# Patient Record
Sex: MALE | Race: WHITE | NOT HISPANIC OR LATINO | Employment: OTHER | URBAN - METROPOLITAN AREA
[De-identification: names, ages, dates, MRNs, and addresses within clinical notes are randomized per-mention and may not be internally consistent; named-entity substitution may affect disease eponyms.]

---

## 2018-02-26 ENCOUNTER — HOSPITAL ENCOUNTER (OUTPATIENT)
Dept: LAB | Facility: HOSPITAL | Age: 58
Discharge: HOME | End: 2018-02-26
Attending: INTERNAL MEDICINE | Admitting: INTERNAL MEDICINE

## 2018-02-26 LAB
ALBUMIN SERPL-MCNC: 4.3 G/DL (ref 3.5–4.8)
ALBUMIN/GLOB SERPL: 1.6 {RATIO} (ref 1–1.7)
ALP SERPL-CCNC: 76 IU/L (ref 32–91)
ALT SERPL-CCNC: 22 IU/L (ref 17–63)
ANION GAP SERPL CALC-SCNC: 10.7 MMOL/L (ref 10–20)
AST SERPL-CCNC: 26 IU/L (ref 15–41)
BASOPHILS # BLD AUTO: 0.1 10*3/UL (ref 0–0.2)
BASOPHILS NFR BLD AUTO: 1 % (ref 0–2)
BILIRUB SERPL-MCNC: 0.4 MG/DL (ref 0.3–1.2)
BUN SERPL-MCNC: 15 MG/DL (ref 8–20)
BUN/CREAT SERPL: 15 (ref 6.2–20.3)
CALCIUM SERPL-MCNC: 9 MG/DL (ref 8.9–10.3)
CHLORIDE SERPL-SCNC: 104 MMOL/L (ref 101–111)
CONV CO2: 27 MMOL/L (ref 22–32)
CONV TOTAL PROTEIN: 7 G/DL (ref 6.1–7.9)
CREAT UR-MCNC: 1 MG/DL (ref 0.7–1.2)
DIFFERENTIAL METHOD BLD: (no result)
DIGOXIN SERPL-MCNC: 0.2 NG/ML (ref 0.8–2)
EOSINOPHIL # BLD AUTO: 0.2 10*3/UL (ref 0–0.3)
EOSINOPHIL # BLD AUTO: 3 % (ref 0–3)
ERYTHROCYTE [DISTWIDTH] IN BLOOD BY AUTOMATED COUNT: 15.3 % (ref 11.5–14.5)
GLOBULIN UR ELPH-MCNC: 2.7 G/DL (ref 2.5–3.8)
GLUCOSE SERPL-MCNC: 92 MG/DL (ref 65–99)
HCT VFR BLD AUTO: 39 % (ref 40–54)
HGB BLD-MCNC: 13.4 G/DL (ref 14–18)
LYMPHOCYTES # BLD AUTO: 2.3 10*3/UL (ref 0.8–4.8)
LYMPHOCYTES NFR BLD AUTO: 33 % (ref 18–42)
MAGNESIUM SERPL-MCNC: 2 MG/DL (ref 1.8–2.5)
MCH RBC QN AUTO: 29 PG (ref 26–32)
MCHC RBC AUTO-ENTMCNC: 34.3 G/DL (ref 32–36)
MCV RBC AUTO: 84.7 FL (ref 80–94)
MONOCYTES # BLD AUTO: 0.6 10*3/UL (ref 0.1–1.3)
MONOCYTES NFR BLD AUTO: 9 % (ref 2–11)
NEUTROPHILS # BLD AUTO: 3.8 10*3/UL (ref 2.3–8.6)
NEUTROPHILS NFR BLD AUTO: 54 % (ref 50–75)
NRBC BLD AUTO-RTO: 0 /100{WBCS}
NRBC/RBC NFR BLD MANUAL: 0 10*3/UL
PLATELET # BLD AUTO: 315 10*3/UL (ref 150–450)
PMV BLD AUTO: 7.7 FL (ref 7.4–10.4)
POTASSIUM SERPL-SCNC: 3.7 MMOL/L (ref 3.6–5.1)
RBC # BLD AUTO: 4.6 10*6/UL (ref 4.6–6)
SODIUM SERPL-SCNC: 138 MMOL/L (ref 136–144)
WBC # BLD AUTO: 7 10*3/UL (ref 4.5–11.5)

## 2018-04-11 ENCOUNTER — HOSPITAL ENCOUNTER (OUTPATIENT)
Dept: PREADMISSION TESTING | Facility: HOSPITAL | Age: 58
Discharge: HOME | End: 2018-04-11
Attending: INTERNAL MEDICINE | Admitting: INTERNAL MEDICINE

## 2018-04-11 LAB
ANION GAP SERPL CALC-SCNC: 11 MMOL/L (ref 10–20)
APTT BLD: 23.1 SEC (ref 24–31)
BACTERIA SPEC AEROBE CULT: NORMAL
BASOPHILS # BLD AUTO: 0.1 10*3/UL (ref 0–0.2)
BASOPHILS NFR BLD AUTO: 2 % (ref 0–2)
BUN SERPL-MCNC: 11 MG/DL (ref 8–20)
BUN/CREAT SERPL: 12.2 (ref 6.2–20.3)
CALCIUM SERPL-MCNC: 8.7 MG/DL (ref 8.9–10.3)
CHLORIDE SERPL-SCNC: 104 MMOL/L (ref 101–111)
CONV CO2: 27 MMOL/L (ref 22–32)
CREAT UR-MCNC: 0.9 MG/DL (ref 0.7–1.2)
DIFFERENTIAL METHOD BLD: (no result)
EOSINOPHIL # BLD AUTO: 0.2 10*3/UL (ref 0–0.3)
EOSINOPHIL # BLD AUTO: 3 % (ref 0–3)
ERYTHROCYTE [DISTWIDTH] IN BLOOD BY AUTOMATED COUNT: 14.6 % (ref 11.5–14.5)
GLUCOSE SERPL-MCNC: 87 MG/DL (ref 65–99)
HCT VFR BLD AUTO: 39 % (ref 40–54)
HGB BLD-MCNC: 13 G/DL (ref 14–18)
INR PPP: 0.9
LYMPHOCYTES # BLD AUTO: 1.8 10*3/UL (ref 0.8–4.8)
LYMPHOCYTES NFR BLD AUTO: 35 % (ref 18–42)
Lab: NORMAL
MCH RBC QN AUTO: 28.5 PG (ref 26–32)
MCHC RBC AUTO-ENTMCNC: 33.3 G/DL (ref 32–36)
MCV RBC AUTO: 85.7 FL (ref 80–94)
MICRO REPORT STATUS: NORMAL
MONOCYTES # BLD AUTO: 0.6 10*3/UL (ref 0.1–1.3)
MONOCYTES NFR BLD AUTO: 11 % (ref 2–11)
NEUTROPHILS # BLD AUTO: 2.5 10*3/UL (ref 2.3–8.6)
NEUTROPHILS NFR BLD AUTO: 49 % (ref 50–75)
NRBC BLD AUTO-RTO: 0 /100{WBCS}
NRBC/RBC NFR BLD MANUAL: 0 10*3/UL
PLATELET # BLD AUTO: 254 10*3/UL (ref 150–450)
PMV BLD AUTO: 7.4 FL (ref 7.4–10.4)
POTASSIUM SERPL-SCNC: 4 MMOL/L (ref 3.6–5.1)
PROTHROMBIN TIME: 9.8 SEC (ref 9.6–11.7)
RBC # BLD AUTO: 4.55 10*6/UL (ref 4.6–6)
SODIUM SERPL-SCNC: 138 MMOL/L (ref 136–144)
SPECIMEN SOURCE: NORMAL
WBC # BLD AUTO: 5.1 10*3/UL (ref 4.5–11.5)

## 2018-12-18 ENCOUNTER — HOSPITAL ENCOUNTER (OUTPATIENT)
Dept: OTHER | Facility: HOSPITAL | Age: 58
Discharge: HOME | End: 2018-12-18
Attending: FAMILY MEDICINE | Admitting: FAMILY MEDICINE

## 2018-12-28 ENCOUNTER — HOSPITAL ENCOUNTER (OUTPATIENT)
Dept: GENERAL RADIOLOGY | Facility: HOSPITAL | Age: 58
Discharge: HOME | End: 2018-12-28
Attending: NURSE PRACTITIONER | Admitting: NURSE PRACTITIONER

## 2019-03-19 ENCOUNTER — HOSPITAL ENCOUNTER (OUTPATIENT)
Dept: OTHER | Facility: HOSPITAL | Age: 59
Discharge: HOME | End: 2019-03-19
Attending: FAMILY MEDICINE | Admitting: FAMILY MEDICINE

## 2019-07-01 PROBLEM — K42.9 UMBILICAL HERNIA: Status: ACTIVE | Noted: 2019-07-01

## 2019-07-01 PROBLEM — Z95.0 PRESENCE OF CARDIAC PACEMAKER: Status: ACTIVE | Noted: 2018-04-23

## 2019-07-01 PROBLEM — IMO0002 THORACIC OR LUMBOSACRAL NEURITIS OR RADICULITIS: Status: ACTIVE | Noted: 2019-07-01

## 2019-07-01 PROBLEM — I48.0 PAROXYSMAL ATRIAL FIBRILLATION (HCC): Status: ACTIVE | Noted: 2017-04-06

## 2019-07-01 PROBLEM — I45.5 SINUS PAUSE: Status: ACTIVE | Noted: 2017-06-08

## 2019-07-01 PROBLEM — J30.9 ALLERGIC RHINITIS: Status: ACTIVE | Noted: 2019-07-01

## 2019-07-01 PROBLEM — E78.5 HYPERLIPIDEMIA: Status: ACTIVE | Noted: 2019-07-01

## 2019-07-01 PROBLEM — I49.5 TACHYCARDIA-BRADYCARDIA (HCC): Status: ACTIVE | Noted: 2017-04-06

## 2019-07-01 PROBLEM — M99.79 DISC NARROWING: Status: ACTIVE | Noted: 2019-07-01

## 2019-07-01 PROBLEM — G47.00 INSOMNIA: Status: ACTIVE | Noted: 2019-07-01

## 2019-07-01 PROBLEM — K21.9 GASTROESOPHAGEAL REFLUX DISEASE: Status: ACTIVE | Noted: 2019-07-01

## 2019-07-01 PROBLEM — I10 HYPERTENSION: Status: ACTIVE | Noted: 2019-07-01

## 2019-07-01 RX ORDER — DIGOXIN 125 MCG
1 TABLET ORAL EVERY 24 HOURS
COMMUNITY
Start: 2018-06-11 | End: 2019-11-21 | Stop reason: SDUPTHER

## 2019-07-01 RX ORDER — COLCHICINE 0.6 MG/1
1 TABLET ORAL EVERY 24 HOURS
COMMUNITY
Start: 2018-01-29 | End: 2020-09-24

## 2019-07-01 RX ORDER — TAMSULOSIN HYDROCHLORIDE 0.4 MG/1
1 CAPSULE ORAL DAILY
COMMUNITY
Start: 2017-04-06 | End: 2022-11-04

## 2019-07-01 RX ORDER — IBUPROFEN 800 MG/1
1 TABLET ORAL 3 TIMES DAILY
COMMUNITY
Start: 2015-12-17 | End: 2019-10-28

## 2019-07-01 RX ORDER — AMITRIPTYLINE HYDROCHLORIDE 50 MG/1
2 TABLET, FILM COATED ORAL NIGHTLY
COMMUNITY
Start: 2015-03-13 | End: 2019-09-03 | Stop reason: SDUPTHER

## 2019-07-01 RX ORDER — ESOMEPRAZOLE MAGNESIUM 40 MG/1
1 CAPSULE, DELAYED RELEASE ORAL DAILY
COMMUNITY
Start: 2015-05-12

## 2019-07-01 RX ORDER — FLUTICASONE PROPIONATE 50 MCG
2 SPRAY, SUSPENSION (ML) NASAL DAILY
COMMUNITY
Start: 2015-08-06 | End: 2020-07-16

## 2019-07-01 RX ORDER — LISINOPRIL 20 MG/1
0.5 TABLET ORAL DAILY
COMMUNITY
Start: 2018-07-30 | End: 2020-03-09 | Stop reason: SDUPTHER

## 2019-07-01 RX ORDER — METOPROLOL SUCCINATE 25 MG/1
0.5 TABLET, EXTENDED RELEASE ORAL EVERY 24 HOURS
COMMUNITY
Start: 2019-01-11 | End: 2020-01-07 | Stop reason: SDUPTHER

## 2019-07-01 RX ORDER — LORATADINE 10 MG/1
1 TABLET ORAL DAILY
COMMUNITY
Start: 2015-08-06 | End: 2021-02-02

## 2019-07-01 RX ORDER — GABAPENTIN 600 MG/1
1 TABLET ORAL 4 TIMES DAILY
COMMUNITY
Start: 2013-09-10 | End: 2020-03-05

## 2019-07-01 RX ORDER — LANOLIN ALCOHOL/MO/W.PET/CERES
50 CREAM (GRAM) TOPICAL DAILY
COMMUNITY
End: 2021-02-02

## 2019-07-02 ENCOUNTER — OFFICE VISIT (OUTPATIENT)
Dept: FAMILY MEDICINE CLINIC | Facility: CLINIC | Age: 59
End: 2019-07-02

## 2019-07-02 VITALS
RESPIRATION RATE: 18 BRPM | WEIGHT: 187 LBS | HEART RATE: 74 BPM | HEIGHT: 71 IN | OXYGEN SATURATION: 98 % | BODY MASS INDEX: 26.18 KG/M2 | TEMPERATURE: 98.5 F

## 2019-07-02 DIAGNOSIS — L01.00 IMPETIGO: ICD-10-CM

## 2019-07-02 DIAGNOSIS — R09.89 LABILE BLOOD PRESSURE: ICD-10-CM

## 2019-07-02 DIAGNOSIS — B35.9 TINEA: Primary | ICD-10-CM

## 2019-07-02 PROCEDURE — 99213 OFFICE O/P EST LOW 20 MIN: CPT | Performed by: FAMILY MEDICINE

## 2019-07-02 RX ORDER — CARISOPRODOL 350 MG/1
TABLET ORAL
Refills: 0 | COMMUNITY
Start: 2019-05-20 | End: 2019-10-28

## 2019-07-02 RX ORDER — CEPHALEXIN 500 MG/1
500 CAPSULE ORAL 3 TIMES DAILY
Qty: 30 CAPSULE | Refills: 0 | Status: SHIPPED | OUTPATIENT
Start: 2019-07-02 | End: 2019-10-28

## 2019-07-02 NOTE — PROGRESS NOTES
Subjective   Steven Hammonds is a 58 y.o. male.     Open wound between left big and second toe. Using OTC athletes cream.       Foot Pain   The current episode started 1 to 4 weeks ago (1 week). The problem occurs constantly. The problem has been gradually worsening. Associated symptoms include a rash. Pertinent negatives include no abdominal pain, chest pain, fatigue, fever, joint swelling, nausea, neck pain, numbness, swollen glands, vomiting or weakness. Nothing aggravates the symptoms. The treatment provided no relief.   Hypertension   This is a chronic problem. The problem has been waxing and waning since onset. The problem is controlled. Pertinent negatives include no chest pain, neck pain or shortness of breath. There are no compliance problems.      Has appt with cardiology in a month    The following portions of the patient's history were reviewed and updated as appropriate: allergies, current medications, past family history, past medical history, past social history, past surgical history and problem list.    Patient Active Problem List   Diagnosis   • Degeneration of lumbar or lumbosacral intervertebral disc   • Disc narrowing   • Encounter for other specified aftercare   • Gastroesophageal reflux disease   • Hypertension   • Paroxysmal atrial fibrillation (CMS/HCC)   • Spinal stenosis   • Spondylolysis   • Status post arthroscopy of knee   • Sinus pause   • Tachycardia-bradycardia (CMS/HCC)   • Thoracic or lumbosacral neuritis or radiculitis   • Tremor   • Presence of cardiac pacemaker   • Allergic rhinitis   • Hyperlipidemia   • Insomnia   • Umbilical hernia   • Tinea   • Impetigo   • Labile blood pressure       Current Outpatient Medications on File Prior to Visit   Medication Sig Dispense Refill   • amitriptyline (ELAVIL) 50 MG tablet Take 2 tablets by mouth Every Night.     • carisoprodol (SOMA) 350 MG tablet TK 1 T PO TID  0   • digoxin (DIGOX) 125 MCG tablet Take 1 tablet by mouth Daily.     •  esomeprazole (NEXIUM) 40 MG capsule Take 1 capsule by mouth Daily.     • fluticasone (FLONASE) 50 MCG/ACT nasal spray 2 sprays into the nostril(s) as directed by provider Daily.     • gabapentin (NEURONTIN) 600 MG tablet Take 1 tablet by mouth 4 (Four) Times a Day.     • ibuprofen (ADVIL,MOTRIN) 800 MG tablet Take 1 tablet by mouth 3 (Three) Times a Day.     • lisinopril (PRINIVIL,ZESTRIL) 20 MG tablet Take 1 tablet by mouth 2 (Two) Times a Day.     • loratadine (CLARITIN) 10 MG tablet Take 1 tablet by mouth Daily.     • metoprolol succinate XL (TOPROL XL) 25 MG 24 hr tablet Take 0.5 tablets by mouth Daily.     • tamsulosin (FLOMAX) 0.4 MG capsule 24 hr capsule Take 1 capsule by mouth Daily.     • colchicine (COLCRYS) 0.6 MG tablet Take 1 tablet by mouth Daily.     • etanercept (ENBREL) 50 MG/ML solution prefilled syringe injection Inject  under the skin into the appropriate area as directed.     • vitamin B-6 (PYRIDOXINE) 50 MG tablet Take 50 mg by mouth Daily.       No current facility-administered medications on file prior to visit.        Allergies   Allergen Reactions   • Oxycodone-Acetaminophen GI Intolerance       Review of Systems   Constitutional: Negative for activity change, appetite change, fatigue and fever.   HENT: Negative for ear pain and voice change.    Eyes: Negative for visual disturbance.   Respiratory: Negative for shortness of breath and wheezing.    Cardiovascular: Negative for chest pain and leg swelling.   Gastrointestinal: Negative for abdominal pain, blood in stool, constipation, diarrhea, nausea and vomiting.   Endocrine: Negative for polydipsia and polyuria.   Genitourinary: Negative for dysuria, frequency and hematuria.   Musculoskeletal: Negative for joint swelling, neck pain and neck stiffness.   Skin: Positive for rash. Negative for bruise.   Neurological: Negative for weakness, numbness and headache.   Psychiatric/Behavioral: Negative for suicidal ideas and depressed mood.        Objective   Physical Exam   Constitutional: He is oriented to person, place, and time. He appears well-developed and well-nourished.   Eyes: Conjunctivae and EOM are normal. Pupils are equal, round, and reactive to light.   Neck: Normal range of motion. Neck supple.   Cardiovascular: Normal rate, regular rhythm and normal heart sounds.   Pulmonary/Chest: Effort normal and breath sounds normal.   Abdominal: Soft. Bowel sounds are normal.   Musculoskeletal: Normal range of motion.   Neurological: He is alert and oriented to person, place, and time.   Skin: Skin is warm and dry.   Psychiatric: He has a normal mood and affect. His behavior is normal. Judgment and thought content normal.         Assessment/Plan   Problem List Items Addressed This Visit        Cardiovascular and Mediastinum    Labile blood pressure    Overview     Recc follow up with cardiology            Musculoskeletal and Integument    Impetigo    Relevant Medications    cephalexin (KEFLEX) 500 MG capsule       Other    Tinea - Primary        A high fiber diet with plenty of fluids (up to 8 glasses of water daily) is suggested to relieve these symptoms.  Metamucil, 1 tablespoon once or twice daily can be used to keep bowels regular if needed.  Medication and medication adverse effects discussed.  Drug education given and explained to patient. Patient verbalized understanding.  OTC meds discussed.  Follow-up in 2 weeks if not better.  Follow-up sooner for worsening symptoms or for any concerns.

## 2019-07-09 ENCOUNTER — TELEPHONE (OUTPATIENT)
Dept: FAMILY MEDICINE CLINIC | Facility: CLINIC | Age: 59
End: 2019-07-09

## 2019-07-09 NOTE — TELEPHONE ENCOUNTER
Spoke with wife.  She will let him know.  She says not to do anything until pt calls back and requests change.

## 2019-07-09 NOTE — TELEPHONE ENCOUNTER
Pt's wife called.  Pt needs PA on Soma.  PA submitted online thru [employer].  Waiting on authorization.

## 2019-07-09 NOTE — TELEPHONE ENCOUNTER
[employer] WILL NOT COVER CARISOPRODOL 350MG  -    HOWEVER IT WILL COVER  TIZANIDINE HCL TAB OR CAP -      [phone]    PHONE

## 2019-07-10 ENCOUNTER — TELEPHONE (OUTPATIENT)
Dept: FAMILY MEDICINE CLINIC | Facility: CLINIC | Age: 59
End: 2019-07-10

## 2019-07-10 NOTE — TELEPHONE ENCOUNTER
His insurance is not covering his carisoprodol tabs the alternative is tizanidine hcl tabs or caps. Ref #[id number]

## 2019-07-11 RX ORDER — TIZANIDINE 4 MG/1
4 TABLET ORAL 3 TIMES DAILY
Qty: 180 TABLET | Refills: 3 | Status: SHIPPED | OUTPATIENT
Start: 2019-07-11 | End: 2020-03-31

## 2019-07-12 ENCOUNTER — TELEPHONE (OUTPATIENT)
Dept: FAMILY MEDICINE CLINIC | Facility: CLINIC | Age: 59
End: 2019-07-12

## 2019-07-12 NOTE — TELEPHONE ENCOUNTER
Notified pt we did PA and they denied it. They said they would cover Tizanidine so that was sent in.

## 2019-07-23 ENCOUNTER — TRANSCRIBE ORDERS (OUTPATIENT)
Dept: ADMINISTRATIVE | Facility: HOSPITAL | Age: 59
End: 2019-07-23

## 2019-07-23 ENCOUNTER — HOSPITAL ENCOUNTER (OUTPATIENT)
Dept: GENERAL RADIOLOGY | Facility: HOSPITAL | Age: 59
Discharge: HOME | End: 2019-07-23
Admitting: NURSE PRACTITIONER

## 2019-07-23 DIAGNOSIS — L40.53 PSORIATIC SPONDYLITIS (HCC): Primary | ICD-10-CM

## 2019-07-23 DIAGNOSIS — Z79.899 DRUG THERAPY: ICD-10-CM

## 2019-07-23 DIAGNOSIS — L40.53 PSORIATIC SPONDYLITIS (HCC): ICD-10-CM

## 2019-07-23 PROCEDURE — 71046 X-RAY EXAM CHEST 2 VIEWS: CPT

## 2019-08-21 ENCOUNTER — CLINICAL SUPPORT NO REQUIREMENTS (OUTPATIENT)
Dept: CARDIOLOGY | Facility: CLINIC | Age: 59
End: 2019-08-21

## 2019-08-21 DIAGNOSIS — I49.5 TACHY-BRADY SYNDROME (HCC): Primary | ICD-10-CM

## 2019-08-21 DIAGNOSIS — Z95.0 PACEMAKER: ICD-10-CM

## 2019-08-26 ENCOUNTER — OFFICE VISIT (OUTPATIENT)
Dept: CARDIOLOGY | Facility: CLINIC | Age: 59
End: 2019-08-26

## 2019-08-26 ENCOUNTER — CLINICAL SUPPORT NO REQUIREMENTS (OUTPATIENT)
Dept: CARDIOLOGY | Facility: CLINIC | Age: 59
End: 2019-08-26

## 2019-08-26 VITALS
DIASTOLIC BLOOD PRESSURE: 93 MMHG | HEART RATE: 61 BPM | SYSTOLIC BLOOD PRESSURE: 155 MMHG | BODY MASS INDEX: 25.43 KG/M2 | OXYGEN SATURATION: 99 % | WEIGHT: 179.75 LBS

## 2019-08-26 DIAGNOSIS — I48.0 PAROXYSMAL ATRIAL FIBRILLATION (HCC): ICD-10-CM

## 2019-08-26 DIAGNOSIS — I10 ESSENTIAL HYPERTENSION: Primary | ICD-10-CM

## 2019-08-26 DIAGNOSIS — E78.2 MIXED HYPERLIPIDEMIA: ICD-10-CM

## 2019-08-26 DIAGNOSIS — I49.5 TACHYCARDIA-BRADYCARDIA (HCC): ICD-10-CM

## 2019-08-26 DIAGNOSIS — I45.5 SINUS PAUSE: ICD-10-CM

## 2019-08-26 DIAGNOSIS — I48.0 PAROXYSMAL ATRIAL FIBRILLATION (HCC): Primary | ICD-10-CM

## 2019-08-26 DIAGNOSIS — Z95.0 PRESENCE OF CARDIAC PACEMAKER: ICD-10-CM

## 2019-08-26 PROCEDURE — 93000 ELECTROCARDIOGRAM COMPLETE: CPT | Performed by: INTERNAL MEDICINE

## 2019-08-26 PROCEDURE — 99214 OFFICE O/P EST MOD 30 MIN: CPT | Performed by: INTERNAL MEDICINE

## 2019-08-26 PROCEDURE — 93280 PM DEVICE PROGR EVAL DUAL: CPT | Performed by: INTERNAL MEDICINE

## 2019-08-26 RX ORDER — DICLOFENAC SODIUM AND MISOPROSTOL 75; 200 MG/1; UG/1
1 TABLET, DELAYED RELEASE ORAL 2 TIMES DAILY
COMMUNITY
End: 2021-03-09

## 2019-08-26 NOTE — PROGRESS NOTES
Encounter Date:08/26/2019  Last seen 2/11/2019      Patient ID: Steven Hammonds is a 59 y.o. male.    Chief Complaint:  Status post pacemaker  Hypertension  CKD 3  History of palpitations and near syncope  Acute visit-patient is having blood pressure issues.    History of Present Illness  Patient has been having some blood pressure issues.  Patient has been having dizziness with low blood pressure.    Since I have last seen, the patient has been without any chest discomfort ,shortness of breath, palpitations, or syncope.  Denies having any headache ,abdominal pain ,nausea, vomiting , diarrhea constipation, loss of weight or loss of appetite.  Denies having any excessive bruising ,hematuria or blood in the stool.    Review of all systems negative except as indicated      Assessment and Plan       [[[[[[[[[[[[[[[[[[[[[  Impression  ============  -status post permanent dual-chamber pacemaker implantation ([employer] MRI Compatible ) 04/13/2018  - sick sinus syndrome-tachy-irvin syndrome.  Patient has history of atrial fibrillation with rapid ventricular response.  Patient had significant pauses of 5-6 seconds.  Symptomatic with dizziness and near-syncope    - history of Near-syncope associated with palpitations and dizziness .  -improved    -cardiac catheterization 03/18/2017 revealed normal left ventricular function and normal coronary arteries.  History of normal echocardiogram    -status post loop recorder placement 03/24/2017. -removal of loop recorder 04/13/2018.    -hypertension  Renal dysfunction BUN 17 creatinine 1.4 GERD depression Psoriatic arthritis Hypokalemia    -history of Anemia and neutropenia    -Status post hip replacement bilateral arthroscopic knee surgery right shoulder surgery  CBP- with chronic myelopathy, s/p multiple lumbar surgeries; continue soma, gabapentin    -Allergic to Percocet  ================  Plan  ==============  EKG showed atrial paced ventricular sensed rhythm.  Patient is  having dizzy spells with low blood pressure.  Interrogation of the pacemaker revealed excellent pacing parameters.  Intermittent atrial high rates were noted.  Medications were reviewed and updated.  Change lisinopril to 10 mg twice a day (20 mg tablet)  Patient is only taking 12.5 mg of metoprolol due to problems with constipation.  Hopefully blood pressure will be more steady.   followup in the office in 6 months with pacemaker interrogation  [[[[[[[[[[[[[[[[[[[[[[[[[[[[[[[[           Diagnosis Plan   1. Essential hypertension     2. Paroxysmal atrial fibrillation (CMS/HCC)     3. Sinus pause     4. Tachycardia-bradycardia (CMS/HCC)     5. Presence of cardiac pacemaker     6. Mixed hyperlipidemia     LAB RESULTS (LAST 7 DAYS)    CBC        BMP        CMP         BNP        TROPONIN        CoAg        Creatinine Clearance  CrCl cannot be calculated (Patient's most recent lab result is older than the maximum 30 days allowed.).    ABG        Radiology  No radiology results for the last day                The following portions of the patient's history were reviewed and updated as appropriate: allergies, current medications, past family history, past medical history, past social history, past surgical history and problem list.    Review of Systems   Constitution: Positive for malaise/fatigue.   Cardiovascular: Positive for chest pain, leg swelling (hands and feet) and palpitations. Negative for syncope.   Respiratory: Negative for shortness of breath.    Skin: Negative for rash.   Gastrointestinal: Positive for nausea. Negative for vomiting.   Neurological: Positive for dizziness, light-headedness and numbness.         Current Outpatient Medications:   •  amitriptyline (ELAVIL) 50 MG tablet, Take 2 tablets by mouth Every Night., Disp: , Rfl:   •  colchicine (COLCRYS) 0.6 MG tablet, Take 1 tablet by mouth Daily., Disp: , Rfl:   •  diclofenac-misoprostol (ARTHROTEC 75) 75-0.2 MG EC tablet, Take 1 tablet by mouth 2 (Two)  Times a Day., Disp: , Rfl:   •  digoxin (DIGOX) 125 MCG tablet, Take 1 tablet by mouth Daily., Disp: , Rfl:   •  etanercept (ENBREL) 50 MG/ML solution prefilled syringe injection, Inject  under the skin into the appropriate area as directed., Disp: , Rfl:   •  fluticasone (FLONASE) 50 MCG/ACT nasal spray, 2 sprays into the nostril(s) as directed by provider Daily., Disp: , Rfl:   •  gabapentin (NEURONTIN) 600 MG tablet, Take 1 tablet by mouth 4 (Four) Times a Day., Disp: , Rfl:   •  lisinopril (PRINIVIL,ZESTRIL) 20 MG tablet, Take 1 tablet by mouth 2 (Two) Times a Day., Disp: , Rfl:   •  loratadine (CLARITIN) 10 MG tablet, Take 1 tablet by mouth Daily., Disp: , Rfl:   •  metoprolol succinate XL (TOPROL XL) 25 MG 24 hr tablet, Take 0.5 tablets by mouth Daily., Disp: , Rfl:   •  tamsulosin (FLOMAX) 0.4 MG capsule 24 hr capsule, Take 1 capsule by mouth Daily., Disp: , Rfl:   •  tiZANidine (ZANAFLEX) 4 MG tablet, Take 1 tablet by mouth 3 (Three) Times a Day., Disp: 180 tablet, Rfl: 3  •  vitamin B-6 (PYRIDOXINE) 50 MG tablet, Take 50 mg by mouth Daily., Disp: , Rfl:   •  carisoprodol (SOMA) 350 MG tablet, TK 1 T PO TID, Disp: , Rfl: 0  •  cephalexin (KEFLEX) 500 MG capsule, Take 1 capsule by mouth 3 (Three) Times a Day., Disp: 30 capsule, Rfl: 0  •  esomeprazole (NEXIUM) 40 MG capsule, Take 1 capsule by mouth Daily., Disp: , Rfl:   •  ibuprofen (ADVIL,MOTRIN) 800 MG tablet, Take 1 tablet by mouth 3 (Three) Times a Day., Disp: , Rfl:     Allergies   Allergen Reactions   • Oxycodone-Acetaminophen GI Intolerance   • Adhesive Tape Unknown (See Comments)   • Oxycodone Unknown (See Comments)       Family History   Problem Relation Age of Onset   • Other Father         Substance Abuse   • Cancer Father         Lung       Past Surgical History:   Procedure Laterality Date   • CARDIAC PACEMAKER PLACEMENT     • KNEE SURGERY Right 2016   • LUMBAR DISCECTOMY  10/2015    Comments: lumbar disk decompression   • TOTAL HIP ARTHROPLASTY  Right 12/09/2016       Past Medical History:   Diagnosis Date   • Acute left ankle pain     Impression: xray neg on prelim read. Findings discussed. All questions answered. Awaiting final radiology evaluation. Natural course and self-limited nature of this condition discussed. Follow-up in 4-6 weeks if not better. Follow-up sooner for worsening symptoms or for any concerns.   • Allergic rhinitis     Impression: persistent   • Annual physical exam     Impression: Discussed anticipatory guidance, diet, exercise, and weight loss. Discussed safety, seatbelts, and routine screening examinations. Discussed self-examinations.   • Arthralgia of right hip     Impression: needs right hip relacement   • Bobby's esophagus with dysplasia     Impression: scope 11/2013 improved from previous. Recheck due in 2015 Packet given   • Bronchitis     Impression: resolved   • Bruising     Impression: appears benign, no other areas noted. Natural course and self-limited nature of this condition discussed. Will follow.   • Cervical disc disease with myelopathy     Impression: seen on MRi 5/2012. Workup in progress.   • Chronic low back pain with sciatica     Unspecified. Impression: scheduled for surgery   • Chronic pain     Impression: severe constipation with opana. Try MS contin   • Chronic pain of right knee     Impression: needs knee replacement   • Colon cancer screening     Impression: packet given   • Conductive hearing loss     Impression: hfhl right. Refer to audiology. Question AR. Start flonase   • Constipation    • Degeneration of lumbar intervertebral disc     Story: history of lumbar surgery. s/p epidural x3, last 6/18/15 Dr Ge. Impression: on ibuprofen   • ED (erectile dysfunction)    • Esophageal reflux    • Esophagitis     Impression: Past due for repeat EGD. Previous physician no longer covered under insurance. Recommend refer to gastroentrreology. Pt has packet to fill out for GSI.   • Foot pain, bilateral    •  Gastroenteritis, acute     Impression: try phenergan, lomotil.   • Headache     Impression: Musculoskeletal. Baclofen tid prn.   • History of tobacco use    • Hydrocele    • Hyperglycemia     Impression: incidential finding   • Hypertension, benign     Impression: stable. Low after surgery. Will follow.   • Insomnia    • Intervertebral disc disorder with myelopathy, lumbar region     Impression: Changed neurontin to hs, increased to 1800 mg at hs.   • Left leg weakness     Impression: due to recent surgery. May benefit for home health. Pt homebound due severe pain postop back surgery. Also needs 3-in-1 Commode   • Left medial knee pain     Impression: Likely OA. Findings discussed. All questions answered. Keep follow-up with his ortho. Xrays today   • Localized primary osteoarthritis of right lower leg     Impression: scope planned   • Medicare annual wellness visit, subsequent     With abnormal findings.   • Mixed hyperlipidemia     Story: Stable, Improved, Compliant with meds Is getting adequate diet and exercise Goals developed at last visit were met Follow up in 6 months Care management needs are self addressed. Would not benefit from care management Self-Management abilities addressed and patient is capable of managing his/her own disease   • Need for prophylactic vaccination and inoculation against influenza    • Nocturia    • Non-seasonal allergic rhinitis due to pollen    • Other specified persistent mood disorders (CMS/[facility])     Story: stress   • Overweight (BMI 25.0-29.9)    • Pain of left clavicle     Impression: likely arthritic   • Palpitations     Impression: labs, holter normal. Elevated HR likely just related to pain   • Paronychia of finger of left hand     Impression: Continued pain and discomfort in the finger. There is little to no inflammation. He was started on Bactrim DS and advised to soak his finger in warm water and epson salt.   • Proctitis     Impression: likely based on colonoscopy  findings. Wife had VGE week prior. Will get path results of biopsies taken at time of endoscopy to eval for crohns   • Psoriatic arthritis (CMS/HCC)     Impression: refer to Dr Rodriguez   • Rash     Impression: exam more consistent with psitiasis rosea. Findings discussed. All questions answered. Reassurance, education. Natural course and self-limited nature of this condition discussed. Follow up if worsens or persists.   • Screening for alcoholism    • Screening for depression    • Scrotal mass    • Tendonitis     Impression: right biceps. Findings discussed. All questions answered. Medication and medication adverse effects discussed. Drug education given and explained to patient. Patient verbalized understanding. Follow-up in 2 weeks if not better. Follow-up sooner for worsening symptoms or for any concerns.   • Tick bite     Initial encounter. Impression: with buuseye rash. Findings discussed. All questions answered. Medication and medication adverse effects discussed. Drug education given and explained to patient. Patient verbalized understanding.   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 Natural course and self-limited nature of this condition discussed. Follow-up in 4-6 weeks if not better. Follow-up sooner for worsening symptoms or for any concerns. EMG if not improved   • Umbilical hernia    • Unspecified contact dermatitis, unspecified cause     Impression: He was started on Elocon cream to help with his symptoms. He was encouraged to RTC if it worsens.       Family History   Problem Relation Age of Onset   • Other Father         Substance Abuse   • Cancer Father         Lung       Social History     Socioeconomic History   • Marital status:      Spouse name: Not on file   • Number of children: Not on file   • Years of education: Not  on file   • Highest education level: Not on file   Tobacco Use   • Smoking status: Former Smoker   Substance and Sexual Activity   • Alcohol use: No     Frequency: Never   • Drug use: No           ECG 12 Lead  Date/Time: 8/26/2019 12:11 PM  Performed by: Claudia Benson MD  Authorized by: Claudia Benson MD   Comparison: compared with previous ECG   Comments: Atrial paced ventricular sensed rhythm 60/min nonspecific ST-T wave changes no ectopy normal axis normal intervals.  No change from 2/11/2019              Objective:       Physical Exam    /93 (BP Location: Left arm, Patient Position: Sitting, Cuff Size: Adult)   Pulse 61   Wt 81.5 kg (179 lb 12 oz)   SpO2 99%   BMI 25.43 kg/m²   The patient is alert, oriented and in no distress.    Vital signs as noted above.    Head and neck revealed no carotid bruits or jugular venous distension.  No thyromegaly or lymphadenopathy is present.    Lungs clear.  No wheezing.  Breath sounds are normal bilaterally.    Heart normal first and second heart sounds.  No murmur..  No pericardial rub is present.  No gallop is present.    Abdomen soft and nontender.  No organomegaly is present.    Extremities revealed good peripheral pulses without any pedal edema.    Skin warm and dry.  Pacemaker site looks normal.    Musculoskeletal system is grossly normal.    CNS grossly normal.

## 2019-09-03 DIAGNOSIS — M51.37 DEGENERATION OF LUMBAR OR LUMBOSACRAL INTERVERTEBRAL DISC: Primary | ICD-10-CM

## 2019-09-03 RX ORDER — AMITRIPTYLINE HYDROCHLORIDE 50 MG/1
TABLET, FILM COATED ORAL
Qty: 180 TABLET | Refills: 4 | Status: SHIPPED | OUTPATIENT
Start: 2019-09-03 | End: 2020-12-01

## 2019-10-28 ENCOUNTER — OFFICE VISIT (OUTPATIENT)
Dept: FAMILY MEDICINE CLINIC | Facility: CLINIC | Age: 59
End: 2019-10-28

## 2019-10-28 VITALS
RESPIRATION RATE: 18 BRPM | HEIGHT: 71 IN | HEART RATE: 90 BPM | WEIGHT: 177.4 LBS | OXYGEN SATURATION: 96 % | BODY MASS INDEX: 24.84 KG/M2 | TEMPERATURE: 97.6 F

## 2019-10-28 DIAGNOSIS — E78.2 MIXED HYPERLIPIDEMIA: ICD-10-CM

## 2019-10-28 DIAGNOSIS — R21 RASH: ICD-10-CM

## 2019-10-28 DIAGNOSIS — R09.89 LABILE BLOOD PRESSURE: ICD-10-CM

## 2019-10-28 DIAGNOSIS — Z12.5 PROSTATE CANCER SCREENING: ICD-10-CM

## 2019-10-28 DIAGNOSIS — K21.9 GASTROESOPHAGEAL REFLUX DISEASE, ESOPHAGITIS PRESENCE NOT SPECIFIED: ICD-10-CM

## 2019-10-28 DIAGNOSIS — I10 ESSENTIAL HYPERTENSION: ICD-10-CM

## 2019-10-28 DIAGNOSIS — I48.0 PAROXYSMAL ATRIAL FIBRILLATION (HCC): ICD-10-CM

## 2019-10-28 DIAGNOSIS — Z00.00 MEDICARE ANNUAL WELLNESS VISIT, SUBSEQUENT: Primary | ICD-10-CM

## 2019-10-28 PROBLEM — K22.719 BARRETT'S ESOPHAGUS WITH DYSPLASIA: Status: ACTIVE | Noted: 2019-10-28

## 2019-10-28 PROBLEM — M54.40 CHRONIC LOW BACK PAIN WITH SCIATICA: Status: ACTIVE | Noted: 2019-10-28

## 2019-10-28 PROBLEM — G89.29 CHRONIC LOW BACK PAIN WITH SCIATICA: Status: ACTIVE | Noted: 2019-10-28

## 2019-10-28 PROBLEM — J30.1 NON-SEASONAL ALLERGIC RHINITIS DUE TO POLLEN: Status: ACTIVE | Noted: 2019-10-28

## 2019-10-28 LAB
BILIRUB BLD-MCNC: NEGATIVE MG/DL
CLARITY, POC: CLEAR
COLOR UR: YELLOW
GLUCOSE UR STRIP-MCNC: NEGATIVE MG/DL
KETONES UR QL: NEGATIVE
LEUKOCYTE EST, POC: NEGATIVE
NITRITE UR-MCNC: NEGATIVE MG/ML
PH UR: 7 [PH] (ref 5–8)
PROT UR STRIP-MCNC: NEGATIVE MG/DL
RBC # UR STRIP: NEGATIVE /UL
SP GR UR: 1 (ref 1–1.03)
UROBILINOGEN UR QL: NORMAL

## 2019-10-28 PROCEDURE — 99213 OFFICE O/P EST LOW 20 MIN: CPT | Performed by: FAMILY MEDICINE

## 2019-10-28 PROCEDURE — 96160 PT-FOCUSED HLTH RISK ASSMT: CPT | Performed by: FAMILY MEDICINE

## 2019-10-28 PROCEDURE — 81003 URINALYSIS AUTO W/O SCOPE: CPT | Performed by: FAMILY MEDICINE

## 2019-10-28 PROCEDURE — G0439 PPPS, SUBSEQ VISIT: HCPCS | Performed by: FAMILY MEDICINE

## 2019-10-28 NOTE — PROGRESS NOTES
The ABCs of the Annual Wellness Visit  Subsequent Medicare Wellness Visit    Chief Complaint   Patient presents with   • Medicare Wellness-subsequent   • Hyperlipidemia   • Hypertension       Subjective   History of Present Illness:  Steven Hammonds is a 59 y.o. male who presents for a Subsequent Medicare Wellness Visit.    HEALTH RISK ASSESSMENT    Recent Hospitalizations:  No hospitalization(s) within the last year.    Current Medical Providers:  Patient Care Team:  Junaid Marquez MD as PCP - General  Junaid Marquez MD as PCP - Family Medicine  Junaid Marquez MD    Smoking Status:  Social History     Tobacco Use   Smoking Status Former Smoker   Smokeless Tobacco Never Used       Alcohol Consumption:  Social History     Substance and Sexual Activity   Alcohol Use No   • Frequency: Never       Depression Screen:   PHQ-2/PHQ-9 Depression Screening 10/28/2019   Little interest or pleasure in doing things 0   Feeling down, depressed, or hopeless 1   Trouble falling or staying asleep, or sleeping too much 1   Feeling tired or having little energy 0   Poor appetite or overeating 0   Feeling bad about yourself - or that you are a failure or have let yourself or your family down 0   Trouble concentrating on things, such as reading the newspaper or watching television 0   Moving or speaking so slowly that other people could have noticed. Or the opposite - being so fidgety or restless that you have been moving around a lot more than usual 0   Thoughts that you would be better off dead, or of hurting yourself in some way 0   Total Score 2   If you checked off any problems, how difficult have these problems made it for you to do your work, take care of things at home, or get along with other people? Not difficult at all       Fall Risk Screen:  JENNIFERADI Fall Risk Assessment has not been completed.    Health Habits and Functional and Cognitive Screening:  Functional & Cognitive Status 10/28/2019   Do  you have difficulty preparing food and eating? No   Do you have difficulty bathing yourself, getting dressed or grooming yourself? No   Do you have difficulty using the toilet? No   Do you have difficulty moving around from place to place? Yes   Do you have trouble with steps or getting out of a bed or a chair? Yes   Current Diet Unhealthy Diet   Dental Exam Up to date   Eye Exam Up to date   Exercise (times per week) 7 times per week   Current Exercise Activities Include Stationary Bicycling/Spin Class   Do you need help using the phone?  No   Are you deaf or do you have serious difficulty hearing?  Yes   Do you need help with transportation? No   Do you need help shopping? No   Do you need help preparing meals?  No   Do you need help with housework?  No   Do you need help with laundry? No   Do you need help taking your medications? No   Do you need help managing money? No   Do you ever drive or ride in a car without wearing a seat belt? No   Have you felt unusual stress, anger or loneliness in the last month? Yes   Who do you live with? Spouse   If you need help, do you have trouble finding someone available to you? No   Have you been bothered in the last four weeks by sexual problems? No   Do you have difficulty concentrating, remembering or making decisions? No         Does the patient have evidence of cognitive impairment? No    Asprin use counseling:Taking ASA appropriately as indicated    Age-appropriate Screening Schedule:  Refer to the list below for future screening recommendations based on patient's age, sex and/or medical conditions. Orders for these recommended tests are listed in the plan section. The patient has been provided with a written plan.    Health Maintenance   Topic Date Due   • TDAP/TD VACCINES (1 - Tdap) 07/10/1978   • ZOSTER VACCINE (1 of 2) 07/10/2009   • INFLUENZA VACCINE  08/01/2019   • LIPID PANEL  10/04/2019   • COLONOSCOPY  12/05/2027          The following portions of the patient's  history were reviewed and updated as appropriate: allergies, current medications, past family history, past medical history, past social history, past surgical history and problem list.    Outpatient Medications Prior to Visit   Medication Sig Dispense Refill   • amitriptyline (ELAVIL) 50 MG tablet TAKE 2 TABLETS AT BEDTIME 180 tablet 4   • colchicine (COLCRYS) 0.6 MG tablet Take 1 tablet by mouth Daily.     • diclofenac-misoprostol (ARTHROTEC 75) 75-0.2 MG EC tablet Take 1 tablet by mouth 2 (Two) Times a Day.     • digoxin (DIGOX) 125 MCG tablet Take 1 tablet by mouth Daily.     • esomeprazole (NEXIUM) 40 MG capsule Take 1 capsule by mouth Daily.     • etanercept (ENBREL) 50 MG/ML solution prefilled syringe injection Inject  under the skin into the appropriate area as directed.     • fluticasone (FLONASE) 50 MCG/ACT nasal spray 2 sprays into the nostril(s) as directed by provider Daily.     • gabapentin (NEURONTIN) 600 MG tablet Take 1 tablet by mouth 4 (Four) Times a Day.     • lisinopril (PRINIVIL,ZESTRIL) 20 MG tablet Take 0.5 tablets by mouth Daily.     • loratadine (CLARITIN) 10 MG tablet Take 1 tablet by mouth Daily.     • metoprolol succinate XL (TOPROL XL) 25 MG 24 hr tablet Take 0.5 tablets by mouth Daily.     • tamsulosin (FLOMAX) 0.4 MG capsule 24 hr capsule Take 1 capsule by mouth Daily.     • tiZANidine (ZANAFLEX) 4 MG tablet Take 1 tablet by mouth 3 (Three) Times a Day. 180 tablet 3   • vitamin B-6 (PYRIDOXINE) 50 MG tablet Take 50 mg by mouth Daily.     • carisoprodol (SOMA) 350 MG tablet TK 1 T PO TID  0   • cephalexin (KEFLEX) 500 MG capsule Take 1 capsule by mouth 3 (Three) Times a Day. 30 capsule 0   • ibuprofen (ADVIL,MOTRIN) 800 MG tablet Take 1 tablet by mouth 3 (Three) Times a Day.       No facility-administered medications prior to visit.        Patient Active Problem List   Diagnosis   • Degeneration of lumbar or lumbosacral intervertebral disc   • Disc narrowing   • Encounter for other  "specified aftercare   • Gastroesophageal reflux disease   • Hypertension   • Paroxysmal atrial fibrillation (CMS/HCC)   • Spinal stenosis   • Spondylolysis   • Status post arthroscopy of knee   • Sinus pause   • Tachycardia-bradycardia (CMS/HCC)   • Thoracic or lumbosacral neuritis or radiculitis   • Tremor   • Presence of cardiac pacemaker   • Allergic rhinitis   • Hyperlipidemia   • Insomnia   • Umbilical hernia   • Tinea   • Impetigo   • Labile blood pressure   • Other specified persistent mood disorders (CMS/HCC)   • Non-seasonal allergic rhinitis due to pollen   • Chronic low back pain with sciatica   • Bobby's esophagus with dysplasia       Advanced Care Planning:  Patient has an advance directive - a copy has been provided and is visible in patient header    Review of Systems    Compared to one year ago, the patient feels his physical health is the same.  Compared to one year ago, the patient feels his mental health is the same.    Reviewed chart for potential of high risk medication in the elderly: yes  Reviewed chart for potential of harmful drug interactions in the elderly:yes    Objective         Vitals:    10/28/19 1416   Pulse: 90   Resp: 18   Temp: 97.6 °F (36.4 °C)   SpO2: 96%   Weight: 80.5 kg (177 lb 6.4 oz)   Height: 179.1 cm (70.5\")       Body mass index is 25.09 kg/m².  Discussed the patient's BMI with him. The BMI is in the acceptable range.    Physical Exam          Assessment/Plan   Medicare Risks and Personalized Health Plan  CMS Preventative Services Quick Reference  Chronic Pain   Colon Cancer Screening  Prostate Cancer Screening     The above risks/problems have been discussed with the patient.  Pertinent information has been shared with the patient in the After Visit Summary.  Follow up plans and orders are seen below in the Assessment/Plan Section.      Follow Up:  Return in about 6 months (around 4/28/2020).     An After Visit Summary and PPPS were given to the patient.             "

## 2019-10-28 NOTE — PATIENT INSTRUCTIONS
Medicare Wellness  Personal Prevention Plan of Service     Date of Office Visit:  10/28/2019  Encounter Provider:  Junaid Marquez MD  Place of Service:  [facility]  Patient Name: Steven Hammonds  :  1960    As part of the Medicare Wellness portion of your visit today, we are providing you with this personalized preventive plan of services (PPPS). This plan is based upon recommendations of the [place] Preventive Services Task Force (USPSTF) and the Advisory Committee on Immunization Practices (ACIP).    This lists the preventive care services that should be considered, and provides dates of when you are due. Items listed as completed are up-to-date and do not require any further intervention.    Health Maintenance   Topic Date Due   • TDAP/TD VACCINES (1 - Tdap) 07/10/1978   • ZOSTER VACCINE (1 of 2) 07/10/2009   • HEPATITIS C SCREENING  2019   • MEDICARE ANNUAL WELLNESS  2019   • INFLUENZA VACCINE  2019   • LIPID PANEL  10/04/2019   • COLONOSCOPY  2027       Orders Placed This Encounter   Procedures   • CBC Auto Differential   • Comprehensive Metabolic Panel   • DENNIS   • Rheumatoid Factor   • TSH   • Sedimentation Rate   • Rickettsial Fever Group IgG / M   • B. Burgdorferi Antibodies, WB Reflex     Standing Status:   Future     Standing Expiration Date:   10/28/2020   • Ehrlichia Profile DNA PCR   • PSA Screen   • Lipid Panel With / Chol / HDL Ratio   • POC Urinalysis Dipstick, Multipro       Return in about 6 months (around 2020).          Fall Prevention in the Home, Adult  Falls can cause injuries. They can happen to people of all ages. There are many things you can do to make your home safe and to help prevent falls. Ask for help when making these changes, if needed.  What actions can I take to prevent falls?  General Instructions  · Use good lighting in all rooms. Replace any light bulbs that burn out.  · Turn on the lights  when you go into a dark area. Use night-lights.  · Keep items that you use often in easy-to-reach places. Lower the shelves around your home if necessary.  · Set up your furniture so you have a clear path. Avoid moving your furniture around.  · Do not have throw rugs and other things on the floor that can make you trip.  · Avoid walking on wet floors.  · If any of your floors are uneven, fix them.  · Add color or contrast paint or tape to clearly jarad and help you see:  ? Any grab bars or handrails.  ? First and last steps of stairways.  ? Where the edge of each step is.  · If you use a stepladder:  ? Make sure that it is fully opened. Do not climb a closed stepladder.  ? Make sure that both sides of the stepladder are locked into place.  ? Ask someone to hold the stepladder for you while you use it.  · If there are any pets around you, be aware of where they are.  What can I do in the bathroom?    · Keep the floor dry. Clean up any water that spills onto the floor as soon as it happens.  · Remove soap buildup in the tub or shower regularly.  · Use non-skid mats or decals on the floor of the tub or shower.  · Attach bath mats securely with double-sided, non-slip rug tape.  · If you need to sit down in the shower, use a plastic, non-slip stool.  · Install grab bars by the toilet and in the tub and shower. Do not use towel bars as grab bars.  What can I do in the bedroom?  · Make sure that you have a light by your bed that is easy to reach.  · Do not use any sheets or blankets that are too big for your bed. They should not hang down onto the floor.  · Have a firm chair that has side arms. You can use this for support while you get dressed.  What can I do in the kitchen?  · Clean up any spills right away.  · If you need to reach something above you, use a strong step stool that has a grab bar.  · Keep electrical cords out of the way.  · Do not use floor polish or wax that makes floors slippery. If you must use wax, use  non-skid floor wax.  What can I do with my stairs?  · Do not leave any items on the stairs.  · Make sure that you have a light switch at the top of the stairs and the bottom of the stairs. If you do not have them, ask someone to add them for you.  · Make sure that there are handrails on both sides of the stairs, and use them. Fix handrails that are broken or loose. Make sure that handrails are as long as the stairways.  · Install non-slip stair treads on all stairs in your home.  · Avoid having throw rugs at the top or bottom of the stairs. If you do have throw rugs, attach them to the floor with carpet tape.  · Choose a carpet that does not hide the edge of the steps on the stairway.  · Check any carpeting to make sure that it is firmly attached to the stairs. Fix any carpet that is loose or worn.  What can I do on the outside of my home?  · Use bright outdoor lighting.  · Regularly fix the edges of walkways and driveways and fix any cracks.  · Remove anything that might make you trip as you walk through a door, such as a raised step or threshold.  · Trim any bushes or trees on the path to your home.  · Regularly check to see if handrails are loose or broken. Make sure that both sides of any steps have handrails.  · Install guardrails along the edges of any raised decks and porches.  · Clear walking paths of anything that might make someone trip, such as tools or rocks.  · Have any leaves, snow, or ice cleared regularly.  · Use sand or salt on walking paths during winter.  · Clean up any spills in your garage right away. This includes grease or oil spills.  What other actions can I take?  · Wear shoes that:  ? Have a low heel. Do not wear high heels.  ? Have rubber bottoms.  ? Are comfortable and fit you well.  ? Are closed at the toe. Do not wear open-toe sandals.  · Use tools that help you move around (mobility aids) if they are needed. These include:  ? Canes.  ? Walkers.  ? Scooters.  ? Crutches.  · Review your  medicines with your doctor. Some medicines can make you feel dizzy. This can increase your chance of falling.  Ask your doctor what other things you can do to help prevent falls.  Where to find more information  · Centers for Disease Control and Prevention, STEADI: https://cdc.gov  · National [place] on Aging: https://ds2uidp.eliu.nih.gov  Contact a doctor if:  · You are afraid of falling at home.  · You feel weak, drowsy, or dizzy at home.  · You fall at home.  Summary  · There are many simple things that you can do to make your home safe and to help prevent falls.  · Ways to make your home safe include removing tripping hazards and installing grab bars in the bathroom.  · Ask for help when making these changes in your home.  This information is not intended to replace advice given to you by your health care provider. Make sure you discuss any questions you have with your health care provider.  Document Released: 10/14/2010 Document Revised: 08/02/2018 Document Reviewed: 08/02/2018  [employer] Interactive Patient Education © 2019 [employer]      Sit-to-Stand Exercise    The sit-to-stand exercise (also known as the chair stand or chair rise exercise) strengthens your lower body and helps you maintain or improve your mobility and independence. The goal is to do the sit-to-stand exercise without using your hands. This will be easier as you become stronger. You should always talk with your health care provider before starting any exercise program, especially if you have had recent surgery.  Do the exercise exactly as told by your health care provider and adjust it as directed. It is normal to feel mild stretching, pulling, tightness, or discomfort as you do this exercise, but you should stop right away if you feel sudden pain or your pain gets worse. Do not begin doing this exercise until told by your health care provider.  What the sit-to-stand exercise does  The sit-to-stand exercise helps to strengthen the muscles  in your thighs and the muscles in the center of your body that give you stability (core muscles). This exercise is especially helpful if:  · You have had knee or hip surgery.  · You have trouble getting up from a chair, out of a car, or off the toilet.  How to do the sit-to-stand exercise  1. Sit toward the front edge of a sturdy chair without armrests. Your knees should be bent and your feet should be flat on the floor and shoulder-width apart.  2. Place your hands lightly on each side of the seat. Keep your back and neck as straight as possible, with your chest slightly forward.  3. Breathe in slowly. Lean forward and slightly shift your weight to the front of your feet.  4. Breathe out as you slowly stand up. Use your hands as little as possible.  5. Stand and pause for a full breath in and out.  6. Breathe in as you sit down slowly. Tighten your core and abdominal muscles to control your lowering as much as possible.  7. Breathe out slowly.  8. Do this exercise 10-15 times. If needed, do it fewer times until you build up strength.  9. Rest for 1 minute, then do another set of 10-15 repetitions.  To change the difficulty of the sit-to-stand exercise  · If the exercise is too difficult, use a chair with sturdy armrests, and push off the armrests to help you come to the standing position. You can also use the armrests to help slowly lower yourself back to sitting. As this gets easier, try to use your arms less. You can also place a firm cushion or pillow on the chair to make the surface higher.  · If this exercise is too easy, do not use your arms to help raise or lower yourself. You can also wear a weighted vest, use hand weights, increase your repetitions, or try a lower chair.  General tips  · You may feel tired when starting an exercise routine. This is normal.  · You may have muscle soreness that lasts a few days. This is normal. As you get stronger, you may not feel muscle soreness.  · Use smooth, steady  movements.  · Do not  hold your breath during strength exercises. This can cause unsafe changes in your blood pressure.  · Breathe in slowly through your nose, and breathe out slowly through your mouth.  Summary  · Strengthening your lower body is an important step to help you move safely and independently.  · The sit-to-stand exercise helps strengthen the muscles in your thighs and core.  · You should always talk with your health care provider before starting any exercise program, especially if you have had recent surgery.  This information is not intended to replace advice given to you by your health care provider. Make sure you discuss any questions you have with your health care provider.  Document Released: 02/08/2018 Document Revised: 02/08/2018 Document Reviewed: 02/08/2018  [employer] Interactive Patient Education © 2019 [employer]      Advance Directive    Advance directives are legal documents that let you make choices ahead of time about your health care and medical treatment in case you become unable to communicate for yourself. Advance directives are a way for you to communicate your wishes to family, friends, and health care providers. This can help convey your decisions about end-of-life care if you become unable to communicate.  Discussing and writing advance directives should happen over time rather than all at once. Advance directives can be changed depending on your situation and what you want, even after you have signed the advance directives.  If you do not have an advance directive, some states assign family decision makers to act on your behalf based on how closely you are related to them. Each state has its own laws regarding advance directives. You may want to check with your health care provider, , or state representative about the laws in your state. There are different types of advance directives, such as:  · Medical power of .  · Living will.  · Do not resuscitate (DNR)  or do not attempt resuscitation (DNAR) order.  Health care proxy and medical power of   A health care proxy, also called a health care agent, is a person who is appointed to make medical decisions for you in cases in which you are unable to make the decisions yourself. Generally, people choose someone they know well and trust to represent their preferences. Make sure to ask this person for an agreement to act as your proxy. A proxy may have to exercise judgment in the event of a medical decision for which your wishes are not known.  A medical power of  is a legal document that names your health care proxy. Depending on the laws in your state, after the document is written, it may also need to be:  · Signed.  · Notarized.  · Dated.  · Copied.  · Witnessed.  · Incorporated into your medical record.  You may also want to appoint someone to manage your financial affairs in a situation in which you are unable to do so. This is called a durable power of  for finances. It is a separate legal document from the durable power of  for health care. You may choose the same person or someone different from your health care proxy to act as your agent in financial matters.  If you do not appoint a proxy, or if there is a concern that the proxy is not acting in your best interests, a court-appointed guardian may be designated to act on your behalf.  Living will  A living will is a set of instructions documenting your wishes about medical care when you cannot express them yourself. Health care providers should keep a copy of your living will in your medical record. You may want to give a copy to family members or friends. To alert caregivers in case of an emergency, you can place a card in your wallet to let them know that you have a living will and where they can find it. A living will is used if you become:  · Terminally ill.  · Incapacitated.  · Unable to communicate or make decisions.  Items to  consider in your living will include:  · The use or non-use of life-sustaining equipment, such as dialysis machines and breathing machines (ventilators).  · A DNR or DNAR order, which is the instruction not to use cardiopulmonary resuscitation (CPR) if breathing or heartbeat stops.  · The use or non-use of tube feeding.  · Withholding of food and fluids.  · Comfort (palliative) care when the goal becomes comfort rather than a cure.  · Organ and tissue donation.  A living will does not give instructions for distributing your money and property if you should pass away. It is recommended that you seek the advice of a  when writing a will. Decisions about taxes, beneficiaries, and asset distribution will be legally binding. This process can relieve your family and friends of any concerns surrounding disputes or questions that may come up about the distribution of your assets.  DNR or DNAR  A DNR or DNAR order is a request not to have CPR in the event that your heart stops beating or you stop breathing. If a DNR or DNAR order has not been made and shared, a health care provider will try to help any patient whose heart has stopped or who has stopped breathing. If you plan to have surgery, talk with your health care provider about how your DNR or DNAR order will be followed if problems occur.  Summary  · Advance directives are the legal documents that allow you to make choices ahead of time about your health care and medical treatment in case you become unable to communicate for yourself.  · The process of discussing and writing advance directives should happen over time. You can change the advance directives, even after you have signed them.  · Advance directives include DNR or DNAR orders, living ingram, and designating an agent as your medical power of .  This information is not intended to replace advice given to you by your health care provider. Make sure you discuss any questions you have with your  health care provider.  Document Released: 03/26/2009 Document Revised: 11/06/2017 Document Reviewed: 11/06/2017  [employer] Interactive Patient Education © 2019 [employer]

## 2019-10-28 NOTE — PROGRESS NOTES
Subjective   Steven Hammonds is a 59 y.o. male.     Hyperlipidemia   The current episode started more than 1 year ago. Pertinent negatives include no chest pain or shortness of breath.   Hypertension   The current episode started more than 1 year ago. Pertinent negatives include no chest pain, neck pain or shortness of breath. Current antihypertension treatment includes ACE inhibitors and beta blockers.   Rash   The current episode started more than 1 month ago (Since December). The problem has been waxing and waning since onset. The affected locations include the right arm, left arm, torso, back, right lower leg, right upper leg, left lower leg and left upper leg. The rash is characterized by redness and scaling (redness worsens after taking shower). He was exposed to an insect bite/sting (tick bite last year). Pertinent negatives include no diarrhea, fatigue, fever, shortness of breath or vomiting.        The following portions of the patient's history were reviewed and updated as appropriate: allergies, current medications, past family history, past medical history, past social history, past surgical history and problem list.    Patient Active Problem List   Diagnosis   • Degeneration of lumbar or lumbosacral intervertebral disc   • Disc narrowing   • Encounter for other specified aftercare   • Gastroesophageal reflux disease   • Hypertension   • Paroxysmal atrial fibrillation (CMS/HCC)   • Spinal stenosis   • Spondylolysis   • Status post arthroscopy of knee   • Sinus pause   • Tachycardia-bradycardia (CMS/HCC)   • Thoracic or lumbosacral neuritis or radiculitis   • Tremor   • Presence of cardiac pacemaker   • Allergic rhinitis   • Hyperlipidemia   • Insomnia   • Umbilical hernia   • Tinea   • Impetigo   • Labile blood pressure   • Other specified persistent mood disorders (CMS/HCC)   • Non-seasonal allergic rhinitis due to pollen   • Chronic low back pain with sciatica   • Bobby's esophagus with dysplasia        Current Outpatient Medications on File Prior to Visit   Medication Sig Dispense Refill   • amitriptyline (ELAVIL) 50 MG tablet TAKE 2 TABLETS AT BEDTIME 180 tablet 4   • colchicine (COLCRYS) 0.6 MG tablet Take 1 tablet by mouth Daily.     • diclofenac-misoprostol (ARTHROTEC 75) 75-0.2 MG EC tablet Take 1 tablet by mouth 2 (Two) Times a Day.     • digoxin (DIGOX) 125 MCG tablet Take 1 tablet by mouth Daily.     • esomeprazole (NEXIUM) 40 MG capsule Take 1 capsule by mouth Daily.     • etanercept (ENBREL) 50 MG/ML solution prefilled syringe injection Inject  under the skin into the appropriate area as directed.     • fluticasone (FLONASE) 50 MCG/ACT nasal spray 2 sprays into the nostril(s) as directed by provider Daily.     • gabapentin (NEURONTIN) 600 MG tablet Take 1 tablet by mouth 4 (Four) Times a Day.     • lisinopril (PRINIVIL,ZESTRIL) 20 MG tablet Take 0.5 tablets by mouth Daily.     • loratadine (CLARITIN) 10 MG tablet Take 1 tablet by mouth Daily.     • metoprolol succinate XL (TOPROL XL) 25 MG 24 hr tablet Take 0.5 tablets by mouth Daily.     • tamsulosin (FLOMAX) 0.4 MG capsule 24 hr capsule Take 1 capsule by mouth Daily.     • tiZANidine (ZANAFLEX) 4 MG tablet Take 1 tablet by mouth 3 (Three) Times a Day. 180 tablet 3   • vitamin B-6 (PYRIDOXINE) 50 MG tablet Take 50 mg by mouth Daily.     • [DISCONTINUED] carisoprodol (SOMA) 350 MG tablet TK 1 T PO TID  0   • [DISCONTINUED] cephalexin (KEFLEX) 500 MG capsule Take 1 capsule by mouth 3 (Three) Times a Day. 30 capsule 0   • [DISCONTINUED] ibuprofen (ADVIL,MOTRIN) 800 MG tablet Take 1 tablet by mouth 3 (Three) Times a Day.       No current facility-administered medications on file prior to visit.        Allergies   Allergen Reactions   • Oxycodone-Acetaminophen GI Intolerance   • Adhesive Tape Unknown (See Comments)   • Oxycodone Unknown (See Comments)       Review of Systems   Constitutional: Negative for activity change, appetite change, fatigue and  fever.   HENT: Negative for ear pain, swollen glands and voice change.    Eyes: Negative for visual disturbance.   Respiratory: Negative for shortness of breath and wheezing.    Cardiovascular: Negative for chest pain and leg swelling.   Gastrointestinal: Negative for abdominal pain, blood in stool, constipation, diarrhea, nausea and vomiting.   Endocrine: Negative for polydipsia and polyuria.   Genitourinary: Negative for dysuria, frequency and hematuria.   Musculoskeletal: Negative for joint swelling, neck pain and neck stiffness.   Skin: Positive for rash. Negative for bruise.   Neurological: Negative for weakness, numbness and headache.   Psychiatric/Behavioral: Negative for suicidal ideas and depressed mood.       Objective   Physical Exam   Constitutional: He is oriented to person, place, and time. He appears well-developed and well-nourished.   Eyes: Conjunctivae and EOM are normal. Pupils are equal, round, and reactive to light.   Neck: Normal range of motion. Neck supple.   Cardiovascular: Normal rate, regular rhythm and normal heart sounds.   Pulmonary/Chest: Effort normal and breath sounds normal.   Abdominal: Soft. Bowel sounds are normal.   Musculoskeletal: Normal range of motion.   Neurological: He is alert and oriented to person, place, and time.   Skin: Skin is warm and dry.   Nummular areas of fine scale and slight erythema. Does not itch    Psychiatric: He has a normal mood and affect. His behavior is normal. Judgment and thought content normal.         Assessment/Plan .  Problem List Items Addressed This Visit     Gastroesophageal reflux disease    Overview     Barretts         Hypertension    Current Assessment & Plan     Take lisinopril 10 mg qd and metoprolol 25 mg qd          Relevant Orders    POC Urinalysis Dipstick, Multipro (Completed)    CBC Auto Differential    Comprehensive Metabolic Panel    Paroxysmal atrial fibrillation (CMS/HCC)    Relevant Medications    aspirin 81 MG tablet     Other Relevant Orders    TSH    Hyperlipidemia    Current Assessment & Plan     Recheck          Relevant Orders    Lipid Panel With / Chol / HDL Ratio    Labile blood pressure    Overview     Recc follow up with cardiology           Other Visit Diagnoses     Medicare annual wellness visit, subsequent    -  Primary    Relevant Orders    POC Urinalysis Dipstick, Multipro (Completed)    Rash        Relevant Orders    DENNIS    Rheumatoid Factor    Sedimentation Rate    Rickettsial Fever Group IgG / M    B. Burgdorferi Antibodies, WB Reflex    Ehrlichia Profile DNA PCR    Prostate cancer screening        Relevant Orders    PSA Screen        Differential diagnosis discussed.   Follow-up for routine health maintenance as directed  Anticipatory guidance discussed.

## 2019-10-31 ENCOUNTER — TELEPHONE (OUTPATIENT)
Dept: FAMILY MEDICINE CLINIC | Facility: CLINIC | Age: 59
End: 2019-10-31

## 2019-10-31 LAB
A PHAGOCYTOPH DNA BLD QL NAA+PROBE: NEGATIVE
ALBUMIN SERPL-MCNC: 4.6 G/DL (ref 3.6–4.8)
ALBUMIN/GLOB SERPL: 1.8 {RATIO} (ref 1.2–2.2)
ALP SERPL-CCNC: 127 IU/L (ref 39–117)
ALT SERPL-CCNC: 30 IU/L (ref 0–44)
ANA SER QL: NEGATIVE
AST SERPL-CCNC: 28 IU/L (ref 0–40)
BASOPHILS # BLD AUTO: 0.1 X10E3/UL (ref 0–0.2)
BASOPHILS NFR BLD AUTO: 1 %
BILIRUB SERPL-MCNC: 0.3 MG/DL (ref 0–1.2)
BUN SERPL-MCNC: 12 MG/DL (ref 8–27)
BUN/CREAT SERPL: 13 (ref 10–24)
CALCIUM SERPL-MCNC: 9.3 MG/DL (ref 8.6–10.2)
CHLORIDE SERPL-SCNC: 104 MMOL/L (ref 96–106)
CHOLEST SERPL-MCNC: 232 MG/DL (ref 100–199)
CHOLEST/HDLC SERPL: 5.3 RATIO (ref 0–5)
CO2 SERPL-SCNC: 25 MMOL/L (ref 20–29)
CREAT SERPL-MCNC: 0.9 MG/DL (ref 0.76–1.27)
E CHAFFEENSIS DNA BLD QL NAA+PROBE: NEGATIVE
EOSINOPHIL # BLD AUTO: 0.2 X10E3/UL (ref 0–0.4)
EOSINOPHIL NFR BLD AUTO: 2 %
ERYTHROCYTE [DISTWIDTH] IN BLOOD BY AUTOMATED COUNT: 14 % (ref 12.3–15.4)
ERYTHROCYTE [SEDIMENTATION RATE] IN BLOOD BY WESTERGREN METHOD: 7 MM/HR (ref 0–30)
GLOBULIN SER CALC-MCNC: 2.6 G/DL (ref 1.5–4.5)
GLUCOSE SERPL-MCNC: 98 MG/DL (ref 65–99)
HCT VFR BLD AUTO: 43.4 % (ref 37.5–51)
HDLC SERPL-MCNC: 44 MG/DL
HGB BLD-MCNC: 14.4 G/DL (ref 13–17.7)
IMM GRANULOCYTES # BLD AUTO: 0 X10E3/UL (ref 0–0.1)
IMM GRANULOCYTES NFR BLD AUTO: 0 %
LDLC SERPL CALC-MCNC: 164 MG/DL (ref 0–99)
LYMPHOCYTES # BLD AUTO: 1.9 X10E3/UL (ref 0.7–3.1)
LYMPHOCYTES NFR BLD AUTO: 25 %
MCH RBC QN AUTO: 29.1 PG (ref 26.6–33)
MCHC RBC AUTO-ENTMCNC: 33.2 G/DL (ref 31.5–35.7)
MCV RBC AUTO: 88 FL (ref 79–97)
MONOCYTES # BLD AUTO: 0.9 X10E3/UL (ref 0.1–0.9)
MONOCYTES NFR BLD AUTO: 13 %
NEUTROPHILS # BLD AUTO: 4.4 X10E3/UL (ref 1.4–7)
NEUTROPHILS NFR BLD AUTO: 59 %
PLATELET # BLD AUTO: 261 X10E3/UL (ref 150–450)
POTASSIUM SERPL-SCNC: 4.3 MMOL/L (ref 3.5–5.2)
PROT SERPL-MCNC: 7.2 G/DL (ref 6–8.5)
PSA SERPL-MCNC: 0.4 NG/ML (ref 0–4)
RBC # BLD AUTO: 4.95 X10E6/UL (ref 4.14–5.8)
RHEUMATOID FACT SERPL-ACNC: <10 IU/ML (ref 0–13.9)
RICK SF IGG TITR SER IF: NORMAL {TITER}
RICK SF IGM TITR SER IF: NORMAL {TITER}
RICK TYPHUS IGG TITR SER IF: NORMAL {TITER}
RICK TYPHUS IGM TITR SER IF: NORMAL {TITER}
SODIUM SERPL-SCNC: 141 MMOL/L (ref 134–144)
TRIGL SERPL-MCNC: 122 MG/DL (ref 0–149)
TSH SERPL DL<=0.005 MIU/L-ACNC: 3.15 UIU/ML (ref 0.45–4.5)
VLDLC SERPL CALC-MCNC: 24 MG/DL (ref 5–40)
WBC # BLD AUTO: 7.5 X10E3/UL (ref 3.4–10.8)

## 2019-10-31 NOTE — TELEPHONE ENCOUNTER
----- Message from Junaid Marquez MD sent at 10/31/2019 10:36 AM EDT -----  Please notify patient that labs are stable/looked ok. Chol creeping up, may need to start meds if persistent

## 2019-11-02 ENCOUNTER — RESULTS ENCOUNTER (OUTPATIENT)
Dept: FAMILY MEDICINE CLINIC | Facility: CLINIC | Age: 59
End: 2019-11-02

## 2019-11-02 DIAGNOSIS — R21 RASH: ICD-10-CM

## 2019-11-21 RX ORDER — DIGOXIN 125 MCG
TABLET ORAL
Qty: 90 TABLET | Refills: 3 | Status: SHIPPED | OUTPATIENT
Start: 2019-11-21 | End: 2020-03-09 | Stop reason: SDUPTHER

## 2019-11-26 ENCOUNTER — CLINICAL SUPPORT NO REQUIREMENTS (OUTPATIENT)
Dept: CARDIOLOGY | Facility: CLINIC | Age: 59
End: 2019-11-26

## 2019-11-26 DIAGNOSIS — Z95.0 PACEMAKER: ICD-10-CM

## 2019-11-26 DIAGNOSIS — I48.0 PAROXYSMAL ATRIAL FIBRILLATION (HCC): ICD-10-CM

## 2019-11-26 DIAGNOSIS — I49.5 TACHYCARDIA-BRADYCARDIA (HCC): Primary | ICD-10-CM

## 2019-11-26 DIAGNOSIS — I49.5 SICK SINUS SYNDROME (HCC): ICD-10-CM

## 2019-11-26 PROCEDURE — 93296 REM INTERROG EVL PM/IDS: CPT | Performed by: INTERNAL MEDICINE

## 2019-11-26 PROCEDURE — 93294 REM INTERROG EVL PM/LDLS PM: CPT | Performed by: INTERNAL MEDICINE

## 2019-12-30 ENCOUNTER — CLINICAL SUPPORT NO REQUIREMENTS (OUTPATIENT)
Dept: CARDIOLOGY | Facility: CLINIC | Age: 59
End: 2019-12-30

## 2019-12-30 DIAGNOSIS — Z95.0 PRESENCE OF CARDIAC PACEMAKER: ICD-10-CM

## 2019-12-30 DIAGNOSIS — I48.0 PAROXYSMAL ATRIAL FIBRILLATION (HCC): Primary | ICD-10-CM

## 2019-12-30 DIAGNOSIS — I49.5 TACHYCARDIA-BRADYCARDIA (HCC): ICD-10-CM

## 2020-01-07 ENCOUNTER — TELEPHONE (OUTPATIENT)
Dept: CARDIOLOGY | Facility: CLINIC | Age: 60
End: 2020-01-07

## 2020-01-07 RX ORDER — METOPROLOL SUCCINATE 25 MG/1
12.5 TABLET, EXTENDED RELEASE ORAL EVERY 24 HOURS
Qty: 90 TABLET | Refills: 3 | Status: SHIPPED | OUTPATIENT
Start: 2020-01-07 | End: 2020-03-09 | Stop reason: SDUPTHER

## 2020-02-03 ENCOUNTER — TELEPHONE (OUTPATIENT)
Dept: FAMILY MEDICINE CLINIC | Facility: CLINIC | Age: 60
End: 2020-02-03

## 2020-02-03 NOTE — TELEPHONE ENCOUNTER
Pt's wife called.  Pt got called for jury duty and needs a letter stating he cannot do it because of his disability.  Please fax letter to clerks office at [phone].

## 2020-03-05 DIAGNOSIS — M51.37 DEGENERATION OF LUMBAR OR LUMBOSACRAL INTERVERTEBRAL DISC: Primary | ICD-10-CM

## 2020-03-05 RX ORDER — GABAPENTIN 600 MG/1
TABLET ORAL
Qty: 360 TABLET | Refills: 3 | Status: SHIPPED | OUTPATIENT
Start: 2020-03-05 | End: 2021-06-07

## 2020-03-09 ENCOUNTER — CLINICAL SUPPORT NO REQUIREMENTS (OUTPATIENT)
Dept: CARDIOLOGY | Facility: CLINIC | Age: 60
End: 2020-03-09

## 2020-03-09 ENCOUNTER — OFFICE VISIT (OUTPATIENT)
Dept: CARDIOLOGY | Facility: CLINIC | Age: 60
End: 2020-03-09

## 2020-03-09 VITALS
HEART RATE: 60 BPM | BODY MASS INDEX: 26.32 KG/M2 | DIASTOLIC BLOOD PRESSURE: 109 MMHG | HEIGHT: 71 IN | WEIGHT: 188 LBS | SYSTOLIC BLOOD PRESSURE: 163 MMHG | OXYGEN SATURATION: 96 %

## 2020-03-09 DIAGNOSIS — Z95.0 PRESENCE OF CARDIAC PACEMAKER: ICD-10-CM

## 2020-03-09 DIAGNOSIS — I45.5 SINUS PAUSE: ICD-10-CM

## 2020-03-09 DIAGNOSIS — I49.5 TACHYCARDIA-BRADYCARDIA (HCC): ICD-10-CM

## 2020-03-09 DIAGNOSIS — I48.0 PAROXYSMAL ATRIAL FIBRILLATION (HCC): ICD-10-CM

## 2020-03-09 DIAGNOSIS — I10 ESSENTIAL HYPERTENSION: ICD-10-CM

## 2020-03-09 DIAGNOSIS — E78.2 MIXED HYPERLIPIDEMIA: Primary | ICD-10-CM

## 2020-03-09 DIAGNOSIS — I48.0 PAROXYSMAL ATRIAL FIBRILLATION (HCC): Primary | ICD-10-CM

## 2020-03-09 PROCEDURE — 93280 PM DEVICE PROGR EVAL DUAL: CPT | Performed by: INTERNAL MEDICINE

## 2020-03-09 PROCEDURE — 99214 OFFICE O/P EST MOD 30 MIN: CPT | Performed by: INTERNAL MEDICINE

## 2020-03-09 PROCEDURE — 93000 ELECTROCARDIOGRAM COMPLETE: CPT | Performed by: INTERNAL MEDICINE

## 2020-03-09 RX ORDER — DIGOXIN 125 MCG
125 TABLET ORAL DAILY
Qty: 90 TABLET | Refills: 3 | Status: SHIPPED | OUTPATIENT
Start: 2020-03-09 | End: 2020-09-24 | Stop reason: SDUPTHER

## 2020-03-09 RX ORDER — METOPROLOL SUCCINATE 25 MG/1
12.5 TABLET, EXTENDED RELEASE ORAL EVERY 24 HOURS
Qty: 180 TABLET | Refills: 3 | Status: SHIPPED | OUTPATIENT
Start: 2020-03-09 | End: 2020-09-24 | Stop reason: SDUPTHER

## 2020-03-09 RX ORDER — LISINOPRIL 20 MG/1
10 TABLET ORAL DAILY
Qty: 90 TABLET | Refills: 3 | Status: SHIPPED | OUTPATIENT
Start: 2020-03-09 | End: 2021-03-11 | Stop reason: SDUPTHER

## 2020-03-09 RX ORDER — DULOXETIN HYDROCHLORIDE 20 MG/1
20 CAPSULE, DELAYED RELEASE ORAL
COMMUNITY
Start: 2020-03-02 | End: 2021-02-02

## 2020-03-09 NOTE — PROGRESS NOTES
Encounter Date:03/09/2020  Last seen 8/6/2019      Patient ID: Steven Hammonds is a 59 y.o. male.    Chief Complaint:  Status post pacemaker  Hypertension  Renal dysfunction  History of near syncope    History of Present Illness  Patient has labile blood pressure.    Since I have last seen, the patient has been without any chest discomfort ,shortness of breath, palpitations, dizziness or syncope.  Denies having any headache ,abdominal pain ,nausea, vomiting , diarrhea constipation, loss of weight or loss of appetite.  Denies having any excessive bruising ,hematuria or blood in the stool.    Review of all systems negative except as indicated    Assessment and Plan       [[[[[[[[[[[[[[[[[[[[[  Impression  ============  -status post permanent dual-chamber pacemaker implantation ([employer] Compatible ) 04/13/2018  - sick sinus syndrome-tachy-irvin syndrome.  Patient has history of atrial fibrillation with rapid ventricular response.  Patient had significant pauses of 5-6 seconds.  Symptomatic with dizziness and near-syncope     - history of Near-syncope associated with palpitations and dizziness .  -improved     -cardiac catheterization 03/18/2017 revealed normal left ventricular function and normal coronary arteries.  History of normal echocardiogram     -status post loop recorder placement 03/24/2017. -removal of loop recorder 04/13/2018.     -hypertension  Renal dysfunction BUN 17 creatinine 1.4 GERD depression Psoriatic arthritis Hypokalemia     -history of Anemia and neutropenia     -Status post hip replacement bilateral arthroscopic knee surgery right shoulder surgery  CBP- with chronic myelopathy, s/p multiple lumbar surgeries; continue soma, gabapentin     -Allergic to Percocet  ================  Plan  ==============  Patient has a labile blood pressure.  Patient is taking metoprolol 25 mg half a tablet a day and lisinopril half a tablet in the p.m. as needed.  EKG showed atrial paced ventricular  sensed rhythm.  Interrogation of the pacemaker revealed excellent pacing parameters.  Intermittent atrial high rates were noted.  Medications were reviewed and updated.  Further plan will depend on patient's progress. followup in the office in 6 months with pacemaker interrogation  [[[[[[[[[[[[[[[[[[[[[[[[[[[[[[[[            Diagnosis Plan   1. Mixed hyperlipidemia     2. Essential hypertension     3. Paroxysmal atrial fibrillation (CMS/HCC)     4. Presence of cardiac pacemaker     5. Sinus pause     6. Tachycardia-bradycardia (CMS/HCC)     LAB RESULTS (LAST 7 DAYS)    CBC        BMP        CMP         BNP        TROPONIN        CoAg        Creatinine Clearance  CrCl cannot be calculated (Patient's most recent lab result is older than the maximum 30 days allowed.).    ABG        Radiology  No radiology results for the last day                The following portions of the patient's history were reviewed and updated as appropriate: allergies, current medications, past family history, past medical history, past social history, past surgical history and problem list.    Review of Systems   Constitution: Negative for malaise/fatigue.   Cardiovascular: Negative for chest pain, leg swelling, palpitations and syncope.   Respiratory: Negative for shortness of breath.    Skin: Negative for rash.   Musculoskeletal: Positive for joint swelling (arthritis).   Gastrointestinal: Negative for nausea and vomiting.   Neurological: Positive for dizziness and light-headedness. Negative for numbness.         Current Outpatient Medications:   •  amitriptyline (ELAVIL) 50 MG tablet, TAKE 2 TABLETS AT BEDTIME, Disp: 180 tablet, Rfl: 4  •  aspirin 81 MG tablet, Take 1 tablet by mouth Daily., Disp: 30 tablet, Rfl: 12  •  colchicine (COLCRYS) 0.6 MG tablet, Take 1 tablet by mouth Daily., Disp: , Rfl:   •  diclofenac-misoprostol (ARTHROTEC 75) 75-0.2 MG EC tablet, Take 1 tablet by mouth 2 (Two) Times a Day., Disp: , Rfl:   •  DULoxetine  (CYMBALTA) 20 MG capsule, , Disp: , Rfl:   •  esomeprazole (NEXIUM) 40 MG capsule, Take 1 capsule by mouth Daily., Disp: , Rfl:   •  etanercept (ENBREL) 50 MG/ML solution prefilled syringe injection, Inject  under the skin into the appropriate area as directed., Disp: , Rfl:   •  fluticasone (FLONASE) 50 MCG/ACT nasal spray, 2 sprays into the nostril(s) as directed by provider Daily., Disp: , Rfl:   •  gabapentin (NEURONTIN) 600 MG tablet, TAKE 1 TABLET FOUR TIMES A DAY, Disp: 360 tablet, Rfl: 3  •  loratadine (CLARITIN) 10 MG tablet, Take 1 tablet by mouth Daily., Disp: , Rfl:   •  tamsulosin (FLOMAX) 0.4 MG capsule 24 hr capsule, Take 1 capsule by mouth Daily., Disp: , Rfl:   •  tiZANidine (ZANAFLEX) 4 MG tablet, Take 1 tablet by mouth 3 (Three) Times a Day., Disp: 180 tablet, Rfl: 3  •  triamcinolone (KENALOG) 0.1 % ointment, RAJENDRA EXT AA OF TORSO AND EXTREMITIES BID FOR FOUR TO FIVE TIMES A WEEK PRN, Disp: , Rfl:   •  vitamin B-6 (PYRIDOXINE) 50 MG tablet, Take 50 mg by mouth Daily., Disp: , Rfl:   •  digoxin (LANOXIN) 125 MCG tablet, Take 1 tablet by mouth Daily., Disp: 90 tablet, Rfl: 3  •  lisinopril (PRINIVIL,ZESTRIL) 20 MG tablet, Take 0.5 tablets by mouth Daily., Disp: 90 tablet, Rfl: 3  •  metoprolol succinate XL (TOPROL XL) 25 MG 24 hr tablet, Take 0.5 tablets by mouth Daily., Disp: 180 tablet, Rfl: 3    Allergies   Allergen Reactions   • Oxycodone-Acetaminophen GI Intolerance   • Adhesive Tape Unknown (See Comments)   • Oxycodone Unknown (See Comments)       Family History   Problem Relation Age of Onset   • Other Father         Substance Abuse   • Cancer Father         Lung       Past Surgical History:   Procedure Laterality Date   • CARDIAC PACEMAKER PLACEMENT     • KNEE SURGERY Right 2016   • LUMBAR DISCECTOMY  10/2015    Comments: lumbar disk decompression   • TOTAL HIP ARTHROPLASTY Right 12/09/2016       Past Medical History:   Diagnosis Date   • Acute left ankle pain     Impression: xray neg on  prelim read. Findings discussed. All questions answered. Awaiting final radiology evaluation. Natural course and self-limited nature of this condition discussed. Follow-up in 4-6 weeks if not better. Follow-up sooner for worsening symptoms or for any concerns.   • Allergic rhinitis     Impression: persistent   • Annual physical exam     Impression: Discussed anticipatory guidance, diet, exercise, and weight loss. Discussed safety, seatbelts, and routine screening examinations. Discussed self-examinations.   • Arthralgia of right hip     Impression: needs right hip relacement   • Bobby's esophagus with dysplasia     Impression: scope 11/2013 improved from previous. Recheck due in 2015 Packet given   • Bronchitis     Impression: resolved   • Bruising     Impression: appears benign, no other areas noted. Natural course and self-limited nature of this condition discussed. Will follow.   • Cervical disc disease with myelopathy     Impression: seen on MRi 5/2012. Workup in progress.   • Chronic low back pain with sciatica     Unspecified. Impression: scheduled for surgery   • Chronic pain     Impression: severe constipation with opana. Try MS contin   • Chronic pain of right knee     Impression: needs knee replacement   • Colon cancer screening     Impression: packet given   • Conductive hearing loss     Impression: hfhl right. Refer to audiology. Question AR. Start flonase   • Constipation    • Degeneration of lumbar intervertebral disc     Story: history of lumbar surgery. s/p epidural x3, last 6/18/15 Dr Ge. Impression: on ibuprofen   • ED (erectile dysfunction)    • Esophageal reflux    • Esophagitis     Impression: Past due for repeat EGD. Previous physician no longer covered under insurance. Recommend refer to gastroentrreology. Pt has packet to fill out for GSI.   • Foot pain, bilateral    • Gastroenteritis, acute     Impression: try phenergan, lomotil.   • Headache     Impression: Musculoskeletal.  Baclofen tid prn.   • History of tobacco use    • Hydrocele    • Hyperglycemia     Impression: incidential finding   • Hypertension, benign     Impression: stable. Low after surgery. Will follow.   • Insomnia    • Intervertebral disc disorder with myelopathy, lumbar region     Impression: Changed neurontin to hs, increased to 1800 mg at hs.   • Left leg weakness     Impression: due to recent surgery. May benefit for home health. Pt homebound due severe pain postop back surgery. Also needs 3-in-1 Commode   • Left medial knee pain     Impression: Likely OA. Findings discussed. All questions answered. Keep follow-up with his ortho. Xrays today   • Localized primary osteoarthritis of right lower leg     Impression: scope planned   • Medicare annual wellness visit, subsequent     With abnormal findings.   • Mixed hyperlipidemia     Story: Stable, Improved, Compliant with meds Is getting adequate diet and exercise Goals developed at last visit were met Follow up in 6 months Care management needs are self addressed. Would not benefit from care management Self-Management abilities addressed and patient is capable of managing his/her own disease   • Need for prophylactic vaccination and inoculation against influenza    • Nocturia    • Non-seasonal allergic rhinitis due to pollen    • Other specified persistent mood disorders (CMS/HCC)     Story: stress   • Overweight (BMI 25.0-29.9)    • Pain of left clavicle     Impression: likely arthritic   • Palpitations     Impression: labs, holter normal. Elevated HR likely just related to pain   • Paronychia of finger of left hand     Impression: Continued pain and discomfort in the finger. There is little to no inflammation. He was started on Bactrim DS and advised to soak his finger in warm water and epson salt.   • Proctitis     Impression: likely based on colonoscopy findings. Wife had VGE week prior. Will get path results of biopsies taken at time of endoscopy to eval for crohns    • Psoriatic arthritis (CMS/[facility])     Impression: refer to Dr Rodriguez   • Rash     Impression: exam more consistent with psitiasis rosea. Findings discussed. All questions answered. Reassurance, education. Natural course and self-limited nature of this condition discussed. Follow up if worsens or persists.   • Screening for alcoholism    • Screening for depression    • Scrotal mass    • Tendonitis     Impression: right biceps. Findings discussed. All questions answered. Medication and medication adverse effects discussed. Drug education given and explained to patient. Patient verbalized understanding. Follow-up in 2 weeks if not better. Follow-up sooner for worsening symptoms or for any concerns.   • Tick bite     Initial encounter. Impression: with buuseye rash. Findings discussed. All questions answered. Medication and medication adverse effects discussed. Drug education given and explained to patient. Patient verbalized understanding.   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 Natural course and self-limited nature of this condition discussed. Follow-up in 4-6 weeks if not better. Follow-up sooner for worsening symptoms or for any concerns. EMG if not improved   • Umbilical hernia    • Unspecified contact dermatitis, unspecified cause     Impression: He was started on Elocon cream to help with his symptoms. He was encouraged to RTC if it worsens.       Family History   Problem Relation Age of Onset   • Other Father         Substance Abuse   • Cancer Father         Lung       Social History     Socioeconomic History   • Marital status:      Spouse name: Not on file   • Number of children: Not on file   • Years of education: Not on file   • Highest education level: Not on file   Tobacco Use   • Smoking status: Former Smoker   • Smokeless  "tobacco: Never Used   Substance and Sexual Activity   • Alcohol use: No     Frequency: Never   • Drug use: No           ECG 12 Lead  Date/Time: 3/9/2020 4:01 PM  Performed by: Claudia Benson MD  Authorized by: Claudia Benson MD   Comparison: compared with previous ECG   Similar to previous ECG  Comments: Atrial paced ventricular sensed rhythm 60/min nonspecific ST-T wave changes normal axis normal intervals no ectopy.  No change from 8/26/2019              Objective:       Physical Exam    BP (!) 163/109   Pulse 60   Ht 179.1 cm (70.5\")   Wt 85.3 kg (188 lb)   SpO2 96%   BMI 26.59 kg/m²   The patient is alert, oriented and in no distress.    Vital signs as noted above.    Head and neck revealed no carotid bruits or jugular venous distension.  No thyromegaly or lymphadenopathy is present.    Lungs clear.  No wheezing.  Breath sounds are normal bilaterally.    Heart normal first and second heart sounds.  No murmur..  No pericardial rub is present.  No gallop is present.    Abdomen soft and nontender.  No organomegaly is present.    Extremities revealed good peripheral pulses without any pedal edema.    Skin warm and dry.  Pacemaker site looks normal.    Musculoskeletal system is grossly normal.    CNS grossly normal.        "

## 2020-03-15 ENCOUNTER — HOSPITAL ENCOUNTER (EMERGENCY)
Facility: HOSPITAL | Age: 60
Discharge: HOME | End: 2020-03-15
Admitting: EMERGENCY MEDICINE

## 2020-03-15 ENCOUNTER — APPOINTMENT (OUTPATIENT)
Dept: CT IMAGING | Facility: HOSPITAL | Age: 60
End: 2020-03-15

## 2020-03-15 VITALS
DIASTOLIC BLOOD PRESSURE: 99 MMHG | HEIGHT: 71 IN | SYSTOLIC BLOOD PRESSURE: 178 MMHG | TEMPERATURE: 98.4 F | RESPIRATION RATE: 16 BRPM | HEART RATE: 78 BPM | BODY MASS INDEX: 26.23 KG/M2 | OXYGEN SATURATION: 99 % | WEIGHT: 187.39 LBS

## 2020-03-15 DIAGNOSIS — K52.9 COLITIS: Primary | ICD-10-CM

## 2020-03-15 LAB
ADV 40+41 DNA STL QL NAA+NON-PROBE: NOT DETECTED
ALBUMIN SERPL-MCNC: 4.2 G/DL (ref 3.5–5.2)
ALBUMIN/GLOB SERPL: 1.5 G/DL
ALP SERPL-CCNC: 105 U/L (ref 39–117)
ALT SERPL W P-5'-P-CCNC: 23 U/L (ref 1–41)
ANION GAP SERPL CALCULATED.3IONS-SCNC: 12 MMOL/L (ref 5–15)
APTT PPP: 24.3 SECONDS (ref 24–31)
AST SERPL-CCNC: 19 U/L (ref 1–40)
ASTRO TYP 1-8 RNA STL QL NAA+NON-PROBE: NOT DETECTED
BASOPHILS # BLD AUTO: 0.1 10*3/MM3 (ref 0–0.2)
BASOPHILS NFR BLD AUTO: 0.6 % (ref 0–1.5)
BILIRUB SERPL-MCNC: 0.3 MG/DL (ref 0.2–1.2)
BUN BLD-MCNC: 21 MG/DL (ref 8–23)
BUN/CREAT SERPL: 24.1 (ref 7–25)
C CAYETANENSIS DNA STL QL NAA+NON-PROBE: NOT DETECTED
CALCIUM SPEC-SCNC: 8.8 MG/DL (ref 8.6–10.5)
CAMPY SP DNA.DIARRHEA STL QL NAA+PROBE: NOT DETECTED
CHLORIDE SERPL-SCNC: 103 MMOL/L (ref 98–107)
CO2 SERPL-SCNC: 25 MMOL/L (ref 22–29)
CREAT BLD-MCNC: 0.87 MG/DL (ref 0.76–1.27)
CRYPTOSP STL CULT: NOT DETECTED
D-LACTATE SERPL-SCNC: 1.1 MMOL/L (ref 0.5–2)
DEPRECATED RDW RBC AUTO: 44.2 FL (ref 37–54)
E COLI DNA SPEC QL NAA+PROBE: NOT DETECTED
E HISTOLYT AG STL-ACNC: NOT DETECTED
EAEC PAA PLAS AGGR+AATA ST NAA+NON-PRB: NOT DETECTED
EC STX1 + STX2 GENES STL NAA+PROBE: NOT DETECTED
EOSINOPHIL # BLD AUTO: 0 10*3/MM3 (ref 0–0.4)
EOSINOPHIL NFR BLD AUTO: 0.1 % (ref 0.3–6.2)
EPEC EAE GENE STL QL NAA+NON-PROBE: NOT DETECTED
ERYTHROCYTE [DISTWIDTH] IN BLOOD BY AUTOMATED COUNT: 15 % (ref 12.3–15.4)
ETEC LTA+ST1A+ST1B TOX ST NAA+NON-PROBE: NOT DETECTED
G LAMBLIA DNA SPEC QL NAA+PROBE: NOT DETECTED
GFR SERPL CREATININE-BSD FRML MDRD: 90 ML/MIN/1.73
GLOBULIN UR ELPH-MCNC: 2.8 GM/DL
GLUCOSE BLD-MCNC: 152 MG/DL (ref 65–99)
HCT VFR BLD AUTO: 41.9 % (ref 37.5–51)
HGB BLD-MCNC: 14.5 G/DL (ref 13–17.7)
INR PPP: 0.95 (ref 0.9–1.1)
LYMPHOCYTES # BLD AUTO: 0.6 10*3/MM3 (ref 0.7–3.1)
LYMPHOCYTES NFR BLD AUTO: 4.7 % (ref 19.6–45.3)
MCH RBC QN AUTO: 29.3 PG (ref 26.6–33)
MCHC RBC AUTO-ENTMCNC: 34.5 G/DL (ref 31.5–35.7)
MCV RBC AUTO: 85 FL (ref 79–97)
MONOCYTES # BLD AUTO: 0.6 10*3/MM3 (ref 0.1–0.9)
MONOCYTES NFR BLD AUTO: 4.7 % (ref 5–12)
NEUTROPHILS # BLD AUTO: 12.1 10*3/MM3 (ref 1.7–7)
NEUTROPHILS NFR BLD AUTO: 89.9 % (ref 42.7–76)
NOROVIRUS GI+II RNA STL QL NAA+NON-PROBE: NOT DETECTED
NRBC BLD AUTO-RTO: 0 /100 WBC (ref 0–0.2)
P SHIGELLOIDES DNA STL QL NAA+PROBE: NOT DETECTED
PLATELET # BLD AUTO: 284 10*3/MM3 (ref 140–450)
PMV BLD AUTO: 7 FL (ref 6–12)
POTASSIUM BLD-SCNC: 4.1 MMOL/L (ref 3.5–5.2)
PROT SERPL-MCNC: 7 G/DL (ref 6–8.5)
PROTHROMBIN TIME: 10.1 SECONDS (ref 9.6–11.7)
RBC # BLD AUTO: 4.93 10*6/MM3 (ref 4.14–5.8)
RV RNA STL NAA+PROBE: NOT DETECTED
SALMONELLA DNA SPEC QL NAA+PROBE: NOT DETECTED
SAPO I+II+IV+V RNA STL QL NAA+NON-PROBE: NOT DETECTED
SHIGELLA SP+EIEC IPAH STL QL NAA+PROBE: NOT DETECTED
SODIUM BLD-SCNC: 140 MMOL/L (ref 136–145)
V CHOLERAE DNA SPEC QL NAA+PROBE: NOT DETECTED
VIBRIO DNA SPEC NAA+PROBE: NOT DETECTED
WBC NRBC COR # BLD: 13.4 10*3/MM3 (ref 3.4–10.8)
YERSINIA STL CULT: NOT DETECTED

## 2020-03-15 PROCEDURE — 83605 ASSAY OF LACTIC ACID: CPT

## 2020-03-15 PROCEDURE — 0097U HC BIOFIRE FILMARRAY GI PANEL: CPT | Performed by: NURSE PRACTITIONER

## 2020-03-15 PROCEDURE — 96374 THER/PROPH/DIAG INJ IV PUSH: CPT

## 2020-03-15 PROCEDURE — 25010000002 MORPHINE PER 10 MG: Performed by: NURSE PRACTITIONER

## 2020-03-15 PROCEDURE — 80053 COMPREHEN METABOLIC PANEL: CPT | Performed by: NURSE PRACTITIONER

## 2020-03-15 PROCEDURE — 96375 TX/PRO/DX INJ NEW DRUG ADDON: CPT

## 2020-03-15 PROCEDURE — 96372 THER/PROPH/DIAG INJ SC/IM: CPT

## 2020-03-15 PROCEDURE — 85730 THROMBOPLASTIN TIME PARTIAL: CPT | Performed by: NURSE PRACTITIONER

## 2020-03-15 PROCEDURE — 74177 CT ABD & PELVIS W/CONTRAST: CPT

## 2020-03-15 PROCEDURE — 0 IOPAMIDOL PER 1 ML: Performed by: NURSE PRACTITIONER

## 2020-03-15 PROCEDURE — 85610 PROTHROMBIN TIME: CPT | Performed by: NURSE PRACTITIONER

## 2020-03-15 PROCEDURE — 99284 EMERGENCY DEPT VISIT MOD MDM: CPT

## 2020-03-15 PROCEDURE — 85025 COMPLETE CBC W/AUTO DIFF WBC: CPT | Performed by: NURSE PRACTITIONER

## 2020-03-15 PROCEDURE — 25010000002 DICYCLOMINE PER 20 MG: Performed by: NURSE PRACTITIONER

## 2020-03-15 PROCEDURE — 25010000002 ONDANSETRON PER 1 MG: Performed by: NURSE PRACTITIONER

## 2020-03-15 PROCEDURE — 96376 TX/PRO/DX INJ SAME DRUG ADON: CPT

## 2020-03-15 RX ORDER — MORPHINE SULFATE 4 MG/ML
2 INJECTION, SOLUTION INTRAMUSCULAR; INTRAVENOUS ONCE
Status: COMPLETED | OUTPATIENT
Start: 2020-03-15 | End: 2020-03-15

## 2020-03-15 RX ORDER — SODIUM CHLORIDE 0.9 % (FLUSH) 0.9 %
10 SYRINGE (ML) INJECTION AS NEEDED
Status: DISCONTINUED | OUTPATIENT
Start: 2020-03-15 | End: 2020-03-15 | Stop reason: HOSPADM

## 2020-03-15 RX ORDER — ONDANSETRON 2 MG/ML
4 INJECTION INTRAMUSCULAR; INTRAVENOUS ONCE
Status: COMPLETED | OUTPATIENT
Start: 2020-03-15 | End: 2020-03-15

## 2020-03-15 RX ORDER — DICYCLOMINE HYDROCHLORIDE 10 MG/1
10 CAPSULE ORAL 3 TIMES DAILY PRN
Qty: 9 CAPSULE | Refills: 0 | Status: SHIPPED | OUTPATIENT
Start: 2020-03-15 | End: 2020-03-18

## 2020-03-15 RX ORDER — DICYCLOMINE HYDROCHLORIDE 10 MG/ML
20 INJECTION INTRAMUSCULAR ONCE
Status: COMPLETED | OUTPATIENT
Start: 2020-03-15 | End: 2020-03-15

## 2020-03-15 RX ADMIN — DICYCLOMINE HYDROCHLORIDE 20 MG: 20 INJECTION INTRAMUSCULAR at 12:35

## 2020-03-15 RX ADMIN — ONDANSETRON 4 MG: 2 INJECTION INTRAMUSCULAR; INTRAVENOUS at 12:32

## 2020-03-15 RX ADMIN — IOPAMIDOL 100 ML: 755 INJECTION, SOLUTION INTRAVENOUS at 13:56

## 2020-03-15 RX ADMIN — SODIUM CHLORIDE 1000 ML: 900 INJECTION, SOLUTION INTRAVENOUS at 12:28

## 2020-03-15 RX ADMIN — Medication 10 ML: at 12:26

## 2020-03-15 RX ADMIN — MORPHINE SULFATE 2 MG: 4 INJECTION INTRAVENOUS at 12:34

## 2020-03-15 RX ADMIN — MORPHINE SULFATE 2 MG: 4 INJECTION INTRAVENOUS at 15:19

## 2020-03-15 NOTE — DISCHARGE INSTRUCTIONS
Call tomorrow to make an appointment to follow-up with your primary care provider this week for further evaluation.  Use the Bentyl as needed for abdominal pain and cramping.  If you have multiple episodes of bleeding from the rectum call your PCPs office or if they are not open return to the emergency department.  Return to the ED for any new or worsening symptoms.

## 2020-03-15 NOTE — ED PROVIDER NOTES
Subjective   History:    59-year-old male presents emergency department today for evaluation of rectal bleeding.  Patient states that he woke up at 2:00 this morning with cramping in his abdomen and went to the bathroom and had diarrhea and he noted blood in the toilet and on the toilet paper.  Patient is complaining of low back pain, nausea but no vomiting.  Patient does have a history of hemorrhoids.  Patient states he had a colonoscopy just a couple of years ago at [facility] and it was normal except for a couple of polyps.  She has never had rectal bleeding before.            Review of Systems   Constitutional: Negative for appetite change, chills, fatigue and fever.   HENT: Negative for congestion, facial swelling, sinus pain and sore throat.    Eyes: Negative for pain and visual disturbance.   Respiratory: Negative for cough, chest tightness and shortness of breath.    Cardiovascular: Negative for chest pain and palpitations.   Gastrointestinal: Positive for blood in stool, diarrhea and nausea. Negative for constipation and vomiting.   Genitourinary: Negative for dysuria, flank pain, frequency and urgency.   Musculoskeletal: Negative for arthralgias, joint swelling and neck pain.   Skin: Negative for color change and rash.   Neurological: Negative for dizziness, seizures, syncope, weakness, light-headedness and headaches.       Past Medical History:   Diagnosis Date   • Acute left ankle pain     Impression: xray neg on prelim read. Findings discussed. All questions answered. Awaiting final radiology evaluation. Natural course and self-limited nature of this condition discussed. Follow-up in 4-6 weeks if not better. Follow-up sooner for worsening symptoms or for any concerns.   • Allergic rhinitis     Impression: persistent   • Annual physical exam     Impression: Discussed anticipatory guidance, diet, exercise, and weight loss. Discussed safety, seatbelts, and routine screening examinations. Discussed  self-examinations.   • Arthralgia of right hip     Impression: needs right hip relacement   • Bobby's esophagus with dysplasia     Impression: scope 11/2013 improved from previous. Recheck due in 2015 Packet given   • Bronchitis     Impression: resolved   • Bruising     Impression: appears benign, no other areas noted. Natural course and self-limited nature of this condition discussed. Will follow.   • Cervical disc disease with myelopathy     Impression: seen on MRi 5/2012. Workup in progress.   • Chronic low back pain with sciatica     Unspecified. Impression: scheduled for surgery   • Chronic pain     Impression: severe constipation with opana. Try MS contin   • Chronic pain of right knee     Impression: needs knee replacement   • Colon cancer screening     Impression: packet given   • Conductive hearing loss     Impression: hfhl right. Refer to audiology. Question AR. Start flonase   • Constipation    • Degeneration of lumbar intervertebral disc     Story: history of lumbar surgery. s/p epidural x3, last 6/18/15 Dr Ge. Impression: on ibuprofen   • ED (erectile dysfunction)    • Esophageal reflux    • Esophagitis     Impression: Past due for repeat EGD. Previous physician no longer covered under insurance. Recommend refer to gastroentrreology. Pt has packet to fill out for GSI.   • Foot pain, bilateral    • Gastroenteritis, acute     Impression: try phenergan, lomotil.   • Headache     Impression: Musculoskeletal. Baclofen tid prn.   • History of tobacco use    • Hydrocele    • Hyperglycemia     Impression: incidential finding   • Hypertension, benign     Impression: stable. Low after surgery. Will follow.   • Insomnia    • Intervertebral disc disorder with myelopathy, lumbar region     Impression: Changed neurontin to hs, increased to 1800 mg at hs.   • Left leg weakness     Impression: due to recent surgery. May benefit for home health. Pt homebound due severe pain postop back surgery. Also needs  3-in-1 Commode   • Left medial knee pain     Impression: Likely OA. Findings discussed. All questions answered. Keep follow-up with his ortho. Xrays today   • Localized primary osteoarthritis of right lower leg     Impression: scope planned   • Medicare annual wellness visit, subsequent     With abnormal findings.   • Mixed hyperlipidemia     Story: Stable, Improved, Compliant with meds Is getting adequate diet and exercise Goals developed at last visit were met Follow up in 6 months Care management needs are self addressed. Would not benefit from care management Self-Management abilities addressed and patient is capable of managing his/her own disease   • Need for prophylactic vaccination and inoculation against influenza    • Nocturia    • Non-seasonal allergic rhinitis due to pollen    • Other specified persistent mood disorders (CMS/HCC)     Story: stress   • Overweight (BMI 25.0-29.9)    • Pain of left clavicle     Impression: likely arthritic   • Palpitations     Impression: labs, holter normal. Elevated HR likely just related to pain   • Paronychia of finger of left hand     Impression: Continued pain and discomfort in the finger. There is little to no inflammation. He was started on Bactrim DS and advised to soak his finger in warm water and epson salt.   • Proctitis     Impression: likely based on colonoscopy findings. Wife had VGE week prior. Will get path results of biopsies taken at time of endoscopy to eval for crohns   • Psoriatic arthritis (CMS/HCC)     Impression: refer to Dr Rodriguez   • Rash     Impression: exam more consistent with psitiasis rosea. Findings discussed. All questions answered. Reassurance, education. Natural course and self-limited nature of this condition discussed. Follow up if worsens or persists.   • Screening for alcoholism    • Screening for depression    • Scrotal mass    • Tendonitis     Impression: right biceps. Findings discussed. All questions answered. Medication and  medication adverse effects discussed. Drug education given and explained to patient. Patient verbalized understanding. Follow-up in 2 weeks if not better. Follow-up sooner for worsening symptoms or for any concerns.   • Tick bite     Initial encounter. Impression: with buuseye rash. Findings discussed. All questions answered. Medication and medication adverse effects discussed. Drug education given and explained to patient. Patient verbalized understanding.   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 Natural course and self-limited nature of this condition discussed. Follow-up in 4-6 weeks if not better. Follow-up sooner for worsening symptoms or for any concerns. EMG if not improved   • Umbilical hernia    • Unspecified contact dermatitis, unspecified cause     Impression: He was started on Elocon cream to help with his symptoms. He was encouraged to RTC if it worsens.       Allergies   Allergen Reactions   • Oxycodone-Acetaminophen GI Intolerance   • Adhesive Tape Unknown (See Comments)   • Oxycodone Unknown (See Comments)       Past Surgical History:   Procedure Laterality Date   • CARDIAC PACEMAKER PLACEMENT     • KNEE SURGERY Right 2016   • LUMBAR DISCECTOMY  10/2015    Comments: lumbar disk decompression   • TOTAL HIP ARTHROPLASTY Right 12/09/2016       Family History   Problem Relation Age of Onset   • Other Father         Substance Abuse   • Cancer Father         Lung       Social History     Socioeconomic History   • Marital status:      Spouse name: Not on file   • Number of children: Not on file   • Years of education: Not on file   • Highest education level: Not on file   Tobacco Use   • Smoking status: Former Smoker   • Smokeless tobacco: Never Used   Substance and Sexual Activity   • Alcohol use: No     Frequency: Never   • Drug  use: No           Objective   Physical Exam   Constitutional: He is oriented to person, place, and time. He appears well-developed and well-nourished.   HENT:   Head: Normocephalic and atraumatic.   Eyes: Pupils are equal, round, and reactive to light. Conjunctivae and EOM are normal.   Neck: Normal range of motion. Neck supple.   Cardiovascular: Normal rate, regular rhythm, normal heart sounds and intact distal pulses.   Pulmonary/Chest: Effort normal and breath sounds normal.   Abdominal: Soft. He exhibits no distension. There is generalized tenderness.   Mildly tender to palpation   Genitourinary: Rectal exam shows guaiac positive stool.   Musculoskeletal: Normal range of motion.   Neurological: He is alert and oriented to person, place, and time.   Skin: Skin is warm and dry. Capillary refill takes less than 2 seconds.   Psychiatric: He has a normal mood and affect. His behavior is normal. Judgment and thought content normal.       Procedures           ED Course      Medications   sodium chloride 0.9 % flush 10 mL (10 mL Intravenous Given 3/15/20 1226)   sodium chloride 0.9 % bolus 1,000 mL (0 mL Intravenous Stopped 3/15/20 1318)   ondansetron (ZOFRAN) injection 4 mg (4 mg Intravenous Given 3/15/20 1232)   Morphine sulfate (PF) injection 2 mg (2 mg Intravenous Given 3/15/20 1234)   dicyclomine (BENTYL) injection 20 mg (20 mg Intramuscular Given 3/15/20 1235)   iopamidol (ISOVUE-370) 76 % injection 100 mL (100 mL Intravenous Given 3/15/20 1356)   Morphine sulfate (PF) injection 2 mg (2 mg Intravenous Given 3/15/20 1519)     Labs Reviewed   COMPREHENSIVE METABOLIC PANEL - Abnormal; Notable for the following components:       Result Value    Glucose 152 (*)     All other components within normal limits    Narrative:     GFR Normal >60  Chronic Kidney Disease <60  Kidney Failure <15     CBC WITH AUTO DIFFERENTIAL - Abnormal; Notable for the following components:    WBC 13.40 (*)     Neutrophil % 89.9 (*)      Lymphocyte % 4.7 (*)     Monocyte % 4.7 (*)     Eosinophil % 0.1 (*)     Neutrophils, Absolute 12.10 (*)     Lymphocytes, Absolute 0.60 (*)     All other components within normal limits   GASTROINTESTINAL PANEL, PCR - Normal    Narrative:     If Aeromonas, Staphylococcus aureus or Bacillus cereus are suspected, please order [id number]: Stool Culture, Aeromonas, S aureus, B Cereus.   APTT - Normal   PROTIME-INR - Normal   POC LACTATE - Normal   POC LACTATE   CBC AND DIFFERENTIAL    Narrative:     The following orders were created for panel order CBC & Differential.  Procedure                               Abnormality         Status                     ---------                               -----------         ------                     CBC Auto Differential[[id number]]        Abnormal            Final result                 Please view results for these tests on the individual orders.     CT Abdomen Pelvis With Contrast   Final Result       1. Nonspecific colitis involving the sigmoid colon, descending colon,   and splenic flexure.   2. No free air, pneumatosis, or abscess formation.   3. Bilateral renal cysts.   4. Dependent atelectasis in lung bases. There is chronic scarring versus   subsegmental atelectasis in the lingula.   5. Postsurgical changes at the L4-S1 levels. The patient has an old   nonunited fracture of the right L3 pedicle.       Electronically Signed By-Jacky Aldana On:3/15/2020 2:06 PM   This report was finalized on [id number] by  Jacky Aldana, .                                               MDM  Number of Diagnoses or Management Options  Colitis:   Diagnosis management comments: DISPOSITION:   Chart Review:  Comorbidity:  has a past medical history of Acute left ankle pain, Allergic rhinitis, Annual physical exam, Arthralgia of right hip, Bobby's esophagus with dysplasia, Bronchitis, Bruising, Cervical disc disease with myelopathy, Chronic low back pain with sciatica, Chronic pain, Chronic pain of  right knee, Colon cancer screening, Conductive hearing loss, Constipation, Degeneration of lumbar intervertebral disc, ED (erectile dysfunction), Esophageal reflux, Esophagitis, Foot pain, bilateral, Gastroenteritis, acute, Headache, History of tobacco use, Hydrocele, Hyperglycemia, Hypertension, benign, Insomnia, Intervertebral disc disorder with myelopathy, lumbar region, Left leg weakness, Left medial knee pain, Localized primary osteoarthritis of right lower leg, Medicare annual wellness visit, subsequent, Mixed hyperlipidemia, Need for prophylactic vaccination and inoculation against influenza, Nocturia, Non-seasonal allergic rhinitis due to pollen, Other specified persistent mood disorders (CMS/HCC), Overweight (BMI 25.0-29.9), Pain of left clavicle, Palpitations, Paronychia of finger of left hand, Proctitis, Psoriatic arthritis (CMS/HCC), Rash, Screening for alcoholism, Screening for depression, Scrotal mass, Tendonitis, Tick bite, Tobacco use disorder, Trigger ring finger of right hand, Ulnar neuropathy at elbow of right upper extremity, Umbilical hernia, and Unspecified contact dermatitis, unspecified cause.     Labs: Hemoglobin 14.5, hematocrit 41.9, WBC 13.4, CMP and clotting times reviewed and unremarkable    Imaging: Was interpreted by physician and reviewed by myself:  Ct Abdomen Pelvis With Contrast    Result Date: 3/15/2020   1. Nonspecific colitis involving the sigmoid colon, descending colon, and splenic flexure. 2. No free air, pneumatosis, or abscess formation. 3. Bilateral renal cysts. 4. Dependent atelectasis in lung bases. There is chronic scarring versus subsegmental atelectasis in the lingula. 5. Postsurgical changes at the L4-S1 levels. The patient has an old nonunited fracture of the right L3 pedicle.  Electronically Signed By-Jacky Aldana On:3/15/2020 2:06 PM This report was finalized on [id number] by  Jacky Aldaan, .      Disposition/Treatment:    59-year-old male presented to the  emergency department today with complaints of rectal bleeding, cramping, nausea that began this morning at 2 AM.  Patient is never had rectal bleeding before.  Labs were obtained and hemoglobin was found to be normal.  Patient did have colitis on CT.  Patient main stable in the emergency department other than hypertension but he states that his blood pressure is always high.  He is in no acute distress.  Patient be discharged home in stable condition to follow-up with his primary care provider for further evaluation of colitis.  Prior to discharge we obtained a stool sample to send for culture.  Patient formed that if these came back negative that he will be contacted by Sherif Centeno.  Patient return to the emergency department for any new or worsening symptoms.       Amount and/or Complexity of Data Reviewed  Clinical lab tests: reviewed  Tests in the radiology section of CPT®: reviewed        Final diagnoses:   Colitis            Vidhya Collado, MINERVA  03/15/20 1724

## 2020-03-16 ENCOUNTER — OFFICE VISIT (OUTPATIENT)
Dept: FAMILY MEDICINE CLINIC | Facility: CLINIC | Age: 60
End: 2020-03-16

## 2020-03-16 VITALS
WEIGHT: 183 LBS | BODY MASS INDEX: 25.62 KG/M2 | SYSTOLIC BLOOD PRESSURE: 112 MMHG | RESPIRATION RATE: 18 BRPM | HEART RATE: 72 BPM | OXYGEN SATURATION: 94 % | TEMPERATURE: 98.8 F | HEIGHT: 71 IN | DIASTOLIC BLOOD PRESSURE: 78 MMHG

## 2020-03-16 DIAGNOSIS — K52.9 COLITIS, ACUTE: Primary | ICD-10-CM

## 2020-03-16 PROCEDURE — 99214 OFFICE O/P EST MOD 30 MIN: CPT | Performed by: FAMILY MEDICINE

## 2020-03-16 RX ORDER — PREDNISONE 20 MG/1
20 TABLET ORAL DAILY
Qty: 20 TABLET | Refills: 0 | Status: SHIPPED | OUTPATIENT
Start: 2020-03-16 | End: 2020-03-29

## 2020-03-16 NOTE — PROGRESS NOTES
Subjective   Steven Hammonds is a 59 y.o. male.     Following up from [facility] ER visit on 3/15/20 due to rectal bleeding, dx with colitis. Would like to go over CT results from yesterday.     Rectal Bleeding   The current episode started yesterday. The problem occurs constantly. The problem has been unchanged. Associated symptoms include abdominal pain (cramping), anorexia (since yesterday), headaches and nausea. Pertinent negatives include no chest pain, fatigue, fever, joint swelling, neck pain, numbness, rash, swollen glands, vomiting or weakness. Associated symptoms comments: Diarrhea, low back pain, dizziness.        The following portions of the patient's history were reviewed and updated as appropriate: allergies, current medications, past family history, past medical history, past social history, past surgical history and problem list.    Patient Active Problem List   Diagnosis   • Degeneration of lumbar or lumbosacral intervertebral disc   • Disc narrowing   • Encounter for other specified aftercare   • Gastroesophageal reflux disease   • Hypertension   • Paroxysmal atrial fibrillation (CMS/HCC)   • Spinal stenosis   • Spondylolysis   • Status post arthroscopy of knee   • Sinus pause   • Tachycardia-bradycardia (CMS/HCC)   • Thoracic or lumbosacral neuritis or radiculitis   • Tremor   • Presence of cardiac pacemaker   • Allergic rhinitis   • Hyperlipidemia   • Insomnia   • Umbilical hernia   • Tinea   • Impetigo   • Labile blood pressure   • Other specified persistent mood disorders (CMS/HCC)   • Non-seasonal allergic rhinitis due to pollen   • Chronic low back pain with sciatica   • Bobby's esophagus with dysplasia       Current Outpatient Medications on File Prior to Visit   Medication Sig Dispense Refill   • amitriptyline (ELAVIL) 50 MG tablet TAKE 2 TABLETS AT BEDTIME 180 tablet 4   • aspirin 81 MG tablet Take 1 tablet by mouth Daily. 30 tablet 12   • colchicine (COLCRYS) 0.6 MG tablet Take 1 tablet by  mouth Daily.     • dicyclomine (BENTYL) 10 MG capsule Take 1 capsule by mouth 3 (Three) Times a Day As Needed (abdominal pain) for up to 3 days. 9 capsule 0   • digoxin (LANOXIN) 125 MCG tablet Take 1 tablet by mouth Daily. 90 tablet 3   • DULoxetine (CYMBALTA) 20 MG capsule      • esomeprazole (NEXIUM) 40 MG capsule Take 1 capsule by mouth Daily.     • etanercept (ENBREL) 50 MG/ML solution prefilled syringe injection Inject  under the skin into the appropriate area as directed.     • fluticasone (FLONASE) 50 MCG/ACT nasal spray 2 sprays into the nostril(s) as directed by provider Daily.     • gabapentin (NEURONTIN) 600 MG tablet TAKE 1 TABLET FOUR TIMES A  tablet 3   • lisinopril (PRINIVIL,ZESTRIL) 20 MG tablet Take 0.5 tablets by mouth Daily. 90 tablet 3   • loratadine (CLARITIN) 10 MG tablet Take 1 tablet by mouth Daily.     • metoprolol succinate XL (TOPROL XL) 25 MG 24 hr tablet Take 0.5 tablets by mouth Daily. 180 tablet 3   • tamsulosin (FLOMAX) 0.4 MG capsule 24 hr capsule Take 1 capsule by mouth Daily.     • tiZANidine (ZANAFLEX) 4 MG tablet Take 1 tablet by mouth 3 (Three) Times a Day. 180 tablet 3   • triamcinolone (KENALOG) 0.1 % ointment RAJENDRA EXT AA OF TORSO AND EXTREMITIES BID FOR FOUR TO FIVE TIMES A WEEK PRN     • vitamin B-6 (PYRIDOXINE) 50 MG tablet Take 50 mg by mouth Daily.     • diclofenac-misoprostol (ARTHROTEC 75) 75-0.2 MG EC tablet Take 1 tablet by mouth 2 (Two) Times a Day.       Current Facility-Administered Medications on File Prior to Visit   Medication Dose Route Frequency Provider Last Rate Last Dose   • [DISCONTINUED] sodium chloride 0.9 % flush 10 mL  10 mL Intravenous PRN Vidhya Collado APRN   10 mL at 03/15/20 1226     Current outpatient and discharge medications have been reconciled for the patient.  Reviewed by: Junaid Marquez MD      Allergies   Allergen Reactions   • Oxycodone-Acetaminophen GI Intolerance   • Adhesive Tape Unknown (See Comments)   •  Oxycodone Unknown (See Comments)       Review of Systems   Constitutional: Negative for activity change, appetite change, fatigue and fever.   HENT: Negative for ear pain, swollen glands and voice change.    Eyes: Negative for visual disturbance.   Respiratory: Negative for shortness of breath and wheezing.    Cardiovascular: Negative for chest pain and leg swelling.   Gastrointestinal: Positive for abdominal pain (cramping), anorexia (since yesterday), hematochezia and nausea. Negative for blood in stool, constipation, diarrhea and vomiting.   Endocrine: Negative for polydipsia and polyuria.   Genitourinary: Negative for dysuria, frequency and hematuria.   Musculoskeletal: Negative for joint swelling, neck pain and neck stiffness.   Skin: Negative for rash and bruise.   Neurological: Negative for weakness, numbness and headache.   Psychiatric/Behavioral: Negative for suicidal ideas and depressed mood.     I have reviewed and confirmed the accuracy of the ROS as documented by the MA/LPN/RN Junaid Marquez MD      Objective   Vitals:    03/16/20 1529   BP: 112/78   Pulse: 72   Resp: 18   Temp: 98.8 °F (37.1 °C)   SpO2: 94%     Physical Exam   Constitutional: He is oriented to person, place, and time. He appears well-developed and well-nourished.   Eyes: Pupils are equal, round, and reactive to light. Conjunctivae and EOM are normal.   Neck: Normal range of motion. Neck supple.   Cardiovascular: Normal rate, regular rhythm and normal heart sounds.   Pulmonary/Chest: Effort normal and breath sounds normal.   Abdominal: Soft. Bowel sounds are normal.   Musculoskeletal: Normal range of motion.   Neurological: He is alert and oriented to person, place, and time.   Skin: Skin is warm and dry.   Psychiatric: He has a normal mood and affect. His behavior is normal. Judgment and thought content normal.       Assessment/Plan .  Problem List Items Addressed This Visit     None      Visit Diagnoses     Colitis, acute    -   Primary    Relevant Medications    predniSONE (DELTASONE) 20 MG tablet      CT and labs reviewed. Try Xifaxin, samples given. If not improved, recc try prednisone taper. Return to ER for worsening sx, weakness/lightheadedness or other s/sx of hypovolemia. Pt referred to Dr Rodríguez for continued management  Medication and medication adverse effects discussed.  Drug education given and explained to patient. Patient verbalized understanding.

## 2020-03-31 RX ORDER — TIZANIDINE 4 MG/1
TABLET ORAL
Qty: 180 TABLET | Refills: 5 | Status: SHIPPED | OUTPATIENT
Start: 2020-03-31 | End: 2020-09-28

## 2020-05-04 ENCOUNTER — CLINICAL SUPPORT NO REQUIREMENTS (OUTPATIENT)
Dept: CARDIOLOGY | Facility: CLINIC | Age: 60
End: 2020-05-04

## 2020-05-04 DIAGNOSIS — I48.0 PAROXYSMAL ATRIAL FIBRILLATION (HCC): Primary | ICD-10-CM

## 2020-05-04 DIAGNOSIS — I49.5 TACHYCARDIA-BRADYCARDIA (HCC): ICD-10-CM

## 2020-05-04 DIAGNOSIS — I45.5 SINUS PAUSE: ICD-10-CM

## 2020-05-04 DIAGNOSIS — Z95.0 PRESENCE OF CARDIAC PACEMAKER: ICD-10-CM

## 2020-06-10 ENCOUNTER — CLINICAL SUPPORT NO REQUIREMENTS (OUTPATIENT)
Dept: CARDIOLOGY | Facility: CLINIC | Age: 60
End: 2020-06-10

## 2020-06-10 DIAGNOSIS — I49.5 TACHYCARDIA-BRADYCARDIA (HCC): ICD-10-CM

## 2020-06-10 DIAGNOSIS — Z95.0 PRESENCE OF CARDIAC PACEMAKER: ICD-10-CM

## 2020-06-10 DIAGNOSIS — I48.0 PAROXYSMAL ATRIAL FIBRILLATION (HCC): Primary | ICD-10-CM

## 2020-06-10 PROCEDURE — 93296 REM INTERROG EVL PM/IDS: CPT | Performed by: INTERNAL MEDICINE

## 2020-06-10 PROCEDURE — 93294 REM INTERROG EVL PM/LDLS PM: CPT | Performed by: INTERNAL MEDICINE

## 2020-06-18 DIAGNOSIS — K52.9 COLITIS, ACUTE: Primary | ICD-10-CM

## 2020-06-29 ENCOUNTER — TELEPHONE (OUTPATIENT)
Dept: FAMILY MEDICINE CLINIC | Facility: CLINIC | Age: 60
End: 2020-06-29

## 2020-07-16 DIAGNOSIS — J30.9 ALLERGIC RHINITIS, UNSPECIFIED SEASONALITY, UNSPECIFIED TRIGGER: Primary | ICD-10-CM

## 2020-07-17 RX ORDER — FLUTICASONE PROPIONATE 50 MCG
SPRAY, SUSPENSION (ML) NASAL
Qty: 3 BOTTLE | Refills: 3 | Status: SHIPPED | OUTPATIENT
Start: 2020-07-17 | End: 2021-08-16

## 2020-09-24 ENCOUNTER — OFFICE VISIT (OUTPATIENT)
Dept: CARDIOLOGY | Facility: CLINIC | Age: 60
End: 2020-09-24

## 2020-09-24 ENCOUNTER — TELEPHONE (OUTPATIENT)
Dept: FAMILY MEDICINE CLINIC | Facility: CLINIC | Age: 60
End: 2020-09-24

## 2020-09-24 ENCOUNTER — CLINICAL SUPPORT NO REQUIREMENTS (OUTPATIENT)
Dept: CARDIOLOGY | Facility: CLINIC | Age: 60
End: 2020-09-24

## 2020-09-24 VITALS
SYSTOLIC BLOOD PRESSURE: 180 MMHG | BODY MASS INDEX: 26.46 KG/M2 | DIASTOLIC BLOOD PRESSURE: 98 MMHG | HEIGHT: 71 IN | WEIGHT: 189 LBS | OXYGEN SATURATION: 97 % | HEART RATE: 57 BPM

## 2020-09-24 DIAGNOSIS — I48.0 PAROXYSMAL ATRIAL FIBRILLATION (HCC): ICD-10-CM

## 2020-09-24 DIAGNOSIS — E78.2 MIXED HYPERLIPIDEMIA: ICD-10-CM

## 2020-09-24 DIAGNOSIS — Z95.0 PRESENCE OF CARDIAC PACEMAKER: ICD-10-CM

## 2020-09-24 DIAGNOSIS — I45.5 SINUS PAUSE: ICD-10-CM

## 2020-09-24 DIAGNOSIS — Z95.0 PRESENCE OF CARDIAC PACEMAKER: Primary | ICD-10-CM

## 2020-09-24 DIAGNOSIS — I10 ESSENTIAL HYPERTENSION: ICD-10-CM

## 2020-09-24 DIAGNOSIS — I49.5 TACHYCARDIA-BRADYCARDIA (HCC): Primary | ICD-10-CM

## 2020-09-24 DIAGNOSIS — I49.5 TACHYCARDIA-BRADYCARDIA (HCC): ICD-10-CM

## 2020-09-24 DIAGNOSIS — M51.37 DEGENERATION OF LUMBAR OR LUMBOSACRAL INTERVERTEBRAL DISC: Primary | ICD-10-CM

## 2020-09-24 PROCEDURE — 93280 PM DEVICE PROGR EVAL DUAL: CPT | Performed by: INTERNAL MEDICINE

## 2020-09-24 PROCEDURE — 99214 OFFICE O/P EST MOD 30 MIN: CPT | Performed by: INTERNAL MEDICINE

## 2020-09-24 PROCEDURE — 93000 ELECTROCARDIOGRAM COMPLETE: CPT | Performed by: INTERNAL MEDICINE

## 2020-09-24 RX ORDER — METOPROLOL SUCCINATE 25 MG/1
12.5 TABLET, EXTENDED RELEASE ORAL EVERY 24 HOURS
Qty: 45 TABLET | Refills: 3 | Status: SHIPPED | OUTPATIENT
Start: 2020-09-24 | End: 2020-09-24 | Stop reason: SDUPTHER

## 2020-09-24 RX ORDER — TRAMADOL HYDROCHLORIDE 50 MG/1
50 TABLET ORAL EVERY 6 HOURS PRN
Qty: 28 TABLET | Refills: 0 | Status: SHIPPED | OUTPATIENT
Start: 2020-09-24 | End: 2020-09-28

## 2020-09-24 RX ORDER — DIGOXIN 125 MCG
125 TABLET ORAL DAILY
Qty: 90 TABLET | Refills: 3 | Status: SHIPPED | OUTPATIENT
Start: 2020-09-24 | End: 2021-10-11

## 2020-09-24 RX ORDER — METOPROLOL SUCCINATE 25 MG/1
12.5 TABLET, EXTENDED RELEASE ORAL EVERY 24 HOURS
Qty: 90 TABLET | Refills: 5 | Status: ON HOLD | OUTPATIENT
Start: 2020-09-24 | End: 2021-02-04 | Stop reason: SDUPTHER

## 2020-09-24 NOTE — PROGRESS NOTES
Encounter Date:09/24/2020  Last seen 3/9/2020      Patient ID: Steven Hammonds is a 60 y.o. male.    Chief Complaint:    Status post pacemaker  Hypertension  Renal dysfunction  History of near syncope     History of Present Illness  Patient has labile blood pressure.  Patient recently has been having back discomfort and his blood pressure has been elevated.     Since I have last seen, the patient has been without any chest discomfort ,shortness of breath, palpitations, dizziness or syncope.  Denies having any headache ,abdominal pain ,nausea, vomiting , diarrhea constipation, loss of weight or loss of appetite.  Denies having any excessive bruising ,hematuria or blood in the stool.     Review of all systems negative except as indicated     Assessment and Plan         [[[[[[[[[[[[[[[[[[[[[  Impression  ============  -status post permanent dual-chamber pacemaker implantation (Scaled Inference MRI Compatible ) 04/13/2018  - sick sinus syndrome-tachy-irvin syndrome.  Patient has history of atrial fibrillation with rapid ventricular response.  Patient had significant pauses of 5-6 seconds.  Symptomatic with dizziness and near-syncope     - history of Near-syncope associated with palpitations and dizziness .  -improved     -cardiac catheterization 03/18/2017 revealed normal left ventricular function and normal coronary arteries.  History of normal echocardiogram     -status post loop recorder placement 03/24/2017. -removal of loop recorder 04/13/2018.     -hypertension  Renal dysfunction BUN 17 creatinine 1.4 GERD depression Psoriatic arthritis Hypokalemia     -history of Anemia and neutropenia     -Status post hip replacement bilateral arthroscopic knee surgery right shoulder surgery  CBP- with chronic myelopathy, s/p multiple lumbar surgeries; continue soma, gabapentin     -Allergic to Percocet  ================  Plan  ==============  Patient has a labile blood pressure.  Patient has been having back discomfort causing  blood pressure to be higher.  Interrogation of the pacemaker revealed excellent pacing parameters.  AF burden is less than 1%.  Increase metoprolol XL to 25 mg a day and take lisinopril half a tablet as needed if the blood pressure is elevated.  EKG showed sinus rhythm without ischemic changes  Have discussed with patient regarding possible need for anticoagulation.  However hopefully increasing the dose of metoprolol will decrease AF burden.  Medications were reviewed and updated.  Further plan will depend on patient's progress.  Followup in the office in 6 months with pacemaker interrogation  [[[[[[[[[[[[[[[[[[[[[[[[[[[[[[[[                    Diagnosis Plan   1. Presence of cardiac pacemaker     2. Essential hypertension     3. Mixed hyperlipidemia     4. Paroxysmal atrial fibrillation (CMS/HCC)     5. Tachycardia-bradycardia (CMS/HCC)     LAB RESULTS (LAST 7 DAYS)    CBC        BMP        CMP         BNP        TROPONIN        CoAg        Creatinine Clearance  CrCl cannot be calculated (Patient's most recent lab result is older than the maximum 30 days allowed.).    ABG        Radiology  No radiology results for the last day                The following portions of the patient's history were reviewed and updated as appropriate: allergies, current medications, past family history, past medical history, past social history, past surgical history and problem list.    Review of Systems   Constitution: Negative for malaise/fatigue.   Cardiovascular: Positive for chest pain and palpitations. Negative for leg swelling and syncope.   Respiratory: Negative for shortness of breath.    Skin: Negative for rash.   Gastrointestinal: Negative for nausea and vomiting.   Neurological: Negative for dizziness, light-headedness and numbness.         Current Outpatient Medications:   •  amitriptyline (ELAVIL) 50 MG tablet, TAKE 2 TABLETS AT BEDTIME, Disp: 180 tablet, Rfl: 4  •  aspirin 81 MG tablet, Take 1 tablet by mouth Daily.,  Disp: 30 tablet, Rfl: 12  •  diclofenac-misoprostol (ARTHROTEC 75) 75-0.2 MG EC tablet, Take 1 tablet by mouth 2 (Two) Times a Day., Disp: , Rfl:   •  digoxin (LANOXIN) 125 MCG tablet, Take 1 tablet by mouth Daily., Disp: 90 tablet, Rfl: 3  •  DULoxetine (CYMBALTA) 20 MG capsule, Take 20 mg by mouth., Disp: , Rfl:   •  esomeprazole (NEXIUM) 40 MG capsule, Take 1 capsule by mouth Daily., Disp: , Rfl:   •  etanercept (ENBREL) 50 MG/ML solution prefilled syringe injection, Inject  under the skin into the appropriate area as directed., Disp: , Rfl:   •  fluticasone (FLONASE) 50 MCG/ACT nasal spray, USE 2 SPRAYS IN EACH NOSTRIL ONCE DAILY, Disp: 3 bottle, Rfl: 3  •  gabapentin (NEURONTIN) 600 MG tablet, TAKE 1 TABLET FOUR TIMES A DAY, Disp: 360 tablet, Rfl: 3  •  lisinopril (PRINIVIL,ZESTRIL) 20 MG tablet, Take 0.5 tablets by mouth Daily., Disp: 90 tablet, Rfl: 3  •  loratadine (CLARITIN) 10 MG tablet, Take 1 tablet by mouth Daily., Disp: , Rfl:   •  tamsulosin (FLOMAX) 0.4 MG capsule 24 hr capsule, Take 1 capsule by mouth Daily., Disp: , Rfl:   •  tiZANidine (ZANAFLEX) 4 MG tablet, TAKE 1 TABLET THREE TIMES A DAY, Disp: 180 tablet, Rfl: 5  •  triamcinolone (KENALOG) 0.1 % ointment, RAJENDRA EXT AA OF TORSO AND EXTREMITIES BID FOR FOUR TO FIVE TIMES A WEEK PRN, Disp: , Rfl:   •  vitamin B-6 (PYRIDOXINE) 50 MG tablet, Take 50 mg by mouth Daily., Disp: , Rfl:   •  metoprolol succinate XL (Toprol XL) 25 MG 24 hr tablet, Take 0.5 tablets by mouth Daily., Disp: 90 tablet, Rfl: 5  •  traMADol (ULTRAM) 50 MG tablet, Take 1 tablet by mouth Every 6 (Six) Hours As Needed for Moderate Pain ., Disp: 28 tablet, Rfl: 0    Allergies   Allergen Reactions   • Oxycodone-Acetaminophen GI Intolerance   • Adhesive Tape Unknown (See Comments)   • Oxycodone Unknown (See Comments)       Family History   Problem Relation Age of Onset   • Other Father         Substance Abuse   • Cancer Father         Lung       Past Surgical History:   Procedure  Laterality Date   • CARDIAC PACEMAKER PLACEMENT     • KNEE SURGERY Right 2016   • LUMBAR DISCECTOMY  10/2015    Comments: lumbar disk decompression   • TOTAL HIP ARTHROPLASTY Right 12/09/2016       Past Medical History:   Diagnosis Date   • Acute left ankle pain     Impression: xray neg on prelim read. Findings discussed. All questions answered. Awaiting final radiology evaluation. Natural course and self-limited nature of this condition discussed. Follow-up in 4-6 weeks if not better. Follow-up sooner for worsening symptoms or for any concerns.   • Allergic rhinitis     Impression: persistent   • Annual physical exam     Impression: Discussed anticipatory guidance, diet, exercise, and weight loss. Discussed safety, seatbelts, and routine screening examinations. Discussed self-examinations.   • Arthralgia of right hip     Impression: needs right hip relacement   • Bobby's esophagus with dysplasia     Impression: scope 11/2013 improved from previous. Recheck due in 2015 Packet given   • Bronchitis     Impression: resolved   • Bruising     Impression: appears benign, no other areas noted. Natural course and self-limited nature of this condition discussed. Will follow.   • Cervical disc disease with myelopathy     Impression: seen on MRi 5/2012. Workup in progress.   • Chronic low back pain with sciatica     Unspecified. Impression: scheduled for surgery   • Chronic pain     Impression: severe constipation with opana. Try MS contin   • Chronic pain of right knee     Impression: needs knee replacement   • Colon cancer screening     Impression: packet given   • Conductive hearing loss     Impression: hfhl right. Refer to audiology. Question AR. Start flonase   • Constipation    • Degeneration of lumbar intervertebral disc     Story: history of lumbar surgery. s/p epidural x3, last 6/18/15 Dr Ge. Impression: on ibuprofen   • ED (erectile dysfunction)    • Esophageal reflux    • Esophagitis     Impression: Past  due for repeat EGD. Previous physician no longer covered under insurance. Recommend refer to gastroentrreology. Pt has packet to fill out for GSI.   • Foot pain, bilateral    • Gastroenteritis, acute     Impression: try phenergan, lomotil.   • Headache     Impression: Musculoskeletal. Baclofen tid prn.   • History of tobacco use    • Hydrocele    • Hyperglycemia     Impression: incidential finding   • Hypertension, benign     Impression: stable. Low after surgery. Will follow.   • Insomnia    • Intervertebral disc disorder with myelopathy, lumbar region     Impression: Changed neurontin to hs, increased to 1800 mg at hs.   • Left leg weakness     Impression: due to recent surgery. May benefit for home health. Pt homebound due severe pain postop back surgery. Also needs 3-in-1 Commode   • Left medial knee pain     Impression: Likely OA. Findings discussed. All questions answered. Keep follow-up with his ortho. Xrays today   • Localized primary osteoarthritis of right lower leg     Impression: scope planned   • Medicare annual wellness visit, subsequent     With abnormal findings.   • Mixed hyperlipidemia     Story: Stable, Improved, Compliant with meds Is getting adequate diet and exercise Goals developed at last visit were met Follow up in 6 months Care management needs are self addressed. Would not benefit from care management Self-Management abilities addressed and patient is capable of managing his/her own disease   • Need for prophylactic vaccination and inoculation against influenza    • Nocturia    • Non-seasonal allergic rhinitis due to pollen    • Other specified persistent mood disorders (CMS/HCC)     Story: stress   • Overweight (BMI 25.0-29.9)    • Pain of left clavicle     Impression: likely arthritic   • Palpitations     Impression: labs, holter normal. Elevated HR likely just related to pain   • Paronychia of finger of left hand     Impression: Continued pain and discomfort in the finger. There is  little to no inflammation. He was started on Bactrim DS and advised to soak his finger in warm water and epson salt.   • Proctitis     Impression: likely based on colonoscopy findings. Wife had VGE week prior. Will get path results of biopsies taken at time of endoscopy to eval for crohns   • Psoriatic arthritis (CMS/HCC)     Impression: refer to Dr Rodriguez   • Rash     Impression: exam more consistent with psitiasis rosea. Findings discussed. All questions answered. Reassurance, education. Natural course and self-limited nature of this condition discussed. Follow up if worsens or persists.   • Screening for alcoholism    • Screening for depression    • Scrotal mass    • Tendonitis     Impression: right biceps. Findings discussed. All questions answered. Medication and medication adverse effects discussed. Drug education given and explained to patient. Patient verbalized understanding. Follow-up in 2 weeks if not better. Follow-up sooner for worsening symptoms or for any concerns.   • Tick bite     Initial encounter. Impression: with buuseye rash. Findings discussed. All questions answered. Medication and medication adverse effects discussed. Drug education given and explained to patient. Patient verbalized understanding.   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 Natural course and self-limited nature of this condition discussed. Follow-up in 4-6 weeks if not better. Follow-up sooner for worsening symptoms or for any concerns. EMG if not improved   • Umbilical hernia    • Unspecified contact dermatitis, unspecified cause     Impression: He was started on Elocon cream to help with his symptoms. He was encouraged to RTC if it worsens.       Family History   Problem Relation Age of Onset   • Other Father         Substance Abuse   • Cancer Father          "Lung       Social History     Socioeconomic History   • Marital status:      Spouse name: Not on file   • Number of children: Not on file   • Years of education: Not on file   • Highest education level: Not on file   Tobacco Use   • Smoking status: Former Smoker   • Smokeless tobacco: Never Used   Substance and Sexual Activity   • Alcohol use: No     Frequency: Never   • Drug use: No           ECG 12 Lead    Date/Time: 9/24/2020 11:38 AM  Performed by: Claudia Benson MD  Authorized by: Claudia Benson MD   Comparison: compared with previous ECG   Similar to previous ECG  Comparison to previous ECG: Normal sinus rhythm nonspecific ST-T wave changes premature atrial contractions 72/min leftward axis no ectopy no other ectopy.  No significant change from 3/9/2020                Objective:       Physical Exam    /98 (BP Location: Left arm, Patient Position: Sitting, Cuff Size: Large Adult)   Pulse 57   Ht 180.3 cm (71\")   Wt 85.7 kg (189 lb)   SpO2 97%   BMI 26.36 kg/m²   The patient is alert, oriented and in no distress.    Vital signs as noted above.    Head and neck revealed no carotid bruits or jugular venous distension.  No thyromegaly or lymphadenopathy is present.    Lungs clear.  No wheezing.  Breath sounds are normal bilaterally.    Heart normal first and second heart sounds.  No murmur..  No pericardial rub is present.  No gallop is present.    Abdomen soft and nontender.  No organomegaly is present.    Extremities revealed good peripheral pulses without any pedal edema.    Skin warm and dry.  Pacemaker site looks normal.    Musculoskeletal system is grossly normal.    CNS grossly normal.        "

## 2020-09-24 NOTE — TELEPHONE ENCOUNTER
Pt's wife called.  States he's having a lot of back pain and can't get in with his neurosurgeon until after he gets an mri done.  Requesting something for pain.  Notified wife per Dr. Marquez can only give him Tramadol, otherwise has to be seen.  Ok with her-he has appt with Dr. Marquez on Monday-he cannot come in any sooner.  Rx for Tramadol sent to [facility].    no

## 2020-09-28 ENCOUNTER — OFFICE VISIT (OUTPATIENT)
Dept: FAMILY MEDICINE CLINIC | Facility: CLINIC | Age: 60
End: 2020-09-28

## 2020-09-28 VITALS
RESPIRATION RATE: 18 BRPM | SYSTOLIC BLOOD PRESSURE: 144 MMHG | OXYGEN SATURATION: 97 % | WEIGHT: 192 LBS | HEART RATE: 64 BPM | TEMPERATURE: 98.2 F | BODY MASS INDEX: 26.88 KG/M2 | DIASTOLIC BLOOD PRESSURE: 96 MMHG | HEIGHT: 71 IN

## 2020-09-28 DIAGNOSIS — K62.89 PROCTITIS: ICD-10-CM

## 2020-09-28 DIAGNOSIS — M51.37 DEGENERATION OF LUMBAR OR LUMBOSACRAL INTERVERTEBRAL DISC: Primary | ICD-10-CM

## 2020-09-28 PROCEDURE — 96372 THER/PROPH/DIAG INJ SC/IM: CPT | Performed by: FAMILY MEDICINE

## 2020-09-28 PROCEDURE — 99214 OFFICE O/P EST MOD 30 MIN: CPT | Performed by: FAMILY MEDICINE

## 2020-09-28 RX ORDER — HYDROCODONE BITARTRATE AND ACETAMINOPHEN 5; 325 MG/1; MG/1
1 TABLET ORAL EVERY 6 HOURS PRN
Qty: 28 TABLET | Refills: 0 | Status: SHIPPED | OUTPATIENT
Start: 2020-09-28 | End: 2020-09-28

## 2020-09-28 RX ORDER — HYDROCORTISONE 25 MG/G
CREAM TOPICAL DAILY
Qty: 7 EACH | Refills: 0 | Status: SHIPPED | OUTPATIENT
Start: 2020-09-28 | End: 2020-09-28 | Stop reason: SDUPTHER

## 2020-09-28 RX ORDER — HYDROCORTISONE 25 MG/G
CREAM TOPICAL DAILY
Qty: 7 EACH | Refills: 0 | Status: SHIPPED | OUTPATIENT
Start: 2020-09-28 | End: 2020-10-05

## 2020-09-28 RX ORDER — BACLOFEN 10 MG/1
10 TABLET ORAL 3 TIMES DAILY PRN
Qty: 60 TABLET | Refills: 1 | Status: SHIPPED | OUTPATIENT
Start: 2020-09-28 | End: 2020-09-28

## 2020-09-28 RX ORDER — KETOROLAC TROMETHAMINE 30 MG/ML
60 INJECTION, SOLUTION INTRAMUSCULAR; INTRAVENOUS ONCE
Status: COMPLETED | OUTPATIENT
Start: 2020-09-28 | End: 2020-09-28

## 2020-09-28 RX ORDER — HYDROCODONE BITARTRATE AND ACETAMINOPHEN 5; 325 MG/1; MG/1
1 TABLET ORAL EVERY 6 HOURS PRN
Qty: 28 TABLET | Refills: 0 | Status: SHIPPED | OUTPATIENT
Start: 2020-09-28 | End: 2020-10-09 | Stop reason: SDUPTHER

## 2020-09-28 RX ORDER — BACLOFEN 10 MG/1
10 TABLET ORAL 3 TIMES DAILY PRN
Qty: 60 TABLET | Refills: 1 | Status: SHIPPED | OUTPATIENT
Start: 2020-09-28 | End: 2020-12-03 | Stop reason: SDUPTHER

## 2020-09-28 RX ADMIN — KETOROLAC TROMETHAMINE 60 MG: 30 INJECTION, SOLUTION INTRAMUSCULAR; INTRAVENOUS at 09:57

## 2020-09-28 NOTE — PROGRESS NOTES
Subjective   Steven Hammonds is a 60 y.o. male.     Patient here today to discuss getting an MRI of back and requesting something stronger than Tramadol for pain.  hx of lumbar surgery. s/p epidural x3, last 6/18/15 Dr Ge.    Would also like to go over colonoscopy from 9/24/20, was dx with proctitis.    Back Pain  The current episode started more than 1 year ago. The problem has been rapidly worsening (x 2 weeks) since onset. The pain is present in the lumbar spine. Pertinent negatives include no abdominal pain, chest pain, dysuria, fever, numbness or weakness.      The following portions of the patient's history were reviewed and updated as appropriate: allergies, current medications, past family history, past medical history, past social history, past surgical history and problem list.    Patient Active Problem List   Diagnosis   • Degeneration of lumbar or lumbosacral intervertebral disc   • Disc narrowing   • Encounter for other specified aftercare   • Gastroesophageal reflux disease   • Hypertension   • Paroxysmal atrial fibrillation (CMS/HCC)   • Spinal stenosis   • Spondylolysis   • Status post arthroscopy of knee   • Sinus pause   • Tachycardia-bradycardia (CMS/HCC)   • Thoracic or lumbosacral neuritis or radiculitis   • Tremor   • Presence of cardiac pacemaker   • Allergic rhinitis   • Hyperlipidemia   • Insomnia   • Umbilical hernia   • Tinea   • Impetigo   • Labile blood pressure   • Other specified persistent mood disorders (CMS/HCC)   • Non-seasonal allergic rhinitis due to pollen   • Chronic low back pain with sciatica   • Bobby's esophagus with dysplasia       Current Outpatient Medications on File Prior to Visit   Medication Sig Dispense Refill   • amitriptyline (ELAVIL) 50 MG tablet TAKE 2 TABLETS AT BEDTIME 180 tablet 4   • aspirin 81 MG tablet Take 1 tablet by mouth Daily. 30 tablet 12   • diclofenac-misoprostol (ARTHROTEC 75) 75-0.2 MG EC tablet Take 1 tablet by mouth 2 (Two) Times a  Day.     • digoxin (LANOXIN) 125 MCG tablet Take 1 tablet by mouth Daily. 90 tablet 3   • DULoxetine (CYMBALTA) 20 MG capsule Take 20 mg by mouth.     • esomeprazole (NEXIUM) 40 MG capsule Take 1 capsule by mouth Daily.     • etanercept (ENBREL) 50 MG/ML solution prefilled syringe injection Inject  under the skin into the appropriate area as directed.     • fluticasone (FLONASE) 50 MCG/ACT nasal spray USE 2 SPRAYS IN EACH NOSTRIL ONCE DAILY 3 bottle 3   • gabapentin (NEURONTIN) 600 MG tablet TAKE 1 TABLET FOUR TIMES A  tablet 3   • lisinopril (PRINIVIL,ZESTRIL) 20 MG tablet Take 0.5 tablets by mouth Daily. 90 tablet 3   • loratadine (CLARITIN) 10 MG tablet Take 1 tablet by mouth Daily.     • metoprolol succinate XL (Toprol XL) 25 MG 24 hr tablet Take 0.5 tablets by mouth Daily. 90 tablet 5   • tamsulosin (FLOMAX) 0.4 MG capsule 24 hr capsule Take 1 capsule by mouth Daily.     • triamcinolone (KENALOG) 0.1 % ointment RAJENDRA EXT AA OF TORSO AND EXTREMITIES BID FOR FOUR TO FIVE TIMES A WEEK PRN     • vitamin B-6 (PYRIDOXINE) 50 MG tablet Take 50 mg by mouth Daily.     • [DISCONTINUED] tiZANidine (ZANAFLEX) 4 MG tablet TAKE 1 TABLET THREE TIMES A  tablet 5   • [DISCONTINUED] traMADol (ULTRAM) 50 MG tablet Take 1 tablet by mouth Every 6 (Six) Hours As Needed for Moderate Pain . 28 tablet 0     No current facility-administered medications on file prior to visit.      Current outpatient and discharge medications have been reconciled for the patient.  Reviewed by: Junaid Marquez MD      Allergies   Allergen Reactions   • Oxycodone-Acetaminophen GI Intolerance   • Adhesive Tape Unknown (See Comments)   • Oxycodone Unknown (See Comments)       Review of Systems   Constitutional: Negative for activity change, appetite change, fatigue and fever.   HENT: Negative for ear pain, swollen glands and voice change.    Eyes: Negative for visual disturbance.   Respiratory: Negative for shortness of breath and  wheezing.    Cardiovascular: Negative for chest pain and leg swelling.   Gastrointestinal: Negative for abdominal pain, blood in stool, constipation, diarrhea, nausea and vomiting.   Endocrine: Negative for polydipsia and polyuria.   Genitourinary: Negative for dysuria, frequency and hematuria.   Musculoskeletal: Positive for back pain. Negative for joint swelling, neck pain and neck stiffness.   Skin: Negative for rash and bruise.   Neurological: Negative for weakness, numbness and headache.   Psychiatric/Behavioral: Negative for suicidal ideas and depressed mood.     I have reviewed and confirmed the accuracy of the ROS as documented by the MA/LPN/RN Junaid Marquez MD      Objective   Vitals:    09/28/20 0902   BP: 144/96   Pulse: 64   Resp: 18   Temp: 98.2 °F (36.8 °C)   SpO2: 97%     Physical Exam  Constitutional:       Appearance: He is well-developed.   HENT:      Head: Normocephalic and atraumatic.      Right Ear: External ear normal.      Left Ear: External ear normal.      Nose: Nose normal.   Eyes:      Pupils: Pupils are equal, round, and reactive to light.   Neck:      Musculoskeletal: Normal range of motion and neck supple.   Cardiovascular:      Rate and Rhythm: Normal rate and regular rhythm.      Heart sounds: Normal heart sounds.   Pulmonary:      Effort: Pulmonary effort is normal.      Breath sounds: Normal breath sounds.   Abdominal:      General: Bowel sounds are normal.      Palpations: Abdomen is soft.   Musculoskeletal: Normal range of motion.   Skin:     General: Skin is warm and dry.   Neurological:      Mental Status: He is alert and oriented to person, place, and time.   Psychiatric:         Behavior: Behavior normal.         Thought Content: Thought content normal.         Judgment: Judgment normal.       I wore protective equipment throughout this patient encounter to include mask and eye protection. Hand hygiene was performed before donning protective equipment and after  removal when leaving the room.    Assessment/Plan .  Steven was seen today for back pain.    Diagnoses and all orders for this visit:    Degeneration of lumbar or lumbosacral intervertebral disc  -     ketorolac (TORADOL) injection 60 mg  -     baclofen (LIORESAL) 10 MG tablet; Take 1 tablet by mouth 3 (Three) Times a Day As Needed for Muscle Spasms.  -     Discontinue: HYDROcodone-acetaminophen (NORCO) 5-325 MG per tablet; Take 1 tablet by mouth Every 6 (Six) Hours As Needed for Severe Pain  for up to 7 days.  -     Cancel: MRI Lumbar Spine Without Contrast  -     MRI Lumbar Spine Without Contrast  -     HYDROcodone-acetaminophen (NORCO) 5-325 MG per tablet; Take 1 tablet by mouth Every 6 (Six) Hours As Needed for Severe Pain  for up to 7 days.    Proctitis  -     Hydrocortisone, Perianal, (Anusol-HC) 2.5 % rectal cream; Insert  into the rectum Daily for 7 days.    Findings discussed. All questions answered.  Medication and medication adverse effects discussed.  Drug education given and explained to patient. Patient verbalized understanding.  Follow-up for routine health maintenance as directed

## 2020-09-30 ENCOUNTER — TRANSCRIBE ORDERS (OUTPATIENT)
Dept: ADMINISTRATIVE | Facility: HOSPITAL | Age: 60
End: 2020-09-30

## 2020-09-30 DIAGNOSIS — C41.4 MALIGNANT NEOPLASM OF PELVIC BONES, SACRUM, AND COCCYX (HCC): Primary | ICD-10-CM

## 2020-10-01 ENCOUNTER — TELEPHONE (OUTPATIENT)
Dept: FAMILY MEDICINE CLINIC | Facility: CLINIC | Age: 60
End: 2020-10-01

## 2020-10-01 NOTE — TELEPHONE ENCOUNTER
[place] from [facility] called and asked if the cream was ment to be suppository instead of a cream. They just wanted clarification.

## 2020-10-05 ENCOUNTER — TELEPHONE (OUTPATIENT)
Dept: FAMILY MEDICINE CLINIC | Facility: CLINIC | Age: 60
End: 2020-10-05

## 2020-10-05 DIAGNOSIS — K62.89 PROCTITIS: Primary | ICD-10-CM

## 2020-10-05 RX ORDER — METHYLPREDNISOLONE 4 MG/1
TABLET ORAL
Qty: 21 TABLET | Refills: 0 | Status: SHIPPED | OUTPATIENT
Start: 2020-10-05 | End: 2021-01-21

## 2020-10-05 NOTE — TELEPHONE ENCOUNTER
Pt was seen on 9-28 and given supp cream for proctitis but it's over 100.00.  Is there something else, like a pill, he can take?

## 2020-10-05 NOTE — TELEPHONE ENCOUNTER
Wife notified.  Rx sent.  He will f/u with Dr. Rodríguez-his GI specialist who just did his scope.

## 2020-10-06 ENCOUNTER — APPOINTMENT (OUTPATIENT)
Dept: MRI IMAGING | Facility: HOSPITAL | Age: 60
End: 2020-10-06

## 2020-10-08 ENCOUNTER — HOSPITAL ENCOUNTER (OUTPATIENT)
Dept: MRI IMAGING | Facility: HOSPITAL | Age: 60
Discharge: HOME | End: 2020-10-08
Admitting: FAMILY MEDICINE

## 2020-10-08 PROCEDURE — 72148 MRI LUMBAR SPINE W/O DYE: CPT

## 2020-10-09 ENCOUNTER — TELEPHONE (OUTPATIENT)
Dept: FAMILY MEDICINE CLINIC | Facility: CLINIC | Age: 60
End: 2020-10-09

## 2020-10-09 DIAGNOSIS — M51.37 DEGENERATION OF LUMBAR OR LUMBOSACRAL INTERVERTEBRAL DISC: ICD-10-CM

## 2020-10-09 RX ORDER — HYDROCODONE BITARTRATE AND ACETAMINOPHEN 5; 325 MG/1; MG/1
1 TABLET ORAL 3 TIMES DAILY PRN
Qty: 90 TABLET | Refills: 0 | Status: SHIPPED | OUTPATIENT
Start: 2020-10-09 | End: 2020-12-03 | Stop reason: SDUPTHER

## 2020-10-09 NOTE — TELEPHONE ENCOUNTER
----- Message from Junaid Marquez MD sent at 10/8/2020  6:40 PM EDT -----  Please notify patient that MRI showed new bulging disk. May need to see spine surgery vs trial of shots by pain management

## 2020-10-09 NOTE — TELEPHONE ENCOUNTER
Pt's wife called.  Asking if pt can get a refills on pain med until his appt with Dr. Ge on Feb 8.  Ok per Dr. Marquez.  Will go ahead and send over a month now but he will need appt for any refills after that.  Called-lmom. Rx sent.

## 2020-10-09 NOTE — TELEPHONE ENCOUNTER
Pt notified.  Pt is getting set up with Dr. Ge (neurosurgeon).  They were just waiting on mri report.

## 2020-11-26 DIAGNOSIS — M51.37 DEGENERATION OF LUMBAR OR LUMBOSACRAL INTERVERTEBRAL DISC: ICD-10-CM

## 2020-12-01 RX ORDER — AMITRIPTYLINE HYDROCHLORIDE 50 MG/1
TABLET, FILM COATED ORAL
Qty: 180 TABLET | Refills: 3 | Status: SHIPPED | OUTPATIENT
Start: 2020-12-01 | End: 2021-11-04

## 2020-12-03 ENCOUNTER — OFFICE VISIT (OUTPATIENT)
Dept: FAMILY MEDICINE CLINIC | Facility: CLINIC | Age: 60
End: 2020-12-03

## 2020-12-03 VITALS
RESPIRATION RATE: 18 BRPM | HEIGHT: 71 IN | BODY MASS INDEX: 26.32 KG/M2 | TEMPERATURE: 98 F | WEIGHT: 188 LBS | OXYGEN SATURATION: 97 % | HEART RATE: 50 BPM

## 2020-12-03 DIAGNOSIS — E78.2 MIXED HYPERLIPIDEMIA: ICD-10-CM

## 2020-12-03 DIAGNOSIS — Z12.5 PROSTATE CANCER SCREENING: ICD-10-CM

## 2020-12-03 DIAGNOSIS — M51.37 DEGENERATION OF LUMBAR OR LUMBOSACRAL INTERVERTEBRAL DISC: ICD-10-CM

## 2020-12-03 DIAGNOSIS — I10 ESSENTIAL HYPERTENSION: Primary | ICD-10-CM

## 2020-12-03 PROCEDURE — 1125F AMNT PAIN NOTED PAIN PRSNT: CPT | Performed by: FAMILY MEDICINE

## 2020-12-03 PROCEDURE — G0439 PPPS, SUBSEQ VISIT: HCPCS | Performed by: FAMILY MEDICINE

## 2020-12-03 PROCEDURE — 99213 OFFICE O/P EST LOW 20 MIN: CPT | Performed by: FAMILY MEDICINE

## 2020-12-03 PROCEDURE — 96160 PT-FOCUSED HLTH RISK ASSMT: CPT | Performed by: FAMILY MEDICINE

## 2020-12-03 PROCEDURE — 1170F FXNL STATUS ASSESSED: CPT | Performed by: FAMILY MEDICINE

## 2020-12-03 RX ORDER — HYDROCODONE BITARTRATE AND ACETAMINOPHEN 5; 325 MG/1; MG/1
1 TABLET ORAL 3 TIMES DAILY PRN
Qty: 90 TABLET | Refills: 0 | Status: SHIPPED | OUTPATIENT
Start: 2020-12-03 | End: 2021-01-21 | Stop reason: SDUPTHER

## 2020-12-03 RX ORDER — BACLOFEN 10 MG/1
10 TABLET ORAL 3 TIMES DAILY PRN
Qty: 60 TABLET | Refills: 1 | Status: SHIPPED | OUTPATIENT
Start: 2020-12-03 | End: 2021-01-21 | Stop reason: SDUPTHER

## 2020-12-03 NOTE — PATIENT INSTRUCTIONS
Medicare Wellness  Personal Prevention Plan of Service     Date of Office Visit:  2020  Encounter Provider:  Junaid Marquez MD  Place of Service:  [facility]  Patient Name: Steven Hammonds  :  1960    As part of the Medicare Wellness portion of your visit today, we are providing you with this personalized preventive plan of services (PPPS). This plan is based upon recommendations of the [place] Preventive Services Task Force (USPSTF) and the Advisory Committee on Immunization Practices (ACIP).    This lists the preventive care services that should be considered, and provides dates of when you are due. Items listed as completed are up-to-date and do not require any further intervention.    Health Maintenance   Topic Date Due   • TDAP/TD VACCINES (1 - Tdap) 07/10/1978   • ZOSTER VACCINE (1 of 2) 07/10/2009   • HEPATITIS C SCREENING  2019   • ANNUAL WELLNESS VISIT  2019   • LIPID PANEL  10/28/2020   • COLONOSCOPY  2030   • INFLUENZA VACCINE  Completed   • Pneumococcal Vaccine 0-64  Aged Out       No orders of the defined types were placed in this encounter.      Return in about 6 months (around 6/3/2021).          Fall Prevention in the Home, Adult  Falls can cause injuries. They can happen to people of all ages. There are many things you can do to make your home safe and to help prevent falls. Ask for help when making these changes, if needed.  What actions can I take to prevent falls?  General Instructions  · Use good lighting in all rooms. Replace any light bulbs that burn out.  · Turn on the lights when you go into a dark area. Use night-lights.  · Keep items that you use often in easy-to-reach places. Lower the shelves around your home if necessary.  · Set up your furniture so you have a clear path. Avoid moving your furniture around.  · Do not have throw rugs and other things on the floor that can make you trip.  · Avoid walking on  wet floors.  · If any of your floors are uneven, fix them.  · Add color or contrast paint or tape to clearly jarad and help you see:  ? Any grab bars or handrails.  ? First and last steps of stairways.  ? Where the edge of each step is.  · If you use a stepladder:  ? Make sure that it is fully opened. Do not climb a closed stepladder.  ? Make sure that both sides of the stepladder are locked into place.  ? Ask someone to hold the stepladder for you while you use it.  · If there are any pets around you, be aware of where they are.  What can I do in the bathroom?         · Keep the floor dry. Clean up any water that spills onto the floor as soon as it happens.  · Remove soap buildup in the tub or shower regularly.  · Use non-skid mats or decals on the floor of the tub or shower.  · Attach bath mats securely with double-sided, non-slip rug tape.  · If you need to sit down in the shower, use a plastic, non-slip stool.  · Install grab bars by the toilet and in the tub and shower. Do not use towel bars as grab bars.  What can I do in the bedroom?  · Make sure that you have a light by your bed that is easy to reach.  · Do not use any sheets or blankets that are too big for your bed. They should not hang down onto the floor.  · Have a firm chair that has side arms. You can use this for support while you get dressed.  What can I do in the kitchen?  · Clean up any spills right away.  · If you need to reach something above you, use a strong step stool that has a grab bar.  · Keep electrical cords out of the way.  · Do not use floor polish or wax that makes floors slippery. If you must use wax, use non-skid floor wax.  What can I do with my stairs?  · Do not leave any items on the stairs.  · Make sure that you have a light switch at the top of the stairs and the bottom of the stairs. If you do not have them, ask someone to add them for you.  · Make sure that there are handrails on both sides of the stairs, and use them. Fix  handrails that are broken or loose. Make sure that handrails are as long as the stairways.  · Install non-slip stair treads on all stairs in your home.  · Avoid having throw rugs at the top or bottom of the stairs. If you do have throw rugs, attach them to the floor with carpet tape.  · Choose a carpet that does not hide the edge of the steps on the stairway.  · Check any carpeting to make sure that it is firmly attached to the stairs. Fix any carpet that is loose or worn.  What can I do on the outside of my home?  · Use bright outdoor lighting.  · Regularly fix the edges of walkways and driveways and fix any cracks.  · Remove anything that might make you trip as you walk through a door, such as a raised step or threshold.  · Trim any bushes or trees on the path to your home.  · Regularly check to see if handrails are loose or broken. Make sure that both sides of any steps have handrails.  · Install guardrails along the edges of any raised decks and porches.  · Clear walking paths of anything that might make someone trip, such as tools or rocks.  · Have any leaves, snow, or ice cleared regularly.  · Use sand or salt on walking paths during winter.  · Clean up any spills in your garage right away. This includes grease or oil spills.  What other actions can I take?  · Wear shoes that:  ? Have a low heel. Do not wear high heels.  ? Have rubber bottoms.  ? Are comfortable and fit you well.  ? Are closed at the toe. Do not wear open-toe sandals.  · Use tools that help you move around (mobility aids) if they are needed. These include:  ? Canes.  ? Walkers.  ? Scooters.  ? Crutches.  · Review your medicines with your doctor. Some medicines can make you feel dizzy. This can increase your chance of falling.  Ask your doctor what other things you can do to help prevent falls.  Where to find more information  · Centers for Disease Control and PreventionSHARON: https://cdc.gov  · [employer] [place] [employer]:  https://st7thdo.eliu.nih.gov  Contact a doctor if:  · You are afraid of falling at home.  · You feel weak, drowsy, or dizzy at home.  · You fall at home.  Summary  · There are many simple things that you can do to make your home safe and to help prevent falls.  · Ways to make your home safe include removing tripping hazards and installing grab bars in the bathroom.  · Ask for help when making these changes in your home.  This information is not intended to replace advice given to you by your health care provider. Make sure you discuss any questions you have with your health care provider.  Document Revised: 04/09/2020 Document Reviewed: 08/02/2018  Elsevier Patient Education © 2020 [employer]      Sit-to-Stand Exercise    The sit-to-stand exercise (also known as the chair stand or chair rise exercise) strengthens your lower body and helps you maintain or improve your mobility and independence. The goal is to do the sit-to-stand exercise without using your hands. This will be easier as you become stronger. You should always talk with your health care provider before starting any exercise program, especially if you have had recent surgery.  Do the exercise exactly as told by your health care provider and adjust it as directed. It is normal to feel mild stretching, pulling, tightness, or discomfort as you do this exercise, but you should stop right away if you feel sudden pain or your pain gets worse. Do not begin doing this exercise until told by your health care provider.  What the sit-to-stand exercise does  The sit-to-stand exercise helps to strengthen the muscles in your thighs and the muscles in the center of your body that give you stability (core muscles). This exercise is especially helpful if:  · You have had knee or hip surgery.  · You have trouble getting up from a chair, out of a car, or off the toilet.  How to do the sit-to-stand exercise  1. Sit toward the front edge of a sturdy chair without armrests.  Your knees should be bent and your feet should be flat on the floor and shoulder-width apart.  2. Place your hands lightly on each side of the seat. Keep your back and neck as straight as possible, with your chest slightly forward.  3. Breathe in slowly. Lean forward and slightly shift your weight to the front of your feet.  4. Breathe out as you slowly stand up. Use your hands as little as possible.  5. Stand and pause for a full breath in and out.  6. Breathe in as you sit down slowly. Tighten your core and abdominal muscles to control your lowering as much as possible.  7. Breathe out slowly.  8. Do this exercise 10-15 times. If needed, do it fewer times until you build up strength.  9. Rest for 1 minute, then do another set of 10-15 repetitions.  To change the difficulty of the sit-to-stand exercise  · If the exercise is too difficult, use a chair with sturdy armrests, and push off the armrests to help you come to the standing position. You can also use the armrests to help slowly lower yourself back to sitting. As this gets easier, try to use your arms less. You can also place a firm cushion or pillow on the chair to make the surface higher.  · If this exercise is too easy, do not use your arms to help raise or lower yourself. You can also wear a weighted vest, use hand weights, increase your repetitions, or try a lower chair.  General tips  · You may feel tired when starting an exercise routine. This is normal.  · You may have muscle soreness that lasts a few days. This is normal. As you get stronger, you may not feel muscle soreness.  · Use smooth, steady movements.  · Do not  hold your breath during strength exercises. This can cause unsafe changes in your blood pressure.  · Breathe in slowly through your nose, and breathe out slowly through your mouth.  Summary  · Strengthening your lower body is an important step to help you move safely and independently.  · The sit-to-stand exercise helps strengthen the  muscles in your thighs and core.  · You should always talk with your health care provider before starting any exercise program, especially if you have had recent surgery.  This information is not intended to replace advice given to you by your health care provider. Make sure you discuss any questions you have with your health care provider.  Document Revised: 10/16/2019 Document Reviewed: 02/08/2018  [employer] Patient Education © 2020 [employer]      Advance Directive    Advance directives are legal documents that let you make choices ahead of time about your health care and medical treatment in case you become unable to communicate for yourself. Advance directives are a way for you to make known your wishes to family, friends, and health care providers. This can let others know about your end-of-life care if you become unable to communicate.  Discussing and writing advance directives should happen over time rather than all at once. Advance directives can be changed depending on your situation and what you want, even after you have signed the advance directives.  There are different types of advance directives, such as:  · Medical power of .  · Living will.  · Do not resuscitate (DNR) or do not attempt resuscitation (DNAR) order.  Health care proxy and medical power of   A health care proxy is also called a health care agent. This is a person who is appointed to make medical decisions for you in cases where you are unable to make the decisions yourself. Generally, people choose someone they know well and trust to represent their preferences. Make sure to ask this person for an agreement to act as your proxy. A proxy may have to exercise judgment in the event of a medical decision for which your wishes are not known.  A medical power of  is a legal document that names your health care proxy. Depending on the laws in your state, after the document is written, it may also need to  be:  · Signed.  · Notarized.  · Dated.  · Copied.  · Witnessed.  · Incorporated into your medical record.  You may also want to appoint someone to manage your money in a situation in which you are unable to do so. This is called a durable power of  for finances. It is a separate legal document from the durable power of  for health care. You may choose the same person or someone different from your health care proxy to act as your agent in money matters.  If you do not appoint a proxy, or if there is a concern that the proxy is not acting in your best interests, a court may appoint a guardian to act on your behalf.  Living will  A living will is a set of instructions that state your wishes about medical care when you cannot express them yourself. Health care providers should keep a copy of your living will in your medical record. You may want to give a copy to family members or friends. To alert caregivers in case of an emergency, you can place a card in your wallet to let them know that you have a living will and where they can find it. A living will is used if you become:  · Terminally ill.  · Disabled.  · Unable to communicate or make decisions.  Items to consider in your living will include:  · To use or not to use life-support equipment, such as dialysis machines and breathing machines (ventilators).  · A DNR or DNAR order. This tells health care providers not to use cardiopulmonary resuscitation (CPR) if breathing or heartbeat stops.  · To use or not to use tube feeding.  · To be given or not to be given food and fluids.  · Comfort (palliative) care when the goal becomes comfort rather than a cure.  · Donation of organs and tissues.  A living will does not give instructions for distributing your money and property if you should pass away.  DNR or DNAR  A DNR or DNAR order is a request not to have CPR in the event that your heart stops beating or you stop breathing. If a DNR or DNAR order has not  been made and shared, a health care provider will try to help any patient whose heart has stopped or who has stopped breathing. If you plan to have surgery, talk with your health care provider about how your DNR or DNAR order will be followed if problems occur.  What if I do not have an advance directive?  If you do not have an advance directive, some states assign family decision makers to act on your behalf based on how closely you are related to them. Each state has its own laws about advance directives. You may want to check with your health care provider, , or state representative about the laws in your state.  Summary  · Advance directives are the legal documents that allow you to make choices ahead of time about your health care and medical treatment in case you become unable to tell others about your care.  · The process of discussing and writing advance directives should happen over time. You can change the advance directives, even after you have signed them.  · Advance directives include DNR or DNAR orders, living ingram, and designating an agent as your medical power of .  This information is not intended to replace advice given to you by your health care provider. Make sure you discuss any questions you have with your health care provider.  Document Revised: 07/16/2020 Document Reviewed: 07/16/2020  Elsevier Patient Education © 2020 Elsevier Inc.

## 2020-12-03 NOTE — PROGRESS NOTES
Subjective   Steven Hammonds is a 60 y.o. male.     Pain med refill.    Back Pain  This is a chronic problem. The current episode started more than 1 year ago. The problem has been rapidly worsening (x 2 weeks) since onset. The pain is present in the lumbar spine. Pertinent negatives include no abdominal pain, chest pain, dysuria, fever, numbness or weakness.        The following portions of the patient's history were reviewed and updated as appropriate: allergies, current medications, past family history, past medical history, past social history, past surgical history and problem list.    Patient Active Problem List   Diagnosis   • Degeneration of lumbar or lumbosacral intervertebral disc   • Disc narrowing   • Encounter for other specified aftercare   • Gastroesophageal reflux disease   • Hypertension   • Paroxysmal atrial fibrillation (CMS/HCC)   • Spinal stenosis   • Spondylolysis   • Status post arthroscopy of knee   • Sinus pause   • Tachycardia-bradycardia (CMS/HCC)   • Thoracic or lumbosacral neuritis or radiculitis   • Tremor   • Presence of cardiac pacemaker   • Allergic rhinitis   • Hyperlipidemia   • Insomnia   • Umbilical hernia   • Tinea   • Impetigo   • Labile blood pressure   • Other specified persistent mood disorders (CMS/HCC)   • Non-seasonal allergic rhinitis due to pollen   • Chronic low back pain with sciatica   • Bobby's esophagus with dysplasia       Current Outpatient Medications on File Prior to Visit   Medication Sig Dispense Refill   • amitriptyline (ELAVIL) 50 MG tablet TAKE 2 TABLETS AT BEDTIME 180 tablet 3   • aspirin 81 MG tablet Take 1 tablet by mouth Daily. 30 tablet 12   • diclofenac-misoprostol (ARTHROTEC 75) 75-0.2 MG EC tablet Take 1 tablet by mouth 2 (Two) Times a Day.     • digoxin (LANOXIN) 125 MCG tablet Take 1 tablet by mouth Daily. 90 tablet 3   • DULoxetine (CYMBALTA) 20 MG capsule Take 20 mg by mouth.     • esomeprazole (NEXIUM) 40 MG capsule Take 1 capsule by mouth  Daily.     • etanercept (ENBREL) 50 MG/ML solution prefilled syringe injection Inject  under the skin into the appropriate area as directed.     • fluticasone (FLONASE) 50 MCG/ACT nasal spray USE 2 SPRAYS IN EACH NOSTRIL ONCE DAILY 3 bottle 3   • gabapentin (NEURONTIN) 600 MG tablet TAKE 1 TABLET FOUR TIMES A  tablet 3   • lisinopril (PRINIVIL,ZESTRIL) 20 MG tablet Take 0.5 tablets by mouth Daily. 90 tablet 3   • loratadine (CLARITIN) 10 MG tablet Take 1 tablet by mouth Daily.     • metoprolol succinate XL (Toprol XL) 25 MG 24 hr tablet Take 0.5 tablets by mouth Daily. 90 tablet 5   • tamsulosin (FLOMAX) 0.4 MG capsule 24 hr capsule Take 1 capsule by mouth Daily.     • triamcinolone (KENALOG) 0.1 % ointment RAJENDRA EXT AA OF TORSO AND EXTREMITIES BID FOR FOUR TO FIVE TIMES A WEEK PRN     • vitamin B-6 (PYRIDOXINE) 50 MG tablet Take 50 mg by mouth Daily.     • [DISCONTINUED] baclofen (LIORESAL) 10 MG tablet Take 1 tablet by mouth 3 (Three) Times a Day As Needed for Muscle Spasms. 60 tablet 1   • [DISCONTINUED] HYDROcodone-acetaminophen (NORCO) 5-325 MG per tablet Take 1 tablet by mouth 3 (Three) Times a Day As Needed for Severe Pain . 90 tablet 0   • methylPREDNISolone (MEDROL) 4 MG dose pack 6 tablets on day one, 5 tablets on day two, 4 tablets on day three, 3 tablets on day four, 2 tablets on day five, 1 tablet on day 6. 21 tablet 0     No current facility-administered medications on file prior to visit.      Current outpatient and discharge medications have been reconciled for the patient.  Reviewed by: Junaid Marquez MD      Allergies   Allergen Reactions   • Oxycodone-Acetaminophen GI Intolerance   • Adhesive Tape Unknown (See Comments)   • Oxycodone Unknown (See Comments)       Review of Systems   Constitutional: Negative for fever.   Cardiovascular: Negative for chest pain.   Gastrointestinal: Negative for abdominal pain.   Genitourinary: Negative for dysuria.   Musculoskeletal: Positive for back  pain.   Neurological: Negative for weakness and numbness.     I have reviewed and confirmed the accuracy of the ROS as documented by the MA/LPN/RN Junaid Marquez MD      Objective   Vitals:    12/03/20 1440   Pulse: 50   Resp: 18   Temp: 98 °F (36.7 °C)   SpO2: 97%     Physical Exam  Constitutional:       Appearance: He is well-developed.   HENT:      Head: Normocephalic and atraumatic.      Right Ear: External ear normal.      Left Ear: External ear normal.      Nose: Nose normal.   Eyes:      Pupils: Pupils are equal, round, and reactive to light.   Neck:      Musculoskeletal: Normal range of motion and neck supple.   Cardiovascular:      Rate and Rhythm: Normal rate and regular rhythm.      Heart sounds: Normal heart sounds.   Pulmonary:      Effort: Pulmonary effort is normal.      Breath sounds: Normal breath sounds.   Abdominal:      General: Bowel sounds are normal.      Palpations: Abdomen is soft.   Musculoskeletal: Normal range of motion.   Skin:     General: Skin is warm and dry.   Neurological:      Mental Status: He is alert and oriented to person, place, and time.   Psychiatric:         Behavior: Behavior normal.         Thought Content: Thought content normal.         Judgment: Judgment normal.         I wore protective equipment throughout this patient encounter to include mask and eye protection. Hand hygiene was performed before donning protective equipment and after removal when leaving the room.      Assessment/Plan .  Problem List Items Addressed This Visit     Degeneration of lumbar or lumbosacral intervertebral disc    Relevant Medications    baclofen (LIORESAL) 10 MG tablet    HYDROcodone-acetaminophen (NORCO) 5-325 MG per tablet    Hypertension - Primary    Relevant Orders    CBC Auto Differential    Hyperlipidemia    Relevant Orders    Comprehensive Metabolic Panel    Lipid Panel With / Chol / HDL Ratio    TSH      Other Visit Diagnoses     Prostate cancer screening        Relevant  Orders    PSA Screen       Findings discussed. All questions answered.  Medication and medication adverse effects discussed.  Drug education given and explained to patient. Patient verbalized understanding.  Follow-up for routine health maintenance as directed  Follow up in 1 month for reevaluation, sooner for concerns.

## 2020-12-03 NOTE — PROGRESS NOTES
QUICK REFERENCE INFORMATION:  The ABCs of the Annual Wellness Visit    Subsequent Medicare Wellness Visit     HEALTH RISK ASSESSMENT    : 1960    Recent Hospitalizations:  No hospitalization(s) within the last year..  ccc    Current Medical Providers:  Patient Care Team:  Junaid Marquez MD as PCP - General  Junaid Marquez MD as PCP - Family Medicine  Junaid Marquez MD Gondi, [place], MD as Consulting Physician (Cardiology)    Smoking Status:  Social History     Tobacco Use   Smoking Status Former Smoker   Smokeless Tobacco Never Used       Alcohol Consumption:  Social History     Substance and Sexual Activity   Alcohol Use No   • Frequency: Never       Depression Screen:   PHQ-2/PHQ-9 Depression Screening 12/3/2020   Little interest or pleasure in doing things 0   Feeling down, depressed, or hopeless 0   Trouble falling or staying asleep, or sleeping too much 0   Feeling tired or having little energy 1   Poor appetite or overeating 0   Feeling bad about yourself - or that you are a failure or have let yourself or your family down 0   Trouble concentrating on things, such as reading the newspaper or watching television 0   Moving or speaking so slowly that other people could have noticed. Or the opposite - being so fidgety or restless that you have been moving around a lot more than usual 0   Thoughts that you would be better off dead, or of hurting yourself in some way 0   Total Score 1   If you checked off any problems, how difficult have these problems made it for you to do your work, take care of things at home, or get along with other people? Not difficult at all     We spent less than 16 minutes asking patient questions, counseling and documenting in the chart.    Health Habits and Functional and Cognitive Screening:  Functional & Cognitive Status 12/3/2020   Do you have difficulty preparing food and eating? No   Do you have difficulty bathing yourself, getting dressed or  grooming yourself? No   Do you have difficulty using the toilet? Yes   Do you have difficulty moving around from place to place? Yes   Do you have trouble with steps or getting out of a bed or a chair? Yes   Current Diet Low Fat Diet   Dental Exam Up to date   Eye Exam Up to date   Exercise (times per week) 7 times per week   Current Exercise Activities Include Walking   Do you need help using the phone?  No   Are you deaf or do you have serious difficulty hearing?  Yes   Do you need help with transportation? No   Do you need help shopping? No   Do you need help preparing meals?  No   Do you need help with housework?  No   Do you need help with laundry? No   Do you need help taking your medications? No   Do you need help managing money? No   Do you ever drive or ride in a car without wearing a seat belt? No   Have you felt unusual stress, anger or loneliness in the last month? Yes   Who do you live with? Spouse   If you need help, do you have trouble finding someone available to you? No   Have you been bothered in the last four weeks by sexual problems? No   Do you have difficulty concentrating, remembering or making decisions? No       Does the patient have evidence of cognitive impairment? No    Aspirin use counseling: Taking ASA appropriately as indicated    Recent Lab Results:    Lab Results   Component Value Date    GLU 98 10/28/2019        Lab Results   Component Value Date    CHOL 206 (H) 10/04/2018    TRIG 122 10/28/2019    HDL 44 10/28/2019    VLDL 24 10/28/2019       Age-appropriate Screening Schedule:  Refer to the list below for future screening recommendations based on patient's age, sex and/or medical conditions. Orders for these recommended tests are listed in the plan section. The patient has been provided with a written plan.    Health Maintenance   Topic Date Due   • TDAP/TD VACCINES (1 - Tdap) 07/10/1978   • ZOSTER VACCINE (1 of 2) 07/10/2009   • LIPID PANEL  10/28/2020   • COLONOSCOPY  09/25/2030    • INFLUENZA VACCINE  Completed        Subjective   History of Present Illness    Steven Hammonds is a 60 y.o. male who presents for an Annual Wellness Visit.  HPI    The following portions of the patient's history were reviewed and updated as appropriate: allergies, current medications, past family history, past medical history, past social history, past surgical history and problem list.    Outpatient Medications Prior to Visit   Medication Sig Dispense Refill   • amitriptyline (ELAVIL) 50 MG tablet TAKE 2 TABLETS AT BEDTIME 180 tablet 3   • aspirin 81 MG tablet Take 1 tablet by mouth Daily. 30 tablet 12   • baclofen (LIORESAL) 10 MG tablet Take 1 tablet by mouth 3 (Three) Times a Day As Needed for Muscle Spasms. 60 tablet 1   • diclofenac-misoprostol (ARTHROTEC 75) 75-0.2 MG EC tablet Take 1 tablet by mouth 2 (Two) Times a Day.     • digoxin (LANOXIN) 125 MCG tablet Take 1 tablet by mouth Daily. 90 tablet 3   • DULoxetine (CYMBALTA) 20 MG capsule Take 20 mg by mouth.     • esomeprazole (NEXIUM) 40 MG capsule Take 1 capsule by mouth Daily.     • etanercept (ENBREL) 50 MG/ML solution prefilled syringe injection Inject  under the skin into the appropriate area as directed.     • fluticasone (FLONASE) 50 MCG/ACT nasal spray USE 2 SPRAYS IN EACH NOSTRIL ONCE DAILY 3 bottle 3   • gabapentin (NEURONTIN) 600 MG tablet TAKE 1 TABLET FOUR TIMES A  tablet 3   • HYDROcodone-acetaminophen (NORCO) 5-325 MG per tablet Take 1 tablet by mouth 3 (Three) Times a Day As Needed for Severe Pain . 90 tablet 0   • lisinopril (PRINIVIL,ZESTRIL) 20 MG tablet Take 0.5 tablets by mouth Daily. 90 tablet 3   • loratadine (CLARITIN) 10 MG tablet Take 1 tablet by mouth Daily.     • metoprolol succinate XL (Toprol XL) 25 MG 24 hr tablet Take 0.5 tablets by mouth Daily. 90 tablet 5   • tamsulosin (FLOMAX) 0.4 MG capsule 24 hr capsule Take 1 capsule by mouth Daily.     • triamcinolone (KENALOG) 0.1 % ointment RAJENDRA EXT AA OF TORSO AND  EXTREMITIES BID FOR FOUR TO FIVE TIMES A WEEK PRN     • vitamin B-6 (PYRIDOXINE) 50 MG tablet Take 50 mg by mouth Daily.     • methylPREDNISolone (MEDROL) 4 MG dose pack 6 tablets on day one, 5 tablets on day two, 4 tablets on day three, 3 tablets on day four, 2 tablets on day five, 1 tablet on day 6. 21 tablet 0     No facility-administered medications prior to visit.        Patient Active Problem List   Diagnosis   • Degeneration of lumbar or lumbosacral intervertebral disc   • Disc narrowing   • Encounter for other specified aftercare   • Gastroesophageal reflux disease   • Hypertension   • Paroxysmal atrial fibrillation (CMS/HCC)   • Spinal stenosis   • Spondylolysis   • Status post arthroscopy of knee   • Sinus pause   • Tachycardia-bradycardia (CMS/HCC)   • Thoracic or lumbosacral neuritis or radiculitis   • Tremor   • Presence of cardiac pacemaker   • Allergic rhinitis   • Hyperlipidemia   • Insomnia   • Umbilical hernia   • Tinea   • Impetigo   • Labile blood pressure   • Other specified persistent mood disorders (CMS/HCC)   • Non-seasonal allergic rhinitis due to pollen   • Chronic low back pain with sciatica   • Bobby's esophagus with dysplasia       Advance Care Planning:  ACP discussion was declined by the patient. Patient does not have an advance directive, information provided.     A face-to-face visit was completed today with patient.  Counseling explanation, and discussion of advanced directives was performed.   The last advanced care visit was performed in 2019.  In a near life ending situation, from which he is not expected to recover functionally, and he is not able to speak for him, he does not want life sustaining measures. We discussed feeding tubes, ventilators and cardiac support as well as dialysis.    We spent less than 16 minutes discussing Advanced Care Planning information and documenting in the chart.      Identification of Risk Factors:  Risk factors include: Cardiovascular  "risk  Polypharmacy  Prostate Cancer Screening .    Review of Systems      Compared to one year ago, the patient feels his physical health is the same.  Compared to one year ago, the patient feels his mental health is the same.    Objective       Vitals:    12/03/20 1440   Pulse: 50   Resp: 18   Temp: 98 °F (36.7 °C)   TempSrc: Temporal   SpO2: 97%   Weight: 85.3 kg (188 lb)   Height: 180.3 cm (70.98\")   PainSc:   6   PainLoc: Back       Patient's Body mass index is 26.23 kg/m². BMI is within normal parameters. No follow-up required..      Assessment/Plan   Patient Self-Management and Personalized Health Advice  The patient has been provided with information about: fall prevention and preventive services including:   · Annual Wellness Visit (AWV).      Reviewed use of high risk medication in the elderly: yes  Reviewed for potential of harmful drug interactions in the elderly: yes    Follow Up:  Return in about 6 months (around 6/3/2021).     An After Visit Summary and PPPS with all of these plans were given to the patient.           Discussed wearing sunscreen, seatbelts. Avoidance or caution with alcohol. Discussed screening as appropriate for age.     I wore protective equipment throughout this patient encounter to include mask and eye protection. Hand hygiene was performed before donning protective equipment and after removal when leaving the room.        "

## 2020-12-04 ENCOUNTER — TELEPHONE (OUTPATIENT)
Dept: FAMILY MEDICINE CLINIC | Facility: CLINIC | Age: 60
End: 2020-12-04

## 2020-12-04 LAB
ALBUMIN SERPL-MCNC: 4.4 G/DL (ref 3.8–4.8)
ALBUMIN/GLOB SERPL: 1.8 {RATIO} (ref 1.2–2.2)
ALP SERPL-CCNC: 123 IU/L (ref 39–117)
ALT SERPL-CCNC: 19 IU/L (ref 0–44)
AST SERPL-CCNC: 23 IU/L (ref 0–40)
BASOPHILS # BLD AUTO: 0.1 X10E3/UL (ref 0–0.2)
BASOPHILS NFR BLD AUTO: 2 %
BILIRUB SERPL-MCNC: 0.4 MG/DL (ref 0–1.2)
BUN SERPL-MCNC: 8 MG/DL (ref 8–27)
BUN/CREAT SERPL: 8 (ref 10–24)
CALCIUM SERPL-MCNC: 9.5 MG/DL (ref 8.6–10.2)
CHLORIDE SERPL-SCNC: 103 MMOL/L (ref 96–106)
CHOLEST SERPL-MCNC: 224 MG/DL (ref 100–199)
CHOLEST/HDLC SERPL: 6.2 RATIO (ref 0–5)
CO2 SERPL-SCNC: 25 MMOL/L (ref 20–29)
CREAT SERPL-MCNC: 0.99 MG/DL (ref 0.76–1.27)
EOSINOPHIL # BLD AUTO: 0.1 X10E3/UL (ref 0–0.4)
EOSINOPHIL NFR BLD AUTO: 2 %
ERYTHROCYTE [DISTWIDTH] IN BLOOD BY AUTOMATED COUNT: 13.5 % (ref 11.6–15.4)
GLOBULIN SER CALC-MCNC: 2.5 G/DL (ref 1.5–4.5)
GLUCOSE SERPL-MCNC: 104 MG/DL (ref 65–99)
HCT VFR BLD AUTO: 41.5 % (ref 37.5–51)
HDLC SERPL-MCNC: 36 MG/DL
HGB BLD-MCNC: 13.7 G/DL (ref 13–17.7)
IMM GRANULOCYTES # BLD AUTO: 0 X10E3/UL (ref 0–0.1)
IMM GRANULOCYTES NFR BLD AUTO: 0 %
LDLC SERPL CALC-MCNC: 155 MG/DL (ref 0–99)
LYMPHOCYTES # BLD AUTO: 1.5 X10E3/UL (ref 0.7–3.1)
LYMPHOCYTES NFR BLD AUTO: 22 %
MCH RBC QN AUTO: 28.5 PG (ref 26.6–33)
MCHC RBC AUTO-ENTMCNC: 33 G/DL (ref 31.5–35.7)
MCV RBC AUTO: 86 FL (ref 79–97)
MONOCYTES # BLD AUTO: 0.7 X10E3/UL (ref 0.1–0.9)
MONOCYTES NFR BLD AUTO: 10 %
NEUTROPHILS # BLD AUTO: 4.3 X10E3/UL (ref 1.4–7)
NEUTROPHILS NFR BLD AUTO: 64 %
PLATELET # BLD AUTO: 273 X10E3/UL (ref 150–450)
POTASSIUM SERPL-SCNC: 4.7 MMOL/L (ref 3.5–5.2)
PROT SERPL-MCNC: 6.9 G/DL (ref 6–8.5)
PSA SERPL-MCNC: 0.3 NG/ML (ref 0–4)
RBC # BLD AUTO: 4.81 X10E6/UL (ref 4.14–5.8)
SODIUM SERPL-SCNC: 142 MMOL/L (ref 134–144)
TRIGL SERPL-MCNC: 179 MG/DL (ref 0–149)
TSH SERPL DL<=0.005 MIU/L-ACNC: 2.58 UIU/ML (ref 0.45–4.5)
VLDLC SERPL CALC-MCNC: 33 MG/DL (ref 5–40)
WBC # BLD AUTO: 6.7 X10E3/UL (ref 3.4–10.8)

## 2020-12-08 RX ORDER — ROSUVASTATIN CALCIUM 10 MG/1
5 TABLET, COATED ORAL DAILY
Qty: 30 TABLET | Refills: 5 | Status: SHIPPED | OUTPATIENT
Start: 2020-12-08 | End: 2020-12-09

## 2020-12-09 RX ORDER — ROSUVASTATIN CALCIUM 10 MG/1
TABLET, COATED ORAL
Qty: 45 TABLET | Refills: 5 | Status: SHIPPED | OUTPATIENT
Start: 2020-12-09 | End: 2021-06-07 | Stop reason: SDUPTHER

## 2020-12-11 ENCOUNTER — TELEPHONE (OUTPATIENT)
Dept: FAMILY MEDICINE CLINIC | Facility: CLINIC | Age: 60
End: 2020-12-11

## 2020-12-11 NOTE — TELEPHONE ENCOUNTER
Received fax from [employer] requesting PA on Rosuvastatin (Crestor).  PA submitted online thru Covermymeds  CaseId:[id number];Status:Approved;Review Type:Prior Auth;Coverage Start Date:11/11/2020;Coverage End Date:12/11/2021.  [facility].

## 2021-01-21 ENCOUNTER — OFFICE VISIT (OUTPATIENT)
Dept: FAMILY MEDICINE CLINIC | Facility: CLINIC | Age: 61
End: 2021-01-21

## 2021-01-21 VITALS
HEIGHT: 71 IN | BODY MASS INDEX: 26.74 KG/M2 | WEIGHT: 191 LBS | RESPIRATION RATE: 20 BRPM | OXYGEN SATURATION: 96 % | TEMPERATURE: 98.4 F

## 2021-01-21 DIAGNOSIS — I10 ESSENTIAL HYPERTENSION: Primary | ICD-10-CM

## 2021-01-21 DIAGNOSIS — E78.2 MIXED HYPERLIPIDEMIA: ICD-10-CM

## 2021-01-21 DIAGNOSIS — M51.37 DEGENERATION OF LUMBAR OR LUMBOSACRAL INTERVERTEBRAL DISC: ICD-10-CM

## 2021-01-21 DIAGNOSIS — H65.01 NON-RECURRENT ACUTE SEROUS OTITIS MEDIA OF RIGHT EAR: ICD-10-CM

## 2021-01-21 PROBLEM — Z87.891 FORMER TOBACCO USE: Status: ACTIVE | Noted: 2021-01-21

## 2021-01-21 PROBLEM — H92.01 RIGHT EAR PAIN: Status: ACTIVE | Noted: 2021-01-21

## 2021-01-21 PROCEDURE — 99214 OFFICE O/P EST MOD 30 MIN: CPT | Performed by: FAMILY MEDICINE

## 2021-01-21 RX ORDER — HYDROCODONE BITARTRATE AND ACETAMINOPHEN 5; 325 MG/1; MG/1
1 TABLET ORAL 3 TIMES DAILY PRN
Qty: 90 TABLET | Refills: 0 | Status: SHIPPED | OUTPATIENT
Start: 2021-01-21 | End: 2021-01-21

## 2021-01-21 RX ORDER — BACLOFEN 10 MG/1
10 TABLET ORAL 3 TIMES DAILY PRN
Qty: 60 TABLET | Refills: 1 | Status: SHIPPED | OUTPATIENT
Start: 2021-01-21 | End: 2021-03-11 | Stop reason: SDUPTHER

## 2021-01-21 RX ORDER — AMOXICILLIN 500 MG/1
500 TABLET, FILM COATED ORAL 3 TIMES DAILY
Qty: 30 TABLET | Refills: 0 | Status: SHIPPED | OUTPATIENT
Start: 2021-01-21 | End: 2021-01-31

## 2021-01-21 RX ORDER — HYDROCODONE BITARTRATE AND ACETAMINOPHEN 5; 325 MG/1; MG/1
1 TABLET ORAL 3 TIMES DAILY PRN
Qty: 90 TABLET | Refills: 0 | Status: SHIPPED | OUTPATIENT
Start: 2021-01-21 | End: 2021-03-11 | Stop reason: DRUGHIGH

## 2021-01-21 RX ORDER — BACLOFEN 10 MG/1
10 TABLET ORAL 3 TIMES DAILY PRN
Qty: 60 TABLET | Refills: 1 | Status: SHIPPED | OUTPATIENT
Start: 2021-01-21 | End: 2021-01-21

## 2021-01-21 NOTE — PROGRESS NOTES
Subjective   Steven Hammonds is a 60 y.o. male.     Pain med refill. Stable on current treatment.  Controlled substance agreement signed and on file.  Right ear pain x 1 week  Recently started on crestor for hyperlipidemia last month. Needs recheck labs    Back Pain  This is a chronic problem. The current episode started more than 1 year ago. The problem has been rapidly worsening (x 2 weeks) since onset. The pain is present in the lumbar spine. Associated symptoms include headaches. Pertinent negatives include no abdominal pain, chest pain, dysuria, fever, numbness or weakness.   Earache   There is pain in the right ear. This is a new problem. The current episode started 1 to 4 weeks ago. The problem occurs constantly. There has been no fever. The pain is at a severity of 7/10. The pain is moderate. Associated symptoms include ear discharge, headaches, hearing loss and neck pain. Pertinent negatives include no abdominal pain, sore throat or vomiting. He has tried NSAIDs and ear drops for the symptoms. The treatment provided mild relief.   Hypertension  This is a chronic problem. The current episode started more than 1 year ago. The problem is uncontrolled. Associated symptoms include headaches and neck pain. Pertinent negatives include no chest pain, palpitations or shortness of breath. Risk factors for coronary artery disease include obesity, male gender and smoking/tobacco exposure. Current antihypertension treatment includes beta blockers and ACE inhibitors.   Hyperlipidemia  Pertinent negatives include no chest pain or shortness of breath.      The following portions of the patient's history were reviewed and updated as appropriate: allergies, current medications, past family history, past medical history, past social history, past surgical history and problem list.    Patient Active Problem List   Diagnosis   • Degeneration of lumbar or lumbosacral intervertebral disc   • Disc narrowing   • Encounter for other  specified aftercare   • Gastroesophageal reflux disease   • Hypertension   • Paroxysmal atrial fibrillation (CMS/HCC)   • Spinal stenosis   • Spondylolysis   • Status post arthroscopy of knee   • Sinus pause   • Tachycardia-bradycardia (CMS/HCC)   • Thoracic or lumbosacral neuritis or radiculitis   • Tremor   • Presence of cardiac pacemaker   • Allergic rhinitis   • Hyperlipidemia   • Insomnia   • Umbilical hernia   • Tinea   • Impetigo   • Labile blood pressure   • Other specified persistent mood disorders (CMS/HCC)   • Non-seasonal allergic rhinitis due to pollen   • Chronic low back pain with sciatica   • Bobby's esophagus with dysplasia   • Former tobacco use   • Cervical disc disease with myelopathy   • Psoriatic arthritis (CMS/HCC)       Current Outpatient Medications on File Prior to Visit   Medication Sig Dispense Refill   • amitriptyline (ELAVIL) 50 MG tablet TAKE 2 TABLETS AT BEDTIME 180 tablet 3   • aspirin 81 MG tablet Take 1 tablet by mouth Daily. 30 tablet 12   • diclofenac-misoprostol (ARTHROTEC 75) 75-0.2 MG EC tablet Take 1 tablet by mouth 2 (Two) Times a Day.     • digoxin (LANOXIN) 125 MCG tablet Take 1 tablet by mouth Daily. 90 tablet 3   • DULoxetine (CYMBALTA) 20 MG capsule Take 20 mg by mouth.     • esomeprazole (NEXIUM) 40 MG capsule Take 1 capsule by mouth Daily.     • etanercept (ENBREL) 50 MG/ML solution prefilled syringe injection Inject  under the skin into the appropriate area as directed.     • fluticasone (FLONASE) 50 MCG/ACT nasal spray USE 2 SPRAYS IN EACH NOSTRIL ONCE DAILY 3 bottle 3   • gabapentin (NEURONTIN) 600 MG tablet TAKE 1 TABLET FOUR TIMES A  tablet 3   • lisinopril (PRINIVIL,ZESTRIL) 20 MG tablet Take 0.5 tablets by mouth Daily. 90 tablet 3   • loratadine (CLARITIN) 10 MG tablet Take 1 tablet by mouth Daily.     • metoprolol succinate XL (Toprol XL) 25 MG 24 hr tablet Take 0.5 tablets by mouth Daily. 90 tablet 5   • rosuvastatin (CRESTOR) 10 MG tablet TAKE 1/2  TABLET BY MOUTH DAILY 45 tablet 5   • tamsulosin (FLOMAX) 0.4 MG capsule 24 hr capsule Take 1 capsule by mouth Daily.     • triamcinolone (KENALOG) 0.1 % ointment RAJENDRA EXT AA OF TORSO AND EXTREMITIES BID FOR FOUR TO FIVE TIMES A WEEK PRN     • vitamin B-6 (PYRIDOXINE) 50 MG tablet Take 50 mg by mouth Daily.     • [DISCONTINUED] baclofen (LIORESAL) 10 MG tablet Take 1 tablet by mouth 3 (Three) Times a Day As Needed for Muscle Spasms. 60 tablet 1   • [DISCONTINUED] HYDROcodone-acetaminophen (NORCO) 5-325 MG per tablet Take 1 tablet by mouth 3 (Three) Times a Day As Needed for Severe Pain . 90 tablet 0   • [DISCONTINUED] methylPREDNISolone (MEDROL) 4 MG dose pack 6 tablets on day one, 5 tablets on day two, 4 tablets on day three, 3 tablets on day four, 2 tablets on day five, 1 tablet on day 6. 21 tablet 0     No current facility-administered medications on file prior to visit.      Current outpatient and discharge medications have been reconciled for the patient.  Reviewed by: Junaid Marquez MD      Allergies   Allergen Reactions   • Oxycodone-Acetaminophen GI Intolerance   • Adhesive Tape Unknown (See Comments)   • Oxycodone Unknown (See Comments)       Review of Systems   Constitutional: Negative for chills, diaphoresis and fever.   HENT: Positive for ear discharge, ear pain and hearing loss. Negative for sore throat, trouble swallowing and voice change.    Eyes: Negative for visual disturbance.   Respiratory: Negative for shortness of breath.    Cardiovascular: Negative for chest pain and palpitations.   Gastrointestinal: Negative for abdominal pain, nausea and vomiting.   Endocrine: Negative for polydipsia and polyphagia.   Genitourinary: Negative for dysuria and hematuria.   Musculoskeletal: Positive for back pain and neck pain. Negative for neck stiffness.   Skin: Negative for color change and pallor.   Allergic/Immunologic: Negative for immunocompromised state.   Neurological: Negative for seizures,  syncope, weakness and numbness.   Hematological: Negative for adenopathy.   Psychiatric/Behavioral: Negative for hallucinations, sleep disturbance and suicidal ideas.     I have reviewed and confirmed the accuracy of the ROS as documented by the MA/LPN/RN Junaid Marquez MD    Objective   Vitals:    01/21/21 0939   Resp: 20   Temp: 98.4 °F (36.9 °C)   SpO2: 96%   B/P 189/90    Physical Exam  Constitutional:       Appearance: He is well-developed.   HENT:      Head: Normocephalic and atraumatic.      Right Ear: External ear normal. Tympanic membrane is erythematous and bulging.      Left Ear: External ear normal.      Nose: Nose normal.   Eyes:      Pupils: Pupils are equal, round, and reactive to light.   Neck:      Musculoskeletal: Normal range of motion and neck supple.   Cardiovascular:      Rate and Rhythm: Normal rate and regular rhythm.      Heart sounds: Normal heart sounds.   Pulmonary:      Effort: Pulmonary effort is normal.      Breath sounds: Normal breath sounds.   Abdominal:      General: Bowel sounds are normal.      Palpations: Abdomen is soft.   Skin:     General: Skin is warm and dry.   Neurological:      Mental Status: He is alert and oriented to person, place, and time.   Psychiatric:         Behavior: Behavior normal.         Thought Content: Thought content normal.         Judgment: Judgment normal.         Common labs    Common Labsle 3/15/20 3/15/20 12/3/20 12/3/20 12/3/20 12/3/20    1224 1224 1614 1614 1614 1614   Glucose   104 (A)      BUN  21 8      Creatinine  0.87 0.99      eGFR Non  Am  90 82      eGFR African Am   95      Sodium  140 142      Potassium  4.1 4.7      Chloride  103 103      Calcium  8.8 9.5      Total Protein   6.9      Albumin  4.20 4.4      Total Bilirubin  0.3 0.4      Alkaline Phosphatase  105 123 (A)      AST (SGOT)  19 23      ALT (SGPT)  23 19      WBC 13.40 (A)     6.7   Hemoglobin 14.5     13.7   Hematocrit 41.9     41.5   Platelets 284     273    Total Cholesterol    224 (A)     Triglycerides    179 (A)     HDL Cholesterol    36 (A)     LDL Cholesterol     155 (A)     PSA     0.3    (A) Abnormal value       Comments are available for some flowsheets but are not being displayed.           Assessment/Plan .  Problem List Items Addressed This Visit     Degeneration of lumbar or lumbosacral intervertebral disc    Relevant Medications    HYDROcodone-acetaminophen (NORCO) 5-325 MG per tablet    baclofen (LIORESAL) 10 MG tablet    Hypertension - Primary    Hyperlipidemia    Relevant Orders    Lipid Panel With / Chol / HDL Ratio    Comprehensive Metabolic Panel      Other Visit Diagnoses     Non-recurrent acute serous otitis media of right ear        Relevant Medications    amoxicillin (AMOXIL) 500 MG tablet       Findings discussed. All questions answered.  Current Outpatient Medications on File Prior to Visit   Medication Sig Dispense Refill   • amitriptyline (ELAVIL) 50 MG tablet TAKE 2 TABLETS AT BEDTIME 180 tablet 3   • aspirin 81 MG tablet Take 1 tablet by mouth Daily. 30 tablet 12   • diclofenac-misoprostol (ARTHROTEC 75) 75-0.2 MG EC tablet Take 1 tablet by mouth 2 (Two) Times a Day.     • digoxin (LANOXIN) 125 MCG tablet Take 1 tablet by mouth Daily. 90 tablet 3   • DULoxetine (CYMBALTA) 20 MG capsule Take 20 mg by mouth.     • esomeprazole (NEXIUM) 40 MG capsule Take 1 capsule by mouth Daily.     • etanercept (ENBREL) 50 MG/ML solution prefilled syringe injection Inject  under the skin into the appropriate area as directed.     • fluticasone (FLONASE) 50 MCG/ACT nasal spray USE 2 SPRAYS IN EACH NOSTRIL ONCE DAILY 3 bottle 3   • gabapentin (NEURONTIN) 600 MG tablet TAKE 1 TABLET FOUR TIMES A  tablet 3   • lisinopril (PRINIVIL,ZESTRIL) 20 MG tablet Take 0.5 tablets by mouth Daily. 90 tablet 3   • loratadine (CLARITIN) 10 MG tablet Take 1 tablet by mouth Daily.     • metoprolol succinate XL (Toprol XL) 25 MG 24 hr tablet Take 0.5 tablets by mouth  Daily. 90 tablet 5   • rosuvastatin (CRESTOR) 10 MG tablet TAKE 1/2 TABLET BY MOUTH DAILY 45 tablet 5   • tamsulosin (FLOMAX) 0.4 MG capsule 24 hr capsule Take 1 capsule by mouth Daily.     • triamcinolone (KENALOG) 0.1 % ointment RAJENDRA EXT AA OF TORSO AND EXTREMITIES BID FOR FOUR TO FIVE TIMES A WEEK PRN     • vitamin B-6 (PYRIDOXINE) 50 MG tablet Take 50 mg by mouth Daily.     • [DISCONTINUED] baclofen (LIORESAL) 10 MG tablet Take 1 tablet by mouth 3 (Three) Times a Day As Needed for Muscle Spasms. 60 tablet 1   • [DISCONTINUED] HYDROcodone-acetaminophen (NORCO) 5-325 MG per tablet Take 1 tablet by mouth 3 (Three) Times a Day As Needed for Severe Pain . 90 tablet 0   • [DISCONTINUED] methylPREDNISolone (MEDROL) 4 MG dose pack 6 tablets on day one, 5 tablets on day two, 4 tablets on day three, 3 tablets on day four, 2 tablets on day five, 1 tablet on day 6. 21 tablet 0     No current facility-administered medications on file prior to visit.      Follow-up in 2 weeks if not better.  Follow-up sooner for worsening symptoms or for any concerns.     I wore protective equipment throughout this patient encounter to include mask and eye protection. Hand hygiene was performed before donning protective equipment and after removal when leaving the room

## 2021-01-22 ENCOUNTER — TELEPHONE (OUTPATIENT)
Dept: FAMILY MEDICINE CLINIC | Facility: CLINIC | Age: 61
End: 2021-01-22

## 2021-01-22 LAB
ALBUMIN SERPL-MCNC: 4.1 G/DL (ref 3.8–4.8)
ALBUMIN/GLOB SERPL: 1.8 {RATIO} (ref 1.2–2.2)
ALP SERPL-CCNC: 99 IU/L (ref 39–117)
ALT SERPL-CCNC: 21 IU/L (ref 0–44)
AST SERPL-CCNC: 24 IU/L (ref 0–40)
BILIRUB SERPL-MCNC: 0.3 MG/DL (ref 0–1.2)
BUN SERPL-MCNC: 11 MG/DL (ref 8–27)
BUN/CREAT SERPL: 12 (ref 10–24)
CALCIUM SERPL-MCNC: 8.7 MG/DL (ref 8.6–10.2)
CHLORIDE SERPL-SCNC: 103 MMOL/L (ref 96–106)
CHOLEST SERPL-MCNC: 126 MG/DL (ref 100–199)
CHOLEST/HDLC SERPL: 2.7 RATIO (ref 0–5)
CO2 SERPL-SCNC: 24 MMOL/L (ref 20–29)
CREAT SERPL-MCNC: 0.95 MG/DL (ref 0.76–1.27)
GLOBULIN SER CALC-MCNC: 2.3 G/DL (ref 1.5–4.5)
GLUCOSE SERPL-MCNC: 98 MG/DL (ref 65–99)
HDLC SERPL-MCNC: 46 MG/DL
LDLC SERPL CALC-MCNC: 64 MG/DL (ref 0–99)
POTASSIUM SERPL-SCNC: 4.6 MMOL/L (ref 3.5–5.2)
PROT SERPL-MCNC: 6.4 G/DL (ref 6–8.5)
SODIUM SERPL-SCNC: 142 MMOL/L (ref 134–144)
TRIGL SERPL-MCNC: 83 MG/DL (ref 0–149)
VLDLC SERPL CALC-MCNC: 16 MG/DL (ref 5–40)

## 2021-01-22 NOTE — TELEPHONE ENCOUNTER
----- Message from Junaid Marquez MD sent at 1/22/2021  8:55 AM EST -----  Please notify patient that labs are stable/looked ok.  Chol looks good

## 2021-02-02 ENCOUNTER — APPOINTMENT (OUTPATIENT)
Dept: CT IMAGING | Facility: HOSPITAL | Age: 61
End: 2021-02-02

## 2021-02-02 ENCOUNTER — HOSPITAL ENCOUNTER (OUTPATIENT)
Facility: HOSPITAL | Age: 61
Setting detail: OBSERVATION
Discharge: HOME | End: 2021-02-04
Attending: INTERNAL MEDICINE | Admitting: INTERNAL MEDICINE

## 2021-02-02 ENCOUNTER — APPOINTMENT (OUTPATIENT)
Dept: GENERAL RADIOLOGY | Facility: HOSPITAL | Age: 61
End: 2021-02-02

## 2021-02-02 DIAGNOSIS — I10 HYPERTENSION, UNSPECIFIED TYPE: ICD-10-CM

## 2021-02-02 DIAGNOSIS — R07.9 CHEST PAIN, UNSPECIFIED TYPE: ICD-10-CM

## 2021-02-02 DIAGNOSIS — Z20.822 2019-NCOV NOT DETECTED: ICD-10-CM

## 2021-02-02 DIAGNOSIS — M79.10 MYALGIA: ICD-10-CM

## 2021-02-02 DIAGNOSIS — Z95.0 PRESENCE OF CARDIAC PACEMAKER: ICD-10-CM

## 2021-02-02 DIAGNOSIS — R94.39 ABNORMAL NUCLEAR STRESS TEST: Primary | ICD-10-CM

## 2021-02-02 PROBLEM — I16.0 HYPERTENSIVE URGENCY: Status: ACTIVE | Noted: 2021-02-02

## 2021-02-02 PROBLEM — R51.9 HEADACHE: Status: ACTIVE | Noted: 2021-02-02

## 2021-02-02 LAB
ALBUMIN SERPL-MCNC: 4.5 G/DL (ref 3.5–5.2)
ALBUMIN/GLOB SERPL: 1.7 G/DL
ALP SERPL-CCNC: 100 U/L (ref 39–117)
ALT SERPL W P-5'-P-CCNC: 18 U/L (ref 1–41)
ANION GAP SERPL CALCULATED.3IONS-SCNC: 10 MMOL/L (ref 5–15)
APTT PPP: 24.5 SECONDS (ref 24–31)
AST SERPL-CCNC: 22 U/L (ref 1–40)
B PARAPERT DNA SPEC QL NAA+PROBE: NOT DETECTED
B PERT DNA SPEC QL NAA+PROBE: NOT DETECTED
BASOPHILS # BLD AUTO: 0 10*3/MM3 (ref 0–0.2)
BASOPHILS NFR BLD AUTO: 0.8 % (ref 0–1.5)
BILIRUB SERPL-MCNC: 0.3 MG/DL (ref 0–1.2)
BUN SERPL-MCNC: 8 MG/DL (ref 8–23)
BUN/CREAT SERPL: 9.5 (ref 7–25)
C PNEUM DNA NPH QL NAA+NON-PROBE: NOT DETECTED
CALCIUM SPEC-SCNC: 9.1 MG/DL (ref 8.6–10.5)
CHLORIDE SERPL-SCNC: 105 MMOL/L (ref 98–107)
CO2 SERPL-SCNC: 27 MMOL/L (ref 22–29)
CREAT SERPL-MCNC: 0.84 MG/DL (ref 0.76–1.27)
DEPRECATED RDW RBC AUTO: 41.6 FL (ref 37–54)
EOSINOPHIL # BLD AUTO: 0.1 10*3/MM3 (ref 0–0.4)
EOSINOPHIL NFR BLD AUTO: 2.3 % (ref 0.3–6.2)
ERYTHROCYTE [DISTWIDTH] IN BLOOD BY AUTOMATED COUNT: 13.9 % (ref 12.3–15.4)
FLUAV SUBTYP SPEC NAA+PROBE: NOT DETECTED
FLUBV RNA ISLT QL NAA+PROBE: NOT DETECTED
GFR SERPL CREATININE-BSD FRML MDRD: 93 ML/MIN/1.73
GLOBULIN UR ELPH-MCNC: 2.6 GM/DL
GLUCOSE SERPL-MCNC: 117 MG/DL (ref 65–99)
HADV DNA SPEC NAA+PROBE: NOT DETECTED
HCOV 229E RNA SPEC QL NAA+PROBE: NOT DETECTED
HCOV HKU1 RNA SPEC QL NAA+PROBE: NOT DETECTED
HCOV NL63 RNA SPEC QL NAA+PROBE: NOT DETECTED
HCOV OC43 RNA SPEC QL NAA+PROBE: NOT DETECTED
HCT VFR BLD AUTO: 41.1 % (ref 37.5–51)
HGB BLD-MCNC: 14.2 G/DL (ref 13–17.7)
HMPV RNA NPH QL NAA+NON-PROBE: NOT DETECTED
HPIV1 RNA SPEC QL NAA+PROBE: NOT DETECTED
HPIV2 RNA SPEC QL NAA+PROBE: NOT DETECTED
HPIV3 RNA NPH QL NAA+PROBE: NOT DETECTED
HPIV4 P GENE NPH QL NAA+PROBE: NOT DETECTED
INR PPP: 0.97 (ref 0.93–1.1)
LYMPHOCYTES # BLD AUTO: 1.6 10*3/MM3 (ref 0.7–3.1)
LYMPHOCYTES NFR BLD AUTO: 30.4 % (ref 19.6–45.3)
M PNEUMO IGG SER IA-ACNC: NOT DETECTED
MCH RBC QN AUTO: 29.3 PG (ref 26.6–33)
MCHC RBC AUTO-ENTMCNC: 34.6 G/DL (ref 31.5–35.7)
MCV RBC AUTO: 84.8 FL (ref 79–97)
MONOCYTES # BLD AUTO: 0.5 10*3/MM3 (ref 0.1–0.9)
MONOCYTES NFR BLD AUTO: 9.1 % (ref 5–12)
NEUTROPHILS NFR BLD AUTO: 3 10*3/MM3 (ref 1.7–7)
NEUTROPHILS NFR BLD AUTO: 57.4 % (ref 42.7–76)
NRBC BLD AUTO-RTO: 0 /100 WBC (ref 0–0.2)
NT-PROBNP SERPL-MCNC: 27.9 PG/ML (ref 0–900)
PLATELET # BLD AUTO: 255 10*3/MM3 (ref 140–450)
PMV BLD AUTO: 7.2 FL (ref 6–12)
POTASSIUM SERPL-SCNC: 4.6 MMOL/L (ref 3.5–5.2)
PROCALCITONIN SERPL-MCNC: 0.05 NG/ML (ref 0–0.25)
PROT SERPL-MCNC: 7.1 G/DL (ref 6–8.5)
PROTHROMBIN TIME: 10.7 SECONDS (ref 9.6–11.7)
RBC # BLD AUTO: 4.85 10*6/MM3 (ref 4.14–5.8)
RHINOVIRUS RNA SPEC NAA+PROBE: NOT DETECTED
RSV RNA NPH QL NAA+NON-PROBE: NOT DETECTED
SARS-COV-2 RNA NPH QL NAA+NON-PROBE: NOT DETECTED
SODIUM SERPL-SCNC: 142 MMOL/L (ref 136–145)
TROPONIN T SERPL-MCNC: <0.01 NG/ML (ref 0–0.03)
TROPONIN T SERPL-MCNC: <0.01 NG/ML (ref 0–0.03)
WBC # BLD AUTO: 5.2 10*3/MM3 (ref 3.4–10.8)

## 2021-02-02 PROCEDURE — G0378 HOSPITAL OBSERVATION PER HR: HCPCS

## 2021-02-02 PROCEDURE — 84484 ASSAY OF TROPONIN QUANT: CPT | Performed by: NURSE PRACTITIONER

## 2021-02-02 PROCEDURE — 96365 THER/PROPH/DIAG IV INF INIT: CPT

## 2021-02-02 PROCEDURE — 85610 PROTHROMBIN TIME: CPT | Performed by: NURSE PRACTITIONER

## 2021-02-02 PROCEDURE — 84145 PROCALCITONIN (PCT): CPT | Performed by: NURSE PRACTITIONER

## 2021-02-02 PROCEDURE — 83880 ASSAY OF NATRIURETIC PEPTIDE: CPT | Performed by: NURSE PRACTITIONER

## 2021-02-02 PROCEDURE — 99220 PR INITIAL OBSERVATION CARE/DAY 70 MINUTES: CPT | Performed by: INTERNAL MEDICINE

## 2021-02-02 PROCEDURE — 85730 THROMBOPLASTIN TIME PARTIAL: CPT | Performed by: NURSE PRACTITIONER

## 2021-02-02 PROCEDURE — 96366 THER/PROPH/DIAG IV INF ADDON: CPT

## 2021-02-02 PROCEDURE — 99214 OFFICE O/P EST MOD 30 MIN: CPT | Performed by: INTERNAL MEDICINE

## 2021-02-02 PROCEDURE — 70450 CT HEAD/BRAIN W/O DYE: CPT

## 2021-02-02 PROCEDURE — 25010000002 ONDANSETRON PER 1 MG: Performed by: NURSE PRACTITIONER

## 2021-02-02 PROCEDURE — 99284 EMERGENCY DEPT VISIT MOD MDM: CPT

## 2021-02-02 PROCEDURE — 0202U NFCT DS 22 TRGT SARS-COV-2: CPT | Performed by: NURSE PRACTITIONER

## 2021-02-02 PROCEDURE — 80053 COMPREHEN METABOLIC PANEL: CPT | Performed by: NURSE PRACTITIONER

## 2021-02-02 PROCEDURE — 85025 COMPLETE CBC W/AUTO DIFF WBC: CPT | Performed by: NURSE PRACTITIONER

## 2021-02-02 PROCEDURE — 71045 X-RAY EXAM CHEST 1 VIEW: CPT

## 2021-02-02 PROCEDURE — 93005 ELECTROCARDIOGRAM TRACING: CPT | Performed by: NURSE PRACTITIONER

## 2021-02-02 RX ORDER — ACETAMINOPHEN 160 MG/5ML
650 SOLUTION ORAL EVERY 4 HOURS PRN
Status: DISCONTINUED | OUTPATIENT
Start: 2021-02-02 | End: 2021-02-04 | Stop reason: HOSPADM

## 2021-02-02 RX ORDER — CHOLECALCIFEROL (VITAMIN D3) 125 MCG
5 CAPSULE ORAL NIGHTLY PRN
Status: DISCONTINUED | OUTPATIENT
Start: 2021-02-02 | End: 2021-02-04 | Stop reason: HOSPADM

## 2021-02-02 RX ORDER — SODIUM CHLORIDE 0.9 % (FLUSH) 0.9 %
10 SYRINGE (ML) INJECTION AS NEEDED
Status: DISCONTINUED | OUTPATIENT
Start: 2021-02-02 | End: 2021-02-04 | Stop reason: HOSPADM

## 2021-02-02 RX ORDER — MELATONIN
3000
COMMUNITY

## 2021-02-02 RX ORDER — GABAPENTIN 300 MG/1
600 CAPSULE ORAL 4 TIMES DAILY
Status: DISCONTINUED | OUTPATIENT
Start: 2021-02-02 | End: 2021-02-04 | Stop reason: HOSPADM

## 2021-02-02 RX ORDER — SODIUM CHLORIDE 0.9 % (FLUSH) 0.9 %
10 SYRINGE (ML) INJECTION EVERY 12 HOURS SCHEDULED
Status: DISCONTINUED | OUTPATIENT
Start: 2021-02-02 | End: 2021-02-04 | Stop reason: HOSPADM

## 2021-02-02 RX ORDER — PANTOPRAZOLE SODIUM 40 MG/1
40 TABLET, DELAYED RELEASE ORAL EVERY MORNING
Status: DISCONTINUED | OUTPATIENT
Start: 2021-02-03 | End: 2021-02-04 | Stop reason: HOSPADM

## 2021-02-02 RX ORDER — ACETAMINOPHEN 650 MG/1
650 SUPPOSITORY RECTAL EVERY 4 HOURS PRN
Status: DISCONTINUED | OUTPATIENT
Start: 2021-02-02 | End: 2021-02-04 | Stop reason: HOSPADM

## 2021-02-02 RX ORDER — CETIRIZINE HYDROCHLORIDE 10 MG/1
10 TABLET ORAL DAILY
COMMUNITY

## 2021-02-02 RX ORDER — MELATONIN
3000 NIGHTLY
Status: DISCONTINUED | OUTPATIENT
Start: 2021-02-02 | End: 2021-02-04 | Stop reason: HOSPADM

## 2021-02-02 RX ORDER — METOPROLOL SUCCINATE 25 MG/1
12.5 TABLET, EXTENDED RELEASE ORAL EVERY 24 HOURS
Status: DISCONTINUED | OUTPATIENT
Start: 2021-02-03 | End: 2021-02-02

## 2021-02-02 RX ORDER — ASCORBIC ACID 500 MG
500 TABLET ORAL NIGHTLY
Status: DISCONTINUED | OUTPATIENT
Start: 2021-02-02 | End: 2021-02-04 | Stop reason: HOSPADM

## 2021-02-02 RX ORDER — BACLOFEN 10 MG/1
10 TABLET ORAL 3 TIMES DAILY PRN
Status: DISCONTINUED | OUTPATIENT
Start: 2021-02-02 | End: 2021-02-04 | Stop reason: HOSPADM

## 2021-02-02 RX ORDER — LISINOPRIL 5 MG/1
10 TABLET ORAL ONCE
Status: COMPLETED | OUTPATIENT
Start: 2021-02-02 | End: 2021-02-02

## 2021-02-02 RX ORDER — HYDROCODONE BITARTRATE AND ACETAMINOPHEN 5; 325 MG/1; MG/1
1 TABLET ORAL EVERY 6 HOURS PRN
Status: DISCONTINUED | OUTPATIENT
Start: 2021-02-02 | End: 2021-02-04 | Stop reason: HOSPADM

## 2021-02-02 RX ORDER — NITROGLYCERIN 20 MG/100ML
10-50 INJECTION INTRAVENOUS
Status: DISCONTINUED | OUTPATIENT
Start: 2021-02-02 | End: 2021-02-04 | Stop reason: HOSPADM

## 2021-02-02 RX ORDER — LISINOPRIL 5 MG/1
10 TABLET ORAL
Status: DISCONTINUED | OUTPATIENT
Start: 2021-02-02 | End: 2021-02-03

## 2021-02-02 RX ORDER — ACETAMINOPHEN 325 MG/1
650 TABLET ORAL EVERY 6 HOURS PRN
Status: DISCONTINUED | OUTPATIENT
Start: 2021-02-02 | End: 2021-02-02

## 2021-02-02 RX ORDER — ROSUVASTATIN CALCIUM 5 MG/1
5 TABLET, COATED ORAL DAILY
Status: DISCONTINUED | OUTPATIENT
Start: 2021-02-03 | End: 2021-02-04 | Stop reason: HOSPADM

## 2021-02-02 RX ORDER — ALUMINA, MAGNESIA, AND SIMETHICONE 2400; 2400; 240 MG/30ML; MG/30ML; MG/30ML
15 SUSPENSION ORAL EVERY 6 HOURS PRN
Status: DISCONTINUED | OUTPATIENT
Start: 2021-02-02 | End: 2021-02-04 | Stop reason: HOSPADM

## 2021-02-02 RX ORDER — ACETAMINOPHEN 500 MG
1000 TABLET ORAL ONCE
Status: COMPLETED | OUTPATIENT
Start: 2021-02-02 | End: 2021-02-02

## 2021-02-02 RX ORDER — ONDANSETRON 4 MG/1
4 TABLET, FILM COATED ORAL EVERY 6 HOURS PRN
Status: DISCONTINUED | OUTPATIENT
Start: 2021-02-02 | End: 2021-02-04 | Stop reason: HOSPADM

## 2021-02-02 RX ORDER — ACETAMINOPHEN 325 MG/1
650 TABLET ORAL EVERY 4 HOURS PRN
Status: DISCONTINUED | OUTPATIENT
Start: 2021-02-02 | End: 2021-02-04 | Stop reason: HOSPADM

## 2021-02-02 RX ORDER — TAMSULOSIN HYDROCHLORIDE 0.4 MG/1
0.4 CAPSULE ORAL NIGHTLY
Status: DISCONTINUED | OUTPATIENT
Start: 2021-02-02 | End: 2021-02-04 | Stop reason: HOSPADM

## 2021-02-02 RX ORDER — AMITRIPTYLINE HYDROCHLORIDE 50 MG/1
100 TABLET, FILM COATED ORAL NIGHTLY
Status: DISCONTINUED | OUTPATIENT
Start: 2021-02-02 | End: 2021-02-04 | Stop reason: HOSPADM

## 2021-02-02 RX ORDER — ONDANSETRON 2 MG/ML
4 INJECTION INTRAMUSCULAR; INTRAVENOUS EVERY 6 HOURS PRN
Status: DISCONTINUED | OUTPATIENT
Start: 2021-02-02 | End: 2021-02-04 | Stop reason: HOSPADM

## 2021-02-02 RX ORDER — ASPIRIN 81 MG/1
324 TABLET, CHEWABLE ORAL ONCE
Status: COMPLETED | OUTPATIENT
Start: 2021-02-02 | End: 2021-02-02

## 2021-02-02 RX ORDER — ASCORBIC ACID 500 MG
500 TABLET ORAL
COMMUNITY

## 2021-02-02 RX ORDER — DIGOXIN 125 MCG
125 TABLET ORAL
Status: DISCONTINUED | OUTPATIENT
Start: 2021-02-03 | End: 2021-02-04 | Stop reason: HOSPADM

## 2021-02-02 RX ORDER — CETIRIZINE HYDROCHLORIDE 10 MG/1
10 TABLET ORAL DAILY
Status: DISCONTINUED | OUTPATIENT
Start: 2021-02-03 | End: 2021-02-04 | Stop reason: HOSPADM

## 2021-02-02 RX ORDER — METOPROLOL SUCCINATE 25 MG/1
25 TABLET, EXTENDED RELEASE ORAL EVERY 24 HOURS
Status: DISCONTINUED | OUTPATIENT
Start: 2021-02-03 | End: 2021-02-03

## 2021-02-02 RX ADMIN — ONDANSETRON 4 MG: 2 INJECTION, SOLUTION INTRAMUSCULAR; INTRAVENOUS at 19:04

## 2021-02-02 RX ADMIN — OXYCODONE HYDROCHLORIDE AND ACETAMINOPHEN 500 MG: 500 TABLET ORAL at 20:42

## 2021-02-02 RX ADMIN — HYDROCODONE BITARTRATE AND ACETAMINOPHEN 1 TABLET: 5; 325 TABLET ORAL at 16:30

## 2021-02-02 RX ADMIN — NITROGLYCERIN 10 MCG/MIN: 20 INJECTION INTRAVENOUS at 12:47

## 2021-02-02 RX ADMIN — Medication 3000 UNITS: at 20:42

## 2021-02-02 RX ADMIN — TAMSULOSIN HYDROCHLORIDE 0.4 MG: 0.4 CAPSULE ORAL at 20:42

## 2021-02-02 RX ADMIN — ASPIRIN 324 MG: 81 TABLET, CHEWABLE ORAL at 12:41

## 2021-02-02 RX ADMIN — LISINOPRIL 10 MG: 5 TABLET ORAL at 15:58

## 2021-02-02 RX ADMIN — AMITRIPTYLINE HYDROCHLORIDE 100 MG: 50 TABLET, FILM COATED ORAL at 20:42

## 2021-02-02 RX ADMIN — GABAPENTIN 600 MG: 300 CAPSULE ORAL at 20:42

## 2021-02-02 RX ADMIN — ACETAMINOPHEN 1000 MG: 500 TABLET, FILM COATED ORAL at 12:41

## 2021-02-02 RX ADMIN — ACETAMINOPHEN 650 MG: 325 TABLET, FILM COATED ORAL at 19:04

## 2021-02-02 RX ADMIN — LISINOPRIL 10 MG: 5 TABLET ORAL at 17:42

## 2021-02-02 RX ADMIN — GABAPENTIN 600 MG: 300 CAPSULE ORAL at 17:42

## 2021-02-02 RX ADMIN — Medication 10 ML: at 20:42

## 2021-02-02 NOTE — ED PROVIDER NOTES
Subjective   Chief complaint: Chest pain headache myalgias      Context: Patient is a 60-year-old male who comes in with multiple complaints.  He states he has had varying blood pressures but it is unclear for how long and states he has been in the 200s at home.  He states his temperature T-max has been 99, he had a Covid exposure 3 to 4 weeks ago.  Has had a minor sore throat and an intermittent nonproductive cough for the last week.  He denies any abdominal pain vomiting or diarrhea.  Has had some intermittent nausea but none at this time.  He has had a intermittent non-intractable headache, not the worst headache of his life and denies any thunderclap type noise.  He denies any unilateral focal deficits weakness confusion ataxia or lethargy.  He states he has had some chest pressure that is nonradiating for the last 3 to 4 days that is not reproducible.  Has had some intermittent shortness of breath associated with it.  He denies any swelling in his legs or feet.  He states last time he had his pacemaker evaluated was a couple months ago and was reported to have a battery life of approximately 70%.    Duration: As above    Timing: Waxes and wanes    Severity: Moderate    Associated symptoms:  Denies        PCP:  Jimmy jackson          Review of Systems   Constitutional: Negative for fever.   HENT: Positive for congestion.    Eyes: Negative for visual disturbance.   Respiratory: Positive for cough and shortness of breath.    Cardiovascular: Positive for chest pain. Negative for palpitations and leg swelling.   Gastrointestinal: Negative.    Genitourinary: Negative.    Musculoskeletal: Positive for myalgias.   Skin: Negative.    Allergic/Immunologic: Negative for immunocompromised state.   Neurological: Positive for weakness.   Hematological: Does not bruise/bleed easily.   Psychiatric/Behavioral: Negative for confusion.       Past Medical History:   Diagnosis Date   • Allergic rhinitis     Impression: persistent    • Arthralgia of right hip     Impression: needs right hip relacement   • Bobby's esophagus with dysplasia     Impression: scope 11/2013 improved from previous. Recheck due in 2015 Packet given   • Cervical disc disease with myelopathy     Impression: seen on MRi 5/2012. Workup in progress.   • Chronic low back pain with sciatica     Unspecified. Impression: scheduled for surgery   • Chronic pain of right knee     Impression: needs knee replacement   • Colon cancer screening     Impression: packet given   • ED (erectile dysfunction)    • Hydrocele    • Insomnia    • Left leg weakness     Impression: due to recent surgery. May benefit for home health. Pt homebound due severe pain postop back surgery. Also needs 3-in-1 Commode   • Medicare annual wellness visit, subsequent     With abnormal findings.   • Non-seasonal allergic rhinitis due to pollen    • Other specified persistent mood disorders (CMS/HCC)     Story: stress   • Psoriatic arthritis (CMS/HCC)     Impression: refer to Dr Rodriguez   • Screening for alcoholism    • Trigger ring finger of right hand     Impression: refer to hand surgeon   • Ulnar neuropathy at elbow of right upper extremity     Impression: suspect stinger occured during surgery. Natural course and self-limited nature of this condition discussed. Follow-up in 4-6 weeks if not better. Follow-up sooner for worsening symptoms or for any concerns. EMG if not improved   • Umbilical hernia        Allergies   Allergen Reactions   • Oxycodone-Acetaminophen GI Intolerance   • Adhesive Tape Unknown (See Comments)   • Oxycodone Unknown (See Comments)       Past Surgical History:   Procedure Laterality Date   • CARDIAC PACEMAKER PLACEMENT     • KNEE SURGERY Right 2016   • LUMBAR DISCECTOMY  10/2015    Comments: lumbar disk decompression   • TOTAL HIP ARTHROPLASTY Right 12/09/2016       Family History   Problem Relation Age of Onset   • Other Father         Substance Abuse   • Cancer Father         Lung        Social History     Socioeconomic History   • Marital status:      Spouse name: Not on file   • Number of children: Not on file   • Years of education: Not on file   • Highest education level: Not on file   Tobacco Use   • Smoking status: Former Smoker   • Smokeless tobacco: Never Used   Substance and Sexual Activity   • Alcohol use: No     Frequency: Never   • Drug use: No   • Sexual activity: Not Currently           Objective   Physical Exam     Vital signs and triage nurse note reviewed.   Constitutional: Awake, alert; well-developed and well-nourished. No acute distress is noted.   HEENT: Normocephalic, atraumatic; pupils are PERRL with intact EOM; oropharynx is pink and moist without exudate or erythema.   Neck: Supple, full range of motion without pain; no cervical lymphadenopathy.   Cardiovascular: Regular rate and rhythm, normal S1-S2.   Pulmonary: Respiratory effort regular nonlabored, breath sounds clear to auscultation all fields.   Abdomen: Soft, nontender nondistended with normoactive bowel sounds; no rebound or guarding.   Musculoskeletal: Independent range of motion of all extremities with no palpable tenderness or edema.   Neuro: Alert oriented x3, speech is clear and appropriate, GCS 15   Skin:  Fleshtone warm, dry, intact; no erythematous or petechial rash or lesion       ECG 12 Lead      Date/Time: 2/2/2021 2:48 PM  Performed by: Alexa Bone APRN  Authorized by: Alexa Bone APRN   Interpreted by physician (jeremy)  Comparison: compared with previous ECG from 4/14/2018  Comparison to previous ECG: AV paced  Rhythm: sinus rhythm  Ectopy comments: pac  BPM: 74                   ED Course      Labs Reviewed   COMPREHENSIVE METABOLIC PANEL - Abnormal; Notable for the following components:       Result Value    Glucose 117 (*)     All other components within normal limits    Narrative:     GFR Normal >60  Chronic Kidney Disease <60  Kidney Failure <15     RESPIRATORY PANEL PCR  "W/ COVID-19 (SARS-COV-2) SHAYNE/SANAM/RISHI/[facility] IN-HOUSE, NP SWAB IN UTM/VTP, 3-4 HR TAT - Normal    Narrative:     Fact sheet for providers: https://docs.[employer]/wp-content/uploads/NYV3567-2606-OA0.1-EUA-Provider-Fact-Sheet-3.pdf    Fact sheet for patients: https://docs.[employer]/wp-content/uploads/NYT8270-7579-IU7.1-EUA-Patient-Fact-Sheet-1.pdf    Test performed by PCR.   PROCALCITONIN - Normal    Narrative:     As a Marker for Sepsis (Non-Neonates):   1. <0.5 ng/mL represents a low risk of severe sepsis and/or septic shock.  1. >2 ng/mL represents a high risk of severe sepsis and/or septic shock.    As a Marker for Lower Respiratory Tract Infections that require antibiotic therapy:  PCT on Admission     Antibiotic Therapy             6-12 Hrs later  > 0.5                Strongly Recommended            >0.25 - <0.5         Recommended  0.1 - 0.25           Discouraged                   Remeasure/reassess PCT  <0.1                 Strongly Discouraged          Remeasure/reassess PCT      As 28 day mortality risk marker: \"Change in Procalcitonin Result\" (> 80 % or <=80 %) if Day 0 (or Day 1) and Day 4 values are available. Refer to http://www.[facility]pct[place]cal[place]com/   Change in PCT <=80 %   A decrease of PCT levels below or equal to 80 % defines a positive change in PCT test result representing a higher risk for 28-day all-cause mortality of patients diagnosed with severe sepsis or septic shock.  Change in PCT > 80 %   A decrease of PCT levels of more than 80 % defines a negative change in PCT result representing a lower risk for 28-day all-cause mortality of patients diagnosed with severe sepsis or septic shock.                Results may be falsely decreased if patient taking Biotin.    PROTIME-INR - Normal   APTT - Normal   BNP (IN-HOUSE) - Normal    Narrative:     Among patients with dyspnea, NT-proBNP is highly sensitive for the detection of acute congestive heart failure. In addition " NT-proBNP of <300 pg/ml effectively rules out acute congestive heart failure with 99% negative predictive value.    Results may be falsely decreased if patient taking Biotin.     TROPONIN (IN-HOUSE) - Normal    Narrative:     Troponin T Reference Range:  <= 0.03 ng/mL-   Negative for AMI  >0.03 ng/mL-     Abnormal for myocardial necrosis.  Clinicians would have to utilize clinical acumen, EKG, Troponin and serial changes to determine if it is an Acute Myocardial Infarction or myocardial injury due to an underlying chronic condition.       Results may be falsely decreased if patient taking Biotin.     CBC WITH AUTO DIFFERENTIAL - Normal   CBC AND DIFFERENTIAL    Narrative:     The following orders were created for panel order CBC & Differential.  Procedure                               Abnormality         Status                     ---------                               -----------         ------                     CBC Auto Differential[[id number]]        Normal              Final result                 Please view results for these tests on the individual orders.         Medications   sodium chloride 0.9 % flush 10 mL (has no administration in time range)   nitroglycerin (TRIDIL) 200 mcg/ml infusion (20 mcg/min Intravenous Rate/Dose Change 2/2/21 1417)   aspirin chewable tablet 324 mg (324 mg Oral Given 2/2/21 1241)   acetaminophen (TYLENOL) tablet 1,000 mg (1,000 mg Oral Given 2/2/21 1241)     Ct Head Without Contrast    Result Date: 2/2/2021  1.No evidence for acute intracranial abnormality.  Electronically Signed By-Yfn Cook MD On:2/2/2021 1:42 PM This report was finalized on [id number] by  Yfn Cook MD.    Xr Chest Ap    Result Date: 2/2/2021  There is no significant change when compared to the prior study. There is no evidence for acute cardiopulmonary process.  Electronically Signed By-Yfn Cook MD On:2/2/2021 12:51 PM This report was finalized on [id number] by  Yfn Cook  MD.    Prior to Admission medications    Medication Sig Start Date End Date Taking? Authorizing Provider   amitriptyline (ELAVIL) 50 MG tablet TAKE 2 TABLETS AT BEDTIME 12/1/20   Junaid Marquez MD   aspirin 81 MG tablet Take 1 tablet by mouth Daily. 10/28/19   Junaid Marquez MD   baclofen (LIORESAL) 10 MG tablet Take 1 tablet by mouth 3 (Three) Times a Day As Needed for Muscle Spasms. 1/21/21   Junaid Marquez MD   diclofenac-misoprostol (ARTHROTEC 75) 75-0.2 MG EC tablet Take 1 tablet by mouth 2 (Two) Times a Day.    Radha Payne MD   digoxin (LANOXIN) 125 MCG tablet Take 1 tablet by mouth Daily. 9/24/20   Claudia Benson MD   DULoxetine (CYMBALTA) 20 MG capsule Take 20 mg by mouth. 3/2/20   Radha Payne MD   esomeprazole (NEXIUM) 40 MG capsule Take 1 capsule by mouth Daily. 5/12/15   Radha Payne MD   etanercept (ENBREL) 50 MG/ML solution prefilled syringe injection Inject  under the skin into the appropriate area as directed. 8/6/15   Radha Payne MD   fluticasone (FLONASE) 50 MCG/ACT nasal spray USE 2 SPRAYS IN EACH NOSTRIL ONCE DAILY 7/17/20   Junaid Marquez MD   gabapentin (NEURONTIN) 600 MG tablet TAKE 1 TABLET FOUR TIMES A DAY 3/5/20   Junaid Marquez MD   HYDROcodone-acetaminophen (NORCO) 5-325 MG per tablet Take 1 tablet by mouth 3 (Three) Times a Day As Needed for Severe Pain . 1/21/21   Junaid Marquez MD   lisinopril (PRINIVIL,ZESTRIL) 20 MG tablet Take 0.5 tablets by mouth Daily. 3/9/20   Claudia Benson MD   loratadine (CLARITIN) 10 MG tablet Take 1 tablet by mouth Daily. 8/6/15   Radha Payne MD   metoprolol succinate XL (Toprol XL) 25 MG 24 hr tablet Take 0.5 tablets by mouth Daily. 9/24/20   Claudia Benson MD   rosuvastatin (CRESTOR) 10 MG tablet TAKE 1/2 TABLET BY MOUTH DAILY 12/9/20   Junaid Marquez MD   tamsulosin (FLOMAX) 0.4 MG capsule 24 hr capsule Take 1 capsule by mouth Daily.  4/6/17   Radha Payne MD   triamcinolone (KENALOG) 0.1 % ointment RAJENDRA EXT AA OF TORSO AND EXTREMITIES BID FOR FOUR TO FIVE TIMES A WEEK PRN 1/27/20   Radha Payne MD   vitamin B-6 (PYRIDOXINE) 50 MG tablet Take 50 mg by mouth Daily.    Radha Payne MD                                          MDM  Number of Diagnoses or Management Options  2019-nCoV not detected:   Chest pain, unspecified type:   Hypertension, unspecified type:   Myalgia:   Diagnosis management comments:        Comorbidities:  has a past medical history of Allergic rhinitis, Arthralgia of right hip, Bobby's esophagus with dysplasia, Cervical disc disease with myelopathy, Chronic low back pain with sciatica, Chronic pain of right knee, Colon cancer screening, ED (erectile dysfunction), Hydrocele, Insomnia, Left leg weakness, Medicare annual wellness visit, subsequent, Non-seasonal allergic rhinitis due to pollen, Other specified persistent mood disorders (CMS/HCC), Psoriatic arthritis (CMS/HCC), Screening for alcoholism, Trigger ring finger of right hand, Ulnar neuropathy at elbow of right upper extremity, and Umbilical hernia.  Differentials: Myalgias virus STEMI non-STEMI electrode abnormalities not all inclusive of differentials considered  Discussion with provider: Hospitalist  Radiology interpretation:  X-rays reviewed by me and interpreted by radiologist,   Ct Head Without Contrast    Result Date: 2/2/2021  1.No evidence for acute intracranial abnormality.  Electronically Signed By-Yfn Cook MD On:2/2/2021 1:42 PM This report was finalized on [id number] by  Yfn Cook MD.    Xr Chest Ap    Result Date: 2/2/2021  There is no significant change when compared to the prior study. There is no evidence for acute cardiopulmonary process.  Electronically Signed By-Yfn Cook MD On:2/2/2021 12:51 PM This report was finalized on [id number] by  Yfn Cook MD.    Lab interpretation:  Labs viewed by me  significant for, glucose 117    Appropriate PPE worn during exam.  Patient has had some improvement of his pain and blood pressure was noted to improve.  Discussed with the hospitalist agreed to admit for further rule out.    i discussed findings with patient who voices understanding of admission        Final diagnoses:   2019-nCoV not detected   Chest pain, unspecified type   Myalgia   Hypertension, unspecified type            Alexa Bone, MINERVA  02/02/21 1445       Alexa Bone, MINERVA  02/02/21 1445

## 2021-02-02 NOTE — CONSULTS
Referring Provider: Deric Mane MD  Reason for Consultation:  Chest pain  Palpitations  Status post pacemaker    Patient Care Team:  Junaid Marquez MD as PCP - General  Junaid Marquez MD as PCP - Family Medicine  Junaid Marquez MD Gondi, B[place], MD as Consulting Physician (Cardiology)    Chief complaint  Chest pain    Subjective .     History of present illness:  Steven Hammonds is a 60 y.o. male who presents with history of multiple cardiac and noncardiac problems as outlined in the assessment was admitted with history of increasing blood pressure recently and having chest discomfort.  Patient blood pressure was high around 220/130.  Patient also has been having palpitations with sometimes heart rate going to 130/min.  Patient denies having any shortness of breath but having chest discomfort left precordial dull aching sensation nonexertional and exertional.  Patient took extra half a tablet of metoprolol with blood pressure coming down some but still was high.  Patient came to the emergency room.  Patient recently had ear infection and has been on amoxicillin.  EKG showed no acute changes.  Troponin levels are negative..         ROS      Patient is not having any shortness of breath,, dizziness or syncope.  Denies having any headache ,abdominal pain ,nausea, vomiting , diarrhea constipation, loss of weight or loss of appetite.  Denies having any excessive bruising ,hematuria or blood in the stool.    Review of all systems negative except as indicated      History  Past Medical History:   Diagnosis Date   • Allergic rhinitis     Impression: persistent   • Arthralgia of right hip     Impression: needs right hip relacement   • Bobby's esophagus with dysplasia     Impression: scope 11/2013 improved from previous. Recheck due in 2015 Packet given   • Cervical disc disease with myelopathy     Impression: seen on MRi 5/2012. Workup in progress.   • Chronic low back pain with sciatica      Unspecified. Impression: scheduled for surgery   • Chronic pain of right knee     Impression: needs knee replacement   • Colon cancer screening     Impression: packet given   • ED (erectile dysfunction)    • Hydrocele    • Insomnia    • Left leg weakness     Impression: due to recent surgery. May benefit for home health. Pt homebound due severe pain postop back surgery. Also needs 3-in-1 Commode   • Medicare annual wellness visit, subsequent     With abnormal findings.   • Non-seasonal allergic rhinitis due to pollen    • Other specified persistent mood disorders (CMS/HCC)     Story: stress   • Psoriatic arthritis (CMS/HCC)     Impression: refer to Dr Rodriguez   • Screening for alcoholism    • Trigger ring finger of right hand     Impression: refer to hand surgeon   • Ulnar neuropathy at elbow of right upper extremity     Impression: suspect stinger occured during surgery. Natural course and self-limited nature of this condition discussed. Follow-up in 4-6 weeks if not better. Follow-up sooner for worsening symptoms or for any concerns. EMG if not improved   • Umbilical hernia        Past Surgical History:   Procedure Laterality Date   • CARDIAC PACEMAKER PLACEMENT     • KNEE SURGERY Right 2016   • LUMBAR DISCECTOMY  10/2015    Comments: lumbar disk decompression   • TOTAL HIP ARTHROPLASTY Right 12/09/2016       Family History   Problem Relation Age of Onset   • Other Father         Substance Abuse   • Cancer Father         Lung       Social History     Tobacco Use   • Smoking status: Former Smoker   • Smokeless tobacco: Never Used   Substance Use Topics   • Alcohol use: No     Frequency: Never   • Drug use: No        Medications Prior to Admission   Medication Sig Dispense Refill Last Dose   • amitriptyline (ELAVIL) 50 MG tablet TAKE 2 TABLETS AT BEDTIME 180 tablet 3 2/1/2021 at Unknown time   • ascorbic acid (VITAMIN C) 500 MG tablet Take 500 mg by mouth every night at bedtime.   2/1/2021 at Unknown time   •  baclofen (LIORESAL) 10 MG tablet Take 1 tablet by mouth 3 (Three) Times a Day As Needed for Muscle Spasms. 60 tablet 1    • cetirizine (zyrTEC) 10 MG tablet Take 10 mg by mouth Daily.   2/2/2021 at 0800   • cholecalciferol (VITAMIN D3) 25 MCG (1000 UT) tablet Take 3,000 Units by mouth every night at bedtime.   2/1/2021 at Unknown time   • diclofenac-misoprostol (ARTHROTEC 75) 75-0.2 MG EC tablet Take 1 tablet by mouth 2 (Two) Times a Day.      • digoxin (LANOXIN) 125 MCG tablet Take 1 tablet by mouth Daily. 90 tablet 3 2/2/2021 at 0800   • esomeprazole (NEXIUM) 40 MG capsule Take 1 capsule by mouth Daily.   2/2/2021 at 0800   • etanercept (ENBREL) 50 MG/ML solution prefilled syringe injection Inject 50 mg under the skin into the appropriate area as directed 1 (One) Time Per Week. Wed   Past Week at Unknown time   • fluticasone (FLONASE) 50 MCG/ACT nasal spray USE 2 SPRAYS IN EACH NOSTRIL ONCE DAILY 3 bottle 3 2/2/2021 at 0800   • gabapentin (NEURONTIN) 600 MG tablet TAKE 1 TABLET FOUR TIMES A  tablet 3 2/2/2021 at 0800   • HYDROcodone-acetaminophen (NORCO) 5-325 MG per tablet Take 1 tablet by mouth 3 (Three) Times a Day As Needed for Severe Pain . 90 tablet 0    • lisinopril (PRINIVIL,ZESTRIL) 20 MG tablet Take 0.5 tablets by mouth Daily. (Patient taking differently: Take 10 mg by mouth Daily As Needed.) 90 tablet 3    • metoprolol succinate XL (Toprol XL) 25 MG 24 hr tablet Take 0.5 tablets by mouth Daily. 90 tablet 5 2/2/2021 at 0800   • rosuvastatin (CRESTOR) 10 MG tablet TAKE 1/2 TABLET BY MOUTH DAILY (Patient taking differently: Take 5 mg by mouth Daily.) 45 tablet 5 2/2/2021 at 0800   • tamsulosin (FLOMAX) 0.4 MG capsule 24 hr capsule Take 1 capsule by mouth Daily.   2/1/2021 at Unknown time         Oxycodone-acetaminophen, Adhesive tape, and Oxycodone    Scheduled Meds:amitriptyline, 100 mg, Oral, Nightly  ascorbic acid, 500 mg, Oral, Nightly  [START ON 2/3/2021] cetirizine, 10 mg, Oral,  "Daily  cholecalciferol, 3,000 Units, Oral, Nightly  [START ON 2/3/2021] digoxin, 125 mcg, Oral, Daily  gabapentin, 600 mg, Oral, 4x Daily  lisinopril, 10 mg, Oral, Q24H  [START ON 2/3/2021] metoprolol succinate XL, 12.5 mg, Oral, Q24H  [START ON 2/3/2021] pantoprazole, 40 mg, Oral, QAM  [START ON 2/3/2021] rosuvastatin, 5 mg, Oral, Daily  sodium chloride, 10 mL, Intravenous, Q12H  tamsulosin, 0.4 mg, Oral, Nightly      Continuous Infusions:nitroglycerin, 10-50 mcg/min, Last Rate: 20 mcg/min (02/02/21 1635)      PRN Meds:.•  acetaminophen **OR** acetaminophen **OR** acetaminophen  •  aluminum-magnesium hydroxide-simethicone  •  baclofen  •  HYDROcodone-acetaminophen  •  melatonin  •  ondansetron **OR** ondansetron  •  [COMPLETED] Insert peripheral IV **AND** sodium chloride  •  sodium chloride    Objective     VITAL SIGNS  Vitals:    02/02/21 1417 02/02/21 1427 02/02/21 1542 02/02/21 1637   BP: 173/94 161/98 140/95 166/90   BP Location:    Left arm   Patient Position:    Sitting   Pulse:   81 71   Resp:    17   Temp:       TempSrc:       SpO2: 96% 97% 96% 98%   Weight:       Height:           Flowsheet Rows      First Filed Value   Admission Height  180.3 cm (71\") Documented at 02/02/2021 1115   Admission Weight  84.3 kg (185 lb 12.8 oz) Documented at 02/02/2021 1115          No intake or output data in the 24 hours ending 02/02/21 1713     TELEMETRY: Sinus rhythm    Physical Exam:  The patient is alert, oriented and in no distress.  Vital signs as noted above.  Head and neck revealed no carotid bruits or jugular venous distention.  No thyromegaly or lymph adenopathy is present  Lungs clear.  No wheezing.  Breath sounds are normal bilaterally.  Heart normal first and second heart sounds.No murmur.  No precordial rub is present.  No gallop is present.  Abdomen soft and nontender.  No organomegaly is present.  Extremities with good peripheral pulses without any pedal edema.  Skin warm and dry.  Pacemaker site looks " normal.  Musculoskeletal system is grossly normal  CNS grossly normal      Results Review:   I reviewed the patient's new clinical results.  Lab Results (last 24 hours)     Procedure Component Value Units Date/Time    Respiratory Panel PCR w/COVID-19(SARS-CoV-2) SHAYNE/SANAM/RISHI/PAD/COR/MAD/THAI In-House, NP Swab in UTM/VTM, 3-4 HR TAT - Swab, Nasopharynx [[id number]]  (Normal) Collected: 02/02/21 1246    Specimen: Swab from Nasopharynx Updated: 02/02/21 1340     ADENOVIRUS, PCR Not Detected     Coronavirus 229E Not Detected     Coronavirus HKU1 Not Detected     Coronavirus NL63 Not Detected     Coronavirus OC43 Not Detected     COVID19 Not Detected     Human Metapneumovirus Not Detected     Human Rhinovirus/Enterovirus Not Detected     Influenza A PCR Not Detected     Influenza B PCR Not Detected     Parainfluenza Virus 1 Not Detected     Parainfluenza Virus 2 Not Detected     Parainfluenza Virus 3 Not Detected     Parainfluenza Virus 4 Not Detected     RSV, PCR Not Detected     Bordetella pertussis pcr Not Detected     Bordetella parapertussis PCR Not Detected     Chlamydophila pneumoniae PCR Not Detected     Mycoplasma pneumo by PCR Not Detected    Narrative:      Fact sheet for providers: https://docs.[employer]/wp-content/uploads/CWB0668-3289-XW9.1-EUA-Provider-Fact-Sheet-3.pdf    Fact sheet for patients: https://docs.[employer]/wp-content/uploads/ZNE2507-7132-HU3.1-EUA-Patient-Fact-Sheet-1.pdf    Test performed by PCR.    Procalcitonin [[id number]]  (Normal) Collected: 02/02/21 1246    Specimen: Blood Updated: 02/02/21 1332     Procalcitonin 0.05 ng/mL     Narrative:      As a Marker for Sepsis (Non-Neonates):   1. <0.5 ng/mL represents a low risk of severe sepsis and/or septic shock.  1. >2 ng/mL represents a high risk of severe sepsis and/or septic shock.    As a Marker for Lower Respiratory Tract Infections that require antibiotic therapy:  PCT on Admission     Antibiotic Therapy             6-12 Hrs later  >  "0.5                Strongly Recommended            >0.25 - <0.5         Recommended  0.1 - 0.25           Discouraged                   Remeasure/reassess PCT  <0.1                 Strongly Discouraged          Remeasure/reassess PCT      As 28 day mortality risk marker: \"Change in Procalcitonin Result\" (> 80 % or <=80 %) if Day 0 (or Day 1) and Day 4 values are available. Refer to http://www.[facility].ioE[facility]au[facility]res [facility] Ter[facility]irspct-calculator.com/   Change in PCT <=80 %   A decrease of PCT levels below or equal to 80 % defines a positive change in PCT test result representing a higher risk for 28-day all-cause mortality of patients diagnosed with severe sepsis or septic shock.  Change in PCT > 80 %   A decrease of PCT levels of more than 80 % defines a negative change in PCT result representing a lower risk for 28-day all-cause mortality of patients diagnosed with severe sepsis or septic shock.                Results may be falsely decreased if patient taking Biotin.     Comprehensive Metabolic Panel [[id number]]  (Abnormal) Collected: 02/02/21 1246    Specimen: Blood Updated: 02/02/21 1325     Glucose 117 mg/dL      BUN 8 mg/dL      Creatinine 0.84 mg/dL      Sodium 142 mmol/L      Potassium 4.6 mmol/L      Chloride 105 mmol/L      CO2 27.0 mmol/L      Calcium 9.1 mg/dL      Total Protein 7.1 g/dL      Albumin 4.50 g/dL      ALT (SGPT) 18 U/L      AST (SGOT) 22 U/L      Alkaline Phosphatase 100 U/L      Total Bilirubin 0.3 mg/dL      eGFR Non African Amer 93 mL/min/1.73      Globulin 2.6 gm/dL      A/G Ratio 1.7 g/dL      BUN/Creatinine Ratio 9.5     Anion Gap 10.0 mmol/L     Narrative:      GFR Normal >60  Chronic Kidney Disease <60  Kidney Failure <15      Troponin [[id number]]  (Normal) Collected: 02/02/21 1246    Specimen: Blood Updated: 02/02/21 1325     Troponin T <0.010 ng/mL     Narrative:      Troponin T Reference Range:  <= 0.03 ng/mL-   Negative for AMI  >0.03 ng/mL-     Abnormal for myocardial necrosis.  Clinicians would have to " utilize clinical acumen, EKG, Troponin and serial changes to determine if it is an Acute Myocardial Infarction or myocardial injury due to an underlying chronic condition.       Results may be falsely decreased if patient taking Biotin.      BNP [[id number]]  (Normal) Collected: 02/02/21 1246    Specimen: Blood Updated: 02/02/21 1323     proBNP 27.9 pg/mL     Narrative:      Among patients with dyspnea, NT-proBNP is highly sensitive for the detection of acute congestive heart failure. In addition NT-proBNP of <300 pg/ml effectively rules out acute congestive heart failure with 99% negative predictive value.    Results may be falsely decreased if patient taking Biotin.      Protime-INR [[id number]]  (Normal) Collected: 02/02/21 1246    Specimen: Blood Updated: 02/02/21 1310     Protime 10.7 Seconds      INR 0.97    aPTT [[id number]]  (Normal) Collected: 02/02/21 1246    Specimen: Blood Updated: 02/02/21 1310     PTT 24.5 seconds     CBC & Differential [[id number]]  (Normal) Collected: 02/02/21 1246    Specimen: Blood Updated: 02/02/21 1256    Narrative:      The following orders were created for panel order CBC & Differential.  Procedure                               Abnormality         Status                     ---------                               -----------         ------                     CBC Auto Differential[[id number]]        Normal              Final result                 Please view results for these tests on the individual orders.    CBC Auto Differential [[id number]]  (Normal) Collected: 02/02/21 1246    Specimen: Blood Updated: 02/02/21 1256     WBC 5.20 10*3/mm3      RBC 4.85 10*6/mm3      Hemoglobin 14.2 g/dL      Hematocrit 41.1 %      MCV 84.8 fL      MCH 29.3 pg      MCHC 34.6 g/dL      RDW 13.9 %      RDW-SD 41.6 fl      MPV 7.2 fL      Platelets 255 10*3/mm3      Neutrophil % 57.4 %      Lymphocyte % 30.4 %      Monocyte % 9.1 %      Eosinophil % 2.3 %      Basophil % 0.8 %      Neutrophils,  Absolute 3.00 10*3/mm3      Lymphocytes, Absolute 1.60 10*3/mm3      Monocytes, Absolute 0.50 10*3/mm3      Eosinophils, Absolute 0.10 10*3/mm3      Basophils, Absolute 0.00 10*3/mm3      nRBC 0.0 /100 WBC           Imaging Results (Last 24 Hours)     Procedure Component Value Units Date/Time    CT Head Without Contrast [[id number]] Collected: 02/02/21 1341     Updated: 02/02/21 1344    Narrative:         DATE OF EXAM:  2/2/2021 1:37 PM     PROCEDURE:   CT HEAD WO CONTRAST-     INDICATIONS:   headache htn     COMPARISON:  3/14/2017     TECHNIQUE:   Routine transaxial and coronal reconstruction images were obtained  through the head without the administration of contrast. Automated  exposure control and iterative reconstruction methods were used.     FINDINGS:  There is no evidence for acute intracranial hemorrhage. No definitive  acute focal ischemia is observed. There is no abnormal cerebral edema.  There is no mass effect or midline shift. The ventricular system is  nondilated. The basilar cisterns are patent. There is no evidence for  displaced skull fracture. The paranasal sinuses and mastoid air cells  are clear.       Impression:      1.No evidence for acute intracranial abnormality.     Electronically Signed By-Yfn Cook MD On:2/2/2021 1:42 PM  This report was finalized on [id number] by  Yfn Cook MD.    XR Chest AP [[id number]] Collected: 02/02/21 1250     Updated: 02/02/21 1253    Narrative:         DATE OF EXAM:   2/2/2021 12:30 PM     PROCEDURE:   XR CHEST AP-     INDICATIONS:   COVID Evaluation, Cough, Fever     COMPARISON:  7/23/2019, 4/13/2018     TECHNIQUE:   [Portable chest radiograph]     FINDINGS:  Chronic changes are noted which appear stable. No new consolidations or  pleural effusions are observed. The cardiac silhouette and mediastinum  are stable. The cardiac pacemaker/AICD is stable with leads intact. No  acute osseous abnormalities are identified.       Impression:      There is  no significant change when compared to the prior study. There  is no evidence for acute cardiopulmonary process.     Electronically Signed By-Yfn Cook MD On:2/2/2021 12:51 PM  This report was finalized on [id number] by  Yfn Cook MD.      LAB RESULTS (LAST 7 DAYS)    CBC  Results from last 7 days   Lab Units 02/02/21  1246   WBC 10*3/mm3 5.20   RBC 10*6/mm3 4.85   HEMOGLOBIN g/dL 14.2   HEMATOCRIT % 41.1   MCV fL 84.8   PLATELETS 10*3/mm3 255       BMP  Results from last 7 days   Lab Units 02/02/21  1246   SODIUM mmol/L 142   POTASSIUM mmol/L 4.6   CHLORIDE mmol/L 105   CO2 mmol/L 27.0   BUN mg/dL 8   CREATININE mg/dL 0.84   GLUCOSE mg/dL 117*       CMP   Results from last 7 days   Lab Units 02/02/21  1246   SODIUM mmol/L 142   POTASSIUM mmol/L 4.6   CHLORIDE mmol/L 105   CO2 mmol/L 27.0   BUN mg/dL 8   CREATININE mg/dL 0.84   GLUCOSE mg/dL 117*   ALBUMIN g/dL 4.50   BILIRUBIN mg/dL 0.3   ALK PHOS U/L 100   AST (SGOT) U/L 22   ALT (SGPT) U/L 18         BNP        TROPONIN  Results from last 7 days   Lab Units 02/02/21  1246   TROPONIN T ng/mL <0.010       CoAg  Results from last 7 days   Lab Units 02/02/21  1246   INR  0.97   APTT seconds 24.5       Creatinine Clearance  Estimated Creatinine Clearance: 110.1 mL/min (by C-G formula based on SCr of 0.84 mg/dL).    ABG        Radiology  Ct Head Without Contrast    Result Date: 2/2/2021  1.No evidence for acute intracranial abnormality.  Electronically Signed By-Yfn Cook MD On:2/2/2021 1:42 PM This report was finalized on [id number] by  Yfn Cook MD.    Xr Chest Ap    Result Date: 2/2/2021  There is no significant change when compared to the prior study. There is no evidence for acute cardiopulmonary process.  Electronically Signed By-Yfn Cook MD On:2/2/2021 12:51 PM This report was finalized on [id number] by  Yfn Cook MD.              EKG          I personally viewed and interpreted the patient's EKG/Telemetry data: Sinus  rhythm premature atrial contractions    ECHOCARDIOGRAM:                 Cardiolite (Tc-99m Sestamibi) stress test      OTHER:     Assessment/Plan     Principal Problem:    Chest pain  Active Problems:    Degeneration of lumbar or lumbosacral intervertebral disc    Gastroesophageal reflux disease    Hypertension    Paroxysmal atrial fibrillation (CMS/HCC)    Presence of cardiac pacemaker    Hyperlipidemia    Chronic low back pain with sciatica    Bobby's esophagus with dysplasia    Hypertensive urgency    Headache    [[[[[[[[[[[[[[[[[[[[[  Impression  ============  -Chest discomfort-possible angina pectoris.  EKG showed no acute changes.  Troponin levels are negative.    -status post permanent dual-chamber pacemaker implantation ([employer] MRI Compatible ) 04/13/2018  - sick sinus syndrome-tachy-irvin syndrome.  Patient has history of atrial fibrillation with rapid ventricular response.  Patient had significant pauses of 5-6 seconds.  Symptomatic with dizziness and near-syncope     - history of Near-syncope associated with palpitations and dizziness .  -improved     -cardiac catheterization 03/18/2017 revealed normal left ventricular function and normal coronary arteries.  History of normal echocardiogram     -status post loop recorder placement 03/24/2017. -removal of loop recorder 04/13/2018.     -hypertension  Renal dysfunction BUN 17 creatinine 1.4 GERD depression Psoriatic arthritis Hypokalemia     -history of Anemia and neutropenia     -Status post hip replacement bilateral arthroscopic knee surgery right shoulder surgery  CBP- with chronic myelopathy, s/p multiple lumbar surgeries; continue soma, gabapentin     -Allergic to Percocet  ================  Plan  ==============  Chest discomfort suggestive of angina pectoris.  History of labile blood pressure.  Patient has hypertensive crisis which is better now.  Patient has palpitations and appears to have PMT.  Reviewed rhythm strips.  We will have  pacemaker interrogation and adjust intervals.  Echocardiogram.  Stress Cardiolite test.  Medications were reviewed and updated.  Further plan will depend on patient's progress.  [[[[[[[[[[[[[[[[[[[[[[[[[[[[[[[[    Claudia Benson MD  02/02/21  17:13 EST

## 2021-02-02 NOTE — H&P
[facility] Medicine Services      Patient Name: Steven Hammonds  : 1960  MRN: [id number]  Primary Care Physician: Junaid Marquez MD  Date of admission: 2021    Patient Care Team:  Junaid Marquez MD as PCP - General  Junaid Marquez MD as PCP - Family Medicine  Junaid Marquez MD Gondi, Bapineedu, MD as Consulting Physician (Cardiology)          Subjective   History Present Illness     Chief Complaint:   Chief Complaint   Patient presents with   • Exposure To Known Illness       Mr. Hammonds is a 60 y.o. male with PMH of paroxysmal atrial fibrillation, tachy-irvin syndrome status post dual-chamber pacemaker, hypertension with documented labile blood pressure in the past, hyperlipidemia, chronic back pain, and Bobby's esophagus who presented to [facility] ER 2021 with complaints of headache, elevated blood pressure as high as 220/130, palpitations, and chest pressure.  He said he is also had a sore throat and a low-grade fever 99.6.  He denied any shortness of breath. The symptoms started approximately 5 days ago on Thursday.  He had a scheduled Covid test today however decided to come to the hospital due to his blood pressure being elevated again this morning with an intense headache.  He stated he checked his heart rate with his home monitor and his heart rate was 28 this morning when his wife and daughter's heart rate were normal. Yesterday his blood pressure was over 200 upon waking. He took 1/2 tablet of 25mg of metoprolol. His blood pressure was still high at noon and he took 10mg of lisinopril that he takes as needed. He was recently treated for otitis media and finished amoxicillin.     In the ER the patient has systolic blood pressure in the 200s. EKG showed sinus rhythm with PACs rate 74. Troponin was normal. CT head showed no acute findings. CXR showed no acute chest findings. WBC normal, procalcitonin normal.  Respiratory  viral panel negative.  He was given 324mg aspirin and started on nitro drip with improvement in his blood pressure and chest pain. He was admitted for further cardiac evaluation.       Review of Systems   Constitution: Positive for fever.   Eyes: Negative.    Cardiovascular: Positive for chest pain and palpitations. Negative for leg swelling.   Respiratory: Positive for cough. Negative for shortness of breath.    Endocrine: Negative.    Hematologic/Lymphatic: Negative.    Skin: Negative.    Musculoskeletal: Negative.    Gastrointestinal: Negative.    Genitourinary: Negative.    Neurological: Positive for headaches.   Psychiatric/Behavioral: Negative.    Allergic/Immunologic: Negative.    All other systems reviewed and are negative.        Personal History     Past Medical History:   Past Medical History:   Diagnosis Date   • Allergic rhinitis     Impression: persistent   • Arthralgia of right hip     Impression: needs right hip relacement   • Bobby's esophagus with dysplasia     Impression: scope 11/2013 improved from previous. Recheck due in 2015 Packet given   • Cervical disc disease with myelopathy     Impression: seen on MRi 5/2012. Workup in progress.   • Chronic low back pain with sciatica     Unspecified. Impression: scheduled for surgery   • Chronic pain of right knee     Impression: needs knee replacement   • Colon cancer screening     Impression: packet given   • ED (erectile dysfunction)    • Hydrocele    • Insomnia    • Left leg weakness     Impression: due to recent surgery. May benefit for home health. Pt homebound due severe pain postop back surgery. Also needs 3-in-1 Commode   • Medicare annual wellness visit, subsequent     With abnormal findings.   • Non-seasonal allergic rhinitis due to pollen    • Other specified persistent mood disorders (CMS/HCC)     Story: stress   • Psoriatic arthritis (CMS/HCC)     Impression: refer to Dr Rodriguez   • Screening for alcoholism    • Trigger ring finger of right  hand     Impression: refer to hand surgeon   • Ulnar neuropathy at elbow of right upper extremity     Impression: suspect stinger occured during surgery. Natural course and self-limited nature of this condition discussed. Follow-up in 4-6 weeks if not better. Follow-up sooner for worsening symptoms or for any concerns. EMG if not improved   • Umbilical hernia        Surgical History:      Past Surgical History:   Procedure Laterality Date   • CARDIAC PACEMAKER PLACEMENT     • KNEE SURGERY Right 2016   • LUMBAR DISCECTOMY  10/2015    Comments: lumbar disk decompression   • TOTAL HIP ARTHROPLASTY Right 12/09/2016           Family History: family history includes Cancer in his father; Other in his father.     Social History:  reports that he has quit smoking. He has never used smokeless tobacco. He reports that he does not drink alcohol or use drugs.      Medications:  Prior to Admission medications    Medication Sig Start Date End Date Taking? Authorizing Provider   amitriptyline (ELAVIL) 50 MG tablet TAKE 2 TABLETS AT BEDTIME 12/1/20   Junaid Marquez MD   aspirin 81 MG tablet Take 1 tablet by mouth Daily. 10/28/19   Junaid Marquez MD   baclofen (LIORESAL) 10 MG tablet Take 1 tablet by mouth 3 (Three) Times a Day As Needed for Muscle Spasms. 1/21/21   Junaid Marquez MD   diclofenac-misoprostol (ARTHROTEC 75) 75-0.2 MG EC tablet Take 1 tablet by mouth 2 (Two) Times a Day.    Radha Payne MD   digoxin (LANOXIN) 125 MCG tablet Take 1 tablet by mouth Daily. 9/24/20   Claudia Benson MD   DULoxetine (CYMBALTA) 20 MG capsule Take 20 mg by mouth. 3/2/20   Radha Payne MD   esomeprazole (NEXIUM) 40 MG capsule Take 1 capsule by mouth Daily. 5/12/15   Radha Payne MD   etanercept (ENBREL) 50 MG/ML solution prefilled syringe injection Inject  under the skin into the appropriate area as directed. 8/6/15   Radha Payne MD   fluticasone (FLONASE) 50 MCG/ACT nasal  spray USE 2 SPRAYS IN EACH NOSTRIL ONCE DAILY 7/17/20   Junaid Marquez MD   gabapentin (NEURONTIN) 600 MG tablet TAKE 1 TABLET FOUR TIMES A DAY 3/5/20   Junaid Marquez MD   HYDROcodone-acetaminophen (NORCO) 5-325 MG per tablet Take 1 tablet by mouth 3 (Three) Times a Day As Needed for Severe Pain . 1/21/21   Junaid Marquez MD   lisinopril (PRINIVIL,ZESTRIL) 20 MG tablet Take 0.5 tablets by mouth Daily. 3/9/20   Claudia Benson MD   loratadine (CLARITIN) 10 MG tablet Take 1 tablet by mouth Daily. 8/6/15   Radha Payne MD   metoprolol succinate XL (Toprol XL) 25 MG 24 hr tablet Take 0.5 tablets by mouth Daily. 9/24/20   Claudia Benson MD   rosuvastatin (CRESTOR) 10 MG tablet TAKE 1/2 TABLET BY MOUTH DAILY 12/9/20   Junaid Marquez MD   tamsulosin (FLOMAX) 0.4 MG capsule 24 hr capsule Take 1 capsule by mouth Daily. 4/6/17   Radha Pyane MD   triamcinolone (KENALOG) 0.1 % ointment RAJENDRA EXT AA OF TORSO AND EXTREMITIES BID FOR FOUR TO FIVE TIMES A WEEK PRN 1/27/20   Radha Payne MD   vitamin B-6 (PYRIDOXINE) 50 MG tablet Take 50 mg by mouth Daily.    Radha Payne MD       Allergies:    Allergies   Allergen Reactions   • Oxycodone-Acetaminophen GI Intolerance   • Adhesive Tape Unknown (See Comments)   • Oxycodone Unknown (See Comments)       Objective   Objective     Vital Signs  Temp:  [98.3 °F (36.8 °C)] 98.3 °F (36.8 °C)  Heart Rate:  [64-80] 80  Resp:  [18] 18  BP: (161-187)/() 161/98  SpO2:  [95 %-97 %] 97 %  on   ;   Device (Oxygen Therapy): room air  Body mass index is 25.91 kg/m².    Physical Exam  Constitutional:       Appearance: Normal appearance. He is normal weight.   HENT:      Head: Normocephalic.   Eyes:      Pupils: Pupils are equal, round, and reactive to light.   Neck:      Musculoskeletal: Normal range of motion.   Cardiovascular:      Rate and Rhythm: Normal rate and regular rhythm.      Pulses: Normal pulses.      Heart  sounds: Normal heart sounds.   Pulmonary:      Effort: Pulmonary effort is normal.      Breath sounds: Normal breath sounds.   Abdominal:      General: Bowel sounds are normal.      Palpations: Abdomen is soft.   Musculoskeletal: Normal range of motion.   Skin:     General: Skin is warm.   Neurological:      Mental Status: He is alert and oriented to person, place, and time.   Psychiatric:         Mood and Affect: Mood normal.         Behavior: Behavior normal.         Results Review:  I have personally reviewed most recent cardiac tracings, lab results and radiology images and interpretations and agree with findings    Results from last 7 days   Lab Units 02/02/21  1246   WBC 10*3/mm3 5.20   HEMOGLOBIN g/dL 14.2   HEMATOCRIT % 41.1   PLATELETS 10*3/mm3 255   INR  0.97     Results from last 7 days   Lab Units 02/02/21  1246   SODIUM mmol/L 142   POTASSIUM mmol/L 4.6   CHLORIDE mmol/L 105   CO2 mmol/L 27.0   BUN mg/dL 8   CREATININE mg/dL 0.84   GLUCOSE mg/dL 117*   CALCIUM mg/dL 9.1   ALT (SGPT) U/L 18   AST (SGOT) U/L 22   TROPONIN T ng/mL <0.010   PROBNP pg/mL 27.9   PROCALCITONIN ng/mL 0.05     Estimated Creatinine Clearance: 110.1 mL/min (by C-G formula based on SCr of 0.84 mg/dL).  Brief Urine Lab Results     None          Microbiology Results (last 10 days)     Procedure Component Value - Date/Time    Respiratory Panel PCR w/COVID-19(SARS-CoV-2) SHAYNE/SANAM/RISHI/PAD/COR/MAD/THAI In-House, NP Swab in UTM/VTM, 3-4 HR TAT - Swab, Nasopharynx [[id number]]  (Normal) Collected: 02/02/21 1246    Lab Status: Final result Specimen: Swab from Nasopharynx Updated: 02/02/21 1340     ADENOVIRUS, PCR Not Detected     Coronavirus 229E Not Detected     Coronavirus HKU1 Not Detected     Coronavirus NL63 Not Detected     Coronavirus OC43 Not Detected     COVID19 Not Detected     Human Metapneumovirus Not Detected     Human Rhinovirus/Enterovirus Not Detected     Influenza A PCR Not Detected     Influenza B PCR Not Detected      Parainfluenza Virus 1 Not Detected     Parainfluenza Virus 2 Not Detected     Parainfluenza Virus 3 Not Detected     Parainfluenza Virus 4 Not Detected     RSV, PCR Not Detected     Bordetella pertussis pcr Not Detected     Bordetella parapertussis PCR Not Detected     Chlamydophila pneumoniae PCR Not Detected     Mycoplasma pneumo by PCR Not Detected    Narrative:      Fact sheet for providers: https://docs.mobintent/wp-content/uploads/WBH6096-2935-WI4.1-EUA-Provider-Fact-Sheet-3.pdf    Fact sheet for patients: https://docs.mobintent/wp-content/uploads/COV2904-7093-SA6.1-EUA-Patient-Fact-Sheet-1.pdf    Test performed by PCR.          ECG/EMG Results (most recent)     Procedure Component Value Units Date/Time    ECG 12 Lead [[id number]] Collected: 02/02/21 1445     Updated: 02/02/21 1447     QT Interval 370 ms     Narrative:      HEART RATE= 74  bpm  RR Interval= 888  ms  OH Interval= 144  ms  P Horizontal Axis= 34  deg  P Front Axis= 63  deg  QRSD Interval= 101  ms  QT Interval= 370  ms  QRS Axis= -80  deg  T Wave Axis= 74  deg  - ABNORMAL ECG -  Sinus rhythm  Atrial premature complexes  When compared with ECG of 14-Apr-2018 4:06:40,  Significant change in rhythm  Electronically Signed By:   Date and Time of Study: 2021-02-02 14:45:05    ECG 12 Lead [[id number]] Resulted: 02/02/21 1449     Updated: 02/02/21 1449                    Ct Head Without Contrast    Result Date: 2/2/2021  1.No evidence for acute intracranial abnormality.  Electronically Signed By-Yfn Cook MD On:2/2/2021 1:42 PM This report was finalized on [id number] by  Yfn Cook MD.    Xr Chest Ap    Result Date: 2/2/2021  There is no significant change when compared to the prior study. There is no evidence for acute cardiopulmonary process.  Electronically Signed By-Yfn Cook MD On:2/2/2021 12:51 PM This report was finalized on [id number] by  Yfn Cook MD.        Estimated Creatinine Clearance: 110.1 mL/min (by C-G  formula based on SCr of 0.84 mg/dL).    Assessment/Plan   Assessment/Plan       Active Hospital Problems    Diagnosis  POA   • **Chest pain [R07.9]  Yes     Priority: High   • Hypertensive urgency [I16.0]  Yes     Priority: High   • Headache [R51.9]  Yes     Priority: High   • Bobby's esophagus with dysplasia [K22.719]  Yes   • Chronic low back pain with sciatica [M54.40, G89.29]  Yes   • Hypertension [I10]  Yes   • Gastroesophageal reflux disease [K21.9]  Yes   • Hyperlipidemia [E78.5]  Yes   • Presence of cardiac pacemaker [Z95.0]  Yes   • Paroxysmal atrial fibrillation (CMS/HCC) [I48.0]  Yes   • Degeneration of lumbar or lumbosacral intervertebral disc [M51.37]  Yes      Resolved Hospital Problems   No resolved problems to display.     Chest pain  -EKG showed EKG showed sinus rhythm with PACs rate 74. Frequent PACs on bedside monitor  -CXR showed no acute chest findings  -first troponin normal   -given 324mg aspirin and started on nitro drip with improvement   -check serial troponins r/o ACS, pacemaker interrogation   -consult Dr. Benson for further recommendations    Hypertensive urgency  -SBP 200s in ER   -on nitro drip  -took 12.5mg of metoprolol this am, patient takes as needed lisinopril 10mg when is blood pressure is elevated. Will give dose of lisinopril now    Headache  -likely secondary to hypertensive urgency  -CT head without acute findings  -WBC normal, procalcitonin normal, respiratory viral panel negative    Paroxysmal atrial fibrillation, tacky-bradycardia syndrome status post dual-chamber pacemaker   -EKG as above SR with frequent PACs  -cont home beta-blocker, digoxin  -pt is not on blood thinners    Hyperlipidemia  -Continue home statin    Chronic back pain  -Continue home Norco, baclofen    GERD, Bobby's esophagus  -PPI      VTE Prophylaxis - SCDs      CODE STATUS:    Code Status and Medical Interventions:   Ordered at: 02/02/21 1532     Level Of Support Discussed With:    Patient     Code  Status:    CPR     Medical Interventions (Level of Support Prior to Arrest):    Full       This patient has been examined wearing appropriate Personal Protective Equipment 02/02/21      I discussed the patient's findings and my recommendations with patient.      Signature: Electronically signed by MINERVA Serra, 02/02/21, 3:33 PM EST.    [facility] Team    Reviewed and agreed with the documentation of the NP above.    I also personally evaluated this patient.    60-year-old man with multiple comorbidities including sick sinus syndrome status post permanent pacemaker placement, hypertension, hyperlipidemia and chronic back pain.    Presented to the emergency room with complaints of headache, chest pressure, palpitation and elevated blood pressure.  Also reported having sore throat and l subjective fever.    In the emergency room systolic blood pressure was noted to be greater than 200.  Respiratory viral panel including Covid 19 was negative.    Patient was admitted for further care    On general examination, patient is in no obvious distress  Lungs are clear to auscultation bilaterally  Heart-S1-S2 heard regular rate and rhythm    Plan  Check serial troponin  Continue on nitroglycerin drip  Pacemaker interrogation  Consult cardiologist    Further recommendation following clinical course    Electronically signed by Deric Mane MD, 02/02/21, 5:21 PM EST.

## 2021-02-03 ENCOUNTER — APPOINTMENT (OUTPATIENT)
Dept: NUCLEAR MEDICINE | Facility: HOSPITAL | Age: 61
End: 2021-02-03

## 2021-02-03 ENCOUNTER — APPOINTMENT (OUTPATIENT)
Dept: CARDIOLOGY | Facility: HOSPITAL | Age: 61
End: 2021-02-03

## 2021-02-03 PROBLEM — R94.39 ABNORMAL NUCLEAR STRESS TEST: Status: ACTIVE | Noted: 2021-02-02

## 2021-02-03 LAB
ALBUMIN SERPL-MCNC: 4.2 G/DL (ref 3.5–5.2)
ALBUMIN/GLOB SERPL: 1.8 G/DL
ALP SERPL-CCNC: 96 U/L (ref 39–117)
ALT SERPL W P-5'-P-CCNC: 16 U/L (ref 1–41)
ANION GAP SERPL CALCULATED.3IONS-SCNC: 9 MMOL/L (ref 5–15)
AST SERPL-CCNC: 23 U/L (ref 1–40)
BASOPHILS # BLD AUTO: 0.1 10*3/MM3 (ref 0–0.2)
BASOPHILS NFR BLD AUTO: 1 % (ref 0–1.5)
BH CV ECHO MEAS - ACS: 2.2 CM
BH CV ECHO MEAS - AO MAX PG (FULL): 1.2 MMHG
BH CV ECHO MEAS - AO MAX PG: 6.4 MMHG
BH CV ECHO MEAS - AO MEAN PG (FULL): 1.1 MMHG
BH CV ECHO MEAS - AO MEAN PG: 3.7 MMHG
BH CV ECHO MEAS - AO ROOT AREA (BSA CORRECTED): 1.8
BH CV ECHO MEAS - AO ROOT AREA: 10.3 CM^2
BH CV ECHO MEAS - AO ROOT DIAM: 3.6 CM
BH CV ECHO MEAS - AO V2 MAX: 126.6 CM/SEC
BH CV ECHO MEAS - AO V2 MEAN: 92 CM/SEC
BH CV ECHO MEAS - AO V2 VTI: 21.7 CM
BH CV ECHO MEAS - AORTIC HR: 95.5 BPM
BH CV ECHO MEAS - AORTIC R-R: 0.63 SEC
BH CV ECHO MEAS - AVA(I,A): 3.2 CM^2
BH CV ECHO MEAS - AVA(I,D): 3.2 CM^2
BH CV ECHO MEAS - AVA(V,A): 2.9 CM^2
BH CV ECHO MEAS - AVA(V,D): 2.9 CM^2
BH CV ECHO MEAS - BSA(HAYCOCK): 2 M^2
BH CV ECHO MEAS - BSA: 2 M^2
BH CV ECHO MEAS - BZI_BMI: 25 KILOGRAMS/M^2
BH CV ECHO MEAS - BZI_METRIC_HEIGHT: 180.3 CM
BH CV ECHO MEAS - BZI_METRIC_WEIGHT: 81.2 KG
BH CV ECHO MEAS - CI(AO): 10.7 L/MIN/M^2
BH CV ECHO MEAS - CI(LVOT): 3.3 L/MIN/M^2
BH CV ECHO MEAS - CO(AO): 21.4 L/MIN
BH CV ECHO MEAS - CO(LVOT): 6.7 L/MIN
BH CV ECHO MEAS - EDV(CUBED): 127.8 ML
BH CV ECHO MEAS - EDV(MOD-SP4): 116.5 ML
BH CV ECHO MEAS - EDV(TEICH): 120.3 ML
BH CV ECHO MEAS - EF(CUBED): 65.9 %
BH CV ECHO MEAS - EF(MOD-BP): 74 %
BH CV ECHO MEAS - EF(MOD-SP4): 73.5 %
BH CV ECHO MEAS - EF(TEICH): 57.2 %
BH CV ECHO MEAS - ESV(CUBED): 43.6 ML
BH CV ECHO MEAS - ESV(MOD-SP4): 30.9 ML
BH CV ECHO MEAS - ESV(TEICH): 51.5 ML
BH CV ECHO MEAS - FS: 30.1 %
BH CV ECHO MEAS - IVS/LVPW: 0.77
BH CV ECHO MEAS - IVSD: 0.97 CM
BH CV ECHO MEAS - LA DIMENSION(2D): 3.7 CM
BH CV ECHO MEAS - LV DIASTOLIC VOL/BSA (35-75): 57.9 ML/M^2
BH CV ECHO MEAS - LV MASS(C)D: 212.4 GRAMS
BH CV ECHO MEAS - LV MASS(C)DI: 105.6 GRAMS/M^2
BH CV ECHO MEAS - LV MAX PG: 5.2 MMHG
BH CV ECHO MEAS - LV MEAN PG: 2.6 MMHG
BH CV ECHO MEAS - LV SYSTOLIC VOL/BSA (12-30): 15.4 ML/M^2
BH CV ECHO MEAS - LV V1 MAX: 114.2 CM/SEC
BH CV ECHO MEAS - LV V1 MEAN: 74.8 CM/SEC
BH CV ECHO MEAS - LV V1 VTI: 21.9 CM
BH CV ECHO MEAS - LVIDD: 5 CM
BH CV ECHO MEAS - LVIDS: 3.5 CM
BH CV ECHO MEAS - LVOT AREA: 3.2 CM^2
BH CV ECHO MEAS - LVOT DIAM: 2 CM
BH CV ECHO MEAS - LVPWD: 1.3 CM
BH CV ECHO MEAS - MV A MAX VEL: 75.5 CM/SEC
BH CV ECHO MEAS - MV DEC SLOPE: 268.9 CM/SEC^2
BH CV ECHO MEAS - MV DEC TIME: 0.22 SEC
BH CV ECHO MEAS - MV E MAX VEL: 59.3 CM/SEC
BH CV ECHO MEAS - MV E/A: 0.78
BH CV ECHO MEAS - MV MAX PG: 3 MMHG
BH CV ECHO MEAS - MV MEAN PG: 1.1 MMHG
BH CV ECHO MEAS - MV V2 MAX: 87.1 CM/SEC
BH CV ECHO MEAS - MV V2 MEAN: 48.7 CM/SEC
BH CV ECHO MEAS - MV V2 VTI: 20.4 CM
BH CV ECHO MEAS - MVA(VTI): 3.4 CM^2
BH CV ECHO MEAS - PA MAX PG (FULL): 4.5 MMHG
BH CV ECHO MEAS - PA MAX PG: 9.4 MMHG
BH CV ECHO MEAS - PA V2 MAX: 152.6 CM/SEC
BH CV ECHO MEAS - PVA(V,A): 1.8 CM^2
BH CV ECHO MEAS - PVA(V,D): 1.8 CM^2
BH CV ECHO MEAS - QP/QS: 0.62
BH CV ECHO MEAS - RV MAX PG: 4.9 MMHG
BH CV ECHO MEAS - RV MEAN PG: 2.6 MMHG
BH CV ECHO MEAS - RV V1 MAX: 110.6 CM/SEC
BH CV ECHO MEAS - RV V1 MEAN: 75.4 CM/SEC
BH CV ECHO MEAS - RV V1 VTI: 18.1 CM
BH CV ECHO MEAS - RVDD: 3 CM
BH CV ECHO MEAS - RVOT AREA: 2.4 CM^2
BH CV ECHO MEAS - RVOT DIAM: 1.8 CM
BH CV ECHO MEAS - SI(AO): 111.5 ML/M^2
BH CV ECHO MEAS - SI(CUBED): 41.9 ML/M^2
BH CV ECHO MEAS - SI(LVOT): 34.9 ML/M^2
BH CV ECHO MEAS - SI(MOD-SP4): 42.6 ML/M^2
BH CV ECHO MEAS - SI(TEICH): 34.2 ML/M^2
BH CV ECHO MEAS - SV(AO): 224.4 ML
BH CV ECHO MEAS - SV(CUBED): 84.3 ML
BH CV ECHO MEAS - SV(LVOT): 70.2 ML
BH CV ECHO MEAS - SV(MOD-SP4): 85.6 ML
BH CV ECHO MEAS - SV(RVOT): 43.7 ML
BH CV ECHO MEAS - SV(TEICH): 68.8 ML
BH CV ECHO MEAS - TR MAX VEL: 226.2 CM/SEC
BH CV NUCLEAR PRIOR STUDY: 3
BH CV STRESS BP STAGE 1: NORMAL
BH CV STRESS COMMENTS STAGE 1: NORMAL
BH CV STRESS DOSE REGADENOSON STAGE 1: 0.4
BH CV STRESS DURATION MIN STAGE 1: 4
BH CV STRESS DURATION SEC STAGE 1: 10
BH CV STRESS HR STAGE 1: 122
BH CV STRESS PROTOCOL 1: NORMAL
BH CV STRESS RECOVERY BP: NORMAL MMHG
BH CV STRESS RECOVERY HR: 108 BPM
BH CV STRESS STAGE 1: 1
BILIRUB SERPL-MCNC: 0.5 MG/DL (ref 0–1.2)
BUN SERPL-MCNC: 10 MG/DL (ref 8–23)
BUN/CREAT SERPL: 12.5 (ref 7–25)
CALCIUM SPEC-SCNC: 9.4 MG/DL (ref 8.6–10.5)
CHLORIDE SERPL-SCNC: 105 MMOL/L (ref 98–107)
CO2 SERPL-SCNC: 26 MMOL/L (ref 22–29)
CREAT SERPL-MCNC: 0.8 MG/DL (ref 0.76–1.27)
DEPRECATED RDW RBC AUTO: 41.1 FL (ref 37–54)
EOSINOPHIL # BLD AUTO: 0.1 10*3/MM3 (ref 0–0.4)
EOSINOPHIL NFR BLD AUTO: 0.9 % (ref 0.3–6.2)
ERYTHROCYTE [DISTWIDTH] IN BLOOD BY AUTOMATED COUNT: 13.8 % (ref 12.3–15.4)
GFR SERPL CREATININE-BSD FRML MDRD: 98 ML/MIN/1.73
GLOBULIN UR ELPH-MCNC: 2.4 GM/DL
GLUCOSE SERPL-MCNC: 124 MG/DL (ref 65–99)
HCT VFR BLD AUTO: 40 % (ref 37.5–51)
HGB BLD-MCNC: 13.7 G/DL (ref 13–17.7)
LV EF 2D ECHO EST: 60 %
LYMPHOCYTES # BLD AUTO: 1.4 10*3/MM3 (ref 0.7–3.1)
LYMPHOCYTES NFR BLD AUTO: 18.3 % (ref 19.6–45.3)
MAGNESIUM SERPL-MCNC: 2 MG/DL (ref 1.6–2.4)
MAGNESIUM SERPL-MCNC: 2 MG/DL (ref 1.6–2.4)
MAXIMAL PREDICTED HEART RATE: 159 BPM
MCH RBC QN AUTO: 29.5 PG (ref 26.6–33)
MCHC RBC AUTO-ENTMCNC: 34.4 G/DL (ref 31.5–35.7)
MCV RBC AUTO: 85.7 FL (ref 79–97)
MONOCYTES # BLD AUTO: 0.6 10*3/MM3 (ref 0.1–0.9)
MONOCYTES NFR BLD AUTO: 7.9 % (ref 5–12)
NEUTROPHILS NFR BLD AUTO: 5.6 10*3/MM3 (ref 1.7–7)
NEUTROPHILS NFR BLD AUTO: 71.9 % (ref 42.7–76)
NRBC BLD AUTO-RTO: 0 /100 WBC (ref 0–0.2)
PERCENT MAX PREDICTED HR: 76.73 %
PLATELET # BLD AUTO: 257 10*3/MM3 (ref 140–450)
PMV BLD AUTO: 7.4 FL (ref 6–12)
POTASSIUM SERPL-SCNC: 4.1 MMOL/L (ref 3.5–5.2)
PROT SERPL-MCNC: 6.6 G/DL (ref 6–8.5)
RBC # BLD AUTO: 4.66 10*6/MM3 (ref 4.14–5.8)
SODIUM SERPL-SCNC: 140 MMOL/L (ref 136–145)
STRESS BASELINE BP: NORMAL MMHG
STRESS BASELINE HR: 98 BPM
STRESS PERCENT HR: 90 %
STRESS POST PEAK BP: NORMAL MMHG
STRESS POST PEAK HR: 122 BPM
STRESS TARGET HR: 135 BPM
TROPONIN T SERPL-MCNC: <0.01 NG/ML (ref 0–0.03)
TSH SERPL DL<=0.05 MIU/L-ACNC: 0.74 UIU/ML (ref 0.27–4.2)
WBC # BLD AUTO: 7.8 10*3/MM3 (ref 3.4–10.8)

## 2021-02-03 PROCEDURE — 93017 CV STRESS TEST TRACING ONLY: CPT

## 2021-02-03 PROCEDURE — 80053 COMPREHEN METABOLIC PANEL: CPT | Performed by: INTERNAL MEDICINE

## 2021-02-03 PROCEDURE — 99214 OFFICE O/P EST MOD 30 MIN: CPT | Performed by: INTERNAL MEDICINE

## 2021-02-03 PROCEDURE — A9500 TC99M SESTAMIBI: HCPCS | Performed by: INTERNAL MEDICINE

## 2021-02-03 PROCEDURE — 84443 ASSAY THYROID STIM HORMONE: CPT | Performed by: INTERNAL MEDICINE

## 2021-02-03 PROCEDURE — 93018 CV STRESS TEST I&R ONLY: CPT | Performed by: INTERNAL MEDICINE

## 2021-02-03 PROCEDURE — G0378 HOSPITAL OBSERVATION PER HR: HCPCS

## 2021-02-03 PROCEDURE — 93306 TTE W/DOPPLER COMPLETE: CPT | Performed by: INTERNAL MEDICINE

## 2021-02-03 PROCEDURE — 0 TECHNETIUM SESTAMIBI: Performed by: INTERNAL MEDICINE

## 2021-02-03 PROCEDURE — 93306 TTE W/DOPPLER COMPLETE: CPT

## 2021-02-03 PROCEDURE — 83735 ASSAY OF MAGNESIUM: CPT | Performed by: INTERNAL MEDICINE

## 2021-02-03 PROCEDURE — 25010000002 ENOXAPARIN PER 10 MG: Performed by: INTERNAL MEDICINE

## 2021-02-03 PROCEDURE — 78452 HT MUSCLE IMAGE SPECT MULT: CPT | Performed by: INTERNAL MEDICINE

## 2021-02-03 PROCEDURE — 85025 COMPLETE CBC W/AUTO DIFF WBC: CPT | Performed by: NURSE PRACTITIONER

## 2021-02-03 PROCEDURE — 84484 ASSAY OF TROPONIN QUANT: CPT | Performed by: INTERNAL MEDICINE

## 2021-02-03 PROCEDURE — 78452 HT MUSCLE IMAGE SPECT MULT: CPT

## 2021-02-03 PROCEDURE — 63710000001 ONDANSETRON PER 8 MG: Performed by: NURSE PRACTITIONER

## 2021-02-03 PROCEDURE — 25010000002 REGADENOSON 0.4 MG/5ML SOLUTION: Performed by: INTERNAL MEDICINE

## 2021-02-03 PROCEDURE — 99226 PR SBSQ OBSERVATION CARE/DAY 35 MINUTES: CPT | Performed by: INTERNAL MEDICINE

## 2021-02-03 RX ORDER — LISINOPRIL 20 MG/1
20 TABLET ORAL
Status: DISCONTINUED | OUTPATIENT
Start: 2021-02-03 | End: 2021-02-04 | Stop reason: HOSPADM

## 2021-02-03 RX ORDER — SODIUM CHLORIDE 0.9 % (FLUSH) 0.9 %
3-10 SYRINGE (ML) INJECTION AS NEEDED
Status: DISCONTINUED | OUTPATIENT
Start: 2021-02-03 | End: 2021-02-04

## 2021-02-03 RX ORDER — SODIUM CHLORIDE 0.9 % (FLUSH) 0.9 %
3 SYRINGE (ML) INJECTION EVERY 12 HOURS SCHEDULED
Status: DISCONTINUED | OUTPATIENT
Start: 2021-02-03 | End: 2021-02-04

## 2021-02-03 RX ORDER — METOPROLOL SUCCINATE 25 MG/1
25 TABLET, EXTENDED RELEASE ORAL EVERY 12 HOURS SCHEDULED
Status: DISCONTINUED | OUTPATIENT
Start: 2021-02-03 | End: 2021-02-04 | Stop reason: HOSPADM

## 2021-02-03 RX ADMIN — TECHNETIUM TC 99M SESTAMIBI 1 DOSE: 1 INJECTION INTRAVENOUS at 08:55

## 2021-02-03 RX ADMIN — ENOXAPARIN SODIUM 40 MG: 40 INJECTION SUBCUTANEOUS at 18:19

## 2021-02-03 RX ADMIN — Medication 10 ML: at 10:35

## 2021-02-03 RX ADMIN — HYDROCODONE BITARTRATE AND ACETAMINOPHEN 1 TABLET: 5; 325 TABLET ORAL at 20:43

## 2021-02-03 RX ADMIN — GABAPENTIN 600 MG: 300 CAPSULE ORAL at 20:43

## 2021-02-03 RX ADMIN — HYDROCODONE BITARTRATE AND ACETAMINOPHEN 1 TABLET: 5; 325 TABLET ORAL at 02:25

## 2021-02-03 RX ADMIN — HYDROCODONE BITARTRATE AND ACETAMINOPHEN 1 TABLET: 5; 325 TABLET ORAL at 10:35

## 2021-02-03 RX ADMIN — AMITRIPTYLINE HYDROCHLORIDE 100 MG: 50 TABLET, FILM COATED ORAL at 20:44

## 2021-02-03 RX ADMIN — REGADENOSON 0.4 MG: 0.08 INJECTION, SOLUTION INTRAVENOUS at 08:55

## 2021-02-03 RX ADMIN — METOPROLOL SUCCINATE 25 MG: 25 TABLET, EXTENDED RELEASE ORAL at 10:30

## 2021-02-03 RX ADMIN — ONDANSETRON HYDROCHLORIDE 4 MG: 4 TABLET, FILM COATED ORAL at 05:21

## 2021-02-03 RX ADMIN — PANTOPRAZOLE SODIUM 40 MG: 40 TABLET, DELAYED RELEASE ORAL at 10:31

## 2021-02-03 RX ADMIN — METOPROLOL SUCCINATE 25 MG: 25 TABLET, EXTENDED RELEASE ORAL at 20:44

## 2021-02-03 RX ADMIN — TAMSULOSIN HYDROCHLORIDE 0.4 MG: 0.4 CAPSULE ORAL at 20:44

## 2021-02-03 RX ADMIN — TECHNETIUM TC 99M SESTAMIBI 1 DOSE: 1 INJECTION INTRAVENOUS at 06:45

## 2021-02-03 RX ADMIN — DIGOXIN 125 MCG: 0.12 TABLET ORAL at 11:28

## 2021-02-03 RX ADMIN — ROSUVASTATIN CALCIUM 5 MG: 5 TABLET, FILM COATED ORAL at 10:31

## 2021-02-03 RX ADMIN — OXYCODONE HYDROCHLORIDE AND ACETAMINOPHEN 500 MG: 500 TABLET ORAL at 20:43

## 2021-02-03 RX ADMIN — GABAPENTIN 600 MG: 300 CAPSULE ORAL at 10:31

## 2021-02-03 RX ADMIN — LISINOPRIL 20 MG: 20 TABLET ORAL at 10:31

## 2021-02-03 RX ADMIN — CETIRIZINE HYDROCHLORIDE 10 MG: 10 TABLET, FILM COATED ORAL at 10:31

## 2021-02-03 RX ADMIN — Medication 3000 UNITS: at 20:44

## 2021-02-03 RX ADMIN — GABAPENTIN 600 MG: 300 CAPSULE ORAL at 18:19

## 2021-02-03 NOTE — PROGRESS NOTES
Discharge Planning Assessment  [facility]     Patient Name: Steven Hammonds  MRN: [id number]  Today's Date: 2/3/2021    Admit Date: 2/2/2021    Discharge Needs Assessment     Row Name 02/03/21 1056       Living Environment    Lives With  spouse;child(wicho), adult    Current Living Arrangements  home/apartment/condo    Primary Care Provided by  self    Provides Primary Care For  child(wicho)    Caregiving Concerns  Provides care for special needs daughter.    Family Caregiver if Needed  spouse    Quality of Family Relationships  involved;supportive    Able to Return to Prior Arrangements  yes       Resource/Environmental Concerns    Resource/Environmental Concerns  none       Transition Planning    Patient/Family Anticipates Transition to  home    Patient/Family Anticipated Services at Transition  none    Transportation Anticipated  family or friend will provide Spouse to transport at dc       Discharge Needs Assessment    Readmission Within the Last 30 Days  no previous admission in last 30 days    Equipment Currently Used at Home  cane, straight PRN    Concerns to be Addressed  no discharge needs identified;denies needs/concerns at this time    Equipment Needed After Discharge  none        Discharge Plan     Row Name 02/03/21 1058       Plan    Plan  DC Plan: Anticipate Routine Home    Patient/Family in Agreement with Plan  yes    Plan Comments  S/w pt at bedside. States he lives home with spouse and dtr. Independent with daily care. Caregiver for special needs child. Confirmed PCP: Jimmy. Affords meds/food. Drives. Denies need for dc at this time. Barriers: Pending myoview testing today.        Continued Care and Services - Admitted Since 2/2/2021    Coordination has not been started for this encounter.         Demographic Summary    No documentation.       Functional Status     Row Name 02/03/21 1056       Functional Status, IADL    Medications  independent    Meal Preparation  independent    Housekeeping   independent    Laundry  independent    Shopping  independent       Employment/    Employment Status  retired            Patient Forms     Row Name 02/03/21 1059       Patient Forms    Patient Observation Letter  Delivered    Delivered to  Patient    Method of delivery  In person declined copy of letter        Met with patient in room wearing PPE: mask, face shield/goggles.      Maintained distance greater than six feet and spent less than 15 minutes in the room.        Beatriz Jensen RN

## 2021-02-03 NOTE — PLAN OF CARE
Goal Outcome Evaluation:        Outcome Summary: patient had stress test today. was abnormal orders for patient to have Heart cath 2/4. consent signed and patient informed

## 2021-02-03 NOTE — PROGRESS NOTES
Referring Provider: Deric Mane MD    Reason for follow-up:  Chest pain  Status post pacemaker  PMT  History of atrial fibrillation  Labile blood pressure     Patient Care Team:  Junaid Marquez MD as PCP - General  Junaid Marquez MD as PCP - Family Medicine  Junaid Marquez MD Gondi, Bapineedu, MD as Consulting Physician (Cardiology)    Subjective .  Feeling okay     ROS    Since I have last seen him yesterday, the patient has been without any chest discomfort ,shortness of breath, palpitations, dizziness or syncope.  Denies having any,abdominal pain ,nausea, vomiting , diarrhea constipation, loss of weight or loss of appetite.  Denies having any excessive bruising ,hematuria or blood in the stool.    Review of all systems negative except as indicated    History  Past Medical History:   Diagnosis Date   • Allergic rhinitis     Impression: persistent   • Arthralgia of right hip     Impression: needs right hip relacement   • Bobby's esophagus with dysplasia     Impression: scope 11/2013 improved from previous. Recheck due in 2015 Packet given   • Cervical disc disease with myelopathy     Impression: seen on MRi 5/2012. Workup in progress.   • Chronic low back pain with sciatica     Unspecified. Impression: scheduled for surgery   • Chronic pain of right knee     Impression: needs knee replacement   • Colon cancer screening     Impression: packet given   • ED (erectile dysfunction)    • Hydrocele    • Insomnia    • Left leg weakness     Impression: due to recent surgery. May benefit for home health. Pt homebound due severe pain postop back surgery. Also needs 3-in-1 Commode   • Medicare annual wellness visit, subsequent     With abnormal findings.   • Non-seasonal allergic rhinitis due to pollen    • Other specified persistent mood disorders (CMS/HCC)     Story: stress   • Psoriatic arthritis (CMS/HCC)     Impression: refer to Dr Rodriguez   • Screening for alcoholism    • Trigger ring  finger of right hand     Impression: refer to hand surgeon   • Ulnar neuropathy at elbow of right upper extremity     Impression: suspect stinger occured during surgery. Natural course and self-limited nature of this condition discussed. Follow-up in 4-6 weeks if not better. Follow-up sooner for worsening symptoms or for any concerns. EMG if not improved   • Umbilical hernia        Past Surgical History:   Procedure Laterality Date   • CARDIAC PACEMAKER PLACEMENT     • KNEE SURGERY Right 2016   • LUMBAR DISCECTOMY  10/2015    Comments: lumbar disk decompression   • TOTAL HIP ARTHROPLASTY Right 12/09/2016       Family History   Problem Relation Age of Onset   • Other Father         Substance Abuse   • Cancer Father         Lung       Social History     Tobacco Use   • Smoking status: Former Smoker   • Smokeless tobacco: Never Used   Substance Use Topics   • Alcohol use: No     Frequency: Never   • Drug use: No        Medications Prior to Admission   Medication Sig Dispense Refill Last Dose   • amitriptyline (ELAVIL) 50 MG tablet TAKE 2 TABLETS AT BEDTIME 180 tablet 3 2/1/2021 at Unknown time   • ascorbic acid (VITAMIN C) 500 MG tablet Take 500 mg by mouth every night at bedtime.   2/1/2021 at Unknown time   • baclofen (LIORESAL) 10 MG tablet Take 1 tablet by mouth 3 (Three) Times a Day As Needed for Muscle Spasms. 60 tablet 1    • cetirizine (zyrTEC) 10 MG tablet Take 10 mg by mouth Daily.   2/2/2021 at 0800   • cholecalciferol (VITAMIN D3) 25 MCG (1000 UT) tablet Take 3,000 Units by mouth every night at bedtime.   2/1/2021 at Unknown time   • diclofenac-misoprostol (ARTHROTEC 75) 75-0.2 MG EC tablet Take 1 tablet by mouth 2 (Two) Times a Day.      • digoxin (LANOXIN) 125 MCG tablet Take 1 tablet by mouth Daily. 90 tablet 3 2/2/2021 at 0800   • esomeprazole (NEXIUM) 40 MG capsule Take 1 capsule by mouth Daily.   2/2/2021 at 0800   • etanercept (ENBREL) 50 MG/ML solution prefilled syringe injection Inject 50 mg under  the skin into the appropriate area as directed 1 (One) Time Per Week. Wed   Past Week at Unknown time   • fluticasone (FLONASE) 50 MCG/ACT nasal spray USE 2 SPRAYS IN EACH NOSTRIL ONCE DAILY 3 bottle 3 2/2/2021 at 0800   • gabapentin (NEURONTIN) 600 MG tablet TAKE 1 TABLET FOUR TIMES A  tablet 3 2/2/2021 at 0800   • HYDROcodone-acetaminophen (NORCO) 5-325 MG per tablet Take 1 tablet by mouth 3 (Three) Times a Day As Needed for Severe Pain . 90 tablet 0    • lisinopril (PRINIVIL,ZESTRIL) 20 MG tablet Take 0.5 tablets by mouth Daily. (Patient taking differently: Take 10 mg by mouth Daily As Needed.) 90 tablet 3    • metoprolol succinate XL (Toprol XL) 25 MG 24 hr tablet Take 0.5 tablets by mouth Daily. 90 tablet 5 2/2/2021 at 0800   • rosuvastatin (CRESTOR) 10 MG tablet TAKE 1/2 TABLET BY MOUTH DAILY (Patient taking differently: Take 5 mg by mouth Daily.) 45 tablet 5 2/2/2021 at 0800   • tamsulosin (FLOMAX) 0.4 MG capsule 24 hr capsule Take 1 capsule by mouth Daily.   2/1/2021 at Unknown time       Allergies  Oxycodone-acetaminophen, Adhesive tape, and Oxycodone    Scheduled Meds:amitriptyline, 100 mg, Oral, Nightly  ascorbic acid, 500 mg, Oral, Nightly  cetirizine, 10 mg, Oral, Daily  cholecalciferol, 3,000 Units, Oral, Nightly  digoxin, 125 mcg, Oral, Daily  enoxaparin, 40 mg, Subcutaneous, Q24H  gabapentin, 600 mg, Oral, 4x Daily  lisinopril, 10 mg, Oral, Q24H  metoprolol succinate XL, 25 mg, Oral, Q24H  pantoprazole, 40 mg, Oral, QAM  rosuvastatin, 5 mg, Oral, Daily  sodium chloride, 10 mL, Intravenous, Q12H  tamsulosin, 0.4 mg, Oral, Nightly      Continuous Infusions:nitroglycerin, 10-50 mcg/min, Last Rate: 20 mcg/min (02/03/21 7523)      PRN Meds:.•  acetaminophen **OR** acetaminophen **OR** acetaminophen  •  aluminum-magnesium hydroxide-simethicone  •  baclofen  •  HYDROcodone-acetaminophen  •  melatonin  •  ondansetron **OR** ondansetron  •  [COMPLETED] Insert peripheral IV **AND** sodium chloride  •   "sodium chloride    Objective     VITAL SIGNS  Vitals:    02/03/21 0500 02/03/21 0501 02/03/21 0533 02/03/21 0602   BP:  157/93 (!) 147/110 (!) 159/105   BP Location:  Left arm     Patient Position:  Lying     Pulse:  77 88 90   Resp:  20     Temp:  98.1 °F (36.7 °C)     TempSrc:  Oral     SpO2:  93%     Weight: 81.2 kg (179 lb 0.2 oz)      Height:           Flowsheet Rows      First Filed Value   Admission Height  180.3 cm (71\") Documented at 02/02/2021 1115   Admission Weight  84.3 kg (185 lb 12.8 oz) Documented at 02/02/2021 1115            Intake/Output Summary (Last 24 hours) at 2/3/2021 0642  Last data filed at 2/3/2021 0220  Gross per 24 hour   Intake 240 ml   Output 850 ml   Net -610 ml        TELEMETRY: Sinus rhythm    Physical Exam:  The patient is alert, oriented and in no distress.  Vital signs as noted above.  Head and neck revealed no carotid bruits or jugular venous distention.  No thyromegaly or lymphadenopathy is present  Lungs clear.  No wheezing.  Breath sounds are normal bilaterally.  Heart normal first and second heart sounds.  No murmur. No precordial rub is present.  No gallop is present.  Abdomen soft and nontender.  No organomegaly is present.  Extremities with good peripheral pulses without any pedal edema.  Skin warm and dry.  Pacemaker site looks normal.  Musculoskeletal system is grossly normal  CNS grossly normal      Results Review:   I reviewed the patient's new clinical results.  Lab Results (last 24 hours)     Procedure Component Value Units Date/Time    TSH [[id number]]  (Normal) Collected: 02/03/21 0443    Specimen: Blood Updated: 02/03/21 0550     TSH 0.740 uIU/mL     Troponin [[id number]]  (Normal) Collected: 02/03/21 0443    Specimen: Blood Updated: 02/03/21 0550     Troponin T <0.010 ng/mL     Narrative:      Troponin T Reference Range:  <= 0.03 ng/mL-   Negative for AMI  >0.03 ng/mL-     Abnormal for myocardial necrosis.  Clinicians would have to utilize clinical acumen, EKG, " Troponin and serial changes to determine if it is an Acute Myocardial Infarction or myocardial injury due to an underlying chronic condition.       Results may be falsely decreased if patient taking Biotin.      Comprehensive Metabolic Panel [[id number]]  (Abnormal) Collected: 02/03/21 0443    Specimen: Blood Updated: 02/03/21 0544     Glucose 124 mg/dL      BUN 10 mg/dL      Creatinine 0.80 mg/dL      Sodium 140 mmol/L      Potassium 4.1 mmol/L      Chloride 105 mmol/L      CO2 26.0 mmol/L      Calcium 9.4 mg/dL      Total Protein 6.6 g/dL      Albumin 4.20 g/dL      ALT (SGPT) 16 U/L      AST (SGOT) 23 U/L      Alkaline Phosphatase 96 U/L      Total Bilirubin 0.5 mg/dL      eGFR Non African Amer 98 mL/min/1.73      Globulin 2.4 gm/dL      A/G Ratio 1.8 g/dL      BUN/Creatinine Ratio 12.5     Anion Gap 9.0 mmol/L     Narrative:      GFR Normal >60  Chronic Kidney Disease <60  Kidney Failure <15      Magnesium [[id number]]  (Normal) Collected: 02/03/21 0443    Specimen: Blood Updated: 02/03/21 0544     Magnesium 2.0 mg/dL     CBC Auto Differential [[id number]]  (Abnormal) Collected: 02/03/21 0443    Specimen: Blood Updated: 02/03/21 0526     WBC 7.80 10*3/mm3      RBC 4.66 10*6/mm3      Hemoglobin 13.7 g/dL      Hematocrit 40.0 %      MCV 85.7 fL      MCH 29.5 pg      MCHC 34.4 g/dL      RDW 13.8 %      RDW-SD 41.1 fl      MPV 7.4 fL      Platelets 257 10*3/mm3      Neutrophil % 71.9 %      Lymphocyte % 18.3 %      Monocyte % 7.9 %      Eosinophil % 0.9 %      Basophil % 1.0 %      Neutrophils, Absolute 5.60 10*3/mm3      Lymphocytes, Absolute 1.40 10*3/mm3      Monocytes, Absolute 0.60 10*3/mm3      Eosinophils, Absolute 0.10 10*3/mm3      Basophils, Absolute 0.10 10*3/mm3      nRBC 0.0 /100 WBC     Troponin [[id number]]  (Normal) Collected: 02/02/21 1824    Specimen: Blood Updated: 02/02/21 1915     Troponin T <0.010 ng/mL     Narrative:      Troponin T Reference Range:  <= 0.03 ng/mL-   Negative for AMI  >0.03 ng/mL-      Abnormal for myocardial necrosis.  Clinicians would have to utilize clinical acumen, EKG, Troponin and serial changes to determine if it is an Acute Myocardial Infarction or myocardial injury due to an underlying chronic condition.       Results may be falsely decreased if patient taking Biotin.      Respiratory Panel PCR w/COVID-19(SARS-CoV-2) SHAYNE/SANAM/RISHI/PAD/COR/MAD/THAI In-House, NP Swab in UTM/VTM, 3-4 HR TAT - Swab, Nasopharynx [[id number]]  (Normal) Collected: 02/02/21 1246    Specimen: Swab from Nasopharynx Updated: 02/02/21 1340     ADENOVIRUS, PCR Not Detected     Coronavirus 229E Not Detected     Coronavirus HKU1 Not Detected     Coronavirus NL63 Not Detected     Coronavirus OC43 Not Detected     COVID19 Not Detected     Human Metapneumovirus Not Detected     Human Rhinovirus/Enterovirus Not Detected     Influenza A PCR Not Detected     Influenza B PCR Not Detected     Parainfluenza Virus 1 Not Detected     Parainfluenza Virus 2 Not Detected     Parainfluenza Virus 3 Not Detected     Parainfluenza Virus 4 Not Detected     RSV, PCR Not Detected     Bordetella pertussis pcr Not Detected     Bordetella parapertussis PCR Not Detected     Chlamydophila pneumoniae PCR Not Detected     Mycoplasma pneumo by PCR Not Detected    Narrative:      Fact sheet for providers: https://docs.[employer]/wp-content/uploads/TTW9637-2878-KW8.1-EUA-Provider-Fact-Sheet-3.pdf    Fact sheet for patients: https://docs.[employer]/wp-content/uploads/YXK4925-2713-ER3.1-EUA-Patient-Fact-Sheet-1.pdf    Test performed by PCR.    Procalcitonin [[id number]]  (Normal) Collected: 02/02/21 1246    Specimen: Blood Updated: 02/02/21 1332     Procalcitonin 0.05 ng/mL     Narrative:      As a Marker for Sepsis (Non-Neonates):   1. <0.5 ng/mL represents a low risk of severe sepsis and/or septic shock.  1. >2 ng/mL represents a high risk of severe sepsis and/or septic shock.    As a Marker for Lower Respiratory Tract Infections that require  "antibiotic therapy:  PCT on Admission     Antibiotic Therapy             6-12 Hrs later  > 0.5                Strongly Recommended            >0.25 - <0.5         Recommended  0.1 - 0.25           Discouraged                   Remeasure/reassess PCT  <0.1                 Strongly Discouraged          Remeasure/reassess PCT      As 28 day mortality risk marker: \"Change in Procalcitonin Result\" (> 80 % or <=80 %) if Day 0 (or Day 1) and Day 4 values are available. Refer to http://www.[facility]saThe Start Projectpct-calculator.com/   Change in PCT <=80 %   A decrease of PCT levels below or equal to 80 % defines a positive change in PCT test result representing a higher risk for 28-day all-cause mortality of patients diagnosed with severe sepsis or septic shock.  Change in PCT > 80 %   A decrease of PCT levels of more than 80 % defines a negative change in PCT result representing a lower risk for 28-day all-cause mortality of patients diagnosed with severe sepsis or septic shock.                Results may be falsely decreased if patient taking Biotin.     Comprehensive Metabolic Panel [[id number]]  (Abnormal) Collected: 02/02/21 1246    Specimen: Blood Updated: 02/02/21 1325     Glucose 117 mg/dL      BUN 8 mg/dL      Creatinine 0.84 mg/dL      Sodium 142 mmol/L      Potassium 4.6 mmol/L      Chloride 105 mmol/L      CO2 27.0 mmol/L      Calcium 9.1 mg/dL      Total Protein 7.1 g/dL      Albumin 4.50 g/dL      ALT (SGPT) 18 U/L      AST (SGOT) 22 U/L      Alkaline Phosphatase 100 U/L      Total Bilirubin 0.3 mg/dL      eGFR Non African Amer 93 mL/min/1.73      Globulin 2.6 gm/dL      A/G Ratio 1.7 g/dL      BUN/Creatinine Ratio 9.5     Anion Gap 10.0 mmol/L     Narrative:      GFR Normal >60  Chronic Kidney Disease <60  Kidney Failure <15      Troponin [[id number]]  (Normal) Collected: 02/02/21 1246    Specimen: Blood Updated: 02/02/21 1325     Troponin T <0.010 ng/mL     Narrative:      Troponin T Reference Range:  <= 0.03 ng/mL-   Negative " for AMI  >0.03 ng/mL-     Abnormal for myocardial necrosis.  Clinicians would have to utilize clinical acumen, EKG, Troponin and serial changes to determine if it is an Acute Myocardial Infarction or myocardial injury due to an underlying chronic condition.       Results may be falsely decreased if patient taking Biotin.      BNP [[id number]]  (Normal) Collected: 02/02/21 1246    Specimen: Blood Updated: 02/02/21 1323     proBNP 27.9 pg/mL     Narrative:      Among patients with dyspnea, NT-proBNP is highly sensitive for the detection of acute congestive heart failure. In addition NT-proBNP of <300 pg/ml effectively rules out acute congestive heart failure with 99% negative predictive value.    Results may be falsely decreased if patient taking Biotin.      Protime-INR [[id number]]  (Normal) Collected: 02/02/21 1246    Specimen: Blood Updated: 02/02/21 1310     Protime 10.7 Seconds      INR 0.97    aPTT [[id number]]  (Normal) Collected: 02/02/21 1246    Specimen: Blood Updated: 02/02/21 1310     PTT 24.5 seconds     CBC & Differential [[id number]]  (Normal) Collected: 02/02/21 1246    Specimen: Blood Updated: 02/02/21 1256    Narrative:      The following orders were created for panel order CBC & Differential.  Procedure                               Abnormality         Status                     ---------                               -----------         ------                     CBC Auto Differential[[id number]]        Normal              Final result                 Please view results for these tests on the individual orders.    CBC Auto Differential [[id number]]  (Normal) Collected: 02/02/21 1246    Specimen: Blood Updated: 02/02/21 1256     WBC 5.20 10*3/mm3      RBC 4.85 10*6/mm3      Hemoglobin 14.2 g/dL      Hematocrit 41.1 %      MCV 84.8 fL      MCH 29.3 pg      MCHC 34.6 g/dL      RDW 13.9 %      RDW-SD 41.6 fl      MPV 7.2 fL      Platelets 255 10*3/mm3      Neutrophil % 57.4 %      Lymphocyte % 30.4 %       Monocyte % 9.1 %      Eosinophil % 2.3 %      Basophil % 0.8 %      Neutrophils, Absolute 3.00 10*3/mm3      Lymphocytes, Absolute 1.60 10*3/mm3      Monocytes, Absolute 0.50 10*3/mm3      Eosinophils, Absolute 0.10 10*3/mm3      Basophils, Absolute 0.00 10*3/mm3      nRBC 0.0 /100 WBC           Imaging Results (Last 24 Hours)     Procedure Component Value Units Date/Time    CT Head Without Contrast [[id number]] Collected: 02/02/21 1341     Updated: 02/02/21 1344    Narrative:         DATE OF EXAM:  2/2/2021 1:37 PM     PROCEDURE:   CT HEAD WO CONTRAST-     INDICATIONS:   headache htn     COMPARISON:  3/14/2017     TECHNIQUE:   Routine transaxial and coronal reconstruction images were obtained  through the head without the administration of contrast. Automated  exposure control and iterative reconstruction methods were used.     FINDINGS:  There is no evidence for acute intracranial hemorrhage. No definitive  acute focal ischemia is observed. There is no abnormal cerebral edema.  There is no mass effect or midline shift. The ventricular system is  nondilated. The basilar cisterns are patent. There is no evidence for  displaced skull fracture. The paranasal sinuses and mastoid air cells  are clear.       Impression:      1.No evidence for acute intracranial abnormality.     Electronically Signed By-Yfn Cook MD On:2/2/2021 1:42 PM  This report was finalized on [id number] by  Yfn Cook MD.    XR Chest AP [[id number]] Collected: 02/02/21 1250     Updated: 02/02/21 1253    Narrative:         DATE OF EXAM:   2/2/2021 12:30 PM     PROCEDURE:   XR CHEST AP-     INDICATIONS:   COVID Evaluation, Cough, Fever     COMPARISON:  7/23/2019, 4/13/2018     TECHNIQUE:   [Portable chest radiograph]     FINDINGS:  Chronic changes are noted which appear stable. No new consolidations or  pleural effusions are observed. The cardiac silhouette and mediastinum  are stable. The cardiac pacemaker/AICD is stable with leads intact.  No  acute osseous abnormalities are identified.       Impression:      There is no significant change when compared to the prior study. There  is no evidence for acute cardiopulmonary process.     Electronically Signed By-Yfn Cook MD On:2/2/2021 12:51 PM  This report was finalized on [id number] by  Yfn Cook MD.      LAB RESULTS (LAST 7 DAYS)    CBC  Results from last 7 days   Lab Units 02/03/21  0443 02/02/21  1246   WBC 10*3/mm3 7.80 5.20   RBC 10*6/mm3 4.66 4.85   HEMOGLOBIN g/dL 13.7 14.2   HEMATOCRIT % 40.0 41.1   MCV fL 85.7 84.8   PLATELETS 10*3/mm3 257 255       BMP  Results from last 7 days   Lab Units 02/03/21  0443 02/02/21  1246   SODIUM mmol/L 140 142   POTASSIUM mmol/L 4.1 4.6   CHLORIDE mmol/L 105 105   CO2 mmol/L 26.0 27.0   BUN mg/dL 10 8   CREATININE mg/dL 0.80 0.84   GLUCOSE mg/dL 124* 117*   MAGNESIUM mg/dL 2.0  --        CMP   Results from last 7 days   Lab Units 02/03/21  0443 02/02/21  1246   SODIUM mmol/L 140 142   POTASSIUM mmol/L 4.1 4.6   CHLORIDE mmol/L 105 105   CO2 mmol/L 26.0 27.0   BUN mg/dL 10 8   CREATININE mg/dL 0.80 0.84   GLUCOSE mg/dL 124* 117*   ALBUMIN g/dL 4.20 4.50   BILIRUBIN mg/dL 0.5 0.3   ALK PHOS U/L 96 100   AST (SGOT) U/L 23 22   ALT (SGPT) U/L 16 18         BNP        TROPONIN  Results from last 7 days   Lab Units 02/03/21  0443   TROPONIN T ng/mL <0.010       CoAg  Results from last 7 days   Lab Units 02/02/21  1246   INR  0.97   APTT seconds 24.5       Creatinine Clearance  Estimated Creatinine Clearance: 111.4 mL/min (by C-G formula based on SCr of 0.8 mg/dL).    ABG        Radiology  Ct Head Without Contrast    Result Date: 2/2/2021  1.No evidence for acute intracranial abnormality.  Electronically Signed By-Yfn Cook MD On:2/2/2021 1:42 PM This report was finalized on [id number] by  Yfn Cook MD.    Xr Chest Ap    Result Date: 2/2/2021  There is no significant change when compared to the prior study. There is no evidence for acute  cardiopulmonary process.  Electronically Signed By-Yfn Cook MD On:2/2/2021 12:51 PM This report was finalized on [id number] by  Yfn Cook MD.              EKG                                      I personally viewed and interpreted the patient's EKG/Telemetry data:    ECHOCARDIOGRAM:             STRESS MYOVIEW:    Cardiolite (Tc-99m Sestamibi) stress test    CARDIAC CATHETERIZATION:            OTHER:         Assessment/Plan     Principal Problem:    Chest pain  Active Problems:    Degeneration of lumbar or lumbosacral intervertebral disc    Gastroesophageal reflux disease    Hypertension    Paroxysmal atrial fibrillation (CMS/HCC)    Presence of cardiac pacemaker    Hyperlipidemia    Chronic low back pain with sciatica    Bobby's esophagus with dysplasia    Hypertensive urgency    Headache      [[[[[[[[[[[[[[[[[[[[[  Impression  ============  -Chest discomfort-possible angina pectoris.  EKG showed no acute changes.  Troponin levels are negative.     -status post permanent dual-chamber pacemaker implantation ([employer] MRI Compatible ) 04/13/2018  - sick sinus syndrome-tachy-irvin syndrome.  Patient has history of atrial fibrillation with rapid ventricular response.  Patient had significant pauses of 5-6 seconds.  Symptomatic with dizziness and near-syncope     - history of Near-syncope associated with palpitations and dizziness .  -improved     -cardiac catheterization 03/18/2017 revealed normal left ventricular function and normal coronary arteries.  History of normal echocardiogram     -status post loop recorder placement 03/24/2017. -removal of loop recorder 04/13/2018.     -hypertension  Renal dysfunction BUN 17 creatinine 1.4 GERD depression Psoriatic arthritis Hypokalemia     -history of Anemia and neutropenia     -Status post hip replacement bilateral arthroscopic knee surgery right shoulder surgery  CBP- with chronic myelopathy, s/p multiple lumbar surgeries; continue soma,  gabapentin     -Allergic to Percocet  ================  Plan  ==============  Chest discomfort suggestive of angina pectoris.  Troponin levels are negative.  EKG showed no acute changes.    History of labile blood pressure.  Patient has hypertensive crisis which is better now but still elevated.  Increase metoprolol.  May need to increase the dose of lisinopril.    Status post pacemaker  Patient has palpitations and appears to have PMT.  Reviewed rhythm strips.  Interrogation of the pacemaker revealed excellent pacing parameters.  Device reprogrammed to avoid PMT.    Echocardiogram.-Today  Stress Cardiolite test.-Today    Medications were reviewed and updated.    Further plan will depend on patient's progress.  [[[[[[[[[[[[[[[[[[[[[[[[[[[[[[[[          Claudia Benson MD  02/03/21  06:42 EST

## 2021-02-03 NOTE — PROGRESS NOTES
[facility] Medicine Services Daily Progress Note      Hospitalist Team  LOS 0 days      Patient Care Team:  Junaid Marquez MD as PCP - General  Junaid Marquez MD as PCP - Family Medicine  Junaid Marquez MD Gondi, Bapineedu, MD as Consulting Physician (Cardiology)    Patient Location: 2118/1      Subjective   Subjective     Chief Complaint / Subjective  Chief Complaint   Patient presents with   • Exposure To Known Illness         Brief Synopsis of Hospital Course/HPI    60-year-old man with multiple comorbidities including sick sinus syndrome status post permanent pacemaker placement, hypertension, hyperlipidemia and chronic back pain.    Presented to the emergency room with complaints of headache, chest pressure, palpitation and elevated blood pressure.  Also reported having sore throat and  subjective fever.    In the emergency room systolic blood pressure was noted to be greater than 200.  Respiratory viral panel including Covid 19 was negative.    Patient was admitted for further care       Date::    2/3/2021  Patient seen and examined  Reports intermittent substernal chest pain  Had Myoview stress test today which was abnormal        Review of Systems   Constitution: Negative for chills and fever.   HENT: Negative for congestion.    Eyes: Negative for blurred vision.   Cardiovascular: Positive for chest pain.   Respiratory: Negative for shortness of breath.    Endocrine: Negative for cold intolerance and heat intolerance.   Gastrointestinal: Negative for abdominal pain, nausea and vomiting.   Genitourinary: Negative for flank pain.   Neurological: Negative for focal weakness.   Psychiatric/Behavioral: Negative for altered mental status.         Objective   Objective      Vital Signs  Temp:  [97.8 °F (36.6 °C)-98.3 °F (36.8 °C)] 98.1 °F (36.7 °C)  Heart Rate:  [64-97] 90  Resp:  [17-20] 20  BP: (140-197)/() 159/105  Oxygen Therapy  SpO2: 93 %  Pulse  "Oximetry Type: Intermittent  Device (Oxygen Therapy): room air  Flowsheet Rows      First Filed Value   Admission Height  180.3 cm (71\") Documented at 02/02/2021 1115   Admission Weight  84.3 kg (185 lb 12.8 oz) Documented at 02/02/2021 1115        Intake & Output (last 3 days)       01/31 0701 - 02/01 0700 02/01 0701 - 02/02 0700 02/02 0701 - 02/03 0700 02/03 0701 - 02/04 0700    P.O.   240     Total Intake(mL/kg)   240 (3)     Urine (mL/kg/hr)   850     Total Output   850     Net   -610             Urine Unmeasured Occurrence   1 x         Lines, Drains & Airways    Active LDAs     Name:   Placement date:   Placement time:   Site:   Days:    Peripheral IV 02/02/21 1246 Right Forearm   02/02/21    1246    Forearm   less than 1                  Physical Exam:    Physical Exam  Vitals signs reviewed.   Constitutional:       General: He is not in acute distress.  HENT:      Head: Normocephalic and atraumatic.      Nose: Nose normal.      Mouth/Throat:      Mouth: Mucous membranes are moist.   Eyes:      Extraocular Movements: Extraocular movements intact.      Conjunctiva/sclera: Conjunctivae normal.      Pupils: Pupils are equal, round, and reactive to light.   Neck:      Musculoskeletal: Neck supple.   Cardiovascular:      Rate and Rhythm: Normal rate and regular rhythm.   Pulmonary:      Effort: Pulmonary effort is normal. No respiratory distress.      Breath sounds: Normal breath sounds.   Abdominal:      General: Bowel sounds are normal.      Palpations: Abdomen is soft.      Tenderness: There is no abdominal tenderness.   Musculoskeletal: Normal range of motion.   Skin:     General: Skin is warm and dry.   Neurological:      Mental Status: He is alert and oriented to person, place, and time.   Psychiatric:         Mood and Affect: Mood normal.               Procedures:              Results Review:     I reviewed the patient's new clinical results.      Lab Results (last 24 hours)     Procedure Component Value " Units Date/Time    TSH [[id number]]  (Normal) Collected: 02/03/21 0443    Specimen: Blood Updated: 02/03/21 0550     TSH 0.740 uIU/mL     Troponin [[id number]]  (Normal) Collected: 02/03/21 0443    Specimen: Blood Updated: 02/03/21 0550     Troponin T <0.010 ng/mL     Narrative:      Troponin T Reference Range:  <= 0.03 ng/mL-   Negative for AMI  >0.03 ng/mL-     Abnormal for myocardial necrosis.  Clinicians would have to utilize clinical acumen, EKG, Troponin and serial changes to determine if it is an Acute Myocardial Infarction or myocardial injury due to an underlying chronic condition.       Results may be falsely decreased if patient taking Biotin.      Comprehensive Metabolic Panel [[id number]]  (Abnormal) Collected: 02/03/21 0443    Specimen: Blood Updated: 02/03/21 0544     Glucose 124 mg/dL      BUN 10 mg/dL      Creatinine 0.80 mg/dL      Sodium 140 mmol/L      Potassium 4.1 mmol/L      Chloride 105 mmol/L      CO2 26.0 mmol/L      Calcium 9.4 mg/dL      Total Protein 6.6 g/dL      Albumin 4.20 g/dL      ALT (SGPT) 16 U/L      AST (SGOT) 23 U/L      Alkaline Phosphatase 96 U/L      Total Bilirubin 0.5 mg/dL      eGFR Non African Amer 98 mL/min/1.73      Globulin 2.4 gm/dL      A/G Ratio 1.8 g/dL      BUN/Creatinine Ratio 12.5     Anion Gap 9.0 mmol/L     Narrative:      GFR Normal >60  Chronic Kidney Disease <60  Kidney Failure <15      Magnesium [[id number]]  (Normal) Collected: 02/03/21 0443    Specimen: Blood Updated: 02/03/21 0544     Magnesium 2.0 mg/dL     CBC Auto Differential [[id number]]  (Abnormal) Collected: 02/03/21 0443    Specimen: Blood Updated: 02/03/21 0526     WBC 7.80 10*3/mm3      RBC 4.66 10*6/mm3      Hemoglobin 13.7 g/dL      Hematocrit 40.0 %      MCV 85.7 fL      MCH 29.5 pg      MCHC 34.4 g/dL      RDW 13.8 %      RDW-SD 41.1 fl      MPV 7.4 fL      Platelets 257 10*3/mm3      Neutrophil % 71.9 %      Lymphocyte % 18.3 %      Monocyte % 7.9 %      Eosinophil % 0.9 %      Basophil %  1.0 %      Neutrophils, Absolute 5.60 10*3/mm3      Lymphocytes, Absolute 1.40 10*3/mm3      Monocytes, Absolute 0.60 10*3/mm3      Eosinophils, Absolute 0.10 10*3/mm3      Basophils, Absolute 0.10 10*3/mm3      nRBC 0.0 /100 WBC     Troponin [[id number]]  (Normal) Collected: 02/02/21 1824    Specimen: Blood Updated: 02/02/21 1915     Troponin T <0.010 ng/mL     Narrative:      Troponin T Reference Range:  <= 0.03 ng/mL-   Negative for AMI  >0.03 ng/mL-     Abnormal for myocardial necrosis.  Clinicians would have to utilize clinical acumen, EKG, Troponin and serial changes to determine if it is an Acute Myocardial Infarction or myocardial injury due to an underlying chronic condition.       Results may be falsely decreased if patient taking Biotin.      Respiratory Panel PCR w/COVID-19(SARS-CoV-2) SHAYNE/SANAM/RISHI/PAD/COR/MAD/THAI In-House, NP Swab in UTM/VTM, 3-4 HR TAT - Swab, Nasopharynx [[id number]]  (Normal) Collected: 02/02/21 1246    Specimen: Swab from Nasopharynx Updated: 02/02/21 1340     ADENOVIRUS, PCR Not Detected     Coronavirus 229E Not Detected     Coronavirus HKU1 Not Detected     Coronavirus NL63 Not Detected     Coronavirus OC43 Not Detected     COVID19 Not Detected     Human Metapneumovirus Not Detected     Human Rhinovirus/Enterovirus Not Detected     Influenza A PCR Not Detected     Influenza B PCR Not Detected     Parainfluenza Virus 1 Not Detected     Parainfluenza Virus 2 Not Detected     Parainfluenza Virus 3 Not Detected     Parainfluenza Virus 4 Not Detected     RSV, PCR Not Detected     Bordetella pertussis pcr Not Detected     Bordetella parapertussis PCR Not Detected     Chlamydophila pneumoniae PCR Not Detected     Mycoplasma pneumo by PCR Not Detected    Narrative:      Fact sheet for providers: https://docs.[employer]/wp-content/uploads/ZBW3880-3181-BQ1.1-EUA-Provider-Fact-Sheet-3.pdf    Fact sheet for patients:  "https://docs.[employer]/wp-content/uploads/DZI5021-2017-JX9.1-EUA-Patient-Fact-Sheet-1.pdf    Test performed by PCR.    Procalcitonin [[id number]]  (Normal) Collected: 02/02/21 1246    Specimen: Blood Updated: 02/02/21 1332     Procalcitonin 0.05 ng/mL     Narrative:      As a Marker for Sepsis (Non-Neonates):   1. <0.5 ng/mL represents a low risk of severe sepsis and/or septic shock.  1. >2 ng/mL represents a high risk of severe sepsis and/or septic shock.    As a Marker for Lower Respiratory Tract Infections that require antibiotic therapy:  PCT on Admission     Antibiotic Therapy             6-12 Hrs later  > 0.5                Strongly Recommended            >0.25 - <0.5         Recommended  0.1 - 0.25           Discouraged                   Remeasure/reassess PCT  <0.1                 Strongly Discouraged          Remeasure/reassess PCT      As 28 day mortality risk marker: \"Change in Procalcitonin Result\" (> 80 % or <=80 %) if Day 0 (or Day 1) and Day 4 values are available. Refer to http://www.Contour-pct-calculator.com/   Change in PCT <=80 %   A decrease of PCT levels below or equal to 80 % defines a positive change in PCT test result representing a higher risk for 28-day all-cause mortality of patients diagnosed with severe sepsis or septic shock.  Change in PCT > 80 %   A decrease of PCT levels of more than 80 % defines a negative change in PCT result representing a lower risk for 28-day all-cause mortality of patients diagnosed with severe sepsis or septic shock.                Results may be falsely decreased if patient taking Biotin.     Comprehensive Metabolic Panel [[id number]]  (Abnormal) Collected: 02/02/21 1246    Specimen: Blood Updated: 02/02/21 1325     Glucose 117 mg/dL      BUN 8 mg/dL      Creatinine 0.84 mg/dL      Sodium 142 mmol/L      Potassium 4.6 mmol/L      Chloride 105 mmol/L      CO2 27.0 mmol/L      Calcium 9.1 mg/dL      Total Protein 7.1 g/dL      Albumin 4.50 g/dL      ALT " (SGPT) 18 U/L      AST (SGOT) 22 U/L      Alkaline Phosphatase 100 U/L      Total Bilirubin 0.3 mg/dL      eGFR Non African Amer 93 mL/min/1.73      Globulin 2.6 gm/dL      A/G Ratio 1.7 g/dL      BUN/Creatinine Ratio 9.5     Anion Gap 10.0 mmol/L     Narrative:      GFR Normal >60  Chronic Kidney Disease <60  Kidney Failure <15      Troponin [[id number]]  (Normal) Collected: 02/02/21 1246    Specimen: Blood Updated: 02/02/21 1325     Troponin T <0.010 ng/mL     Narrative:      Troponin T Reference Range:  <= 0.03 ng/mL-   Negative for AMI  >0.03 ng/mL-     Abnormal for myocardial necrosis.  Clinicians would have to utilize clinical acumen, EKG, Troponin and serial changes to determine if it is an Acute Myocardial Infarction or myocardial injury due to an underlying chronic condition.       Results may be falsely decreased if patient taking Biotin.      BNP [[id number]]  (Normal) Collected: 02/02/21 1246    Specimen: Blood Updated: 02/02/21 1323     proBNP 27.9 pg/mL     Narrative:      Among patients with dyspnea, NT-proBNP is highly sensitive for the detection of acute congestive heart failure. In addition NT-proBNP of <300 pg/ml effectively rules out acute congestive heart failure with 99% negative predictive value.    Results may be falsely decreased if patient taking Biotin.      Protime-INR [[id number]]  (Normal) Collected: 02/02/21 1246    Specimen: Blood Updated: 02/02/21 1310     Protime 10.7 Seconds      INR 0.97    aPTT [[id number]]  (Normal) Collected: 02/02/21 1246    Specimen: Blood Updated: 02/02/21 1310     PTT 24.5 seconds     CBC & Differential [[id number]]  (Normal) Collected: 02/02/21 1246    Specimen: Blood Updated: 02/02/21 1256    Narrative:      The following orders were created for panel order CBC & Differential.  Procedure                               Abnormality         Status                     ---------                               -----------         ------                     CBC Auto  Differential[[id number]]        Normal              Final result                 Please view results for these tests on the individual orders.    CBC Auto Differential [[id number]]  (Normal) Collected: 02/02/21 1246    Specimen: Blood Updated: 02/02/21 1256     WBC 5.20 10*3/mm3      RBC 4.85 10*6/mm3      Hemoglobin 14.2 g/dL      Hematocrit 41.1 %      MCV 84.8 fL      MCH 29.3 pg      MCHC 34.6 g/dL      RDW 13.9 %      RDW-SD 41.6 fl      MPV 7.2 fL      Platelets 255 10*3/mm3      Neutrophil % 57.4 %      Lymphocyte % 30.4 %      Monocyte % 9.1 %      Eosinophil % 2.3 %      Basophil % 0.8 %      Neutrophils, Absolute 3.00 10*3/mm3      Lymphocytes, Absolute 1.60 10*3/mm3      Monocytes, Absolute 0.50 10*3/mm3      Eosinophils, Absolute 0.10 10*3/mm3      Basophils, Absolute 0.00 10*3/mm3      nRBC 0.0 /100 WBC         No results found for: HGBA1C  Results from last 7 days   Lab Units 02/02/21  1246   INR  0.97           No results found for: LIPASE  Lab Results   Component Value Date    CHOL 206 (H) 10/04/2018    CHLPL 126 01/21/2021    TRIG 83 01/21/2021    HDL 46 01/21/2021    LDL 64 01/21/2021       No results found for: INTRAOP, PREDX, FINALDX, COMDX    Microbiology Results (last 10 days)     Procedure Component Value - Date/Time    Respiratory Panel PCR w/COVID-19(SARS-CoV-2) SHAYNE/SANAM/RISHI/PAD/COR/MAD/THAI In-House, NP Swab in UTM/VTM, 3-4 HR TAT - Swab, Nasopharynx [[id number]]  (Normal) Collected: 02/02/21 1246    Lab Status: Final result Specimen: Swab from Nasopharynx Updated: 02/02/21 1340     ADENOVIRUS, PCR Not Detected     Coronavirus 229E Not Detected     Coronavirus HKU1 Not Detected     Coronavirus NL63 Not Detected     Coronavirus OC43 Not Detected     COVID19 Not Detected     Human Metapneumovirus Not Detected     Human Rhinovirus/Enterovirus Not Detected     Influenza A PCR Not Detected     Influenza B PCR Not Detected     Parainfluenza Virus 1 Not Detected     Parainfluenza Virus 2 Not Detected      Parainfluenza Virus 3 Not Detected     Parainfluenza Virus 4 Not Detected     RSV, PCR Not Detected     Bordetella pertussis pcr Not Detected     Bordetella parapertussis PCR Not Detected     Chlamydophila pneumoniae PCR Not Detected     Mycoplasma pneumo by PCR Not Detected    Narrative:      Fact sheet for providers: https://docs.[employer]/wp-content/uploads/BND0642-7015-HE6.1-EUA-Provider-Fact-Sheet-3.pdf    Fact sheet for patients: https://docs.[employer]/wp-content/uploads/USG5316-1033-IG6.1-EUA-Patient-Fact-Sheet-1.pdf    Test performed by PCR.          ECG/EMG Results (most recent)     Procedure Component Value Units Date/Time    ECG 12 Lead [[id number]] Collected: 02/02/21 1445     Updated: 02/02/21 1447     QT Interval 370 ms     Narrative:      HEART RATE= 74  bpm  RR Interval= 888  ms  WA Interval= 144  ms  P Horizontal Axis= 34  deg  P Front Axis= 63  deg  QRSD Interval= 101  ms  QT Interval= 370  ms  QRS Axis= -80  deg  T Wave Axis= 74  deg  - ABNORMAL ECG -  Sinus rhythm  Atrial premature complexes  When compared with ECG of 14-Apr-2018 4:06:40,  Significant change in rhythm  Electronically Signed By:   Date and Time of Study: 2021-02-02 14:45:05    ECG 12 Lead [[id number]] Resulted: 02/02/21 1449     Updated: 02/02/21 1449                    Ct Head Without Contrast    Result Date: 2/2/2021  1.No evidence for acute intracranial abnormality.  Electronically Signed By-Yfn Cook MD On:2/2/2021 1:42 PM This report was finalized on [id number] by  Yfn Cook MD.    Xr Chest Ap    Result Date: 2/2/2021  There is no significant change when compared to the prior study. There is no evidence for acute cardiopulmonary process.  Electronically Signed By-Yfn Cook MD On:2/2/2021 12:51 PM This report was finalized on [id number] by  Yfn Cook MD.          Xrays, labs reviewed personally by physician.    Medication Review:   I have reviewed the patient's current medication  list      Scheduled Meds  amitriptyline, 100 mg, Oral, Nightly  ascorbic acid, 500 mg, Oral, Nightly  cetirizine, 10 mg, Oral, Daily  cholecalciferol, 3,000 Units, Oral, Nightly  digoxin, 125 mcg, Oral, Daily  enoxaparin, 40 mg, Subcutaneous, Q24H  gabapentin, 600 mg, Oral, 4x Daily  lisinopril, 10 mg, Oral, Q24H  metoprolol succinate XL, 25 mg, Oral, Q24H  pantoprazole, 40 mg, Oral, QAM  rosuvastatin, 5 mg, Oral, Daily  sodium chloride, 10 mL, Intravenous, Q12H  tamsulosin, 0.4 mg, Oral, Nightly        Meds Infusions  nitroglycerin, 10-50 mcg/min, Last Rate: Stopped (02/03/21 0653)        Meds PRN  •  acetaminophen **OR** acetaminophen **OR** acetaminophen  •  aluminum-magnesium hydroxide-simethicone  •  baclofen  •  HYDROcodone-acetaminophen  •  melatonin  •  ondansetron **OR** ondansetron  •  [COMPLETED] Insert peripheral IV **AND** sodium chloride  •  sodium chloride        Assessment/Plan   Assessment/Plan     Active Hospital Problems    Diagnosis  POA   • **Chest pain [R07.9]  Yes   • Hypertensive urgency [I16.0]  Yes   • Headache [R51.9]  Yes   • Bobby's esophagus with dysplasia [K22.719]  Yes   • Chronic low back pain with sciatica [M54.40, G89.29]  Yes   • Hypertension [I10]  Yes   • Gastroesophageal reflux disease [K21.9]  Yes   • Hyperlipidemia [E78.5]  Yes   • Presence of cardiac pacemaker [Z95.0]  Yes   • Paroxysmal atrial fibrillation (CMS/HCC) [I48.0]  Yes   • Degeneration of lumbar or lumbosacral intervertebral disc [M51.37]  Yes      Resolved Hospital Problems   No resolved problems to display.       MEDICAL DECISION MAKING COMPLEXITY BY PROBLEM:     Chest pain  EKG showed no acute ST/T wave changes  Serial troponin insignificant  Had Myoview stress test 2/3/2021 was abnormal with inferior and apical ischemia  Cardiologist following-further recommendation per cardiologist  On lisinopril, metoprolol, statin    Hypertensive urgency on presentation  Patient known to have history of labile blood  pressure  Blood pressure better controlled today  Nitroglycerin drip  On lisinopril and metoprolol    Bradycardia tacky syndrome-sick sinus syndrome  Status post permanent pacemaker placement  Complained of palpitation on presentation  Initial thought was pacemaker mediated tachycardia however interrogation of pacemaker reviewed excellent pacing parameters.  Device reprogrammed to avoid pacemaker mediated tachycardia  On metoprolol and digoxin    Headache-probably due to elevated blood pressure/nitro  CT head showed no acute intracranial normality  Supportive care with Tylenol        Hyperlipidemia-on statin       Chronic back pain  On Norco, baclofen     GERD, Bobby's esophagus  -PPI        VTE Prophylaxis -Lovenox  Mechanical Order History:      Ordered        02/02/21 1532  Place Sequential Compression Device  Once         02/02/21 1532  Maintain Sequential Compression Device  Continuous                 Pharmalogical Order History:      Ordered     Dose Route Frequency Stop    02/02/21 1723  enoxaparin (LOVENOX) syringe 40 mg      40 mg SC Every 24 Hours --                  Code Status -   Code Status and Medical Interventions:   Ordered at: 02/02/21 1532     Level Of Support Discussed With:    Patient     Code Status:    CPR     Medical Interventions (Level of Support Prior to Arrest):    Full       This patient has been examined with appropriate PPE . 02/03/21        Discharge Planning  Pending clinical progress        Electronically signed by Deric Mane MD, 02/03/21, 09:00 EST.  [facility]ist Team

## 2021-02-03 NOTE — PLAN OF CARE
Goal Outcome Evaluation:  Plan of Care Reviewed With: patient  Progress: no change  Outcome Summary: Patient on Nitro drip and NPO awaiting his scheduled procedures. Will continue to monitor.

## 2021-02-03 NOTE — H&P (VIEW-ONLY)
Referring Provider: Deric Mane MD    Reason for follow-up:  Chest pain  Status post pacemaker  PMT  History of atrial fibrillation  Labile blood pressure     Patient Care Team:  Junaid Marquez MD as PCP - General  Junaid Marquez MD as PCP - Family Medicine  Junaid Marquez MD Gondi, B[place]edu, MD as Consulting Physician (Cardiology)    Subjective .  Feeling okay     ROS    Since I have last seen him yesterday, the patient has been without any chest discomfort ,shortness of breath, palpitations, dizziness or syncope.  Denies having any,abdominal pain ,nausea, vomiting , diarrhea constipation, loss of weight or loss of appetite.  Denies having any excessive bruising ,hematuria or blood in the stool.    Review of all systems negative except as indicated    History  Past Medical History:   Diagnosis Date   • Allergic rhinitis     Impression: persistent   • Arthralgia of right hip     Impression: needs right hip relacement   • Bobby's esophagus with dysplasia     Impression: scope 11/2013 improved from previous. Recheck due in 2015 Packet given   • Cervical disc disease with myelopathy     Impression: seen on MRi 5/2012. Workup in progress.   • Chronic low back pain with sciatica     Unspecified. Impression: scheduled for surgery   • Chronic pain of right knee     Impression: needs knee replacement   • Colon cancer screening     Impression: packet given   • ED (erectile dysfunction)    • Hydrocele    • Insomnia    • Left leg weakness     Impression: due to recent surgery. May benefit for home health. Pt homebound due severe pain postop back surgery. Also needs 3-in-1 Commode   • Medicare annual wellness visit, subsequent     With abnormal findings.   • Non-seasonal allergic rhinitis due to pollen    • Other specified persistent mood disorders (CMS/HCC)     Story: stress   • Psoriatic arthritis (CMS/HCC)     Impression: refer to Dr Rodriguez   • Screening for alcoholism    • Trigger ring  finger of right hand     Impression: refer to hand surgeon   • Ulnar neuropathy at elbow of right upper extremity     Impression: suspect stinger occured during surgery. Natural course and self-limited nature of this condition discussed. Follow-up in 4-6 weeks if not better. Follow-up sooner for worsening symptoms or for any concerns. EMG if not improved   • Umbilical hernia        Past Surgical History:   Procedure Laterality Date   • CARDIAC PACEMAKER PLACEMENT     • KNEE SURGERY Right 2016   • LUMBAR DISCECTOMY  10/2015    Comments: lumbar disk decompression   • TOTAL HIP ARTHROPLASTY Right 12/09/2016       Family History   Problem Relation Age of Onset   • Other Father         Substance Abuse   • Cancer Father         Lung       Social History     Tobacco Use   • Smoking status: Former Smoker   • Smokeless tobacco: Never Used   Substance Use Topics   • Alcohol use: No     Frequency: Never   • Drug use: No        Medications Prior to Admission   Medication Sig Dispense Refill Last Dose   • amitriptyline (ELAVIL) 50 MG tablet TAKE 2 TABLETS AT BEDTIME 180 tablet 3 2/1/2021 at Unknown time   • ascorbic acid (VITAMIN C) 500 MG tablet Take 500 mg by mouth every night at bedtime.   2/1/2021 at Unknown time   • baclofen (LIORESAL) 10 MG tablet Take 1 tablet by mouth 3 (Three) Times a Day As Needed for Muscle Spasms. 60 tablet 1    • cetirizine (zyrTEC) 10 MG tablet Take 10 mg by mouth Daily.   2/2/2021 at 0800   • cholecalciferol (VITAMIN D3) 25 MCG (1000 UT) tablet Take 3,000 Units by mouth every night at bedtime.   2/1/2021 at Unknown time   • diclofenac-misoprostol (ARTHROTEC 75) 75-0.2 MG EC tablet Take 1 tablet by mouth 2 (Two) Times a Day.      • digoxin (LANOXIN) 125 MCG tablet Take 1 tablet by mouth Daily. 90 tablet 3 2/2/2021 at 0800   • esomeprazole (NEXIUM) 40 MG capsule Take 1 capsule by mouth Daily.   2/2/2021 at 0800   • etanercept (ENBREL) 50 MG/ML solution prefilled syringe injection Inject 50 mg under  the skin into the appropriate area as directed 1 (One) Time Per Week. Wed   Past Week at Unknown time   • fluticasone (FLONASE) 50 MCG/ACT nasal spray USE 2 SPRAYS IN EACH NOSTRIL ONCE DAILY 3 bottle 3 2/2/2021 at 0800   • gabapentin (NEURONTIN) 600 MG tablet TAKE 1 TABLET FOUR TIMES A  tablet 3 2/2/2021 at 0800   • HYDROcodone-acetaminophen (NORCO) 5-325 MG per tablet Take 1 tablet by mouth 3 (Three) Times a Day As Needed for Severe Pain . 90 tablet 0    • lisinopril (PRINIVIL,ZESTRIL) 20 MG tablet Take 0.5 tablets by mouth Daily. (Patient taking differently: Take 10 mg by mouth Daily As Needed.) 90 tablet 3    • metoprolol succinate XL (Toprol XL) 25 MG 24 hr tablet Take 0.5 tablets by mouth Daily. 90 tablet 5 2/2/2021 at 0800   • rosuvastatin (CRESTOR) 10 MG tablet TAKE 1/2 TABLET BY MOUTH DAILY (Patient taking differently: Take 5 mg by mouth Daily.) 45 tablet 5 2/2/2021 at 0800   • tamsulosin (FLOMAX) 0.4 MG capsule 24 hr capsule Take 1 capsule by mouth Daily.   2/1/2021 at Unknown time       Allergies  Oxycodone-acetaminophen, Adhesive tape, and Oxycodone    Scheduled Meds:amitriptyline, 100 mg, Oral, Nightly  ascorbic acid, 500 mg, Oral, Nightly  cetirizine, 10 mg, Oral, Daily  cholecalciferol, 3,000 Units, Oral, Nightly  digoxin, 125 mcg, Oral, Daily  enoxaparin, 40 mg, Subcutaneous, Q24H  gabapentin, 600 mg, Oral, 4x Daily  lisinopril, 10 mg, Oral, Q24H  metoprolol succinate XL, 25 mg, Oral, Q24H  pantoprazole, 40 mg, Oral, QAM  rosuvastatin, 5 mg, Oral, Daily  sodium chloride, 10 mL, Intravenous, Q12H  tamsulosin, 0.4 mg, Oral, Nightly      Continuous Infusions:nitroglycerin, 10-50 mcg/min, Last Rate: 20 mcg/min (02/03/21 3223)      PRN Meds:.•  acetaminophen **OR** acetaminophen **OR** acetaminophen  •  aluminum-magnesium hydroxide-simethicone  •  baclofen  •  HYDROcodone-acetaminophen  •  melatonin  •  ondansetron **OR** ondansetron  •  [COMPLETED] Insert peripheral IV **AND** sodium chloride  •   "sodium chloride    Objective     VITAL SIGNS  Vitals:    02/03/21 0500 02/03/21 0501 02/03/21 0533 02/03/21 0602   BP:  157/93 (!) 147/110 (!) 159/105   BP Location:  Left arm     Patient Position:  Lying     Pulse:  77 88 90   Resp:  20     Temp:  98.1 °F (36.7 °C)     TempSrc:  Oral     SpO2:  93%     Weight: 81.2 kg (179 lb 0.2 oz)      Height:           Flowsheet Rows      First Filed Value   Admission Height  180.3 cm (71\") Documented at 02/02/2021 1115   Admission Weight  84.3 kg (185 lb 12.8 oz) Documented at 02/02/2021 1115            Intake/Output Summary (Last 24 hours) at 2/3/2021 0642  Last data filed at 2/3/2021 0220  Gross per 24 hour   Intake 240 ml   Output 850 ml   Net -610 ml        TELEMETRY: Sinus rhythm    Physical Exam:  The patient is alert, oriented and in no distress.  Vital signs as noted above.  Head and neck revealed no carotid bruits or jugular venous distention.  No thyromegaly or lymphadenopathy is present  Lungs clear.  No wheezing.  Breath sounds are normal bilaterally.  Heart normal first and second heart sounds.  No murmur. No precordial rub is present.  No gallop is present.  Abdomen soft and nontender.  No organomegaly is present.  Extremities with good peripheral pulses without any pedal edema.  Skin warm and dry.  Pacemaker site looks normal.  Musculoskeletal system is grossly normal  CNS grossly normal      Results Review:   I reviewed the patient's new clinical results.  Lab Results (last 24 hours)     Procedure Component Value Units Date/Time    TSH [[id number]]  (Normal) Collected: 02/03/21 0443    Specimen: Blood Updated: 02/03/21 0550     TSH 0.740 uIU/mL     Troponin [[id number]]  (Normal) Collected: 02/03/21 0443    Specimen: Blood Updated: 02/03/21 0550     Troponin T <0.010 ng/mL     Narrative:      Troponin T Reference Range:  <= 0.03 ng/mL-   Negative for AMI  >0.03 ng/mL-     Abnormal for myocardial necrosis.  Clinicians would have to utilize clinical acumen, EKG, " Troponin and serial changes to determine if it is an Acute Myocardial Infarction or myocardial injury due to an underlying chronic condition.       Results may be falsely decreased if patient taking Biotin.      Comprehensive Metabolic Panel [[id number]]  (Abnormal) Collected: 02/03/21 0443    Specimen: Blood Updated: 02/03/21 0544     Glucose 124 mg/dL      BUN 10 mg/dL      Creatinine 0.80 mg/dL      Sodium 140 mmol/L      Potassium 4.1 mmol/L      Chloride 105 mmol/L      CO2 26.0 mmol/L      Calcium 9.4 mg/dL      Total Protein 6.6 g/dL      Albumin 4.20 g/dL      ALT (SGPT) 16 U/L      AST (SGOT) 23 U/L      Alkaline Phosphatase 96 U/L      Total Bilirubin 0.5 mg/dL      eGFR Non African Amer 98 mL/min/1.73      Globulin 2.4 gm/dL      A/G Ratio 1.8 g/dL      BUN/Creatinine Ratio 12.5     Anion Gap 9.0 mmol/L     Narrative:      GFR Normal >60  Chronic Kidney Disease <60  Kidney Failure <15      Magnesium [[id number]]  (Normal) Collected: 02/03/21 0443    Specimen: Blood Updated: 02/03/21 0544     Magnesium 2.0 mg/dL     CBC Auto Differential [[id number]]  (Abnormal) Collected: 02/03/21 0443    Specimen: Blood Updated: 02/03/21 0526     WBC 7.80 10*3/mm3      RBC 4.66 10*6/mm3      Hemoglobin 13.7 g/dL      Hematocrit 40.0 %      MCV 85.7 fL      MCH 29.5 pg      MCHC 34.4 g/dL      RDW 13.8 %      RDW-SD 41.1 fl      MPV 7.4 fL      Platelets 257 10*3/mm3      Neutrophil % 71.9 %      Lymphocyte % 18.3 %      Monocyte % 7.9 %      Eosinophil % 0.9 %      Basophil % 1.0 %      Neutrophils, Absolute 5.60 10*3/mm3      Lymphocytes, Absolute 1.40 10*3/mm3      Monocytes, Absolute 0.60 10*3/mm3      Eosinophils, Absolute 0.10 10*3/mm3      Basophils, Absolute 0.10 10*3/mm3      nRBC 0.0 /100 WBC     Troponin [[id number]]  (Normal) Collected: 02/02/21 1824    Specimen: Blood Updated: 02/02/21 1915     Troponin T <0.010 ng/mL     Narrative:      Troponin T Reference Range:  <= 0.03 ng/mL-   Negative for AMI  >0.03 ng/mL-      Abnormal for myocardial necrosis.  Clinicians would have to utilize clinical acumen, EKG, Troponin and serial changes to determine if it is an Acute Myocardial Infarction or myocardial injury due to an underlying chronic condition.       Results may be falsely decreased if patient taking Biotin.      Respiratory Panel PCR w/COVID-19(SARS-CoV-2) SHAYNE/SANAM/RISHI/PAD/COR/MAD/THAI In-House, NP Swab in UTM/VTM, 3-4 HR TAT - Swab, Nasopharynx [[id number]]  (Normal) Collected: 02/02/21 1246    Specimen: Swab from Nasopharynx Updated: 02/02/21 1340     ADENOVIRUS, PCR Not Detected     Coronavirus 229E Not Detected     Coronavirus HKU1 Not Detected     Coronavirus NL63 Not Detected     Coronavirus OC43 Not Detected     COVID19 Not Detected     Human Metapneumovirus Not Detected     Human Rhinovirus/Enterovirus Not Detected     Influenza A PCR Not Detected     Influenza B PCR Not Detected     Parainfluenza Virus 1 Not Detected     Parainfluenza Virus 2 Not Detected     Parainfluenza Virus 3 Not Detected     Parainfluenza Virus 4 Not Detected     RSV, PCR Not Detected     Bordetella pertussis pcr Not Detected     Bordetella parapertussis PCR Not Detected     Chlamydophila pneumoniae PCR Not Detected     Mycoplasma pneumo by PCR Not Detected    Narrative:      Fact sheet for providers: https://docs.[employer]/wp-content/uploads/GDE9187-5186-SU6.1-EUA-Provider-Fact-Sheet-3.pdf    Fact sheet for patients: https://docs.[employer]/wp-content/uploads/AHN9063-3190-XB0.1-EUA-Patient-Fact-Sheet-1.pdf    Test performed by PCR.    Procalcitonin [[id number]]  (Normal) Collected: 02/02/21 1246    Specimen: Blood Updated: 02/02/21 1332     Procalcitonin 0.05 ng/mL     Narrative:      As a Marker for Sepsis (Non-Neonates):   1. <0.5 ng/mL represents a low risk of severe sepsis and/or septic shock.  1. >2 ng/mL represents a high risk of severe sepsis and/or septic shock.    As a Marker for Lower Respiratory Tract Infections that require  "antibiotic therapy:  PCT on Admission     Antibiotic Therapy             6-12 Hrs later  > 0.5                Strongly Recommended            >0.25 - <0.5         Recommended  0.1 - 0.25           Discouraged                   Remeasure/reassess PCT  <0.1                 Strongly Discouraged          Remeasure/reassess PCT      As 28 day mortality risk marker: \"Change in Procalcitonin Result\" (> 80 % or <=80 %) if Day 0 (or Day 1) and Day 4 values are available. Refer to http://www.[facility] [facility]onBangeepct-calculator.com/   Change in PCT <=80 %   A decrease of PCT levels below or equal to 80 % defines a positive change in PCT test result representing a higher risk for 28-day all-cause mortality of patients diagnosed with severe sepsis or septic shock.  Change in PCT > 80 %   A decrease of PCT levels of more than 80 % defines a negative change in PCT result representing a lower risk for 28-day all-cause mortality of patients diagnosed with severe sepsis or septic shock.                Results may be falsely decreased if patient taking Biotin.     Comprehensive Metabolic Panel [[id number]]  (Abnormal) Collected: 02/02/21 1246    Specimen: Blood Updated: 02/02/21 1325     Glucose 117 mg/dL      BUN 8 mg/dL      Creatinine 0.84 mg/dL      Sodium 142 mmol/L      Potassium 4.6 mmol/L      Chloride 105 mmol/L      CO2 27.0 mmol/L      Calcium 9.1 mg/dL      Total Protein 7.1 g/dL      Albumin 4.50 g/dL      ALT (SGPT) 18 U/L      AST (SGOT) 22 U/L      Alkaline Phosphatase 100 U/L      Total Bilirubin 0.3 mg/dL      eGFR Non African Amer 93 mL/min/1.73      Globulin 2.6 gm/dL      A/G Ratio 1.7 g/dL      BUN/Creatinine Ratio 9.5     Anion Gap 10.0 mmol/L     Narrative:      GFR Normal >60  Chronic Kidney Disease <60  Kidney Failure <15      Troponin [[id number]]  (Normal) Collected: 02/02/21 1246    Specimen: Blood Updated: 02/02/21 1325     Troponin T <0.010 ng/mL     Narrative:      Troponin T Reference Range:  <= 0.03 ng/mL-   Negative " for AMI  >0.03 ng/mL-     Abnormal for myocardial necrosis.  Clinicians would have to utilize clinical acumen, EKG, Troponin and serial changes to determine if it is an Acute Myocardial Infarction or myocardial injury due to an underlying chronic condition.       Results may be falsely decreased if patient taking Biotin.      BNP [[id number]]  (Normal) Collected: 02/02/21 1246    Specimen: Blood Updated: 02/02/21 1323     proBNP 27.9 pg/mL     Narrative:      Among patients with dyspnea, NT-proBNP is highly sensitive for the detection of acute congestive heart failure. In addition NT-proBNP of <300 pg/ml effectively rules out acute congestive heart failure with 99% negative predictive value.    Results may be falsely decreased if patient taking Biotin.      Protime-INR [[id number]]  (Normal) Collected: 02/02/21 1246    Specimen: Blood Updated: 02/02/21 1310     Protime 10.7 Seconds      INR 0.97    aPTT [[id number]]  (Normal) Collected: 02/02/21 1246    Specimen: Blood Updated: 02/02/21 1310     PTT 24.5 seconds     CBC & Differential [[id number]]  (Normal) Collected: 02/02/21 1246    Specimen: Blood Updated: 02/02/21 1256    Narrative:      The following orders were created for panel order CBC & Differential.  Procedure                               Abnormality         Status                     ---------                               -----------         ------                     CBC Auto Differential[[id number]]        Normal              Final result                 Please view results for these tests on the individual orders.    CBC Auto Differential [[id number]]  (Normal) Collected: 02/02/21 1246    Specimen: Blood Updated: 02/02/21 1256     WBC 5.20 10*3/mm3      RBC 4.85 10*6/mm3      Hemoglobin 14.2 g/dL      Hematocrit 41.1 %      MCV 84.8 fL      MCH 29.3 pg      MCHC 34.6 g/dL      RDW 13.9 %      RDW-SD 41.6 fl      MPV 7.2 fL      Platelets 255 10*3/mm3      Neutrophil % 57.4 %      Lymphocyte % 30.4 %       Monocyte % 9.1 %      Eosinophil % 2.3 %      Basophil % 0.8 %      Neutrophils, Absolute 3.00 10*3/mm3      Lymphocytes, Absolute 1.60 10*3/mm3      Monocytes, Absolute 0.50 10*3/mm3      Eosinophils, Absolute 0.10 10*3/mm3      Basophils, Absolute 0.00 10*3/mm3      nRBC 0.0 /100 WBC           Imaging Results (Last 24 Hours)     Procedure Component Value Units Date/Time    CT Head Without Contrast [[id number]] Collected: 02/02/21 1341     Updated: 02/02/21 1344    Narrative:         DATE OF EXAM:  2/2/2021 1:37 PM     PROCEDURE:   CT HEAD WO CONTRAST-     INDICATIONS:   headache htn     COMPARISON:  3/14/2017     TECHNIQUE:   Routine transaxial and coronal reconstruction images were obtained  through the head without the administration of contrast. Automated  exposure control and iterative reconstruction methods were used.     FINDINGS:  There is no evidence for acute intracranial hemorrhage. No definitive  acute focal ischemia is observed. There is no abnormal cerebral edema.  There is no mass effect or midline shift. The ventricular system is  nondilated. The basilar cisterns are patent. There is no evidence for  displaced skull fracture. The paranasal sinuses and mastoid air cells  are clear.       Impression:      1.No evidence for acute intracranial abnormality.     Electronically Signed By-Yfn Cook MD On:2/2/2021 1:42 PM  This report was finalized on [id number] by  Yfn Cook MD.    XR Chest AP [[id number]] Collected: 02/02/21 1250     Updated: 02/02/21 1253    Narrative:         DATE OF EXAM:   2/2/2021 12:30 PM     PROCEDURE:   XR CHEST AP-     INDICATIONS:   COVID Evaluation, Cough, Fever     COMPARISON:  7/23/2019, 4/13/2018     TECHNIQUE:   [Portable chest radiograph]     FINDINGS:  Chronic changes are noted which appear stable. No new consolidations or  pleural effusions are observed. The cardiac silhouette and mediastinum  are stable. The cardiac pacemaker/AICD is stable with leads intact.  No  acute osseous abnormalities are identified.       Impression:      There is no significant change when compared to the prior study. There  is no evidence for acute cardiopulmonary process.     Electronically Signed By-Yfn Cook MD On:2/2/2021 12:51 PM  This report was finalized on [id number] by  Yfn Cook MD.      LAB RESULTS (LAST 7 DAYS)    CBC  Results from last 7 days   Lab Units 02/03/21  0443 02/02/21  1246   WBC 10*3/mm3 7.80 5.20   RBC 10*6/mm3 4.66 4.85   HEMOGLOBIN g/dL 13.7 14.2   HEMATOCRIT % 40.0 41.1   MCV fL 85.7 84.8   PLATELETS 10*3/mm3 257 255       BMP  Results from last 7 days   Lab Units 02/03/21  0443 02/02/21  1246   SODIUM mmol/L 140 142   POTASSIUM mmol/L 4.1 4.6   CHLORIDE mmol/L 105 105   CO2 mmol/L 26.0 27.0   BUN mg/dL 10 8   CREATININE mg/dL 0.80 0.84   GLUCOSE mg/dL 124* 117*   MAGNESIUM mg/dL 2.0  --        CMP   Results from last 7 days   Lab Units 02/03/21  0443 02/02/21  1246   SODIUM mmol/L 140 142   POTASSIUM mmol/L 4.1 4.6   CHLORIDE mmol/L 105 105   CO2 mmol/L 26.0 27.0   BUN mg/dL 10 8   CREATININE mg/dL 0.80 0.84   GLUCOSE mg/dL 124* 117*   ALBUMIN g/dL 4.20 4.50   BILIRUBIN mg/dL 0.5 0.3   ALK PHOS U/L 96 100   AST (SGOT) U/L 23 22   ALT (SGPT) U/L 16 18         BNP        TROPONIN  Results from last 7 days   Lab Units 02/03/21  0443   TROPONIN T ng/mL <0.010       CoAg  Results from last 7 days   Lab Units 02/02/21  1246   INR  0.97   APTT seconds 24.5       Creatinine Clearance  Estimated Creatinine Clearance: 111.4 mL/min (by C-G formula based on SCr of 0.8 mg/dL).    ABG        Radiology  Ct Head Without Contrast    Result Date: 2/2/2021  1.No evidence for acute intracranial abnormality.  Electronically Signed By-Yfn Cook MD On:2/2/2021 1:42 PM This report was finalized on [id number] by  Yfn Cook MD.    Xr Chest Ap    Result Date: 2/2/2021  There is no significant change when compared to the prior study. There is no evidence for acute  cardiopulmonary process.  Electronically Signed By-Yfn Cook MD On:2/2/2021 12:51 PM This report was finalized on [id number] by  Yfn Cook MD.              EKG                                      I personally viewed and interpreted the patient's EKG/Telemetry data:    ECHOCARDIOGRAM:             STRESS MYOVIEW:    Cardiolite (Tc-99m Sestamibi) stress test    CARDIAC CATHETERIZATION:            OTHER:         Assessment/Plan     Principal Problem:    Chest pain  Active Problems:    Degeneration of lumbar or lumbosacral intervertebral disc    Gastroesophageal reflux disease    Hypertension    Paroxysmal atrial fibrillation (CMS/HCC)    Presence of cardiac pacemaker    Hyperlipidemia    Chronic low back pain with sciatica    Bobby's esophagus with dysplasia    Hypertensive urgency    Headache      [[[[[[[[[[[[[[[[[[[[[  Impression  ============  -Chest discomfort-possible angina pectoris.  EKG showed no acute changes.  Troponin levels are negative.     -status post permanent dual-chamber pacemaker implantation ([employer] MRI Compatible ) 04/13/2018  - sick sinus syndrome-tachy-irvin syndrome.  Patient has history of atrial fibrillation with rapid ventricular response.  Patient had significant pauses of 5-6 seconds.  Symptomatic with dizziness and near-syncope     - history of Near-syncope associated with palpitations and dizziness .  -improved     -cardiac catheterization 03/18/2017 revealed normal left ventricular function and normal coronary arteries.  History of normal echocardiogram     -status post loop recorder placement 03/24/2017. -removal of loop recorder 04/13/2018.     -hypertension  Renal dysfunction BUN 17 creatinine 1.4 GERD depression Psoriatic arthritis Hypokalemia     -history of Anemia and neutropenia     -Status post hip replacement bilateral arthroscopic knee surgery right shoulder surgery  CBP- with chronic myelopathy, s/p multiple lumbar surgeries; continue soma,  gabapentin     -Allergic to Percocet  ================  Plan  ==============  Chest discomfort suggestive of angina pectoris.  Troponin levels are negative.  EKG showed no acute changes.    History of labile blood pressure.  Patient has hypertensive crisis which is better now but still elevated.  Increase metoprolol.  May need to increase the dose of lisinopril.    Status post pacemaker  Patient has palpitations and appears to have PMT.  Reviewed rhythm strips.  Interrogation of the pacemaker revealed excellent pacing parameters.  Device reprogrammed to avoid PMT.    Echocardiogram.-Today  Stress Cardiolite test.-Today    Medications were reviewed and updated.    Further plan will depend on patient's progress.  [[[[[[[[[[[[[[[[[[[[[[[[[[[[[[[[          Claudia Benson MD  02/03/21  06:42 EST               51

## 2021-02-04 ENCOUNTER — READMISSION MANAGEMENT (OUTPATIENT)
Dept: CALL CENTER | Facility: HOSPITAL | Age: 61
End: 2021-02-04

## 2021-02-04 VITALS
RESPIRATION RATE: 16 BRPM | DIASTOLIC BLOOD PRESSURE: 75 MMHG | BODY MASS INDEX: 25 KG/M2 | SYSTOLIC BLOOD PRESSURE: 125 MMHG | HEART RATE: 66 BPM | HEIGHT: 71 IN | TEMPERATURE: 97.8 F | OXYGEN SATURATION: 94 % | WEIGHT: 178.57 LBS

## 2021-02-04 LAB
ANION GAP SERPL CALCULATED.3IONS-SCNC: 7 MMOL/L (ref 5–15)
BASOPHILS # BLD AUTO: 0.1 10*3/MM3 (ref 0–0.2)
BASOPHILS NFR BLD AUTO: 1.1 % (ref 0–1.5)
BUN SERPL-MCNC: 18 MG/DL (ref 8–23)
BUN/CREAT SERPL: 18.6 (ref 7–25)
CALCIUM SPEC-SCNC: 9 MG/DL (ref 8.6–10.5)
CHLORIDE SERPL-SCNC: 105 MMOL/L (ref 98–107)
CO2 SERPL-SCNC: 27 MMOL/L (ref 22–29)
CREAT SERPL-MCNC: 0.97 MG/DL (ref 0.76–1.27)
DEPRECATED RDW RBC AUTO: 42.9 FL (ref 37–54)
EOSINOPHIL # BLD AUTO: 0.2 10*3/MM3 (ref 0–0.4)
EOSINOPHIL NFR BLD AUTO: 2.3 % (ref 0.3–6.2)
ERYTHROCYTE [DISTWIDTH] IN BLOOD BY AUTOMATED COUNT: 14.4 % (ref 12.3–15.4)
GFR SERPL CREATININE-BSD FRML MDRD: 79 ML/MIN/1.73
GLUCOSE SERPL-MCNC: 143 MG/DL (ref 65–99)
HCT VFR BLD AUTO: 38.6 % (ref 37.5–51)
HGB BLD-MCNC: 13.1 G/DL (ref 13–17.7)
INR PPP: 0.99 (ref 0.93–1.1)
LYMPHOCYTES # BLD AUTO: 2.2 10*3/MM3 (ref 0.7–3.1)
LYMPHOCYTES NFR BLD AUTO: 22.1 % (ref 19.6–45.3)
MCH RBC QN AUTO: 29.1 PG (ref 26.6–33)
MCHC RBC AUTO-ENTMCNC: 34 G/DL (ref 31.5–35.7)
MCV RBC AUTO: 85.4 FL (ref 79–97)
MONOCYTES # BLD AUTO: 0.8 10*3/MM3 (ref 0.1–0.9)
MONOCYTES NFR BLD AUTO: 8.2 % (ref 5–12)
NEUTROPHILS NFR BLD AUTO: 6.7 10*3/MM3 (ref 1.7–7)
NEUTROPHILS NFR BLD AUTO: 66.3 % (ref 42.7–76)
NRBC BLD AUTO-RTO: 0.1 /100 WBC (ref 0–0.2)
PLATELET # BLD AUTO: 257 10*3/MM3 (ref 140–450)
PMV BLD AUTO: 7.3 FL (ref 6–12)
POTASSIUM SERPL-SCNC: 4 MMOL/L (ref 3.5–5.2)
PROTHROMBIN TIME: 10.9 SECONDS (ref 9.6–11.7)
QT INTERVAL: 370 MS
RBC # BLD AUTO: 4.51 10*6/MM3 (ref 4.14–5.8)
SODIUM SERPL-SCNC: 139 MMOL/L (ref 136–145)
WBC # BLD AUTO: 10.1 10*3/MM3 (ref 3.4–10.8)

## 2021-02-04 PROCEDURE — 80048 BASIC METABOLIC PNL TOTAL CA: CPT | Performed by: INTERNAL MEDICINE

## 2021-02-04 PROCEDURE — C1769 GUIDE WIRE: HCPCS | Performed by: INTERNAL MEDICINE

## 2021-02-04 PROCEDURE — 99152 MOD SED SAME PHYS/QHP 5/>YRS: CPT | Performed by: INTERNAL MEDICINE

## 2021-02-04 PROCEDURE — 85610 PROTHROMBIN TIME: CPT | Performed by: INTERNAL MEDICINE

## 2021-02-04 PROCEDURE — 85025 COMPLETE CBC W/AUTO DIFF WBC: CPT | Performed by: INTERNAL MEDICINE

## 2021-02-04 PROCEDURE — 93458 L HRT ARTERY/VENTRICLE ANGIO: CPT | Performed by: INTERNAL MEDICINE

## 2021-02-04 PROCEDURE — G0378 HOSPITAL OBSERVATION PER HR: HCPCS

## 2021-02-04 PROCEDURE — 25010000002 MIDAZOLAM PER 1 MG: Performed by: INTERNAL MEDICINE

## 2021-02-04 PROCEDURE — 99217 PR OBSERVATION CARE DISCHARGE MANAGEMENT: CPT | Performed by: INTERNAL MEDICINE

## 2021-02-04 PROCEDURE — 25010000002 FENTANYL CITRATE (PF) 100 MCG/2ML SOLUTION: Performed by: INTERNAL MEDICINE

## 2021-02-04 PROCEDURE — 0 IOPAMIDOL PER 1 ML: Performed by: INTERNAL MEDICINE

## 2021-02-04 PROCEDURE — C1894 INTRO/SHEATH, NON-LASER: HCPCS | Performed by: INTERNAL MEDICINE

## 2021-02-04 PROCEDURE — 99215 OFFICE O/P EST HI 40 MIN: CPT | Performed by: INTERNAL MEDICINE

## 2021-02-04 RX ORDER — METOPROLOL SUCCINATE 25 MG/1
25 TABLET, EXTENDED RELEASE ORAL EVERY 24 HOURS
Qty: 90 TABLET | Refills: 5 | Status: SHIPPED | OUTPATIENT
Start: 2021-02-04 | End: 2021-03-11 | Stop reason: SDUPTHER

## 2021-02-04 RX ORDER — FENTANYL CITRATE 50 UG/ML
INJECTION, SOLUTION INTRAMUSCULAR; INTRAVENOUS AS NEEDED
Status: DISCONTINUED | OUTPATIENT
Start: 2021-02-04 | End: 2021-02-04 | Stop reason: HOSPADM

## 2021-02-04 RX ORDER — ONDANSETRON 2 MG/ML
4 INJECTION INTRAMUSCULAR; INTRAVENOUS EVERY 6 HOURS PRN
Status: DISCONTINUED | OUTPATIENT
Start: 2021-02-04 | End: 2021-02-04

## 2021-02-04 RX ORDER — SODIUM CHLORIDE 9 MG/ML
250 INJECTION, SOLUTION INTRAVENOUS ONCE AS NEEDED
Status: DISCONTINUED | OUTPATIENT
Start: 2021-02-04 | End: 2021-02-04 | Stop reason: HOSPADM

## 2021-02-04 RX ORDER — LIDOCAINE HYDROCHLORIDE 20 MG/ML
INJECTION, SOLUTION INFILTRATION; PERINEURAL AS NEEDED
Status: DISCONTINUED | OUTPATIENT
Start: 2021-02-04 | End: 2021-02-04 | Stop reason: HOSPADM

## 2021-02-04 RX ORDER — ONDANSETRON 4 MG/1
4 TABLET, FILM COATED ORAL EVERY 6 HOURS PRN
Status: DISCONTINUED | OUTPATIENT
Start: 2021-02-04 | End: 2021-02-04

## 2021-02-04 RX ORDER — DIPHENHYDRAMINE HCL 25 MG
25 CAPSULE ORAL EVERY 6 HOURS PRN
Status: DISCONTINUED | OUTPATIENT
Start: 2021-02-04 | End: 2021-02-04 | Stop reason: HOSPADM

## 2021-02-04 RX ORDER — METOPROLOL SUCCINATE 25 MG/1
25 TABLET, EXTENDED RELEASE ORAL EVERY 24 HOURS
Qty: 90 TABLET | Refills: 5 | Status: SHIPPED | OUTPATIENT
Start: 2021-02-04 | End: 2021-02-04 | Stop reason: SDUPTHER

## 2021-02-04 RX ORDER — ACETAMINOPHEN 325 MG/1
650 TABLET ORAL EVERY 4 HOURS PRN
Status: DISCONTINUED | OUTPATIENT
Start: 2021-02-04 | End: 2021-02-04

## 2021-02-04 RX ORDER — MIDAZOLAM HYDROCHLORIDE 1 MG/ML
INJECTION INTRAMUSCULAR; INTRAVENOUS AS NEEDED
Status: DISCONTINUED | OUTPATIENT
Start: 2021-02-04 | End: 2021-02-04 | Stop reason: HOSPADM

## 2021-02-04 RX ORDER — METOPROLOL SUCCINATE 25 MG/1
25 TABLET, EXTENDED RELEASE ORAL EVERY 24 HOURS
Qty: 90 TABLET | Refills: 5
Start: 2021-02-04 | End: 2021-02-04 | Stop reason: SDUPTHER

## 2021-02-04 RX ADMIN — CETIRIZINE HYDROCHLORIDE 10 MG: 10 TABLET, FILM COATED ORAL at 10:08

## 2021-02-04 RX ADMIN — Medication 10 ML: at 10:07

## 2021-02-04 RX ADMIN — METOPROLOL SUCCINATE 25 MG: 25 TABLET, EXTENDED RELEASE ORAL at 10:08

## 2021-02-04 RX ADMIN — LISINOPRIL 20 MG: 20 TABLET ORAL at 10:08

## 2021-02-04 RX ADMIN — ROSUVASTATIN CALCIUM 5 MG: 5 TABLET, FILM COATED ORAL at 10:08

## 2021-02-04 RX ADMIN — GABAPENTIN 600 MG: 300 CAPSULE ORAL at 10:09

## 2021-02-04 RX ADMIN — DIGOXIN 125 MCG: 0.12 TABLET ORAL at 11:04

## 2021-02-04 RX ADMIN — HYDROCODONE BITARTRATE AND ACETAMINOPHEN 1 TABLET: 5; 325 TABLET ORAL at 10:12

## 2021-02-04 RX ADMIN — PANTOPRAZOLE SODIUM 40 MG: 40 TABLET, DELAYED RELEASE ORAL at 05:29

## 2021-02-04 NOTE — PROGRESS NOTES
Referring Provider: Deric Mane MD    Reason for follow-up:  Chest pain  Status post pacemaker  PMT  History of atrial fibrillation  Labile blood pressure  Abnormal stress Cardiolite test     Patient Care Team:  Junaid Marquez MD as PCP - General  Junaid Marquez MD as PCP - Family Medicine  Junaid Marquez MD Gondi, Bapineedu, MD as Consulting Physician (Cardiology)    Subjective .  Feeling okay     ROS    Since I have last seen him yesterday, the patient has been without any chest discomfort ,shortness of breath, palpitations, dizziness or syncope.  Denies having any,abdominal pain ,nausea, vomiting , diarrhea constipation, loss of weight or loss of appetite.  Denies having any excessive bruising ,hematuria or blood in the stool.    Review of all systems negative except as indicated    History  Past Medical History:   Diagnosis Date   • Allergic rhinitis     Impression: persistent   • Arthralgia of right hip     Impression: needs right hip relacement   • Bobby's esophagus with dysplasia     Impression: scope 11/2013 improved from previous. Recheck due in 2015 Packet given   • Cervical disc disease with myelopathy     Impression: seen on MRi 5/2012. Workup in progress.   • Chronic low back pain with sciatica     Unspecified. Impression: scheduled for surgery   • Chronic pain of right knee     Impression: needs knee replacement   • Colon cancer screening     Impression: packet given   • ED (erectile dysfunction)    • Hydrocele    • Insomnia    • Left leg weakness     Impression: due to recent surgery. May benefit for home health. Pt homebound due severe pain postop back surgery. Also needs 3-in-1 Commode   • Medicare annual wellness visit, subsequent     With abnormal findings.   • Non-seasonal allergic rhinitis due to pollen    • Other specified persistent mood disorders (CMS/HCC)     Story: stress   • Psoriatic arthritis (CMS/HCC)     Impression: refer to Dr Rodriguez   • Screening for  alcoholism    • Trigger ring finger of right hand     Impression: refer to hand surgeon   • Ulnar neuropathy at elbow of right upper extremity     Impression: suspect stinger occured during surgery. Natural course and self-limited nature of this condition discussed. Follow-up in 4-6 weeks if not better. Follow-up sooner for worsening symptoms or for any concerns. EMG if not improved   • Umbilical hernia        Past Surgical History:   Procedure Laterality Date   • CARDIAC PACEMAKER PLACEMENT     • KNEE SURGERY Right 2016   • LUMBAR DISCECTOMY  10/2015    Comments: lumbar disk decompression   • TOTAL HIP ARTHROPLASTY Right 12/09/2016       Family History   Problem Relation Age of Onset   • Other Father         Substance Abuse   • Cancer Father         Lung       Social History     Tobacco Use   • Smoking status: Former Smoker   • Smokeless tobacco: Never Used   Substance Use Topics   • Alcohol use: No     Frequency: Never   • Drug use: No        Medications Prior to Admission   Medication Sig Dispense Refill Last Dose   • amitriptyline (ELAVIL) 50 MG tablet TAKE 2 TABLETS AT BEDTIME 180 tablet 3 2/1/2021 at Unknown time   • ascorbic acid (VITAMIN C) 500 MG tablet Take 500 mg by mouth every night at bedtime.   2/1/2021 at Unknown time   • baclofen (LIORESAL) 10 MG tablet Take 1 tablet by mouth 3 (Three) Times a Day As Needed for Muscle Spasms. 60 tablet 1    • cetirizine (zyrTEC) 10 MG tablet Take 10 mg by mouth Daily.   2/2/2021 at 0800   • cholecalciferol (VITAMIN D3) 25 MCG (1000 UT) tablet Take 3,000 Units by mouth every night at bedtime.   2/1/2021 at Unknown time   • diclofenac-misoprostol (ARTHROTEC 75) 75-0.2 MG EC tablet Take 1 tablet by mouth 2 (Two) Times a Day.      • digoxin (LANOXIN) 125 MCG tablet Take 1 tablet by mouth Daily. 90 tablet 3 2/2/2021 at 0800   • esomeprazole (NEXIUM) 40 MG capsule Take 1 capsule by mouth Daily.   2/2/2021 at 0800   • etanercept (ENBREL) 50 MG/ML solution prefilled syringe  injection Inject 50 mg under the skin into the appropriate area as directed 1 (One) Time Per Week. Wed   Past Week at Unknown time   • fluticasone (FLONASE) 50 MCG/ACT nasal spray USE 2 SPRAYS IN EACH NOSTRIL ONCE DAILY 3 bottle 3 2/2/2021 at 0800   • gabapentin (NEURONTIN) 600 MG tablet TAKE 1 TABLET FOUR TIMES A  tablet 3 2/2/2021 at 0800   • HYDROcodone-acetaminophen (NORCO) 5-325 MG per tablet Take 1 tablet by mouth 3 (Three) Times a Day As Needed for Severe Pain . 90 tablet 0    • lisinopril (PRINIVIL,ZESTRIL) 20 MG tablet Take 0.5 tablets by mouth Daily. (Patient taking differently: Take 10 mg by mouth Daily As Needed.) 90 tablet 3    • metoprolol succinate XL (Toprol XL) 25 MG 24 hr tablet Take 0.5 tablets by mouth Daily. 90 tablet 5 2/2/2021 at 0800   • rosuvastatin (CRESTOR) 10 MG tablet TAKE 1/2 TABLET BY MOUTH DAILY (Patient taking differently: Take 5 mg by mouth Daily.) 45 tablet 5 2/2/2021 at 0800   • tamsulosin (FLOMAX) 0.4 MG capsule 24 hr capsule Take 1 capsule by mouth Daily.   2/1/2021 at Unknown time       Allergies  Oxycodone-acetaminophen, Adhesive tape, and Oxycodone    Scheduled Meds:amitriptyline, 100 mg, Oral, Nightly  ascorbic acid, 500 mg, Oral, Nightly  cetirizine, 10 mg, Oral, Daily  cholecalciferol, 3,000 Units, Oral, Nightly  digoxin, 125 mcg, Oral, Daily  enoxaparin, 40 mg, Subcutaneous, Q24H  gabapentin, 600 mg, Oral, 4x Daily  lisinopril, 20 mg, Oral, Q24H  metoprolol succinate XL, 25 mg, Oral, Q12H  pantoprazole, 40 mg, Oral, QAM  rosuvastatin, 5 mg, Oral, Daily  sodium chloride, 10 mL, Intravenous, Q12H  sodium chloride, 3 mL, Intravenous, Q12H  tamsulosin, 0.4 mg, Oral, Nightly      Continuous Infusions:nitroglycerin, 10-50 mcg/min, Last Rate: Stopped (02/03/21 0653)      PRN Meds:.•  acetaminophen **OR** acetaminophen **OR** acetaminophen  •  aluminum-magnesium hydroxide-simethicone  •  baclofen  •  HYDROcodone-acetaminophen  •  melatonin  •  ondansetron **OR**  "ondansetron  •  [COMPLETED] Insert peripheral IV **AND** sodium chloride  •  sodium chloride  •  sodium chloride    Objective     VITAL SIGNS  Vitals:    02/03/21 1751 02/03/21 2217 02/04/21 0414 02/04/21 0526   BP: 148/90 152/99 105/75 105/69   BP Location:  Left arm Right arm Right arm   Patient Position:  Lying Lying Lying   Pulse: 81 73 63 72   Resp: 17 18 14 16   Temp: 98 °F (36.7 °C) 98 °F (36.7 °C) 98.2 °F (36.8 °C) 97.8 °F (36.6 °C)   TempSrc:  Oral Oral Oral   SpO2: 98% 95% 95% 94%   Weight:    81 kg (178 lb 9.2 oz)   Height:           Flowsheet Rows      First Filed Value   Admission Height  180.3 cm (71\") Documented at 02/02/2021 1115   Admission Weight  84.3 kg (185 lb 12.8 oz) Documented at 02/02/2021 1115            Intake/Output Summary (Last 24 hours) at 2/4/2021 0646  Last data filed at 2/4/2021 0526  Gross per 24 hour   Intake 960 ml   Output 800 ml   Net 160 ml        TELEMETRY: Sinus rhythm    Physical Exam:  The patient is alert, oriented and in no distress.  Vital signs as noted above.  Head and neck revealed no carotid bruits or jugular venous distention.  No thyromegaly or lymphadenopathy is present  Lungs clear.  No wheezing.  Breath sounds are normal bilaterally.  Heart normal first and second heart sounds.  No murmur. No precordial rub is present.  No gallop is present.  Abdomen soft and nontender.  No organomegaly is present.  Extremities with good peripheral pulses without any pedal edema.  Skin warm and dry.  Pacemaker site looks normal.  Musculoskeletal system is grossly normal  CNS grossly normal      Results Review:   I reviewed the patient's new clinical results.  Lab Results (last 24 hours)     Procedure Component Value Units Date/Time    Basic Metabolic Panel [[id number]]  (Abnormal) Collected: 02/04/21 0246    Specimen: Blood Updated: 02/04/21 0402     Glucose 143 mg/dL      BUN 18 mg/dL      Creatinine 0.97 mg/dL      Sodium 139 mmol/L      Potassium 4.0 mmol/L      Chloride 105 " mmol/L      CO2 27.0 mmol/L      Calcium 9.0 mg/dL      eGFR Non African Amer 79 mL/min/1.73      BUN/Creatinine Ratio 18.6     Anion Gap 7.0 mmol/L     Narrative:      GFR Normal >60  Chronic Kidney Disease <60  Kidney Failure <15      Protime-INR [[id number]]  (Normal) Collected: 02/04/21 0246    Specimen: Blood Updated: 02/04/21 0319     Protime 10.9 Seconds      INR 0.99    CBC & Differential [[id number]]  (Normal) Collected: 02/04/21 0246    Specimen: Blood Updated: 02/04/21 0310    Narrative:      The following orders were created for panel order CBC & Differential.  Procedure                               Abnormality         Status                     ---------                               -----------         ------                     CBC Auto Differential[[id number]]        Normal              Final result                 Please view results for these tests on the individual orders.    CBC Auto Differential [[id number]]  (Normal) Collected: 02/04/21 0246    Specimen: Blood Updated: 02/04/21 0310     WBC 10.10 10*3/mm3      RBC 4.51 10*6/mm3      Hemoglobin 13.1 g/dL      Hematocrit 38.6 %      MCV 85.4 fL      MCH 29.1 pg      MCHC 34.0 g/dL      RDW 14.4 %      RDW-SD 42.9 fl      MPV 7.3 fL      Platelets 257 10*3/mm3      Neutrophil % 66.3 %      Lymphocyte % 22.1 %      Monocyte % 8.2 %      Eosinophil % 2.3 %      Basophil % 1.1 %      Neutrophils, Absolute 6.70 10*3/mm3      Lymphocytes, Absolute 2.20 10*3/mm3      Monocytes, Absolute 0.80 10*3/mm3      Eosinophils, Absolute 0.20 10*3/mm3      Basophils, Absolute 0.10 10*3/mm3      nRBC 0.1 /100 WBC     Magnesium [[id number]]  (Normal) Collected: 02/03/21 1742    Specimen: Blood Updated: 02/03/21 1945     Magnesium 2.0 mg/dL           Imaging Results (Last 24 Hours)     ** No results found for the last 24 hours. **      LAB RESULTS (LAST 7 DAYS)    CBC  Results from last 7 days   Lab Units 02/04/21  0246 02/03/21  0443 02/02/21  1246   WBC 10*3/mm3 10.10  7.80 5.20   RBC 10*6/mm3 4.51 4.66 4.85   HEMOGLOBIN g/dL 13.1 13.7 14.2   HEMATOCRIT % 38.6 40.0 41.1   MCV fL 85.4 85.7 84.8   PLATELETS 10*3/mm3 257 257 255       BMP  Results from last 7 days   Lab Units 02/04/21  0246 02/03/21  1742 02/03/21  0443 02/02/21  1246   SODIUM mmol/L 139  --  140 142   POTASSIUM mmol/L 4.0  --  4.1 4.6   CHLORIDE mmol/L 105  --  105 105   CO2 mmol/L 27.0  --  26.0 27.0   BUN mg/dL 18  --  10 8   CREATININE mg/dL 0.97  --  0.80 0.84   GLUCOSE mg/dL 143*  --  124* 117*   MAGNESIUM mg/dL  --  2.0 2.0  --        CMP   Results from last 7 days   Lab Units 02/04/21  0246 02/03/21  0443 02/02/21  1246   SODIUM mmol/L 139 140 142   POTASSIUM mmol/L 4.0 4.1 4.6   CHLORIDE mmol/L 105 105 105   CO2 mmol/L 27.0 26.0 27.0   BUN mg/dL 18 10 8   CREATININE mg/dL 0.97 0.80 0.84   GLUCOSE mg/dL 143* 124* 117*   ALBUMIN g/dL  --  4.20 4.50   BILIRUBIN mg/dL  --  0.5 0.3   ALK PHOS U/L  --  96 100   AST (SGOT) U/L  --  23 22   ALT (SGPT) U/L  --  16 18         BNP        TROPONIN  Results from last 7 days   Lab Units 02/03/21  0443   TROPONIN T ng/mL <0.010       CoAg  Results from last 7 days   Lab Units 02/04/21  0246 02/02/21  1246   INR  0.99 0.97   APTT seconds  --  24.5       Creatinine Clearance  Estimated Creatinine Clearance: 91.6 mL/min (by C-G formula based on SCr of 0.97 mg/dL).    ABG        Radiology  Ct Head Without Contrast    Result Date: 2/2/2021  1.No evidence for acute intracranial abnormality.  Electronically Signed By-Yfn Cook MD On:2/2/2021 1:42 PM This report was finalized on [id number] by  Yfn Cook MD.    Xr Chest Ap    Result Date: 2/2/2021  There is no significant change when compared to the prior study. There is no evidence for acute cardiopulmonary process.  Electronically Signed By-Yfn Cook MD On:2/2/2021 12:51 PM This report was finalized on [id number] by  Yfn Cook MD.              EKG                                      I personally  viewed and interpreted the patient's EKG/Telemetry data:    ECHOCARDIOGRAM:    Results for orders placed during the hospital encounter of 02/02/21   Adult Transthoracic Echo Complete W/ Cont if Necessary Per Protocol    Narrative · Estimated left ventricular EF = 60% Left ventricular systolic function   is normal.     Indications  Chest pain    Technically satisfactory study.  Mitral valve is structurally normal.  Tricuspid valve is structurally normal.  Aortic valve is structurally normal.  Pulmonic valve could not be well visualized.  No evidence for mitral tricuspid or aortic regurgitation is seen by   Doppler study.  Left atrium is normal in size.  Right atrium is normal in size.  Left ventricle is normal in size and contractility with ejection fraction   of 60%.  Right ventricle is normal in size.  Atrial septum is intact.  Aorta is normal.  No pericardial effusion or intracardiac thrombus is seen.    Impression  Structurally and functionally normal cardiac valves.  Left ventricular size and contractility is normal with ejection fraction   of 60%.           STRESS MYOVIEW:    Cardiolite (Tc-99m Sestamibi) stress test    CARDIAC CATHETERIZATION:            OTHER:         Assessment/Plan     Principal Problem:    Chest pain  Active Problems:    Abnormal nuclear stress test    Degeneration of lumbar or lumbosacral intervertebral disc    Gastroesophageal reflux disease    Hypertension    Paroxysmal atrial fibrillation (CMS/HCC)    Presence of cardiac pacemaker    Hyperlipidemia    Chronic low back pain with sciatica    Bobby's esophagus with dysplasia    Hypertensive urgency    Headache      [[[[[[[[[[[[[[[[[[[[[  Impression  ============  -Chest discomfort-possible angina pectoris.  EKG showed no acute changes.  Troponin levels are negative.  Abnormal stress Cardiolite test with inferior ischemia.  Echocardiogram 2/2/2021-normal    Cardiac catheterization 2/4/2021 revealed  Normal left ventricle size and  contractility with ejection fraction of 60%.  Essentially normal coronary arteries with dominant right coronary artery system with luminal irregularities.    -status post permanent dual-chamber pacemaker implantation (Lab4U MRI Compatible ) 04/13/2018  - sick sinus syndrome-tachy-irvin syndrome.  Patient has history of atrial fibrillation with rapid ventricular response.  Patient had significant pauses of 5-6 seconds.  Symptomatic with dizziness and near-syncope     - history of Near-syncope associated with palpitations and dizziness .  -improved     -cardiac catheterization 03/18/2017 revealed normal left ventricular function and normal coronary arteries.  History of normal echocardiogram     -status post loop recorder placement 03/24/2017. -removal of loop recorder 04/13/2018.     -hypertension  Renal dysfunction BUN 17 creatinine 1.4 GERD depression Psoriatic arthritis Hypokalemia     -history of Anemia and neutropenia     -Status post hip replacement bilateral arthroscopic knee surgery right shoulder surgery  CBP- with chronic myelopathy, s/p multiple lumbar surgeries; continue soma, gabapentin     -Allergic to Percocet  ================  Plan  ==============  Chest discomfort suggestive of angina pectoris.  Troponin levels are negative.  EKG showed no acute changes.  Abnormal stress Cardiolite test    History of labile blood pressure.  Patient has hypertensive crisis which is better now but still elevated.  Increase metoprolol.  May need to increase the dose of lisinopril.    Status post pacemaker  Patient has palpitations and appears to have PMT.  Reviewed rhythm strips.  Interrogation of the pacemaker revealed excellent pacing parameters.  Device reprogrammed to avoid PMT.    Echocardiogram.-Normal 2/2/2021  Stress Cardiolite test.-Abnormal 2/3/2021    Patient to have cardiac catheterization coronary artery graffiti.  Risks and benefits pros and cons of the procedure were discussed with  patient.    Medications were reviewed and updated.  Patient is on Lanoxin metoprolol 25 mg twice a day and lisinopril 20 mg a day.    Further plan will depend on patient's progress.  [[[[[[[[[[[[[[[[[[[[[[[[[[[[[[[[    Cardiac catheterization 2/4/2021 revealed  Normal left ventricle size and contractility with ejection fraction of 60%.  Essentially normal coronary arteries with dominant right coronary artery system with luminal irregularities.    RECOMMENDATIONS:  Noncardiac work-up.  Okay with discharge plans.  Follow-up in the office in 2 weeks.  ]]]]]]]]]]        Claudia Benson MD  02/04/21  06:46 EST

## 2021-02-04 NOTE — PROGRESS NOTES
Case Management Discharge Note                Selected Continued Care - Discharged on 2/4/2021 Admission date: 2/2/2021 - Discharge disposition: Home or Self Care     Final Discharge Disposition Code: 01 - home or self-care

## 2021-02-04 NOTE — DISCHARGE SUMMARY
[facility]  DISCHARGE SUMMARY        Prepared For PCP:  Junaid Marquez MD    Patient Name: Steven Hammonds  : 1960  MRN: [id number]      Date of Admission:   2021    Date of Discharge:  2021    Length of stay:  LOS: 0 days     Hospital Course     Presenting Problem:   Myalgia [M79.10]  Chest pain [R07.9]  Chest pain, unspecified type [R07.9]  Hypertension, unspecified type [I10]  2019-nCoV not detected [Z03.818]      Active Hospital Problems    Diagnosis  POA   • **Chest pain [R07.9]  Yes   • Hypertensive urgency [I16.0]  Yes   • Headache [R51.9]  Yes   • Abnormal nuclear stress test [R94.39]  Unknown   • Bobby's esophagus with dysplasia [K22.719]  Yes   • Chronic low back pain with sciatica [M54.40, G89.29]  Yes   • Hypertension [I10]  Yes   • Gastroesophageal reflux disease [K21.9]  Yes   • Hyperlipidemia [E78.5]  Yes   • Presence of cardiac pacemaker [Z95.0]  Yes   • Paroxysmal atrial fibrillation (CMS/HCC) [I48.0]  Yes   • Degeneration of lumbar or lumbosacral intervertebral disc [M51.37]  Yes      Resolved Hospital Problems   No resolved problems to display.           Hospital Course:    60-year-old man with multiple comorbidities including sick sinus syndrome status post permanent pacemaker placement, hypertension, hyperlipidemia and chronic back pain.    Presented to the emergency room with complaints of headache, chest pressure, palpitation and elevated blood pressure.  Also reported having sore throat and  subjective fever.    In the emergency room systolic blood pressure was noted to be greater than 200.  Respiratory viral panel including Covid 19 was negative.    Patient was admitted for further care      Conditions addressed while inpatient are as stated below    Chest pain  EKG showed no acute ST/T wave changes  Serial troponin insignificant  Had Myoview stress test 2/3/2021 was abnormal with inferior and apical ischemia  Cardiac  catheterization 2/4-normal LV size and contractility with EF of 60% and essentially normal coronary arteries with dominant RCA system with luminal irregularities  On lisinopril, metoprolol, statin  Patient to follow-up with a cardiologist in 2 weeks     Hypertensive urgency on presentation  Patient known to have history of labile blood pressure  Blood pressure better controlled prior to discharge  On lisinopril and metoprolol     Bradycardia tacky syndrome-sick sinus syndrome  Status post permanent pacemaker placement  Complained of palpitation on presentation  Initial thought was pacemaker mediated tachycardia however interrogation of pacemaker reviewed excellent pacing parameters.  Device reprogrammed to avoid pacemaker mediated tachycardia  On metoprolol and digoxin     Headache-probably due to elevated blood pressure/nitro  CT head showed no acute intracranial normality  Supportive care with Tylenol        Hyperlipidemia-on statin        Chronic back pain  On Norco, baclofen     GERD, Bobby's esophagus  -PPI         Recommendation for Outpatient Providers:             Reasons For Change In Medications and Indications for New Medications:        Day of Discharge     HPI:       Vital Signs:   Temp:  [97.8 °F (36.6 °C)-98.3 °F (36.8 °C)] 97.8 °F (36.6 °C)  Heart Rate:  [63-94] 66  Resp:  [14-18] 16  BP: (100-152)/(59-99) 125/75     Physical Exam:  Physical Exam  Vitals signs reviewed.   Constitutional:       General: He is not in acute distress.  HENT:      Head: Normocephalic and atraumatic.      Nose: Nose normal.      Mouth/Throat:      Mouth: Mucous membranes are moist.   Eyes:      Extraocular Movements: Extraocular movements intact.      Conjunctiva/sclera: Conjunctivae normal.      Pupils: Pupils are equal, round, and reactive to light.   Neck:      Musculoskeletal: Neck supple.   Cardiovascular:      Rate and Rhythm: Normal rate and regular rhythm.   Pulmonary:      Effort: Pulmonary effort is normal. No  respiratory distress.      Breath sounds: Normal breath sounds.   Abdominal:      General: Bowel sounds are normal.      Palpations: Abdomen is soft.      Tenderness: There is no abdominal tenderness.   Skin:     General: Skin is warm and dry.   Neurological:      General: No focal deficit present.      Mental Status: He is alert and oriented to person, place, and time.   Psychiatric:         Mood and Affect: Mood normal.         Pertinent  and/or Most Recent Results     Results from last 7 days   Lab Units 02/04/21  0246 02/03/21  0443 02/02/21  1246   WBC 10*3/mm3 10.10 7.80 5.20   HEMOGLOBIN g/dL 13.1 13.7 14.2   HEMATOCRIT % 38.6 40.0 41.1   PLATELETS 10*3/mm3 257 257 255   SODIUM mmol/L 139 140 142   POTASSIUM mmol/L 4.0 4.1 4.6   CHLORIDE mmol/L 105 105 105   CO2 mmol/L 27.0 26.0 27.0   BUN mg/dL 18 10 8   CREATININE mg/dL 0.97 0.80 0.84   GLUCOSE mg/dL 143* 124* 117*   CALCIUM mg/dL 9.0 9.4 9.1     Results from last 7 days   Lab Units 02/04/21  0246 02/03/21  0443 02/02/21  1246   BILIRUBIN mg/dL  --  0.5 0.3   ALK PHOS U/L  --  96 100   ALT (SGPT) U/L  --  16 18   AST (SGOT) U/L  --  23 22   PROTIME Seconds 10.9  --  10.7   INR  0.99  --  0.97   APTT seconds  --   --  24.5           Invalid input(s): TG, LDLCALC, LDLREALC  Results from last 7 days   Lab Units 02/03/21  0443 02/02/21  1824 02/02/21  1246   TSH uIU/mL 0.740  --   --    PROBNP pg/mL  --   --  27.9   TROPONIN T ng/mL <0.010 <0.010 <0.010   PROCALCITONIN ng/mL  --   --  0.05       Brief Urine Lab Results     None          Microbiology Results Abnormal     Procedure Component Value - Date/Time    Respiratory Panel PCR w/COVID-19(SARS-CoV-2) [facility]I/[facility]/MAD/THAI In-House, NP Swab in UTM/VTM, 3-4 HR TAT - Swab, Nasopharynx [[id number]]  (Normal) Collected: 02/02/21 1246    Lab Status: Final result Specimen: Swab from Nasopharynx Updated: 02/02/21 1340     ADENOVIRUS, PCR Not Detected     Coronavirus 229E Not Detected     Coronavirus HKU1 Not  Detected     Coronavirus NL63 Not Detected     Coronavirus OC43 Not Detected     COVID19 Not Detected     Human Metapneumovirus Not Detected     Human Rhinovirus/Enterovirus Not Detected     Influenza A PCR Not Detected     Influenza B PCR Not Detected     Parainfluenza Virus 1 Not Detected     Parainfluenza Virus 2 Not Detected     Parainfluenza Virus 3 Not Detected     Parainfluenza Virus 4 Not Detected     RSV, PCR Not Detected     Bordetella pertussis pcr Not Detected     Bordetella parapertussis PCR Not Detected     Chlamydophila pneumoniae PCR Not Detected     Mycoplasma pneumo by PCR Not Detected    Narrative:      Fact sheet for providers: https://docs.[employer]/wp-content/uploads/VKN5543-7218-SX4.1-EUA-Provider-Fact-Sheet-3.pdf    Fact sheet for patients: https://docs.[employer]/wp-content/uploads/TFT9240-6473-QS2.1-EUA-Patient-Fact-Sheet-1.pdf    Test performed by PCR.          Ct Head Without Contrast    Result Date: 2/2/2021  Impression: 1.No evidence for acute intracranial abnormality.  Electronically Signed By-Yfn Cook MD On:2/2/2021 1:42 PM This report was finalized on [id number] by  Yfn Cook MD.    Xr Chest Ap    Result Date: 2/2/2021  Impression: There is no significant change when compared to the prior study. There is no evidence for acute cardiopulmonary process.  Electronically Signed By-Yfn Cook MD On:2/2/2021 12:51 PM This report was finalized on [id number] by  Yfn Cook MD.                Results for orders placed during the hospital encounter of 02/02/21   Adult Transthoracic Echo Complete W/ Cont if Necessary Per Protocol    Narrative · Estimated left ventricular EF = 60% Left ventricular systolic function   is normal.     Indications  Chest pain    Technically satisfactory study.  Mitral valve is structurally normal.  Tricuspid valve is structurally normal.  Aortic valve is structurally normal.  Pulmonic valve could not be well visualized.  No evidence  for mitral tricuspid or aortic regurgitation is seen by   Doppler study.  Left atrium is normal in size.  Right atrium is normal in size.  Left ventricle is normal in size and contractility with ejection fraction   of 60%.  Right ventricle is normal in size.  Atrial septum is intact.  Aorta is normal.  No pericardial effusion or intracardiac thrombus is seen.    Impression  Structurally and functionally normal cardiac valves.  Left ventricular size and contractility is normal with ejection fraction   of 60%.               Test Results Pending at Discharge        Procedures Performed  Procedure(s):  Left Heart Cath and coronary angiogram         Consults:   Consults     Date and Time Order Name Status Description    2/2/2021 1459 Inpatient Cardiology Consult Completed     2/2/2021 142 Hospitalist (on-call MD unless specified) Completed             Discharge Details        Discharge Medications      Changes to Medications      Instructions Start Date   lisinopril 20 MG tablet  Commonly known as: PRINIVILZESTRIL  What changed:   · when to take this  · reasons to take this   10 mg, Oral, Daily      metoprolol succinate XL 25 MG 24 hr tablet  Commonly known as: Toprol XL  What changed: how much to take   25 mg, Oral, Every 24 Hours         Continue These Medications      Instructions Start Date   amitriptyline 50 MG tablet  Commonly known as: ELAVIL   TAKE 2 TABLETS AT BEDTIME      ascorbic acid 500 MG tablet  Commonly known as: VITAMIN C   500 mg, Oral, Every Night at Bedtime      baclofen 10 MG tablet  Commonly known as: LIORESAL   10 mg, Oral, 3 Times Daily PRN      cetirizine 10 MG tablet  Commonly known as: zyrTEC   10 mg, Oral, Daily      cholecalciferol 25 MCG (1000 UT) tablet  Commonly known as: VITAMIN D3   3,000 Units, Oral, Every Night at Bedtime      diclofenac-miSOPROStol 75-0.2 MG EC tablet  Commonly known as: ARTHROTEC 75   1 tablet, Oral, 2 Times Daily      digoxin 125 MCG tablet  Commonly known as:  LANOXIN   125 mcg, Oral, Daily      Enbrel 50 MG/ML solution prefilled syringe injection  Generic drug: etanercept   50 mg, Subcutaneous, Weekly, Wed      fluticasone 50 MCG/ACT nasal spray  Commonly known as: FLONASE   USE 2 SPRAYS IN EACH NOSTRIL ONCE DAILY      gabapentin 600 MG tablet  Commonly known as: NEURONTIN   TAKE 1 TABLET FOUR TIMES A DAY      HYDROcodone-acetaminophen 5-325 MG per tablet  Commonly known as: NORCO   1 tablet, Oral, 3 Times Daily PRN      nexIUM 40 MG capsule  Generic drug: esomeprazole   1 capsule, Oral, Daily      rosuvastatin 10 MG tablet  Commonly known as: CRESTOR   TAKE 1/2 TABLET BY MOUTH DAILY      tamsulosin 0.4 MG capsule 24 hr capsule  Commonly known as: FLOMAX   1 capsule, Oral, Daily             Allergies   Allergen Reactions   • Oxycodone-Acetaminophen GI Intolerance   • Adhesive Tape Unknown (See Comments)   • Oxycodone Unknown (See Comments)         Discharge Disposition:  Home or Self Care    Diet:  Hospital:  Diet Order   Procedures   • Diet Cardiac; Healthy Heart         Discharge Activity:   Activity Instructions     Gradually Increase Activity Until at Pre-Hospitalization Level              CODE STATUS:    Code Status and Medical Interventions:   Ordered at: 02/02/21 1532     Level Of Support Discussed With:    Patient     Code Status:    CPR     Medical Interventions (Level of Support Prior to Arrest):    Full         Follow-up Appointments  Future Appointments   Date Time Provider Department Center   2/8/2021 10:00 AM Mike Ge MD [facility]   4/15/2021 10:00 AM [facility] [place] [facility]   4/15/2021 10:40 AM Claudia Benson MD [facility]       Additional Instructions for the Follow-ups that You Need to Schedule     Discharge Follow-up with PCP   As directed       Currently Documented PCP:    Junaid Marquez MD    PCP Phone Number:    [phone]     Follow Up Details: Follow-up with PCP as needed          Discharge Follow-up with Specified Provider: Follow-up with cardiologist in 2 weeks   As directed      To: Follow-up with cardiologist in 2 weeks                 Condition on Discharge:      Stable      This patient has been examined with appropriate PPE. 02/04/21      Electronically signed by Deric Mane MD, 02/04/21, 9:29 AM EST.      Time: I spent  33  minutes on this discharge activity which included face-to-face encounter with the patient/reviewing the data in the system/coordination of the care with the nursing staff as well as consultants/documentation/entering orders.

## 2021-02-04 NOTE — OUTREACH NOTE
Prep Survey      Responses   [facility]?  [facility]   Is LACE score < 7 ?  Yes   Emergency Room discharge w/ pulse ox?  No   Eligibility  [facility]   Date of Admission  02/02/21   Date of Discharge  02/04/21   Discharge Disposition  Home or Self Care   Discharge diagnosis  Chest pain   Does the patient have one of the following disease processes/diagnoses(primary or secondary)?  Other   Does the patient have Home health ordered?  No   Is there a DME ordered?  No   Prep survey completed?  Yes          Katherine Mendez RN

## 2021-02-04 NOTE — PLAN OF CARE
Goal Outcome Evaluation:     Progress: improving   The patient is in good spirits and his vitals have been stable. The patient has been sleeping for the majority of the night. The patient is going to a heart cath today. Will continue to monitor.

## 2021-02-05 ENCOUNTER — TRANSITIONAL CARE MANAGEMENT TELEPHONE ENCOUNTER (OUTPATIENT)
Dept: CALL CENTER | Facility: HOSPITAL | Age: 61
End: 2021-02-05

## 2021-02-05 ENCOUNTER — NURSE TRIAGE (OUTPATIENT)
Dept: CALL CENTER | Facility: HOSPITAL | Age: 61
End: 2021-02-05

## 2021-02-05 NOTE — OUTREACH NOTE
Call Center TCM Note      Responses   [facility] patient discharged from?  [facility]   Does the patient have one of the following disease processes/diagnoses(primary or secondary)?  Other   TCM attempt successful?  Yes   Call start time  1235   Call end time  1239   Discharge diagnosis  Chest pain   Meds reviewed with patient/caregiver?  Yes   Is the patient having any side effects they believe may be caused by any medication additions or changes?  No   Does the patient have all medications ordered at discharge?  N/A   Is the patient taking all medications as directed (includes completed medication regime)?  Yes   Comments regarding appointments  Appt with neurosurgeon is on 2/8/21,  Appt with Dr. Benson is on 2/22/21   Does the patient have a primary care provider?   Yes   Does the patient have an appointment with their PCP within 7 days of discharge?  No   What is preventing the patient from scheduling follow up appointments within 7 days of discharge?  -- [Pt reports he will call PCP office on 2/8/21 after his appt with neurosurgery]   Nursing Interventions  Advised patient to make appointment   Has the patient kept scheduled appointments due by today?  N/A   Psychosocial issues?  No   Did the patient receive a copy of their discharge instructions?  Yes   Nursing interventions  Reviewed instructions with patient   What is the patient's perception of their health status since discharge?  Improving [Pt report his BP was 90/70 last evening at 7:00 pm. He had some dizziness at that time. BP this morning around 10:00 am was 120/80 after medication. No provider is aware. RN routed to PCP office. ]   Is the patient/caregiver able to teach back signs and symptoms related to disease process for when to call PCP?  Yes   Is the patient/caregiver able to teach back signs and symptoms related to disease process for when to call 911?  Yes   Is the patient/caregiver able to teach back the hierarchy of who to call/visit for  symptoms/problems? PCP, Specialist, Home health nurse, Urgent Care, ED, 911  Yes   If the patient is a current smoker, are they able to teach back resources for cessation?  Not a smoker   TCM call completed?  Yes          Viviana Menezes RN    2/5/2021, 12:40 EST

## 2021-02-05 NOTE — OUTREACH NOTE
Call Center TCM Note      Responses   [facility] patient discharged from?  [facility]   Does the patient have one of the following disease processes/diagnoses(primary or secondary)?  Other   TCM attempt successful?  No [Pt and Michelle]   Unsuccessful attempts  Attempt 1          Viviana Menezes RN    2/5/2021, 10:54 EST

## 2021-02-05 NOTE — PROGRESS NOTES
Subjective   Patient ID: Steven Hammonds is a 60 y.o. male is here today as a self referral.  Patient was seen in 2015.    MRI Lspine 9.28.20 [facility]    Patient is here today to be seen for back pain.  Patient states his pain is low back and radiates into his buttocks and into his legs and feet, left worse than right.  Numbness and tingling present.    I last saw this patient about 6 years ago. I had done a revision L2-L3 and L3-L4 decompression for adjacent level stenosis above an L4 to S1 fusion done by Dr. Whittaker in 2013. He said he did reasonably well up until about 6 months ago when he began having recurring pain in his back and both legs, particularly the left anterior thigh. He has been on baclofen and pain medications from his primary care physician. We discussed his new MRI which shows the expected findings of deterioration above his fusion and now a herniated disc and stenosis, particularly at L3-L4 towards the left. I cannot detect any outright motor deficits but he does have a combination of back and radicular pain. I have recommended a series of transforaminal blocks on the left at L3-L4 since it is the radiculopathy that is bothering him the most. Will send him to Dr. Diaz for this, as well as for medical management. I told him to stay on his gabapentin and will re-evaluate in a few months. If this helps, then will continue along these lines. If not, will do a myelogram. However, if surgery were considered it would probably involve an extension of his fusion, which is a much bigger operation, hopefully one we can avoid.         Back Pain  The pain is present in the lumbar spine. The pain radiates to the left knee, left thigh, right knee, right foot, right thigh and left foot. The pain is at a severity of 6/10. The pain is the same all the time. The symptoms are aggravated by bending and twisting. Stiffness is present in the morning. Associated symptoms include headaches, leg pain, numbness, tingling  "and weakness. Pertinent negatives include no bladder incontinence, bowel incontinence or fever. He has tried heat, muscle relaxant, analgesics and bed rest for the symptoms. The treatment provided no relief.       The following portions of the patient's history were reviewed and updated as appropriate: allergies, current medications, past family history, past medical history, past social history, past surgical history and problem list.    Review of Systems   Constitutional: Negative for chills and fever.   HENT: Negative for congestion.    Gastrointestinal: Negative for bowel incontinence.   Genitourinary: Negative for bladder incontinence.   Musculoskeletal: Positive for arthralgias, back pain, gait problem, joint swelling and myalgias. Negative for neck pain.   Neurological: Positive for dizziness, tingling, syncope, weakness, light-headedness, numbness and headaches.   All other systems reviewed and are negative.          Objective     Vitals:    02/08/21 1025   BP: 142/86   Temp: 98.6 °F (37 °C)   Weight: 86.5 kg (190 lb 9.6 oz)   Height: 180.3 cm (71\")     Body mass index is 26.58 kg/m².      Physical Exam  Constitutional:       Appearance: He is well-developed.   HENT:      Head: Normocephalic and atraumatic.   Eyes:      Extraocular Movements: EOM normal.      Conjunctiva/sclera: Conjunctivae normal.      Pupils: Pupils are equal, round, and reactive to light.   Neck:      Vascular: No carotid bruit.   Neurological:      Mental Status: He is oriented to person, place, and time.      Coordination: Finger-Nose-Finger Test and Heel to Shin Test normal.      Gait: Gait is intact.      Deep Tendon Reflexes:      Reflex Scores:       Tricep reflexes are 2+ on the right side and 2+ on the left side.       Bicep reflexes are 2+ on the right side and 2+ on the left side.       Brachioradialis reflexes are 2+ on the right side and 2+ on the left side.       Patellar reflexes are 2+ on the right side and 2+ on the left " side.       Achilles reflexes are 2+ on the right side and 2+ on the left side.  Psychiatric:         Speech: Speech normal.       Neurologic Exam     Mental Status   Oriented to person, place, and time.   Registration of memory: Good recent and remote memory.   Attention: normal. Concentration: normal.   Speech: speech is normal   Level of consciousness: alert  Knowledge: consistent with education.     Cranial Nerves     CN II   Visual fields full to confrontation.   Visual acuity: normal    CN III, IV, VI   Pupils are equal, round, and reactive to light.  Extraocular motions are normal.     CN V   Facial sensation intact.   Right corneal reflex: normal  Left corneal reflex: normal    CN VII   Facial expression full, symmetric.   Right facial weakness: none  Left facial weakness: none    CN VIII   Hearing: intact    CN IX, X   Palate: symmetric    CN XI   Right sternocleidomastoid strength: normal  Left sternocleidomastoid strength: normal    CN XII   Tongue: not atrophic  Tongue deviation: none    Motor Exam   Muscle bulk: normal  Right arm tone: normal  Left arm tone: normal  Right leg tone: normal  Left leg tone: normal    Strength   Strength 5/5 except as noted.     Sensory Exam   Light touch normal.     Gait, Coordination, and Reflexes     Gait  Gait: normal    Coordination   Finger to nose coordination: normal  Heel to shin coordination: normal    Reflexes   Right brachioradialis: 2+  Left brachioradialis: 2+  Right biceps: 2+  Left biceps: 2+  Right triceps: 2+  Left triceps: 2+  Right patellar: 2+  Left patellar: 2+  Right achilles: 2+  Left achilles: 2+  Right : 2+  Left : 2+          Assessment/Plan   Independent Review of Radiographic Studies:      I personally reviewed the images from the following studies.    I reviewed the lumbar MRI done in the fall 2020 which showed that he is fused from L4-S1 and there is some adjacent level stenosis at L2-L3 and L3-L4 with a left-sided L3-L4 herniated  disc.  Agree with the report.        Medical Decision Making:      Hopefully we will try and avoid surgery.  We will send him to Dr. Diaz for series of left L3-L4 transforaminal epidural blocks and medical management.  I will see him again in about 2-1/2 months.  If he is not any better by then, we will proceed with a myelogram and talk about surgery which would probably entail extension of his fusion.      Diagnoses and all orders for this visit:    1. Herniation of lumbar intervertebral disc with radiculopathy (Primary)  -     Ambulatory Referral to Pain Management    2. Spinal stenosis of lumbar region with neurogenic claudication  -     Ambulatory Referral to Pain Management    3. History of lumbar spinal fusion  -     Ambulatory Referral to Pain Management      Return in about 10 weeks (around 4/19/2021) for Face-to-face.

## 2021-02-06 NOTE — TELEPHONE ENCOUNTER
"He was discharged on 02/04/2021 from [facility]- She is askng questions about his medications. Discussed the medications in great detail per AVS discharge papers.     Reason for Disposition  • Caller has medication question only, adult not sick, and triager answers question    Additional Information  • Negative: Drug overdose and triager unable to answer question  • Negative: Caller requesting information unrelated to medicine  • Negative: Caller requesting a prescription for Strep throat and has a positive culture result  • Negative: Rash while taking a medication or within 3 days of stopping it  • Negative: Immunization reaction suspected  • Negative: [1] Asthma and [2] having symptoms of asthma (cough, wheezing, etc.)  • Negative: [1] Influenza symptoms AND [2] anti-viral med prescription request, such as Tamiflu  • Negative: [1] Symptom of illness (e.g., headache, abdominal pain, earache, vomiting) AND [2] more than mild  • Negative: MORE THAN A DOUBLE DOSE of a prescription or over-the-counter (OTC) drug  • Negative: [1] DOUBLE DOSE (an extra dose or lesser amount) of over-the-counter (OTC) drug AND [2] any symptoms (e.g., dizziness, nausea, pain, sleepiness)  • Negative: [1] DOUBLE DOSE (an extra dose or lesser amount) of prescription drug AND [2] any symptoms (e.g., dizziness, nausea, pain, sleepiness)  • Negative: Took another person's prescription drug  • Negative: [1] DOUBLE DOSE (an extra dose or lesser amount) of prescription drug AND [2] NO symptoms (Exception: a double dose of antibiotics)  • Negative: Diabetes drug error or overdose (e.g., took wrong type of insulin or took extra dose)  • Negative: [1] Request for URGENT new prescription or refill of \"essential\" medication (i.e., likelihood of harm to patient if not taken) AND [2] triager unable to fill per unit policy  • Negative: [1] Prescription not at pharmacy AND [2] was prescribed by PCP recently  • Negative: [1] Pharmacy calling with prescription " "questions AND [2] triager unable to answer question  • Negative: [1] Caller has URGENT medication question about med that PCP or specialist prescribed AND [2] triager unable to answer question  • Negative: [1] Caller has NON-URGENT medication question about med that PCP prescribed AND [2] triager unable to answer question  • Negative: [1] Caller requesting a NON-URGENT new prescription or refill AND [2] triager unable to refill per unit policy  • Negative: [1] Caller has medication question about med not prescribed by PCP AND [2] triager unable to answer question (e.g., compatibility with other med, storage)  • Negative: Caller requesting a CONTROLLED substance prescription refill (e.g., narcotics, ADHD medicines)  • Negative: Caller wants to use a complementary or alternative medicine  • Negative: [1] Prescription prescribed recently is not at pharmacy AND [2] triager has access to patient's EMR AND [3] prescription is recorded in the EMR  • Negative: [1] DOUBLE DOSE (an extra dose or lesser amount) of over-the-counter (OTC) drug AND [2] NO symptoms  • Negative: [1] DOUBLE DOSE (an extra dose or lesser amount) of antibiotic drug AND [2] NO symptoms  • Negative: Caller has medication question, adult has minor symptoms, caller declines triage, AND triager answers question    Answer Assessment - Initial Assessment Questions  1.   NAME of MEDICATION: \"What medicine are you calling about?\"      See note   2.   QUESTION: \"What is your question?\"      See note   3.   PRESCRIBING HCP: \"Who prescribed it?\" Reason: if prescribed by specialist, call should be referred to that group.      See note   4. SYMPTOMS: \"Do you have any symptoms?\"      none  5. SEVERITY: If symptoms are present, ask \"Are they mild, moderate or severe?\"      n  6.  PREGNANCY:  \"Is there any chance that you are pregnant?\" \"When was your last menstrual period?\"      n    Protocols used: MEDICATION QUESTION CALL-ADULT-AH      "

## 2021-02-08 ENCOUNTER — OFFICE VISIT (OUTPATIENT)
Dept: NEUROSURGERY | Facility: CLINIC | Age: 61
End: 2021-02-08

## 2021-02-08 VITALS
HEIGHT: 71 IN | DIASTOLIC BLOOD PRESSURE: 86 MMHG | WEIGHT: 190.6 LBS | SYSTOLIC BLOOD PRESSURE: 142 MMHG | BODY MASS INDEX: 26.68 KG/M2 | TEMPERATURE: 98.6 F

## 2021-02-08 DIAGNOSIS — M51.16 HERNIATION OF LUMBAR INTERVERTEBRAL DISC WITH RADICULOPATHY: Primary | ICD-10-CM

## 2021-02-08 DIAGNOSIS — M48.062 SPINAL STENOSIS OF LUMBAR REGION WITH NEUROGENIC CLAUDICATION: ICD-10-CM

## 2021-02-08 DIAGNOSIS — Z98.1 HISTORY OF LUMBAR SPINAL FUSION: ICD-10-CM

## 2021-02-08 PROCEDURE — 99204 OFFICE O/P NEW MOD 45 MIN: CPT | Performed by: NEUROLOGICAL SURGERY

## 2021-02-22 ENCOUNTER — OFFICE VISIT (OUTPATIENT)
Dept: CARDIOLOGY | Facility: CLINIC | Age: 61
End: 2021-02-22

## 2021-02-22 VITALS
DIASTOLIC BLOOD PRESSURE: 104 MMHG | HEART RATE: 62 BPM | SYSTOLIC BLOOD PRESSURE: 176 MMHG | WEIGHT: 194.75 LBS | HEIGHT: 71 IN | BODY MASS INDEX: 27.27 KG/M2 | TEMPERATURE: 97.1 F | OXYGEN SATURATION: 98 %

## 2021-02-22 DIAGNOSIS — I49.5 TACHY-BRADY SYNDROME (HCC): ICD-10-CM

## 2021-02-22 DIAGNOSIS — E78.2 MIXED HYPERLIPIDEMIA: ICD-10-CM

## 2021-02-22 DIAGNOSIS — I10 ESSENTIAL HYPERTENSION: ICD-10-CM

## 2021-02-22 DIAGNOSIS — Z95.0 PRESENCE OF CARDIAC PACEMAKER: Primary | ICD-10-CM

## 2021-02-22 DIAGNOSIS — I45.5 SINUS PAUSE: ICD-10-CM

## 2021-02-22 DIAGNOSIS — I48.0 PAROXYSMAL ATRIAL FIBRILLATION (HCC): ICD-10-CM

## 2021-02-22 DIAGNOSIS — I49.5 TACHYCARDIA-BRADYCARDIA (HCC): ICD-10-CM

## 2021-02-22 PROCEDURE — 99214 OFFICE O/P EST MOD 30 MIN: CPT | Performed by: INTERNAL MEDICINE

## 2021-02-22 NOTE — PROGRESS NOTES
Encounter Date:02/22/2021    Last seen 2/4/2021      Patient ID: Steven Hammonds is a 60 y.o. male.    Chief Complaint:  Status post pacemaker  Recent chest discomfort  Hypertension  History of renal dysfunction    History of present illness  =========  Blood pressure is elevated  Since I have last seen, the patient has been without any chest discomfort ,shortness of breath, palpitations, dizziness or syncope.  Denies having any headache ,abdominal pain ,nausea, vomiting , diarrhea constipation, loss of weight or loss of appetite.  Denies having any excessive bruising ,hematuria or blood in the stool.    Review of all systems negative except as indicated.    Reviewed ROS.    [[[[[[[[[[[[[[[[[[[[[  Impression  ============  -Recent chest discomfort-improved.  EKG showed no acute changes.  Troponin levels are negative.  Abnormal stress Cardiolite test with inferior ischemia.  Echocardiogram 2/2/2021-normal     Cardiac catheterization 2/4/2021 revealed  Normal left ventricle size and contractility with ejection fraction of 60%.  Essentially normal coronary arteries with dominant right coronary artery system with luminal irregularities.     -status post permanent dual-chamber pacemaker implantation ([employer] MRI Compatible ) 04/13/2018  - sick sinus syndrome-tachy-irvin syndrome.  Patient has history of atrial fibrillation with rapid ventricular response.  Patient had significant pauses of 5-6 seconds.  Symptomatic with dizziness and near-syncope     - history of Near-syncope associated with palpitations and dizziness .  -improved     -cardiac catheterization 03/18/2017 revealed normal left ventricular function and normal coronary arteries.  History of normal echocardiogram     -status post loop recorder placement 03/24/2017. -removal of loop recorder 04/13/2018.     -hypertension  Renal dysfunction BUN 17 creatinine 1.4 GERD depression Psoriatic arthritis Hypokalemia     -history of Anemia and  neutropenia     -Status post hip replacement bilateral arthroscopic knee surgery right shoulder surgery  CBP- with chronic myelopathy, s/p multiple lumbar surgeries; continue soma, gabapentin     -Allergic to Percocet  ================  Plan  ==============  Recent chest discomfort suggestive of angina pectoris.-Improved.  Troponin levels are negative.  EKG showed no acute changes.  Abnormal stress Cardiolite test    Cardiac catheterization 2/4/2021 revealed  Normal left ventricle size and contractility with ejection fraction of 60%.  Essentially normal coronary arteries with dominant right coronary artery system with luminal irregularities.  Cardiac catheterization results were discussed with patient and educated patient..    History of labile blood pressure.  Hypertension-not well controlled.  176/100.  Increase metoprolol to 25 mg twice a day  Increase lisinopril to 20 mg a day.  Patient was taking half a tablet.      Status post pacemaker  Patient had palpitations and appears to have PMT.  Reviewed rhythm strips.  Interrogation of the pacemaker revealed excellent pacing parameters.  Device reprogrammed to avoid PMT recently.     Medications were reviewed and updated.  Patient is not having any angina pectoris or congestive heart failure.  Follow-up in the office 4/15/2021 with pacemaker interrogation   Further plan will depend on patient's progress.  [[[[[[[[[[[[[[[[[[[[[[[[[[[[[[[[                             Diagnosis Plan   1. Presence of cardiac pacemaker     2. Essential hypertension     3. Mixed hyperlipidemia     4. Paroxysmal atrial fibrillation (CMS/HCC)     5. Tachycardia-bradycardia (CMS/HCC)     6. Sinus pause     7. Tachy-irvin syndrome (CMS/HCC)     LAB RESULTS (LAST 7 DAYS)    CBC        BMP        CMP         BNP        TROPONIN        CoAg        Creatinine Clearance  Estimated Creatinine Clearance: 99.9 mL/min (by C-G formula based on SCr of 0.97 mg/dL).    ABG        Radiology  No radiology  results for the last day                The following portions of the patient's history were reviewed and updated as appropriate: allergies, current medications, past family history, past medical history, past social history, past surgical history and problem list.    Review of Systems   Constitution: Negative for malaise/fatigue.   Cardiovascular: Positive for chest pain. Negative for leg swelling, palpitations and syncope.   Respiratory: Negative for shortness of breath.    Skin: Negative for rash.   Gastrointestinal: Negative for nausea and vomiting.   Neurological: Positive for dizziness and light-headedness. Negative for numbness.         Current Outpatient Medications:   •  amitriptyline (ELAVIL) 50 MG tablet, TAKE 2 TABLETS AT BEDTIME, Disp: 180 tablet, Rfl: 3  •  ascorbic acid (VITAMIN C) 500 MG tablet, Take 500 mg by mouth every night at bedtime., Disp: , Rfl:   •  baclofen (LIORESAL) 10 MG tablet, Take 1 tablet by mouth 3 (Three) Times a Day As Needed for Muscle Spasms., Disp: 60 tablet, Rfl: 1  •  cetirizine (zyrTEC) 10 MG tablet, Take 10 mg by mouth Daily., Disp: , Rfl:   •  cholecalciferol (VITAMIN D3) 25 MCG (1000 UT) tablet, Take 3,000 Units by mouth every night at bedtime., Disp: , Rfl:   •  diclofenac-misoprostol (ARTHROTEC 75) 75-0.2 MG EC tablet, Take 1 tablet by mouth 2 (Two) Times a Day., Disp: , Rfl:   •  digoxin (LANOXIN) 125 MCG tablet, Take 1 tablet by mouth Daily., Disp: 90 tablet, Rfl: 3  •  esomeprazole (NEXIUM) 40 MG capsule, Take 1 capsule by mouth Daily., Disp: , Rfl:   •  etanercept (ENBREL) 50 MG/ML solution prefilled syringe injection, Inject 50 mg under the skin into the appropriate area as directed 1 (One) Time Per Week. Wed, Disp: , Rfl:   •  fluticasone (FLONASE) 50 MCG/ACT nasal spray, USE 2 SPRAYS IN EACH NOSTRIL ONCE DAILY, Disp: 3 bottle, Rfl: 3  •  gabapentin (NEURONTIN) 600 MG tablet, TAKE 1 TABLET FOUR TIMES A DAY, Disp: 360 tablet, Rfl: 3  •  HYDROcodone-acetaminophen  (NORCO) 5-325 MG per tablet, Take 1 tablet by mouth 3 (Three) Times a Day As Needed for Severe Pain ., Disp: 90 tablet, Rfl: 0  •  lisinopril (PRINIVIL,ZESTRIL) 20 MG tablet, Take 0.5 tablets by mouth Daily. (Patient taking differently: Take 10 mg by mouth Daily As Needed.), Disp: 90 tablet, Rfl: 3  •  metoprolol succinate XL (Toprol XL) 25 MG 24 hr tablet, Take 1 tablet by mouth Daily., Disp: 90 tablet, Rfl: 5  •  rosuvastatin (CRESTOR) 10 MG tablet, TAKE 1/2 TABLET BY MOUTH DAILY (Patient taking differently: Take 5 mg by mouth Daily.), Disp: 45 tablet, Rfl: 5  •  tamsulosin (FLOMAX) 0.4 MG capsule 24 hr capsule, Take 1 capsule by mouth Daily., Disp: , Rfl:     Allergies   Allergen Reactions   • Oxycodone-Acetaminophen GI Intolerance   • Adhesive Tape Unknown (See Comments)   • Oxycodone Unknown (See Comments)       Family History   Problem Relation Age of Onset   • Other Father         Substance Abuse   • Cancer Father         Lung       Past Surgical History:   Procedure Laterality Date   • CARDIAC CATHETERIZATION N/A 2/4/2021    Procedure: Left Heart Cath and coronary angiogram;  Surgeon: Claudia Benson MD;  Location: [facility] CATH [facility];  Service: Cardiovascular;  Laterality: N/A;   • CARDIAC PACEMAKER PLACEMENT     • KNEE SURGERY Right 2016   • LUMBAR DISCECTOMY  10/2015    Comments: lumbar disk decompression   • TOTAL HIP ARTHROPLASTY Right 12/09/2016       Past Medical History:   Diagnosis Date   • Allergic rhinitis     Impression: persistent   • Arthralgia of right hip     Impression: needs right hip relacement   • Bobby's esophagus with dysplasia     Impression: scope 11/2013 improved from previous. Recheck due in 2015 Packet given   • Cervical disc disease with myelopathy     Impression: seen on MRi 5/2012. Workup in progress.   • Chronic low back pain with sciatica     Unspecified. Impression: scheduled for surgery   • Chronic pain of right knee     Impression: needs knee replacement   •  "Colon cancer screening     Impression: packet given   • ED (erectile dysfunction)    • Hydrocele    • Insomnia    • Left leg weakness     Impression: due to recent surgery. May benefit for home health. Pt homebound due severe pain postop back surgery. Also needs 3-in-1 Commode   • Medicare annual wellness visit, subsequent     With abnormal findings.   • Non-seasonal allergic rhinitis due to pollen    • Other specified persistent mood disorders (CMS/HCC)     Story: stress   • Psoriatic arthritis (CMS/HCC)     Impression: refer to Dr Rodriguez   • Screening for alcoholism    • Trigger ring finger of right hand     Impression: refer to hand surgeon   • Ulnar neuropathy at elbow of right upper extremity     Impression: suspect stinger occured during surgery. Natural course and self-limited nature of this condition discussed. Follow-up in 4-6 weeks if not better. Follow-up sooner for worsening symptoms or for any concerns. EMG if not improved   • Umbilical hernia        Family History   Problem Relation Age of Onset   • Other Father         Substance Abuse   • Cancer Father         Lung       Social History     Socioeconomic History   • Marital status:      Spouse name: Not on file   • Number of children: Not on file   • Years of education: Not on file   • Highest education level: Not on file   Tobacco Use   • Smoking status: Former Smoker   • Smokeless tobacco: Never Used   Substance and Sexual Activity   • Alcohol use: No     Frequency: Never   • Drug use: No   • Sexual activity: Not Currently         Procedures      Objective:       Physical Exam    BP (!) 176/104   Pulse 62   Temp 97.1 °F (36.2 °C)   Ht 180.3 cm (71\")   Wt 88.3 kg (194 lb 12 oz)   SpO2 98%   BMI 27.16 kg/m²   The patient is alert, oriented and in no distress.    Vital signs as noted above.    Head and neck revealed no carotid bruits or jugular venous distension.  No thyromegaly or lymphadenopathy is present.    Lungs clear.  No wheezing.  " Breath sounds are normal bilaterally.    Heart normal first and second heart sounds.  No murmur..  No pericardial rub is present.  No gallop is present.    Abdomen soft and nontender.  No organomegaly is present.    Extremities revealed good peripheral pulses without any pedal edema.    Skin warm and dry.  Pacemaker site looks normal.    Musculoskeletal system is grossly normal.    CNS grossly normal.    Reviewed and unchanged from last visit.

## 2021-02-23 ENCOUNTER — PREP FOR SURGERY (OUTPATIENT)
Dept: SURGERY | Facility: SURGERY CENTER | Age: 61
End: 2021-02-23

## 2021-02-23 ENCOUNTER — OFFICE VISIT (OUTPATIENT)
Dept: PAIN MEDICINE | Facility: CLINIC | Age: 61
End: 2021-02-23

## 2021-02-23 VITALS
OXYGEN SATURATION: 94 % | RESPIRATION RATE: 20 BRPM | BODY MASS INDEX: 26.6 KG/M2 | TEMPERATURE: 98 F | SYSTOLIC BLOOD PRESSURE: 167 MMHG | HEIGHT: 71 IN | HEART RATE: 70 BPM | DIASTOLIC BLOOD PRESSURE: 107 MMHG | WEIGHT: 190 LBS

## 2021-02-23 DIAGNOSIS — G89.29 OTHER CHRONIC PAIN: ICD-10-CM

## 2021-02-23 DIAGNOSIS — M53.3 SACROILIAC JOINT DYSFUNCTION OF RIGHT SIDE: ICD-10-CM

## 2021-02-23 DIAGNOSIS — M51.16 HERNIATION OF LUMBAR INTERVERTEBRAL DISC WITH RADICULOPATHY: Primary | ICD-10-CM

## 2021-02-23 DIAGNOSIS — M51.16 HERNIATION OF LUMBAR INTERVERTEBRAL DISC WITH RADICULOPATHY: ICD-10-CM

## 2021-02-23 DIAGNOSIS — M48.062 SPINAL STENOSIS OF LUMBAR REGION WITH NEUROGENIC CLAUDICATION: Primary | ICD-10-CM

## 2021-02-23 DIAGNOSIS — Z98.1 HISTORY OF LUMBAR SPINAL FUSION: ICD-10-CM

## 2021-02-23 DIAGNOSIS — M51.37 DEGENERATION OF LUMBAR OR LUMBOSACRAL INTERVERTEBRAL DISC: ICD-10-CM

## 2021-02-23 PROCEDURE — 99215 OFFICE O/P EST HI 40 MIN: CPT | Performed by: NURSE PRACTITIONER

## 2021-02-23 RX ORDER — SODIUM CHLORIDE 0.9 % (FLUSH) 0.9 %
10 SYRINGE (ML) INJECTION EVERY 12 HOURS SCHEDULED
Status: CANCELLED | OUTPATIENT
Start: 2021-02-23

## 2021-02-23 RX ORDER — SODIUM CHLORIDE 0.9 % (FLUSH) 0.9 %
10 SYRINGE (ML) INJECTION AS NEEDED
Status: CANCELLED | OUTPATIENT
Start: 2021-02-23

## 2021-02-23 NOTE — PROGRESS NOTES
"CHIEF COMPLAINT  New pt referred to our office by Mike Ge MD for back pain. Pt c/o low back pain x past 6 months    Subjective   Steven Hammonds is a 60 y.o. male.   He presents to the office for evaluation of back pain. He was referred here by Dr. Ge.  Office visit from 2/8/2021 with Dr. Ge reviewed.  Patient was evaluated for low back pain radiating into his buttocks and into his legs and feet, left worse than right.  Patient was last seen by Dr. Ge 6 years prior with a revision at L2-3 and L3-4 decompression for adjacent level stenosis above and L4-S1 fusion done by Dr. Whittaker in 2013.  New MRI shows expected findings of deterioration above his fusion as well as a herniated disc and stenosis particularly at L3-L4 towards the left.  Recommending a series of transforaminal blocks on the left L3-4 as this is the radiculopathy that is bothering him most.     Today his pain is 7/10VAS in severity.  Patient states that his pain began to worsen approximately 6 months ago.  He describes his pain as continuous sharp and throbbing pain that is in his right low back over his sacroiliac joint.  His primary complaint is electrical pain that radiates down his medial lower extremities (Left leg > Right leg).  His pain is worsened by prolonged position (sitting, standing, walking), bending, twisting, and lifting.  He states that he is cautious about what he lifts due to his back pain; it is improved by rest while lying in a fetal position with a pillow between his knees.  His pain is worse at night.     He is currently maintained on Norco 5/325 3/day, Gabapentin 600 mg 4/day, and baclofen 10 mg q8h (all medications prescribed by his PCP). This regimen decreases his pain to a 4-5/10VAS in severity.      He lives with his wife and daughter and he retired from [employer] in 2011.  He reports a history of alcohol use, he states he quit drinking \"a long time ago\", he denies any history of illicit drug use.  " He has a history of tobacco use, he quit in 2003.  He reports a family history of back problems in his mother and sister.  He reports a family history of alcohol addiction in his father.     Past pain medications: Opana, OxyContin---Patient was able to wean off of these medication as his pain improved following surgery.      Current pain medications: Norco 5/325, Gabapentin 600 mg, Baclofen 10 mg     Past therapies:  Physical Therapy: Physical therapy following surgery, no new physical therapy with return in pain  Chiropractor: No  Massage Therapy: No  TENS: Patient has one, he has not used this due to his pacemaker  Neck or back surgery: Yes--Lumbar surgery x 4, most recently with Dr. Ge in 2015.   Past pain management: Yes, [facility]--has not been in pain management for many years     Previous Injections: CALDERON  Effect of Injection (%): 75% relief  Length of Relief: a few hours     History of Present Illness     PEG Assessment   What number best describes your pain on average in the past week?7  What number best describes how, during the past week, pain has interfered with your enjoyment of life?7  What number best describes how, during the past week, pain has interfered with your general activity?  7      Current Outpatient Medications:   •  amitriptyline (ELAVIL) 50 MG tablet, TAKE 2 TABLETS AT BEDTIME, Disp: 180 tablet, Rfl: 3  •  ascorbic acid (VITAMIN C) 500 MG tablet, Take 500 mg by mouth every night at bedtime., Disp: , Rfl:   •  baclofen (LIORESAL) 10 MG tablet, Take 1 tablet by mouth 3 (Three) Times a Day As Needed for Muscle Spasms., Disp: 60 tablet, Rfl: 1  •  cetirizine (zyrTEC) 10 MG tablet, Take 10 mg by mouth Daily., Disp: , Rfl:   •  cholecalciferol (VITAMIN D3) 25 MCG (1000 UT) tablet, Take 3,000 Units by mouth every night at bedtime., Disp: , Rfl:   •  diclofenac-misoprostol (ARTHROTEC 75) 75-0.2 MG EC tablet, Take 1 tablet by mouth 2 (Two) Times a Day., Disp: , Rfl:   •   digoxin (LANOXIN) 125 MCG tablet, Take 1 tablet by mouth Daily., Disp: 90 tablet, Rfl: 3  •  esomeprazole (NEXIUM) 40 MG capsule, Take 1 capsule by mouth Daily., Disp: , Rfl:   •  etanercept (ENBREL) 50 MG/ML solution prefilled syringe injection, Inject 50 mg under the skin into the appropriate area as directed 1 (One) Time Per Week. Wed, Disp: , Rfl:   •  fluticasone (FLONASE) 50 MCG/ACT nasal spray, USE 2 SPRAYS IN EACH NOSTRIL ONCE DAILY, Disp: 3 bottle, Rfl: 3  •  gabapentin (NEURONTIN) 600 MG tablet, TAKE 1 TABLET FOUR TIMES A DAY, Disp: 360 tablet, Rfl: 3  •  HYDROcodone-acetaminophen (NORCO) 5-325 MG per tablet, Take 1 tablet by mouth 3 (Three) Times a Day As Needed for Severe Pain ., Disp: 90 tablet, Rfl: 0  •  lisinopril (PRINIVIL,ZESTRIL) 20 MG tablet, Take 0.5 tablets by mouth Daily. (Patient taking differently: Take 10 mg by mouth Daily As Needed.), Disp: 90 tablet, Rfl: 3  •  metoprolol succinate XL (Toprol XL) 25 MG 24 hr tablet, Take 1 tablet by mouth Daily., Disp: 90 tablet, Rfl: 5  •  rosuvastatin (CRESTOR) 10 MG tablet, TAKE 1/2 TABLET BY MOUTH DAILY (Patient taking differently: Take 5 mg by mouth Daily.), Disp: 45 tablet, Rfl: 5  •  tamsulosin (FLOMAX) 0.4 MG capsule 24 hr capsule, Take 1 capsule by mouth Daily., Disp: , Rfl:     The following portions of the patient's history were reviewed and updated as appropriate: allergies, current medications, past family history, past medical history, past social history, past surgical history and problem list.    REVIEW OF PERTINENT MEDICAL DATA    --  The aforementioned information the Chief Complaint section and above subjective data including any HPI data, and also the Review of Systems data, has been personally reviewed and affirmed.  --    DATE OF EXAM:  10/8/2020 12:40 PM     PROCEDURE:  MRI LUMBAR SPINE WO CONTRAST-     INDICATIONS:  Low back pain, prior surgery, new symptoms; M51.37-Other intervertebral  disc degeneration, lumbosacral region      COMPARISON:  01/15/2016     TECHNIQUE:   Routine magnetic resonance imaging of the lumbar spine was performed  without the administration of contrast.     FINDINGS:  There are pedicle screws bridged by rods extending from L4 to S1. There  are signal changes about the endplates at L2-3 which has progressed  which could be on a degenerative basis. There are additional endplate  changes involving the L3 vertebral body. The patient has had interbody  fusion at L4-5 and L5-S1. There is retrolisthesis of L2 on L3 of about  4.8 mm procedure measuring around 2 mm and L3 on L4 of about 5.5 mm  previously measuring 4.3 mm.     The conus is around the level of T12.     There is a left renal cyst.     T11-T12: There is some bulging of the annulus which is suggested on the  prior study. Transaxial images do not extend through this level.     T12-L1: There is no disc herniation or intervertebral foraminal  stenosis.     L1-L2: There is anterolisthesis of L1 on L2 of about 3.3 mm previously  measuring around 3 mm. There is bulging of the annulus asymmetric in the  right subarticular recess area. The intervertebral foramina seem patent.  There is some facet arthropathy. The asymmetric bulging of the annulus  has developed since the prior study.     L2-L3: There is more of a retrolisthesis of L2 on L3 as compared to the  prior study. The patient has had laminectomies at this level. There is  facet arthropathy. The intervertebral foramina seem patent. There is  asymmetric bulging of the annulus laterally to the left. This is  probably superimposed on marginal osteophyte. This does encroach on the  floor of the left intervertebral foramina. This has been suggested.     L3-L4: There is a retrolisthesis of L3 on L4. There is a disc protrusion  extending into the left lateral recess that has developed since the  prior study. The patient has had laminectomies at this level. There is  moderate narrowing of the foramina.     L4-L5: The  patient has had interbody fusion. There is evidence for  laminectomies at this level. There is facet arthropathy on the right  which does encroach on the right foraminal area where there is moderate  narrowing of the intervertebral foramina. The patient probably has had a  partial facetectomy on the left. The left intervertebral foramina seems  patent.     L5-S1: The patient has had laminectomies at this level. There is  interbody fusion. There is some facet arthropathy. There is more of a  broad-based impression on the thecal sac could reflect more of an  osseous bar. It looks like there may be heterotopic bone along the floor  of the left intervertebral foramina. There is some narrowing of both  intervertebral foramina. There is a slight retrolisthesis of L5 on S1  which has been noted.     IMPRESSION:  1.Multilevel postsurgical changes.  2.Slightly more prominent bulging of the annulus asymmetric right  subarticular recess L1-L2.  3.Interval development of a disc protrusion which extends into the left  lateral recess at the L3-4 level.     Electronically Signed By-Escobar Uriarte On:10/8/2020 4:04 PM  This report was finalized on [id number] by  Escobar Uriarte, .    Review of Systems   Constitutional: Negative for activity change, fatigue and fever.   HENT: Negative for congestion.    Eyes: Negative for visual disturbance.   Respiratory: Negative for cough and shortness of breath.    Cardiovascular: Negative for chest pain.   Gastrointestinal: Negative for constipation and diarrhea.   Endocrine: Negative for polyuria.   Genitourinary: Negative for difficulty urinating.   Musculoskeletal: Positive for back pain.   Allergic/Immunologic: Negative for immunocompromised state.   Neurological: Negative for dizziness, weakness, light-headedness, numbness and headaches.   Psychiatric/Behavioral: Negative for agitation, sleep disturbance and suicidal ideas. The patient is not nervous/anxious.      Vitals:    02/23/21 1110  "  BP: (!) 167/107  Comment: pt sts had some of his bp meds this am   Pulse: 70   Resp: 20   Temp: 98 °F (36.7 °C)   SpO2: 94%   Weight: 86.2 kg (190 lb)   Height: 180.3 cm (71\")   PainSc:   7   PainLoc: Back     Objective   Physical Exam  Vitals signs and nursing note reviewed.   Constitutional:       Appearance: Normal appearance. He is well-developed.   Eyes:      General: Lids are normal.   Neck:      Musculoskeletal: Normal range of motion.   Cardiovascular:      Rate and Rhythm: Normal rate.   Pulmonary:      Effort: Pulmonary effort is normal.   Musculoskeletal:      Lumbar back: He exhibits decreased range of motion, tenderness and bony tenderness.      Comments: POS Right, NEG Left Lillian  POS Right, NEG Left Thigh Thrust  POS Right, NEG Left SI compression  equivocal Bilateral SLR    Skin:         Neurological:      Mental Status: He is alert and oriented to person, place, and time.      Motor: No weakness.   Psychiatric:         Attention and Perception: Attention normal.         Mood and Affect: Mood normal.         Speech: Speech normal.         Behavior: Behavior normal.         Judgment: Judgment normal.       Assessment/Plan   Diagnoses and all orders for this visit:    1. Spinal stenosis of lumbar region with neurogenic claudication (Primary)    2. Degeneration of lumbar or lumbosacral intervertebral disc    3. History of lumbar spinal fusion    4. Herniation of lumbar intervertebral disc with radiculopathy    5. Other chronic pain      --- 40 minutes spent in chart review, face to face time with patient and documentation.   --- Review of report of Lumbar MRI from 10/8/2020.   --- Left L3 TFESI  Reviewed the procedure at length with the patient.  Included in the review was expectations, complications, risk and benefits.The procedure was described in detail and the risks, benefits and alternatives were discussed with the patient (including but not limited to: bleeding, infection, nerve damage, " worsening of pain, inability to perform injection, paralysis, seizures, and death) who agreed to proceed.  Discussed the potential for sedation if warranted/wanted.  The procedure will plan to be performed at [facility] with fluoroscopic guidance(unless ultrasound is indicated) and could potentially have steroids and contrast dye used. Questions were answered and in a way the patient could understand.  Patient verbalized understanding and wishes to proceed.  This intervention will be ordered.  Discussed with patient that all procedures are part of a multimodal plan of care and include either formal PT or a home exercise program.  Patient has no evidence of coagulopathy or current infection.  --- Consider Right Sacroiliac joint injection in the future.   --- Patient's BP is elevated today, his HTN medication was adjusted by Dr. Benson yesterday.  He denies any headache, chest pain, vision changes, or shortness of breath at this time.   --- Follow-up after procedure     DAX REPORT    DAX report has been reviewed and scanned into the patient's chart.    As the clinician, I personally reviewed the DAX from 2/23/2021 while the patient was in the office today.    EMR Dragon/Transcription disclaimer:   Much of this encounter note is an electronic transcription/translation of spoken language to printed text. The electronic translation of spoken language may permit erroneous, or at times, nonsensical words or phrases to be inadvertently transcribed; Although I have reviewed the note for such errors, some may still exist.

## 2021-03-08 ENCOUNTER — TRANSCRIBE ORDERS (OUTPATIENT)
Dept: LAB | Facility: SURGERY CENTER | Age: 61
End: 2021-03-08

## 2021-03-08 ENCOUNTER — APPOINTMENT (OUTPATIENT)
Dept: LAB | Facility: SURGERY CENTER | Age: 61
End: 2021-03-08

## 2021-03-08 DIAGNOSIS — Z01.818 OTHER SPECIFIED PRE-OPERATIVE EXAMINATION: Primary | ICD-10-CM

## 2021-03-09 ENCOUNTER — LAB (OUTPATIENT)
Dept: LAB | Facility: SURGERY CENTER | Age: 61
End: 2021-03-09

## 2021-03-09 DIAGNOSIS — Z01.818 OTHER SPECIFIED PRE-OPERATIVE EXAMINATION: ICD-10-CM

## 2021-03-09 LAB — SARS-COV-2 ORF1AB RESP QL NAA+PROBE: NOT DETECTED

## 2021-03-09 PROCEDURE — U0004 COV-19 TEST NON-CDC HGH THRU: HCPCS | Performed by: SURGERY

## 2021-03-09 PROCEDURE — C9803 HOPD COVID-19 SPEC COLLECT: HCPCS

## 2021-03-09 NOTE — SIGNIFICANT NOTE
Patient educated on the following :    - If you are receiving Sedation for your procedure Nothing to Eat after     and only clear liquids until       -You will need to have someone drive you home after your PROCEDURE and remain with you for 24 hours after the PROCEDURE  - The date of your PROCEDURE, your ride is welcome to either remain in our parking lot or within 10-15 minutes of [facility]  -You will need to arrive at           on           for your PROCEDURE  -Please contact [facility] [facility] at: [phone] with any questions and/or concerns

## 2021-03-10 ENCOUNTER — TRANSCRIBE ORDERS (OUTPATIENT)
Dept: SURGERY | Facility: SURGERY CENTER | Age: 61
End: 2021-03-10

## 2021-03-10 ENCOUNTER — APPOINTMENT (OUTPATIENT)
Dept: GENERAL RADIOLOGY | Facility: SURGERY CENTER | Age: 61
End: 2021-03-10

## 2021-03-10 DIAGNOSIS — I10 ESSENTIAL HYPERTENSION: ICD-10-CM

## 2021-03-10 DIAGNOSIS — Z41.9 SURGERY, ELECTIVE: Primary | ICD-10-CM

## 2021-03-11 ENCOUNTER — HOSPITAL ENCOUNTER (OUTPATIENT)
Dept: GENERAL RADIOLOGY | Facility: SURGERY CENTER | Age: 61
Setting detail: HOSPITAL OUTPATIENT SURGERY
End: 2021-03-11

## 2021-03-11 ENCOUNTER — HOSPITAL ENCOUNTER (OUTPATIENT)
Facility: SURGERY CENTER | Age: 61
Setting detail: HOSPITAL OUTPATIENT SURGERY
Discharge: HOME | End: 2021-03-11
Attending: ANESTHESIOLOGY | Admitting: ANESTHESIOLOGY

## 2021-03-11 ENCOUNTER — OFFICE VISIT (OUTPATIENT)
Dept: FAMILY MEDICINE CLINIC | Facility: CLINIC | Age: 61
End: 2021-03-11

## 2021-03-11 VITALS
BODY MASS INDEX: 26.6 KG/M2 | HEIGHT: 71 IN | OXYGEN SATURATION: 96 % | WEIGHT: 190 LBS | RESPIRATION RATE: 16 BRPM | HEART RATE: 67 BPM | DIASTOLIC BLOOD PRESSURE: 96 MMHG | TEMPERATURE: 98.3 F | SYSTOLIC BLOOD PRESSURE: 162 MMHG

## 2021-03-11 VITALS
OXYGEN SATURATION: 97 % | HEART RATE: 71 BPM | DIASTOLIC BLOOD PRESSURE: 102 MMHG | BODY MASS INDEX: 27.32 KG/M2 | TEMPERATURE: 97.3 F | HEIGHT: 70 IN | RESPIRATION RATE: 20 BRPM | WEIGHT: 190.8 LBS | SYSTOLIC BLOOD PRESSURE: 160 MMHG

## 2021-03-11 DIAGNOSIS — I10 ESSENTIAL HYPERTENSION: ICD-10-CM

## 2021-03-11 DIAGNOSIS — M54.40 CHRONIC LOW BACK PAIN WITH SCIATICA, SCIATICA LATERALITY UNSPECIFIED, UNSPECIFIED BACK PAIN LATERALITY: Primary | ICD-10-CM

## 2021-03-11 DIAGNOSIS — M51.37 DEGENERATION OF LUMBAR OR LUMBOSACRAL INTERVERTEBRAL DISC: ICD-10-CM

## 2021-03-11 DIAGNOSIS — G89.29 CHRONIC LOW BACK PAIN WITH SCIATICA, SCIATICA LATERALITY UNSPECIFIED, UNSPECIFIED BACK PAIN LATERALITY: Primary | ICD-10-CM

## 2021-03-11 DIAGNOSIS — Z87.891 FORMER TOBACCO USE: ICD-10-CM

## 2021-03-11 DIAGNOSIS — Z41.9 SURGERY, ELECTIVE: ICD-10-CM

## 2021-03-11 PROCEDURE — 3E0R3BZ INTRODUCTION OF ANESTHETIC AGENT INTO SPINAL CANAL, PERCUTANEOUS APPROACH: ICD-10-PCS | Performed by: ANESTHESIOLOGY

## 2021-03-11 PROCEDURE — 3E0R33Z INTRODUCTION OF ANTI-INFLAMMATORY INTO SPINAL CANAL, PERCUTANEOUS APPROACH: ICD-10-PCS | Performed by: ANESTHESIOLOGY

## 2021-03-11 PROCEDURE — 99214 OFFICE O/P EST MOD 30 MIN: CPT | Performed by: FAMILY MEDICINE

## 2021-03-11 PROCEDURE — 64483 NJX AA&/STRD TFRM EPI L/S 1: CPT | Performed by: ANESTHESIOLOGY

## 2021-03-11 PROCEDURE — 0 IOHEXOL 300 MG/ML SOLUTION 10 ML VIAL: Performed by: ANESTHESIOLOGY

## 2021-03-11 PROCEDURE — 25010000002 METHYLPREDNISOLONE PER 80 MG: Performed by: ANESTHESIOLOGY

## 2021-03-11 RX ORDER — METOPROLOL SUCCINATE 50 MG/1
25 TABLET, EXTENDED RELEASE ORAL 2 TIMES DAILY
Qty: 180 TABLET | Refills: 3 | Status: SHIPPED | OUTPATIENT
Start: 2021-03-11 | End: 2022-05-12

## 2021-03-11 RX ORDER — HYDROCHLOROTHIAZIDE 25 MG/1
25 TABLET ORAL DAILY
Qty: 90 TABLET | Refills: 3 | Status: SHIPPED | OUTPATIENT
Start: 2021-03-11 | End: 2021-06-22

## 2021-03-11 RX ORDER — HYDROCHLOROTHIAZIDE 25 MG/1
TABLET ORAL
Qty: 90 TABLET | Refills: 3 | Status: SHIPPED | OUTPATIENT
Start: 2021-03-11 | End: 2022-02-11

## 2021-03-11 RX ORDER — BACLOFEN 10 MG/1
10 TABLET ORAL 3 TIMES DAILY PRN
Qty: 270 TABLET | Refills: 1 | Status: SHIPPED | OUTPATIENT
Start: 2021-03-11 | End: 2022-09-20

## 2021-03-11 RX ORDER — HYDROCODONE BITARTRATE AND ACETAMINOPHEN 10; 325 MG/1; MG/1
1 TABLET ORAL EVERY 8 HOURS PRN
Qty: 90 TABLET | Refills: 0 | Status: SHIPPED | OUTPATIENT
Start: 2021-03-11 | End: 2021-04-10

## 2021-03-11 RX ORDER — LISINOPRIL 20 MG/1
20 TABLET ORAL DAILY
Qty: 90 TABLET | Refills: 3 | Status: SHIPPED | OUTPATIENT
Start: 2021-03-11 | End: 2022-04-04

## 2021-03-11 RX ORDER — METOPROLOL SUCCINATE 50 MG/1
50 TABLET, EXTENDED RELEASE ORAL DAILY
Qty: 90 TABLET | Refills: 3 | Status: SHIPPED | OUTPATIENT
Start: 2021-03-11 | End: 2021-03-11 | Stop reason: SDUPTHER

## 2021-03-11 RX ORDER — HYDROCHLOROTHIAZIDE 25 MG/1
25 TABLET ORAL DAILY
Qty: 30 TABLET | Refills: 0 | Status: ON HOLD | OUTPATIENT
Start: 2021-03-11 | End: 2021-03-11

## 2021-03-11 NOTE — DISCHARGE INSTRUCTIONS
[facility] - Post-procedure Instructions          --  While there are no absolute restrictions, it is recommended that you do not perform strenuous activity today. In the morning, you may resume your level of activity as before your block.    --  If you have a band-aid at your injection site, please remove it later today. Observe the area for any redness, swelling, pus-like drainage, or a temperature over 101°. If any of these symptoms occur, please call your doctor at [phone]. If after office hours, leave a message and the on-call provider will return your call.    --  Ice may be applied to your injection site. It is recommended you avoid direct heat (heating pad; hot tub) for 1-2 days.    --  Call [facility] at [phone] if you experience persistent headache, persistent bleeding from the injection site, or severe pain not relieved by heat or oral medication.    --  Do not make important decisions today.    --  Due to the effects of the block and/or the I.V. Sedation, DO NOT drive or operate hazardous machinery for 12 hours.    --  Do not drink alcohol for 12 hours.    -- You may return to work tomorrow, or as directed by your referring doctor.    --  Occasionally you may notice a slight increase in your pain after the procedure. This should start to improve within the next 24-48 hours. Radiofrequency ablation procedure pain may last 3-4 weeks.    --  It may take as long as 3-4 days before you notice a gradual improvement in your pain and/or other symptoms.    -- You may continue to take your prescribed pain medication as needed.    --  Some normal possible side effects of steroid use could include fluid retention, increased blood sugar, dull headache, increased sweating, increased appetite, mood swings and flushing.    --  Diabetics are recommended to watch their blood glucose level closely for 24-48 hours after the injection.    --  Must stay in PACU for 20 min upon arrival and  prove no leg weakness before being discharged.    --  IN THE EVENT OF A LIFE THREATENING EMERGENCY, (CHEST PAIN, BREATHING DIFFICULTIES, PARALYSIS…) YOU SHOULD GO TO YOUR NEAREST EMERGENCY ROOM.    --  You should be contacted by our office within 2-3 days to schedule follow up or next appointment date.  If not contacted within 7 days, please call the office at [phone]

## 2021-03-11 NOTE — PROGRESS NOTES
Subjective   Steven Hammonds is a 60 y.o. male.     Pain med refill. Stable on current treatment.  Controlled substance agreement signed and on file.  Patient states he was admitted to [facility] in February 2021 due to hypertension.    Pt has appt with pain management today for epidural injections.     Back Pain  This is a chronic problem. The current episode started more than 1 year ago. The problem has been rapidly worsening (x 2 weeks) since onset. The pain is present in the lumbar spine. Pertinent negatives include no dysuria, fever, numbness or weakness.        The following portions of the patient's history were reviewed and updated as appropriate: allergies, current medications, past family history, past medical history, past social history, past surgical history and problem list.    Patient Active Problem List   Diagnosis   • Degeneration of lumbar or lumbosacral intervertebral disc   • Disc narrowing   • Colon cancer screening   • Gastroesophageal reflux disease   • Hypertension   • Paroxysmal atrial fibrillation (CMS/HCC)   • Spinal stenosis of lumbar region with neurogenic claudication   • Spondylolysis   • Status post arthroscopy of knee   • Sinus pause   • Tachycardia-bradycardia (CMS/HCC)   • Thoracic or lumbosacral neuritis or radiculitis   • Tremor   • Presence of cardiac pacemaker   • Allergic rhinitis   • Hyperlipidemia   • Insomnia   • Umbilical hernia   • Tinea   • Impetigo   • Labile blood pressure   • Other specified persistent mood disorders (CMS/HCC)   • Non-seasonal allergic rhinitis due to pollen   • Chronic low back pain with sciatica   • Bobby's esophagus with dysplasia   • Former tobacco use   • Cervical disc disease with myelopathy   • Psoriatic arthritis (CMS/HCC)   • Chest pain   • Hypertensive urgency   • Headache   • Abnormal nuclear stress test   • Herniation of lumbar intervertebral disc with radiculopathy   • History of lumbar spinal fusion   • Other chronic pain   • Sacroiliac joint  dysfunction of right side       Current Outpatient Medications on File Prior to Visit   Medication Sig Dispense Refill   • amitriptyline (ELAVIL) 50 MG tablet TAKE 2 TABLETS AT BEDTIME 180 tablet 3   • ascorbic acid (VITAMIN C) 500 MG tablet Take 500 mg by mouth every night at bedtime.     • cetirizine (zyrTEC) 10 MG tablet Take 10 mg by mouth Daily.     • cholecalciferol (VITAMIN D3) 25 MCG (1000 UT) tablet Take 3,000 Units by mouth every night at bedtime.     • digoxin (LANOXIN) 125 MCG tablet Take 1 tablet by mouth Daily. 90 tablet 3   • esomeprazole (NEXIUM) 40 MG capsule Take 1 capsule by mouth Daily.     • etanercept (ENBREL) 50 MG/ML solution prefilled syringe injection Inject 50 mg under the skin into the appropriate area as directed 1 (One) Time Per Week. Wed     • fluticasone (FLONASE) 50 MCG/ACT nasal spray USE 2 SPRAYS IN EACH NOSTRIL ONCE DAILY 3 bottle 3   • gabapentin (NEURONTIN) 600 MG tablet TAKE 1 TABLET FOUR TIMES A  tablet 3   • rosuvastatin (CRESTOR) 10 MG tablet TAKE 1/2 TABLET BY MOUTH DAILY (Patient taking differently: Take 5 mg by mouth Daily.) 45 tablet 5   • tamsulosin (FLOMAX) 0.4 MG capsule 24 hr capsule Take 1 capsule by mouth Daily.     • [DISCONTINUED] baclofen (LIORESAL) 10 MG tablet Take 1 tablet by mouth 3 (Three) Times a Day As Needed for Muscle Spasms. 60 tablet 1   • [DISCONTINUED] HYDROcodone-acetaminophen (NORCO) 5-325 MG per tablet Take 1 tablet by mouth 3 (Three) Times a Day As Needed for Severe Pain . 90 tablet 0   • [DISCONTINUED] lisinopril (PRINIVIL,ZESTRIL) 20 MG tablet Take 0.5 tablets by mouth Daily. (Patient taking differently: Take 10 mg by mouth Daily As Needed.) 90 tablet 3   • [DISCONTINUED] metoprolol succinate XL (Toprol XL) 25 MG 24 hr tablet Take 1 tablet by mouth Daily. (Patient taking differently: Take 25 mg by mouth 2 (two) times a day.) 90 tablet 5     No current facility-administered medications on file prior to visit.     Current outpatient and  discharge medications have been reconciled for the patient.  Reviewed by: Junaid Marquez MD      Allergies   Allergen Reactions   • Oxycodone-Acetaminophen GI Intolerance   • Adhesive Tape Unknown (See Comments)   • Oxycodone Unknown (See Comments)       Review of Systems   Constitutional: Negative for fever.   Genitourinary: Negative for dysuria.   Musculoskeletal: Positive for back pain.   Neurological: Negative for weakness and numbness.     I have reviewed and confirmed the accuracy of the ROS as documented by the MA/LPN/RN Junaid Marquez MD    Objective   Vitals:    03/11/21 1019   BP: (!) 160/102   Pulse: 71   Resp: 20   Temp: 97.3 °F (36.3 °C)   SpO2: 97%     Physical Exam  Constitutional:       Appearance: He is well-developed.   HENT:      Head: Normocephalic and atraumatic.      Right Ear: External ear normal.      Left Ear: External ear normal.      Nose: Nose normal.   Eyes:      Pupils: Pupils are equal, round, and reactive to light.   Cardiovascular:      Rate and Rhythm: Normal rate and regular rhythm.      Heart sounds: Normal heart sounds.   Pulmonary:      Effort: Pulmonary effort is normal.      Breath sounds: Normal breath sounds.   Abdominal:      General: Bowel sounds are normal.      Palpations: Abdomen is soft.   Musculoskeletal:         General: Normal range of motion.      Cervical back: Normal range of motion and neck supple.   Skin:     General: Skin is warm and dry.   Neurological:      Mental Status: He is alert and oriented to person, place, and time.   Psychiatric:         Behavior: Behavior normal.         Thought Content: Thought content normal.         Judgment: Judgment normal.           Assessment/Plan .  Problem List Items Addressed This Visit     Degeneration of lumbar or lumbosacral intervertebral disc    Relevant Medications    HYDROcodone-acetaminophen (NORCO)  MG per tablet    baclofen (LIORESAL) 10 MG tablet    Hypertension    Relevant Medications     lisinopril (PRINIVIL,ZESTRIL) 20 MG tablet    metoprolol succinate XL (Toprol XL) 50 MG 24 hr tablet    hydroCHLOROthiazide (HYDRODIURIL) 25 MG tablet    hydroCHLOROthiazide (HYDRODIURIL) 25 MG tablet    Chronic low back pain with sciatica - Primary    Overview     scheduled for epidural today          Relevant Medications    HYDROcodone-acetaminophen (NORCO)  MG per tablet    Former tobacco use       Findings discussed. All questions answered.  Medication and medication adverse effects discussed.  Drug education given and explained to patient. Patient verbalized understanding.  Follow-up for routine health maintenance as directed     I wore protective equipment throughout this patient encounter to include mask and eye protection. Hand hygiene was performed before donning protective equipment and after removal when leaving the room

## 2021-03-11 NOTE — OP NOTE
Lumbar Transforaminal Epidural Steroid Injection (one level Unilateral)  [facility]      PREOPERATIVE DIAGNOSIS:  Lumbar Degenerative Disc Disease, Lumbar Spinal Stenosis WITH Neurogenic Claudication, Lumbar Postlaminectomy Syndrome and left Lumbar Radiculopathy    POSTOPERATIVE DIAGNOSIS:  Same as preop diagnosis    PROCEDURE:  CPT 75129 --  Diagnostic Transforaminal Epidural Steroid Injection at the L3 level, on the left     PRE-PROCEDURE DISCUSSION WITH PATIENT:    Risks and complications were discussed with the patient prior to starting the procedure and informed consent was obtained.  We discussed various topics including but not limited to bleeding, infection, injury, nerve injury, paralysis, coma, death, postprocedural painful flare-up, postprocedural site soreness, and a lack of pain relief.  We discussed the diagnostic aspect of transforaminal epidural / selective nerve root blockade.    SURGEON:  Danial Diaz MD    REASON FOR PROCEDURE:    Degenerative changes are noted in the area. and Radiating pattern of pain is likely consistent with degenerative changes in the area.    SEDATION:  Patient declined administration of moderate sedation    ANESTHETIC:  Marcaine 0.25%  STEROID:  Methylprednisolone (DEPO MEDROL) 80mg/ml    DESCRIPTON OF PROCEDURE:  After obtaining informed consent, an I.V. was not started in the preoperative area. The patient taken to the operating room and was placed in the prone position with a pillow under the abdomen.  All pressure points were well padded.  EKG, blood pressure, and pulse oximeter were monitored.  The lumbar area was prepped with Chloraprep and draped in a sterile fashion. Under fluoroscopic guidance in an oblique dimension on the above mentioned side, the transverse process of the aforementioned vertebra at the junction of the body at 6 o'clock position was identified. Skin and subcutaneous tissue was anesthetized with 1% lidocaine. A 22-gauge  spinal needle was introduced under fluoroscopic guidance at the above junction into the foramen without parasthesias and into the epidural space. After confirming the position of the needle with PA, lateral, and oblique fluoroscopic views, aspiration was checked and was clear of blood or CSF.  Next, 1 mL of Omnipaque was injected. After seeing adequate spread on the corresponding nerve root, a total volume 3mL of injectate containing 1ml of the above mentioned local anesthetic, 1 ml saline,  and the above mentioned corticosteroid was injected into the epidural space.    The needle was removed intact.  Vital signs were stable throughout.        ESTIMATED BLOOD LOSS:  <5 mL  SPECIMENS:  none    COMPLICATIONS:   No complications were noted., There was no indication of vascular uptake on live injection of contrast dye., There was no indication of intrathecal uptake on live injection of contrast dye., There was not any evidence of dural puncture.   and The patient did not have any signs of postprocedure numbness nor weakness.    TOLERANCE & DISCHARGE CONDITION:    The patient tolerated the procedure well.  The patient was transported to the recovery area without difficulties.  The patient was discharged to home under the care of family in stable and satisfactory condition.    PLAN OF CARE:  1. The patient was given our standard instruction sheet.  2. The patient will Return to clinic 2 wks.  3. The patient will resume all medications as per the medication reconciliation sheet.

## 2021-03-17 DIAGNOSIS — I10 ESSENTIAL HYPERTENSION: ICD-10-CM

## 2021-03-17 RX ORDER — METOPROLOL SUCCINATE 50 MG/1
25 TABLET, EXTENDED RELEASE ORAL 2 TIMES DAILY
Qty: 90 TABLET | Refills: 3 | Status: CANCELLED | OUTPATIENT
Start: 2021-03-17

## 2021-03-26 ENCOUNTER — OFFICE VISIT (OUTPATIENT)
Dept: PAIN MEDICINE | Facility: CLINIC | Age: 61
End: 2021-03-26

## 2021-03-26 VITALS
DIASTOLIC BLOOD PRESSURE: 89 MMHG | SYSTOLIC BLOOD PRESSURE: 141 MMHG | HEART RATE: 88 BPM | TEMPERATURE: 98.2 F | HEIGHT: 71 IN | BODY MASS INDEX: 25.34 KG/M2 | RESPIRATION RATE: 20 BRPM | OXYGEN SATURATION: 100 % | WEIGHT: 181 LBS

## 2021-03-26 DIAGNOSIS — M51.16 HERNIATION OF LUMBAR INTERVERTEBRAL DISC WITH RADICULOPATHY: ICD-10-CM

## 2021-03-26 DIAGNOSIS — Z98.1 HISTORY OF LUMBAR SPINAL FUSION: Primary | ICD-10-CM

## 2021-03-26 DIAGNOSIS — G89.29 OTHER CHRONIC PAIN: ICD-10-CM

## 2021-03-26 PROCEDURE — 99212 OFFICE O/P EST SF 10 MIN: CPT | Performed by: NURSE PRACTITIONER

## 2021-03-26 NOTE — PROGRESS NOTES
CHIEF COMPLAINT  F/u back pain. Pt had Left L3 LUMBAR/SACRAL TRANSFORAMINAL EPIDURAL and sts receiving 75% relief the day of procedure then pain returned to baseline.     Subjective   Steven Hammonds is a 60 y.o. male  who presents for follow-up.  He has a history of back pain.  He completed a Left L3 TFESI performed by Dr. Diaz on 3/11/2014.  He reports 75% relief the day of the procedure and then his pain returned to baseline.  He reports worsening anterior left lower extremity pain and worsening right sided back pain radiating into his medial leg.     Patient remained masked during entire encounter. No cough present. I donned a mask and eye protection throughout entire visit. Prior to donning mask and eye protection, hand hygiene was performed, as well as when it was doffed.  I was closer than 6 feet, but not for an extended period of time. No obvious exposure to any bodily fluids.    Back Pain  This is a chronic problem. The problem occurs constantly. The pain is present in the lumbar spine. The pain radiates to the left thigh and right thigh. The pain is at a severity of 6/10. The symptoms are aggravated by sitting, standing, position, bending and twisting. Pertinent negatives include no chest pain, fever, headaches, numbness or weakness. He has tried analgesics and muscle relaxant (injections, Gabapentin) for the symptoms. The treatment provided mild relief.      PEG Assessment   What number best describes your pain on average in the past week?6  What number best describes how, during the past week, pain has interfered with your enjoyment of life?6  What number best describes how, during the past week, pain has interfered with your general activity?  6    The following portions of the patient's history were reviewed and updated as appropriate: allergies, current medications, past family history, past medical history, past social history, past surgical history and problem list.    Review of Systems  "  Constitutional: Negative for activity change, fatigue and fever.   HENT: Negative for congestion.    Eyes: Negative for visual disturbance.   Respiratory: Negative for cough and shortness of breath.    Cardiovascular: Negative for chest pain.   Gastrointestinal: Negative for constipation and diarrhea.   Endocrine: Negative for polyuria.   Genitourinary: Negative for difficulty urinating.   Musculoskeletal: Positive for back pain.   Neurological: Negative for dizziness, weakness, light-headedness, numbness and headaches.   Psychiatric/Behavioral: Negative for agitation, sleep disturbance and suicidal ideas. The patient is not nervous/anxious.      --  The aforementioned information the Chief Complaint section and above subjective data including any HPI data, and also the Review of Systems data, has been personally reviewed and affirmed.  --    Vitals:    03/26/21 1103   Pulse: 88   Resp: 20   Temp: 98.2 °F (36.8 °C)   SpO2: 100%   Weight: 82.1 kg (181 lb)   Height: 180.3 cm (71\")   PainSc:   6   PainLoc: Back     Objective   Physical Exam  Vitals and nursing note reviewed.   Constitutional:       Appearance: Normal appearance. He is well-developed.   Eyes:      General: Lids are normal.   Cardiovascular:      Rate and Rhythm: Normal rate.   Pulmonary:      Effort: Pulmonary effort is normal.   Musculoskeletal:      Cervical back: Normal range of motion.      Lumbar back: Tenderness and bony tenderness present. Positive right straight leg raise test and positive left straight leg raise test.   Neurological:      Mental Status: He is alert and oriented to person, place, and time.      Gait: Gait abnormal (cane).   Psychiatric:         Attention and Perception: Attention normal.         Mood and Affect: Mood normal.         Speech: Speech normal.         Behavior: Behavior normal.         Judgment: Judgment normal.       Assessment/Plan   Diagnoses and all orders for this visit:    1. History of lumbar spinal fusion " (Primary)    2. Herniation of lumbar intervertebral disc with radiculopathy    3. Other chronic pain      --- Patient is not interested in repeating the Left L3 TFESI or any further injections at this time as he only had relief the day of the procedure for multiple injections in the past.  --- Follow-up with Dr. Ge  --- Follow-up with our clinic based on Neurosurgery recommendations after upcoming visit.      DAX REPORT  As part of the patient's treatment plan, I am prescribing controlled substances. The patient has been made aware of appropriate use of such medications, including potential risk of somnolence, limited ability to drive and/or work safely, and the potential for dependence or overdose. It has also bee made clear that these medications are for use by this patient only, without concomitant use of alcohol or other substances unless prescribed.     Patient has completed prescribing agreement detailing terms of continued prescribing of controlled substances, including monitoring DAX reports, urine drug screening, and pill counts if necessary. The patient is aware that inappropriate use will results in cessation of prescribing such medications.    DAX report has been reviewed and scanned into the patient's chart.    As the clinician, I personally reviewed the DAX from 3/26/2021 while the patient was in the office today.    History and physical exam exhibit continued safe and appropriate use of controlled substances.      EMR Dragon/Transcription disclaimer:   Much of this encounter note is an electronic transcription/translation of spoken language to printed text. The electronic translation of spoken language may permit erroneous, or at times, nonsensical words or phrases to be inadvertently transcribed; Although I have reviewed the note for such errors, some may still exist.

## 2021-03-28 PROCEDURE — 93294 REM INTERROG EVL PM/LDLS PM: CPT | Performed by: INTERNAL MEDICINE

## 2021-03-28 PROCEDURE — 93296 REM INTERROG EVL PM/IDS: CPT | Performed by: INTERNAL MEDICINE

## 2021-04-12 NOTE — H&P (VIEW-ONLY)
Subjective   Patient ID: Steven Hammonds is a 60 y.o. male is here today for follow-up. Patient was last seen 2/8/21 for herniation of lumbar intervertebral disc with radiculopathy. Patient was referred to Pain Management.      Patient reports today pain in low back that radiates down both legs, left worse than right.  Numbness and tingling present.  Patient went to pain management and had one epidural but was only relieved of pain for 2 hours.     This gentleman was fused from L4 to S1. I did a discectomy on him at L3-L4 a few years ago, which helped, but the pain recurred because of a recurrent herniated disc and more stenosis at L3-L4 and a bit at L2-L3. Transforaminal epidural block did not work. He was recently put on Eliquis for atrial fibrillation by his cardiologist. He is pretty miserable today. He is using a cane and his back and his right anterior thigh are bothering him quite a bit. It has been going on for 6 months and I think he probably needs surgery. We will need to check with his cardiologist, Dr. Benson if he can come off of his Eliquis so that we can do the myelogram, and eventually the surgery. We will get the myelogram done. I will give him some additional pain medications to tide him over and we will talk about decompressing and fusing him above his fusion level at L3-L4 and perhaps as even as high as L2-L3.           Back Pain  The pain is present in the lumbar spine. The quality of the pain is described as stabbing. The pain radiates to the left foot, left knee, left thigh, right foot, right knee and right thigh. The pain is at a severity of 6/10. The pain is the same all the time. The symptoms are aggravated by bending. Associated symptoms include numbness and tingling. Pertinent negatives include no bladder incontinence, bowel incontinence or fever. He has tried ice, heat, home exercises and walking for the symptoms. The treatment provided mild relief.       The following portions of the  "patient's history were reviewed and updated as appropriate: allergies, current medications, past family history, past medical history, past social history, past surgical history and problem list.    Review of Systems   Constitutional: Negative for chills and fever.   HENT: Negative for congestion.    Gastrointestinal: Negative for bowel incontinence.   Genitourinary: Negative for bladder incontinence.   Musculoskeletal: Positive for back pain and gait problem.   Neurological: Positive for tingling and numbness.   All other systems reviewed and are negative.          Objective     Vitals:    04/19/21 1145   BP: 130/82   Temp: 99.2 °F (37.3 °C)   Weight: 81.1 kg (178 lb 12.8 oz)   Height: 180.3 cm (71\")     Body mass index is 24.94 kg/m².      Physical Exam  Constitutional:       Appearance: He is well-developed.   HENT:      Head: Normocephalic and atraumatic.   Eyes:      Extraocular Movements: EOM normal.      Conjunctiva/sclera: Conjunctivae normal.      Pupils: Pupils are equal, round, and reactive to light.   Neck:      Vascular: No carotid bruit.   Neurological:      Mental Status: He is oriented to person, place, and time.      Coordination: Finger-Nose-Finger Test and Heel to Shin Test normal.      Gait: Gait is intact.      Deep Tendon Reflexes:      Reflex Scores:       Tricep reflexes are 2+ on the right side and 2+ on the left side.       Bicep reflexes are 2+ on the right side and 2+ on the left side.       Brachioradialis reflexes are 2+ on the right side and 2+ on the left side.       Patellar reflexes are 2+ on the right side and 2+ on the left side.       Achilles reflexes are 2+ on the right side and 2+ on the left side.  Psychiatric:         Speech: Speech normal.       Neurologic Exam     Mental Status   Oriented to person, place, and time.   Registration of memory: Good recent and remote memory.   Attention: normal. Concentration: normal.   Speech: speech is normal   Level of consciousness: " alert  Knowledge: consistent with education.     Cranial Nerves     CN II   Visual fields full to confrontation.   Visual acuity: normal    CN III, IV, VI   Pupils are equal, round, and reactive to light.  Extraocular motions are normal.     CN V   Facial sensation intact.   Right corneal reflex: normal  Left corneal reflex: normal    CN VII   Facial expression full, symmetric.   Right facial weakness: none  Left facial weakness: none    CN VIII   Hearing: intact    CN IX, X   Palate: symmetric    CN XI   Right sternocleidomastoid strength: normal  Left sternocleidomastoid strength: normal    CN XII   Tongue: not atrophic  Tongue deviation: none    Motor Exam   Muscle bulk: normal  Right arm tone: normal  Left arm tone: normal  Right leg tone: normal  Left leg tone: normal    Strength   Strength 5/5 except as noted.     Sensory Exam   Light touch normal.     Gait, Coordination, and Reflexes     Gait  Gait: normal    Coordination   Finger to nose coordination: normal  Heel to shin coordination: normal    Reflexes   Right brachioradialis: 2+  Left brachioradialis: 2+  Right biceps: 2+  Left biceps: 2+  Right triceps: 2+  Left triceps: 2+  Right patellar: 2+  Left patellar: 2+  Right achilles: 2+  Left achilles: 2+  Right : 2+  Left : 2+          Assessment/Plan   Independent Review of Radiographic Studies:      I personally reviewed the images from the following studies.    I reviewed the lumbar MRI done in the fall 2020 which showed that he is fused from L4-S1 and there is some adjacent level stenosis at L2-L3 and L3-L4 with a left-sided L3-L4 herniated disc.  Agree with the report.    Medical Decision Making:      We will need to check with cardiology if he can come off of his Eliquis.  But we will go ahead and order a myelogram in anticipation that he might need another surgery probably an extension of his fusion to as high as L2.  He is aware the risk of spinal headache and the potential need for blood  patch.  I refilled his pain medication originally given by his primary care physician.  He can tolerate hydrocodone.      Diagnoses and all orders for this visit:    1. Herniation of lumbar intervertebral disc with radiculopathy (Primary)  -     HYDROcodone-acetaminophen (NORCO)  MG per tablet; Take 1 tablet by mouth Every 8 (Eight) Hours As Needed for Moderate Pain .  Dispense: 40 tablet; Refill: 0  -     IR Myelogram Lumbar Spine; Future  -     CT Lumbar Spine With Intrathecal Contrast; Future  -     XR Spine Lumbar Complete With Flex & Ext; Future    2. Spinal stenosis of lumbar region with neurogenic claudication  -     HYDROcodone-acetaminophen (NORCO)  MG per tablet; Take 1 tablet by mouth Every 8 (Eight) Hours As Needed for Moderate Pain .  Dispense: 40 tablet; Refill: 0  -     IR Myelogram Lumbar Spine; Future  -     CT Lumbar Spine With Intrathecal Contrast; Future  -     XR Spine Lumbar Complete With Flex & Ext; Future    3. History of lumbar spinal fusion  -     HYDROcodone-acetaminophen (NORCO)  MG per tablet; Take 1 tablet by mouth Every 8 (Eight) Hours As Needed for Moderate Pain .  Dispense: 40 tablet; Refill: 0  -     IR Myelogram Lumbar Spine; Future  -     CT Lumbar Spine With Intrathecal Contrast; Future  -     XR Spine Lumbar Complete With Flex & Ext; Future    Other orders  -     No Lab Testing Needed; Standing      Return in about 16 days (around 5/5/2021) for After myelogram.

## 2021-04-15 ENCOUNTER — OFFICE VISIT (OUTPATIENT)
Dept: CARDIOLOGY | Facility: CLINIC | Age: 61
End: 2021-04-15

## 2021-04-15 ENCOUNTER — CLINICAL SUPPORT NO REQUIREMENTS (OUTPATIENT)
Dept: CARDIOLOGY | Facility: CLINIC | Age: 61
End: 2021-04-15

## 2021-04-15 VITALS
DIASTOLIC BLOOD PRESSURE: 86 MMHG | HEIGHT: 71 IN | HEART RATE: 62 BPM | BODY MASS INDEX: 26.81 KG/M2 | OXYGEN SATURATION: 95 % | TEMPERATURE: 97.3 F | WEIGHT: 191.5 LBS | SYSTOLIC BLOOD PRESSURE: 122 MMHG

## 2021-04-15 DIAGNOSIS — E78.2 MIXED HYPERLIPIDEMIA: ICD-10-CM

## 2021-04-15 DIAGNOSIS — Z95.0 PRESENCE OF CARDIAC PACEMAKER: ICD-10-CM

## 2021-04-15 DIAGNOSIS — I49.5 TACHYCARDIA-BRADYCARDIA (HCC): Primary | ICD-10-CM

## 2021-04-15 DIAGNOSIS — I10 ESSENTIAL HYPERTENSION: ICD-10-CM

## 2021-04-15 DIAGNOSIS — I49.5 SICK SINUS SYNDROME (HCC): ICD-10-CM

## 2021-04-15 PROCEDURE — 93280 PM DEVICE PROGR EVAL DUAL: CPT | Performed by: INTERNAL MEDICINE

## 2021-04-15 PROCEDURE — 99214 OFFICE O/P EST MOD 30 MIN: CPT | Performed by: INTERNAL MEDICINE

## 2021-04-15 NOTE — PROGRESS NOTES
Encounter Date:04/15/2021  Last seen 2/22/2021      Patient ID: Steven Hammonds is a 60 y.o. male.    Chief Complaint:    Status post pacemaker  Recent chest discomfort  Hypertension  History of renal dysfunction     History of present illness  =========  Patient has been having off-and-on palpitations and dizziness feeling and near syncopal feeling.    Since I have last seen, the patient has been without any chest discomfort ,shortness of breath, palpitations, dizziness or syncope.  Denies having any headache ,abdominal pain ,nausea, vomiting , diarrhea constipation, loss of weight or loss of appetite.  Denies having any excessive bruising ,hematuria or blood in the stool.     Review of all systems negative except as indicated.     Reviewed ROS.     [[[[[[[[[[[[[[[[[[[[[  Impression  ============  -Recent chest discomfort-improved.  EKG showed no acute changes.  Troponin levels are negative.  Abnormal stress Cardiolite test with inferior ischemia.  Echocardiogram 2/2/2021-normal     Cardiac catheterization 2/4/2021 revealed  Normal left ventricle size and contractility with ejection fraction of 60%.  Essentially normal coronary arteries with dominant right coronary artery system with luminal irregularities.     -status post permanent dual-chamber pacemaker implantation ([employer] MRI Compatible ) 04/13/2018  - sick sinus syndrome-tachy-irvin syndrome.  Patient has history of atrial fibrillation with rapid ventricular response.  Patient had significant pauses of 5-6 seconds.  Symptomatic with dizziness and near-syncope     - history of Near-syncope associated with palpitations and dizziness .  -improved     -cardiac catheterization 03/18/2017 revealed normal left ventricular function and normal coronary arteries.  History of normal echocardiogram     -status post loop recorder placement 03/24/2017. -removal of loop recorder 04/13/2018.     -hypertension  Renal dysfunction BUN 17 creatinine 1.4 GERD  depression Psoriatic arthritis Hypokalemia     -history of Anemia and neutropenia     -Status post hip replacement bilateral arthroscopic knee surgery right shoulder surgery  CBP- with chronic myelopathy, s/p multiple lumbar surgeries; continue soma, gabapentin     -Allergic to Percocet  ================  Plan  ==============  Dizziness nausea and feeling of near passing out.  Pacemaker interrogation revealed intermittent atrial fibrillation.    Recent chest discomfort suggestive of angina pectoris.-Improved.  Troponin levels are negative.  EKG showed no acute changes.  Abnormal stress Cardiolite test     Cardiac catheterization 2/4/2021 revealed  Normal left ventricle size and contractility with ejection fraction of 60%.  Essentially normal coronary arteries with dominant right coronary artery system with luminal irregularities.  Cardiac catheterization results were discussed with patient and educated patient..     History of labile blood pressure.  Hypertension-better control 122/86  Continue metoprolol 50 mg a day  Continue lisinopril to 20 mg a day.    Paroxysmal atrial fibrillation.    Anticoagulation.  Patient has intermittent atrial fibrillation.  Option of anticoagulation and option of agents was reviewed.  Patient was started on Eliquis 5 mg twice a day.    status post pacemaker  Patient had palpitations and appears to have PMT.  Reviewed rhythm strips.  Interrogation of the pacemaker revealed excellent pacing parameters.  (Today 4/15/2021)  Device reprogrammed recently to avoid PMT recently.     Medications were reviewed and updated.  Patient is not having any angina pectoris or congestive heart failure.  Follow-up in the office in 6 months.  Further plan will depend on patient's progress.  [[[[[[[[[[[[[[[[[[[[[[[[[[[[[[[[                     Diagnosis Plan   1. Tachycardia-bradycardia (CMS/HCC)     2. Essential hypertension     3. Mixed hyperlipidemia     4. Sick sinus syndrome (CMS/HCC)     LAB RESULTS  (LAST 7 DAYS)    CBC        BMP        CMP         BNP        TROPONIN        CoAg        Creatinine Clearance  CrCl cannot be calculated (Patient's most recent lab result is older than the maximum 30 days allowed.).    ABG        Radiology  No radiology results for the last day                The following portions of the patient's history were reviewed and updated as appropriate: allergies, current medications, past family history, past medical history, past social history, past surgical history and problem list.    Review of Systems   Constitutional: Negative for malaise/fatigue.   Cardiovascular: Positive for chest pain (occ.), leg swelling and palpitations (occ.). Negative for syncope.   Respiratory: Negative for shortness of breath.    Skin: Negative for rash.   Gastrointestinal: Negative for nausea and vomiting.   Neurological: Positive for dizziness. Negative for light-headedness and numbness.         Current Outpatient Medications:   •  amitriptyline (ELAVIL) 50 MG tablet, TAKE 2 TABLETS AT BEDTIME, Disp: 180 tablet, Rfl: 3  •  ascorbic acid (VITAMIN C) 500 MG tablet, Take 500 mg by mouth every night at bedtime., Disp: , Rfl:   •  baclofen (LIORESAL) 10 MG tablet, Take 1 tablet by mouth 3 (Three) Times a Day As Needed for Muscle Spasms., Disp: 270 tablet, Rfl: 1  •  cetirizine (zyrTEC) 10 MG tablet, Take 10 mg by mouth Daily., Disp: , Rfl:   •  cholecalciferol (VITAMIN D3) 25 MCG (1000 UT) tablet, Take 3,000 Units by mouth every night at bedtime., Disp: , Rfl:   •  digoxin (LANOXIN) 125 MCG tablet, Take 1 tablet by mouth Daily., Disp: 90 tablet, Rfl: 3  •  esomeprazole (NEXIUM) 40 MG capsule, Take 1 capsule by mouth Daily., Disp: , Rfl:   •  etanercept (ENBREL) 50 MG/ML solution prefilled syringe injection, Inject 50 mg under the skin into the appropriate area as directed 1 (One) Time Per Week. Wed, Disp: , Rfl:   •  fluticasone (FLONASE) 50 MCG/ACT nasal spray, USE 2 SPRAYS IN EACH NOSTRIL ONCE DAILY,  Disp: 3 bottle, Rfl: 3  •  gabapentin (NEURONTIN) 600 MG tablet, TAKE 1 TABLET FOUR TIMES A DAY, Disp: 360 tablet, Rfl: 3  •  hydroCHLOROthiazide (HYDRODIURIL) 25 MG tablet, Take 1 tablet by mouth Daily., Disp: 90 tablet, Rfl: 3  •  lisinopril (PRINIVIL,ZESTRIL) 20 MG tablet, Take 1 tablet by mouth Daily., Disp: 90 tablet, Rfl: 3  •  metoprolol succinate XL (Toprol XL) 50 MG 24 hr tablet, Take 0.5 tablets by mouth 2 (Two) Times a Day. (Patient taking differently: Take 50 mg by mouth Daily.), Disp: 180 tablet, Rfl: 3  •  rosuvastatin (CRESTOR) 10 MG tablet, TAKE 1/2 TABLET BY MOUTH DAILY (Patient taking differently: Take 5 mg by mouth Daily.), Disp: 45 tablet, Rfl: 5  •  tamsulosin (FLOMAX) 0.4 MG capsule 24 hr capsule, Take 1 capsule by mouth Daily., Disp: , Rfl:   •  hydroCHLOROthiazide (HYDRODIURIL) 25 MG tablet, TAKE 1 TABLET BY MOUTH DAILY, Disp: 90 tablet, Rfl: 3    Allergies   Allergen Reactions   • Oxycodone-Acetaminophen GI Intolerance   • Adhesive Tape Unknown (See Comments)   • Oxycodone Unknown (See Comments)       Family History   Problem Relation Age of Onset   • Other Father         Substance Abuse   • Cancer Father         Lung       Past Surgical History:   Procedure Laterality Date   • CARDIAC CATHETERIZATION N/A 2/4/2021    Procedure: Left Heart Cath and coronary angiogram;  Surgeon: Claudia Benson MD;  Location: [facility] INVASIVE LOCATION;  Service: Cardiovascular;  Laterality: N/A;   • CARDIAC PACEMAKER PLACEMENT     • EPIDURAL Left 3/11/2021    Procedure: Left L3 LUMBAR/SACRAL TRANSFORAMINAL EPIDURAL;  Surgeon: Danial Diaz MD;  Location: [facility] OR;  Service: Pain Management;  Laterality: Left;   • HERNIA REPAIR      umbilical   • KNEE SURGERY Right 2016   • LUMBAR DISCECTOMY  10/2015    Comments: lumbar disk decompression   • TOTAL HIP ARTHROPLASTY Right 12/09/2016       Past Medical History:   Diagnosis Date   • Allergic rhinitis     Impression: persistent   • Arthralgia of  right hip     Impression: needs right hip relacement   • Bobby's esophagus with dysplasia     Impression: scope 11/2013 improved from previous. Recheck due in 2015 Packet given   • Cervical disc disease with myelopathy     Impression: seen on MRi 5/2012. Workup in progress.   • Chronic low back pain with sciatica     Unspecified. Impression: scheduled for surgery   • Chronic pain of right knee     Impression: needs knee replacement   • Colon cancer screening     Impression: packet given   • ED (erectile dysfunction)    • GERD (gastroesophageal reflux disease)    • Hydrocele    • Hyperlipidemia    • Hypertension    • Insomnia    • Left leg weakness     Impression: due to recent surgery. May benefit for home health. Pt homebound due severe pain postop back surgery. Also needs 3-in-1 Com[employer]   • Medicare annual wellness visit, subsequent     With abnormal findings.   • Non-seasonal allergic rhinitis due to pollen    • Other specified persistent mood disorders (CMS/HCC)     Story: stress   • PONV (postoperative nausea and vomiting)    • Psoriatic arthritis (CMS/HCC)     Impression: refer to Dr Rodriguez   • Screening for alcoholism    • Trigger ring finger of right hand     Impression: refer to hand surgeon   • Ulnar neuropathy at elbow of right upper extremity     Impression: suspect stinger occured during surgery. Natural course and self-limited nature of this condition discussed. Follow-up in 4-6 weeks if not better. Follow-up sooner for worsening symptoms or for any concerns. EMG if not improved   • Umbilical hernia        Family History   Problem Relation Age of Onset   • Other Father         Substance Abuse   • Cancer Father         Lung       Social History     Socioeconomic History   • Marital status:      Spouse name: Not on file   • Number of children: Not on file   • Years of education: Not on file   • Highest education level: Not on file   Tobacco Use   • Smoking status: Former Smoker     Quit date:  "2003     Years since quittin.2   • Smokeless tobacco: Former User   Vaping Use   • Vaping Use: Never used   Substance and Sexual Activity   • Alcohol use: No   • Drug use: No   • Sexual activity: Not Currently         Procedures      Objective:       Physical Exam    /86   Pulse 62   Temp 97.3 °F (36.3 °C)   Ht 180.3 cm (71\")   Wt 86.9 kg (191 lb 8 oz)   SpO2 95%   BMI 26.71 kg/m²   The patient is alert, oriented and in no distress.    Vital signs as noted above.    Head and neck revealed no carotid bruits or jugular venous distension.  No thyromegaly or lymphadenopathy is present.    Lungs clear.  No wheezing.  Breath sounds are normal bilaterally.    Heart normal first and second heart sounds.  No murmur..  No pericardial rub is present.  No gallop is present.    Abdomen soft and nontender.  No organomegaly is present.    Extremities revealed good peripheral pulses without any pedal edema.    Skin warm and dry.  Pacemaker site looks normal.    Musculoskeletal system is grossly normal.    CNS grossly normal.        "

## 2021-04-19 ENCOUNTER — OFFICE VISIT (OUTPATIENT)
Dept: NEUROSURGERY | Facility: CLINIC | Age: 61
End: 2021-04-19

## 2021-04-19 VITALS
SYSTOLIC BLOOD PRESSURE: 130 MMHG | HEIGHT: 71 IN | BODY MASS INDEX: 25.03 KG/M2 | WEIGHT: 178.8 LBS | TEMPERATURE: 99.2 F | DIASTOLIC BLOOD PRESSURE: 82 MMHG

## 2021-04-19 DIAGNOSIS — Z98.1 HISTORY OF LUMBAR SPINAL FUSION: ICD-10-CM

## 2021-04-19 DIAGNOSIS — M48.062 SPINAL STENOSIS OF LUMBAR REGION WITH NEUROGENIC CLAUDICATION: ICD-10-CM

## 2021-04-19 DIAGNOSIS — M51.16 HERNIATION OF LUMBAR INTERVERTEBRAL DISC WITH RADICULOPATHY: Primary | ICD-10-CM

## 2021-04-19 PROCEDURE — 99214 OFFICE O/P EST MOD 30 MIN: CPT | Performed by: NEUROLOGICAL SURGERY

## 2021-04-19 RX ORDER — APIXABAN 5 MG/1
5 TABLET, FILM COATED ORAL 2 TIMES DAILY
COMMUNITY
Start: 2021-04-15 | End: 2021-06-07 | Stop reason: SDUPTHER

## 2021-04-19 RX ORDER — HYDROCODONE BITARTRATE AND ACETAMINOPHEN 10; 325 MG/1; MG/1
1 TABLET ORAL EVERY 8 HOURS PRN
Qty: 40 TABLET | Refills: 0 | Status: SHIPPED | OUTPATIENT
Start: 2021-04-19 | End: 2021-05-24 | Stop reason: SDUPTHER

## 2021-04-23 ENCOUNTER — HOSPITAL ENCOUNTER (OUTPATIENT)
Dept: CT IMAGING | Facility: HOSPITAL | Age: 61
Discharge: HOME | End: 2021-04-23

## 2021-04-23 ENCOUNTER — HOSPITAL ENCOUNTER (OUTPATIENT)
Dept: GENERAL RADIOLOGY | Facility: HOSPITAL | Age: 61
Discharge: HOME | End: 2021-04-23

## 2021-04-23 VITALS
BODY MASS INDEX: 25.9 KG/M2 | RESPIRATION RATE: 16 BRPM | WEIGHT: 185 LBS | HEART RATE: 60 BPM | DIASTOLIC BLOOD PRESSURE: 76 MMHG | TEMPERATURE: 98 F | HEIGHT: 71 IN | SYSTOLIC BLOOD PRESSURE: 116 MMHG | OXYGEN SATURATION: 98 %

## 2021-04-23 DIAGNOSIS — Z98.1 HISTORY OF LUMBAR SPINAL FUSION: ICD-10-CM

## 2021-04-23 DIAGNOSIS — M51.16 HERNIATION OF LUMBAR INTERVERTEBRAL DISC WITH RADICULOPATHY: ICD-10-CM

## 2021-04-23 DIAGNOSIS — M48.062 SPINAL STENOSIS OF LUMBAR REGION WITH NEUROGENIC CLAUDICATION: ICD-10-CM

## 2021-04-23 PROCEDURE — 62304 MYELOGRAPHY LUMBAR INJECTION: CPT

## 2021-04-23 PROCEDURE — 72114 X-RAY EXAM L-S SPINE BENDING: CPT

## 2021-04-23 PROCEDURE — 72132 CT LUMBAR SPINE W/DYE: CPT

## 2021-04-23 PROCEDURE — 25010000003 LIDOCAINE 1 % SOLUTION: Performed by: RADIOLOGY

## 2021-04-23 PROCEDURE — 0 IOPAMIDOL 41 % SOLUTION: Performed by: RADIOLOGY

## 2021-04-23 PROCEDURE — 72240 MYELOGRAPHY NECK SPINE: CPT

## 2021-04-23 RX ORDER — SODIUM CHLORIDE 0.9 % (FLUSH) 0.9 %
10 SYRINGE (ML) INJECTION AS NEEDED
Status: DISCONTINUED | OUTPATIENT
Start: 2021-04-23 | End: 2021-04-24 | Stop reason: HOSPADM

## 2021-04-23 RX ORDER — LIDOCAINE HYDROCHLORIDE 10 MG/ML
10 INJECTION, SOLUTION INFILTRATION; PERINEURAL ONCE
Status: COMPLETED | OUTPATIENT
Start: 2021-04-23 | End: 2021-04-23

## 2021-04-23 RX ORDER — SODIUM CHLORIDE 0.9 % (FLUSH) 0.9 %
3 SYRINGE (ML) INJECTION EVERY 12 HOURS SCHEDULED
Status: DISCONTINUED | OUTPATIENT
Start: 2021-04-23 | End: 2021-04-24 | Stop reason: HOSPADM

## 2021-04-23 RX ADMIN — IOPAMIDOL 20 ML: 408 INJECTION, SOLUTION INTRATHECAL at 08:39

## 2021-04-23 RX ADMIN — LIDOCAINE HYDROCHLORIDE 10 ML: 10 INJECTION, SOLUTION INFILTRATION; PERINEURAL at 08:36

## 2021-04-23 NOTE — NURSING NOTE
Pt arrived to Radiology triage [facility] for Lumbar Myelogram.   Pt wearing a mask as well as this RN for any bedside care.

## 2021-04-24 ENCOUNTER — TELEPHONE (OUTPATIENT)
Dept: INTERVENTIONAL RADIOLOGY/VASCULAR | Facility: HOSPITAL | Age: 61
End: 2021-04-24

## 2021-04-24 NOTE — TELEPHONE ENCOUNTER
Spoke with patient.  He states he is doing fine s/p myelogram yesterday.  He has no problems or concerns and no headache.

## 2021-05-24 ENCOUNTER — OFFICE VISIT (OUTPATIENT)
Dept: NEUROSURGERY | Facility: CLINIC | Age: 61
End: 2021-05-24

## 2021-05-24 VITALS
HEIGHT: 71 IN | DIASTOLIC BLOOD PRESSURE: 88 MMHG | WEIGHT: 184.4 LBS | SYSTOLIC BLOOD PRESSURE: 142 MMHG | TEMPERATURE: 98.2 F | BODY MASS INDEX: 25.81 KG/M2

## 2021-05-24 DIAGNOSIS — M43.16 SPONDYLOLISTHESIS AT L3-L4 LEVEL: ICD-10-CM

## 2021-05-24 DIAGNOSIS — M48.062 SPINAL STENOSIS OF LUMBAR REGION WITH NEUROGENIC CLAUDICATION: Primary | ICD-10-CM

## 2021-05-24 DIAGNOSIS — M43.16 SPONDYLOLISTHESIS AT L2-L3 LEVEL: ICD-10-CM

## 2021-05-24 PROCEDURE — 99214 OFFICE O/P EST MOD 30 MIN: CPT | Performed by: NEUROLOGICAL SURGERY

## 2021-05-24 RX ORDER — CEFAZOLIN SODIUM 2 G/100ML
2 INJECTION, SOLUTION INTRAVENOUS ONCE
Status: CANCELLED | OUTPATIENT
Start: 2021-06-24 | End: 2021-05-24

## 2021-05-24 RX ORDER — HYDROCODONE BITARTRATE AND ACETAMINOPHEN 10; 325 MG/1; MG/1
1 TABLET ORAL EVERY 8 HOURS PRN
Qty: 40 TABLET | Refills: 0 | Status: SHIPPED | OUTPATIENT
Start: 2021-05-24 | End: 2021-06-24 | Stop reason: SDUPTHER

## 2021-05-25 ENCOUNTER — HOSPITAL ENCOUNTER (OUTPATIENT)
Facility: HOSPITAL | Age: 61
Setting detail: SURGERY ADMIT
End: 2021-05-25
Attending: NEUROLOGICAL SURGERY | Admitting: NEUROLOGICAL SURGERY

## 2021-05-25 ENCOUNTER — TELEPHONE (OUTPATIENT)
Dept: CARDIOLOGY | Facility: CLINIC | Age: 61
End: 2021-05-25

## 2021-05-25 PROBLEM — M43.16 SPONDYLOLISTHESIS AT L3-L4 LEVEL: Status: ACTIVE | Noted: 2021-05-25

## 2021-05-25 NOTE — TELEPHONE ENCOUNTER
DR. NIKKO JOSEPH   PHONE [phone]  [phone]  OPEN REVISION L2-4 DECOMPRESSION AND L2/3 PLIF   LAST OFFICE VISIT 4/15/21

## 2021-05-25 NOTE — TELEPHONE ENCOUNTER
Patient called, he left a message that his [employer] pacemaker machine is blinking yellow on doctor and call icon.

## 2021-05-26 NOTE — TELEPHONE ENCOUNTER
Called home number and cell number, left message on machine to call [employer] for assistance with yellow lights. Following up to make sure you have green lights on monitor.

## 2021-06-04 DIAGNOSIS — M51.37 DEGENERATION OF LUMBAR OR LUMBOSACRAL INTERVERTEBRAL DISC: ICD-10-CM

## 2021-06-07 RX ORDER — ROSUVASTATIN CALCIUM 10 MG/1
5 TABLET, COATED ORAL DAILY
Qty: 45 TABLET | Refills: 5 | Status: SHIPPED | OUTPATIENT
Start: 2021-06-07 | End: 2022-08-08

## 2021-06-07 RX ORDER — APIXABAN 5 MG/1
5 TABLET, FILM COATED ORAL 2 TIMES DAILY
Qty: 180 TABLET | Refills: 3 | Status: SHIPPED | OUTPATIENT
Start: 2021-06-07 | End: 2022-06-02

## 2021-06-07 RX ORDER — GABAPENTIN 600 MG/1
TABLET ORAL
Qty: 360 TABLET | Refills: 3 | Status: SHIPPED | OUTPATIENT
Start: 2021-06-07 | End: 2022-06-28

## 2021-06-22 ENCOUNTER — PRE-ADMISSION TESTING (OUTPATIENT)
Dept: PREADMISSION TESTING | Facility: HOSPITAL | Age: 61
End: 2021-06-22

## 2021-06-22 VITALS
RESPIRATION RATE: 16 BRPM | OXYGEN SATURATION: 96 % | SYSTOLIC BLOOD PRESSURE: 144 MMHG | WEIGHT: 190.7 LBS | DIASTOLIC BLOOD PRESSURE: 89 MMHG | HEIGHT: 71 IN | BODY MASS INDEX: 26.7 KG/M2 | TEMPERATURE: 97.5 F | HEART RATE: 61 BPM

## 2021-06-22 DIAGNOSIS — M43.16 SPONDYLOLISTHESIS AT L3-L4 LEVEL: ICD-10-CM

## 2021-06-22 DIAGNOSIS — M43.16 SPONDYLOLISTHESIS AT L2-L3 LEVEL: Primary | ICD-10-CM

## 2021-06-22 LAB
ANION GAP SERPL CALCULATED.3IONS-SCNC: 8.4 MMOL/L (ref 5–15)
BUN SERPL-MCNC: 13 MG/DL (ref 8–23)
BUN/CREAT SERPL: 13.8 (ref 7–25)
CALCIUM SPEC-SCNC: 8.8 MG/DL (ref 8.6–10.5)
CHLORIDE SERPL-SCNC: 104 MMOL/L (ref 98–107)
CO2 SERPL-SCNC: 26.6 MMOL/L (ref 22–29)
CREAT SERPL-MCNC: 0.94 MG/DL (ref 0.76–1.27)
DEPRECATED RDW RBC AUTO: 45 FL (ref 37–54)
ERYTHROCYTE [DISTWIDTH] IN BLOOD BY AUTOMATED COUNT: 13.7 % (ref 12.3–15.4)
GFR SERPL CREATININE-BSD FRML MDRD: 82 ML/MIN/1.73
GLUCOSE SERPL-MCNC: 101 MG/DL (ref 65–99)
HCT VFR BLD AUTO: 38.6 % (ref 37.5–51)
HGB BLD-MCNC: 13 G/DL (ref 13–17.7)
MCH RBC QN AUTO: 29.7 PG (ref 26.6–33)
MCHC RBC AUTO-ENTMCNC: 33.7 G/DL (ref 31.5–35.7)
MCV RBC AUTO: 88.1 FL (ref 79–97)
PLATELET # BLD AUTO: 242 10*3/MM3 (ref 140–450)
PMV BLD AUTO: 9.3 FL (ref 6–12)
POTASSIUM SERPL-SCNC: 4.1 MMOL/L (ref 3.5–5.2)
RBC # BLD AUTO: 4.38 10*6/MM3 (ref 4.14–5.8)
SARS-COV-2 ORF1AB RESP QL NAA+PROBE: NOT DETECTED
SODIUM SERPL-SCNC: 139 MMOL/L (ref 136–145)
WBC # BLD AUTO: 5.51 10*3/MM3 (ref 3.4–10.8)

## 2021-06-22 PROCEDURE — 80048 BASIC METABOLIC PNL TOTAL CA: CPT

## 2021-06-22 PROCEDURE — 36415 COLL VENOUS BLD VENIPUNCTURE: CPT

## 2021-06-22 PROCEDURE — C9803 HOPD COVID-19 SPEC COLLECT: HCPCS

## 2021-06-22 PROCEDURE — U0004 COV-19 TEST NON-CDC HGH THRU: HCPCS

## 2021-06-22 PROCEDURE — 85027 COMPLETE CBC AUTOMATED: CPT

## 2021-06-23 ENCOUNTER — HOSPITAL ENCOUNTER (OUTPATIENT)
Dept: CT IMAGING | Facility: HOSPITAL | Age: 61
Discharge: HOME | End: 2021-06-23
Admitting: NEUROLOGICAL SURGERY

## 2021-06-23 DIAGNOSIS — M43.16 SPONDYLOLISTHESIS AT L3-L4 LEVEL: ICD-10-CM

## 2021-06-23 DIAGNOSIS — M43.16 SPONDYLOLISTHESIS AT L2-L3 LEVEL: ICD-10-CM

## 2021-06-23 PROCEDURE — 72131 CT LUMBAR SPINE W/O DYE: CPT

## 2021-06-24 ENCOUNTER — TELEPHONE (OUTPATIENT)
Dept: NEUROSURGERY | Facility: CLINIC | Age: 61
End: 2021-06-24

## 2021-06-24 DIAGNOSIS — M48.062 SPINAL STENOSIS OF LUMBAR REGION WITH NEUROGENIC CLAUDICATION: ICD-10-CM

## 2021-06-24 RX ORDER — HYDROCODONE BITARTRATE AND ACETAMINOPHEN 10; 325 MG/1; MG/1
1 TABLET ORAL EVERY 8 HOURS PRN
Qty: 40 TABLET | Refills: 0 | Status: SHIPPED | OUTPATIENT
Start: 2021-06-24 | End: 2021-06-25 | Stop reason: SDUPTHER

## 2021-06-24 NOTE — TELEPHONE ENCOUNTER
"Pt is very upset about his surgery date.  Apparently there was a problem and it did not get scheduled when he thought it should and he feels that the \"surgery scheduler screwed it up\"  He was very nasty and as the conversation went on got biligerent and I ended the call with \"I will give the office mgr your # and have a nice day\".  [phone]  "

## 2021-06-24 NOTE — TELEPHONE ENCOUNTER
Patient's wife called and is requesting pain medication refill.     Pharmacy verified    Thank you

## 2021-06-25 ENCOUNTER — TELEPHONE (OUTPATIENT)
Dept: NEUROSURGERY | Facility: CLINIC | Age: 61
End: 2021-06-25

## 2021-06-25 DIAGNOSIS — M48.062 SPINAL STENOSIS OF LUMBAR REGION WITH NEUROGENIC CLAUDICATION: ICD-10-CM

## 2021-06-25 RX ORDER — HYDROCODONE BITARTRATE AND ACETAMINOPHEN 10; 325 MG/1; MG/1
1 TABLET ORAL EVERY 8 HOURS PRN
Qty: 40 TABLET | Refills: 0 | Status: SHIPPED | OUTPATIENT
Start: 2021-06-25 | End: 2021-10-05 | Stop reason: SDUPTHER

## 2021-06-25 NOTE — TELEPHONE ENCOUNTER
I dicussed the situation that occurred yesterday with Dr. Ge and Risk management. Dr. Ge has agreed to do the surgery as long as the patient and his wife understands what occurred yesterday was unacceptable behavior and they will need to apologize for their actions towards Mell.    I spoke with Mr Hammonds and his wife Michelle as they called this morning to request the surgery to be rescheduled. I informed them that Dr. Cedillo has agreed to do the surgery under a few circumstances. One the behavior that took place will not be tolerated and is unacceptable. 2. They will need to apologize to the staff for their actions. Michelle was very apologetic. I did let them know Dr. Ge will call them this afternoon to discuss this further.            Below is a both Mell's conversations with patients wife and spouse that took place on 6/24/21     Vidhya conversation:  I called Steven Morales to reschedule his surgery that was cancelled for today. His wife spoke first. She called me a liar, said “I was fucking lying.” That she has been in my chair before and knows I am lying. They contacted [employer] yesterday and was told an authorization was never started. How could I do this to her , “I screwed him over and she wants to see me hung for this.” She said she wants to speak to my manager, I told her I would be happy to arrange that. She said she already did that, she called this morning. She complained about Mr. Hammonds not getting enough pain medication and how Dr. Ge should advocate better for his patients care.  Told me to hang on because Steven wants to speak with me and rip me one too. Mr. Hammonds got on the phone told me that I never started an auth, he spoke to Mary and they said one was not on record, and what do I have to say. I began to explain about Mary using a third party company and he and his wife began to speak over me, calling me a “fucking liar.” Wife  repeated “wanting to see me hang for this and I hung up on them “     Vidhya informed me of the conversation she had with the patient and his spouse.  I called the patient and wife back to try to explain why the surgery was cancelled and delayed.  I tried to tell them that Mary uses a third party to authorize their spinal surgeries called [employer].  When they called Mary and stated there was no authorize on file that was accurate because Mary does not do the authorizations.  Mary will not be notified until after [employer] gives them the authorization information once it’s approved.  The  stated “that he was tired of talking to a bunch of liars” he proceed to give the phone to his wife.  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  “I know Vidhya dropped the ball and all of you all are trying to cover her ass and yours.  All of you are a bunch of liars. “I stated to the wife I’m just explain the process and the facts.  She stated that the facts are my  is in pain and Dr. KITCHEN gave him 40 pain pills to hold him over till this surgery that’s a pill and a half a day he had to take and she stated that its ridiculous a Dr. Swanson can’t stick up for his patients when they are in pain.  My  is not a drug seeking patient he is in pain and Dr. KITCHEN need to grow a set of balls for his patients.   She goes I called earlier in the day about this and I said yes I see where there is a message in the chart in regards to refilling his pain medication.  She said when she called in this morning she requested to speak to a manager but she was in a meeting.  I told her our manager left for the day and I was cut off at that point and said she didn’t even call me back before she left, that’s a bitch.  I kept trying to speak to tell her that I was told about this  but I wanted to speak with Vidhya to get the facts before I called her back but the wife wouldn’t let me talk.   She kept cutting me off stating “this is fucking ridiculous” I could hear the  shouting in the background but couldn’t make out what he was saying.  She said she wants the pain medication filled and Dr. Swanson to call them back by end of business day today.  I told them I would take this to my manager.    I spoke with Dr. Swanson and Vy notified him about the situation.

## 2021-06-26 ENCOUNTER — DOCUMENTATION (OUTPATIENT)
Dept: NEUROSURGERY | Facility: CLINIC | Age: 61
End: 2021-06-26

## 2021-06-26 NOTE — PROGRESS NOTES
I had a conversation with the patient and his wife.  The issues with the approval process and their behavior are well documented.  I told him that I would do the surgery but that I would not tolerate that kind of behavior from them in the future.  They have a special needs child and will not be able to do the surgery next week but I told him we could do it on July 6.  My office will get that rescheduled for that day.

## 2021-06-27 PROCEDURE — 93296 REM INTERROG EVL PM/IDS: CPT | Performed by: INTERNAL MEDICINE

## 2021-06-27 PROCEDURE — 93294 REM INTERROG EVL PM/LDLS PM: CPT | Performed by: INTERNAL MEDICINE

## 2021-06-28 ENCOUNTER — PREP FOR SURGERY (OUTPATIENT)
Dept: OTHER | Facility: HOSPITAL | Age: 61
End: 2021-06-28

## 2021-06-28 DIAGNOSIS — M43.16 SPONDYLOLISTHESIS AT L2-L3 LEVEL: ICD-10-CM

## 2021-06-28 DIAGNOSIS — M48.062 SPINAL STENOSIS OF LUMBAR REGION WITH NEUROGENIC CLAUDICATION: Primary | ICD-10-CM

## 2021-06-28 DIAGNOSIS — M43.16 SPONDYLOLISTHESIS AT L3-L4 LEVEL: ICD-10-CM

## 2021-06-29 ENCOUNTER — PREP FOR SURGERY (OUTPATIENT)
Dept: OTHER | Facility: HOSPITAL | Age: 61
End: 2021-06-29

## 2021-06-29 ENCOUNTER — TELEPHONE (OUTPATIENT)
Dept: NEUROSURGERY | Facility: CLINIC | Age: 61
End: 2021-06-29

## 2021-06-29 ENCOUNTER — TRANSCRIBE ORDERS (OUTPATIENT)
Dept: SLEEP MEDICINE | Facility: HOSPITAL | Age: 61
End: 2021-06-29

## 2021-06-29 DIAGNOSIS — Z01.818 OTHER SPECIFIED PRE-OPERATIVE EXAMINATION: Primary | ICD-10-CM

## 2021-06-29 NOTE — TELEPHONE ENCOUNTER
"CALLED PATIENT TO GIVE DATE AND TIME FOR SURGERY 07/06/21 BE AT HOSPITAL AT 6 AM.  GAVE PATIENT POST OP APPOINTMENT DATE AND TIME  WIFE, VINNIE,  GOT ON PHONE AND SAID THAT THEY WILL BE FIRING DR. DORADO AND HE WILL NOT BE THIER SURGEON.  VINNIE STATES THAT SHE WILL BE CIRCLING BACK TO COME AFTER THE STAFF, \"HOPES THAT VIKA WILL BE FIRED \"  WIFE VINNIE DID ASK FOR MEDICAL RECORDS AND EXPLAINED THAT THEY WOULD HAVE TO CALL MEDICAL RECORDS, OFFERED # BUT SHE REFUSED AND STATES THAT SHE WILL FIND IT ON   HER OWN,  PATIENT ASKED WHO CALLED FROM PAT TO DO TESTING ON 7/3/21  EXPLAIN THAT THEY WOULD HAVE TO CALL PAT TO GET THAT INFORMATION DUE TO I DO NOT WORK IN THAT OFFICE AND DO NOT KNOW PAT PROCEDURES AND POLICY.        VINNIE WILL BE TAKING CARE OF THE 'S ISSUE THRU ANOTHER SURGEON AND WILL BE CIRCLING BACK TO THE STAFF .    VINNIE STATES THAT SHE IS A MEDICAL ASSISTANT AND THAT SHE KNOWS HOW THE SYSTEM WORKS AND WILL BE GETTING THE HELP HER  DESERVES.    AT THE END OF THE CONVERSATION VINNIE STATES THAT SHE WAS RECORDING THE WHOLE CONVERSATION . VINNIE THEN HUNG UP  "

## 2021-07-01 ENCOUNTER — TELEPHONE (OUTPATIENT)
Dept: NEUROSURGERY | Facility: CLINIC | Age: 61
End: 2021-07-01

## 2021-07-01 NOTE — TELEPHONE ENCOUNTER
----- Message from Mike Ge MD sent at 6/26/2021  1:37 PM EDT -----  I spoke with both the patient and his wife today.  I said that I would do the surgery but that any further abusive behavior on their part would lead me to dismiss him as a patient.  Because of their special needs child they cannot do the surgery next week but they would like to proceed on July 6.  Please put it on my schedule for that day.

## 2021-07-03 ENCOUNTER — APPOINTMENT (OUTPATIENT)
Dept: LAB | Facility: HOSPITAL | Age: 61
End: 2021-07-03

## 2021-08-15 DIAGNOSIS — J30.9 ALLERGIC RHINITIS, UNSPECIFIED SEASONALITY, UNSPECIFIED TRIGGER: ICD-10-CM

## 2021-08-16 RX ORDER — FLUTICASONE PROPIONATE 50 MCG
SPRAY, SUSPENSION (ML) NASAL
Qty: 48 G | Refills: 3 | Status: SHIPPED | OUTPATIENT
Start: 2021-08-16 | End: 2022-09-13

## 2021-08-16 RX ORDER — MEDROXYPROGESTERONE ACETATE 150 MG/ML
INJECTION, SUSPENSION INTRAMUSCULAR
COMMUNITY
Start: 2021-05-28

## 2021-09-26 PROCEDURE — 93296 REM INTERROG EVL PM/IDS: CPT | Performed by: INTERNAL MEDICINE

## 2021-09-26 PROCEDURE — 93294 REM INTERROG EVL PM/LDLS PM: CPT | Performed by: INTERNAL MEDICINE

## 2021-10-05 ENCOUNTER — OFFICE VISIT (OUTPATIENT)
Dept: FAMILY MEDICINE CLINIC | Facility: CLINIC | Age: 61
End: 2021-10-05

## 2021-10-05 VITALS
WEIGHT: 185.6 LBS | HEIGHT: 71 IN | BODY MASS INDEX: 25.98 KG/M2 | RESPIRATION RATE: 18 BRPM | DIASTOLIC BLOOD PRESSURE: 80 MMHG | OXYGEN SATURATION: 98 % | TEMPERATURE: 97.5 F | HEART RATE: 80 BPM | SYSTOLIC BLOOD PRESSURE: 118 MMHG

## 2021-10-05 DIAGNOSIS — Z87.891 FORMER TOBACCO USE: ICD-10-CM

## 2021-10-05 DIAGNOSIS — G89.29 CHRONIC BILATERAL LOW BACK PAIN, UNSPECIFIED WHETHER SCIATICA PRESENT: Primary | ICD-10-CM

## 2021-10-05 DIAGNOSIS — M51.16 HERNIATION OF LUMBAR INTERVERTEBRAL DISC WITH RADICULOPATHY: ICD-10-CM

## 2021-10-05 DIAGNOSIS — M54.50 CHRONIC BILATERAL LOW BACK PAIN, UNSPECIFIED WHETHER SCIATICA PRESENT: Primary | ICD-10-CM

## 2021-10-05 DIAGNOSIS — Z23 NEED FOR INFLUENZA VACCINATION: ICD-10-CM

## 2021-10-05 DIAGNOSIS — M48.062 SPINAL STENOSIS OF LUMBAR REGION WITH NEUROGENIC CLAUDICATION: ICD-10-CM

## 2021-10-05 PROCEDURE — 99214 OFFICE O/P EST MOD 30 MIN: CPT | Performed by: FAMILY MEDICINE

## 2021-10-05 PROCEDURE — 90686 IIV4 VACC NO PRSV 0.5 ML IM: CPT | Performed by: FAMILY MEDICINE

## 2021-10-05 PROCEDURE — G0008 ADMIN INFLUENZA VIRUS VAC: HCPCS | Performed by: FAMILY MEDICINE

## 2021-10-05 RX ORDER — HYDROCODONE BITARTRATE AND ACETAMINOPHEN 10; 325 MG/1; MG/1
1 TABLET ORAL EVERY 8 HOURS PRN
Qty: 90 TABLET | Refills: 0 | Status: SHIPPED | OUTPATIENT
Start: 2021-10-05 | End: 2021-11-04

## 2021-10-05 NOTE — PROGRESS NOTES
Subjective   Steven Hammonds is a 61 y.o. male.   Chief Complaint   Patient presents with   • Back Pain       Pain med refill.   Pt was scheduled for spine fusion L2-S1, but surgery was canceled at last minute due to administrative error. Pt would like to see another surgeon for evaluation.          Back Pain  This is a chronic problem. The current episode started more than 1 year ago. The problem has been rapidly worsening since onset. The pain is present in the lumbar spine. The quality of the pain is described as stabbing and aching. The pain does not radiate. The pain is at a severity of 7/10. The pain is moderate. The pain is the same all the time. The symptoms are aggravated by position. Associated symptoms include tingling. Pertinent negatives include no abdominal pain, dysuria or fever. He has tried nothing for the symptoms. The treatment provided no relief.        The following portions of the patient's history were reviewed and updated as appropriate: allergies, current medications, past family history, past medical history, past social history, past surgical history and problem list.    Patient Active Problem List   Diagnosis   • Degeneration of lumbar or lumbosacral intervertebral disc   • Disc narrowing   • Colon cancer screening   • Gastroesophageal reflux disease   • Hypertension   • Paroxysmal atrial fibrillation (HCC)   • Spinal stenosis of lumbar region with neurogenic claudication   • Spondylolysis   • Status post arthroscopy of knee   • Sinus pause   • Tachycardia-bradycardia (HCC)   • Thoracic or lumbosacral neuritis or radiculitis   • Tremor   • Presence of cardiac pacemaker   • Allergic rhinitis   • Hyperlipidemia   • Insomnia   • Umbilical hernia   • Tinea   • Impetigo   • Labile blood pressure   • Other specified persistent mood disorders (HCC)   • Non-seasonal allergic rhinitis due to pollen   • Chronic low back pain with sciatica   • Bobby's esophagus with dysplasia   • Former tobacco  use   • Cervical disc disease with myelopathy   • Psoriatic arthritis (HCC)   • Chest pain   • Hypertensive urgency   • Headache   • Abnormal nuclear stress test   • Herniation of lumbar intervertebral disc with radiculopathy   • History of lumbar spinal fusion   • Other chronic pain   • Sacroiliac joint dysfunction of right side   • Spondylolisthesis at L2-L3 level   • Spondylolisthesis at L3-L4 level       Current Outpatient Medications on File Prior to Visit   Medication Sig Dispense Refill   • amitriptyline (ELAVIL) 50 MG tablet TAKE 2 TABLETS AT BEDTIME (Patient taking differently: Take 100 mg by mouth Every Night.) 180 tablet 3   • ascorbic acid (VITAMIN C) 500 MG tablet Take 500 mg by mouth every night at bedtime.     • baclofen (LIORESAL) 10 MG tablet Take 1 tablet by mouth 3 (Three) Times a Day As Needed for Muscle Spasms. 270 tablet 1   • cetirizine (zyrTEC) 10 MG tablet Take 10 mg by mouth Daily.     • Chlorhexidine Gluconate (HIBICLENS EX) Apply  topically. AS DIRECTED PREOP     • cholecalciferol (VITAMIN D3) 25 MCG (1000 UT) tablet Take 3,000 Units by mouth every night at bedtime.     • digoxin (LANOXIN) 125 MCG tablet Take 1 tablet by mouth Daily. 90 tablet 3   • Eliquis 5 MG tablet tablet Take 1 tablet by mouth 2 (Two) Times a Day. (Patient taking differently: Take 5 mg by mouth 2 (Two) Times a Day. PT TO STOP PER MD INSTRUCTION) 180 tablet 3   • esomeprazole (NEXIUM) 40 MG capsule Take 1 capsule by mouth Daily.     • fluticasone (FLONASE) 50 MCG/ACT nasal spray USE 2 SPRAYS IN EACH NOSTRIL ONCE DAILY 48 g 3   • gabapentin (NEURONTIN) 600 MG tablet TAKE 1 TABLET FOUR TIMES A  tablet 3   • hydroCHLOROthiazide (HYDRODIURIL) 25 MG tablet TAKE 1 TABLET BY MOUTH DAILY 90 tablet 3   • lisinopril (PRINIVIL,ZESTRIL) 20 MG tablet Take 1 tablet by mouth Daily. 90 tablet 3   • metoprolol succinate XL (Toprol XL) 50 MG 24 hr tablet Take 0.5 tablets by mouth 2 (Two) Times a Day. (Patient taking differently:  "Take 50 mg by mouth Daily.) 180 tablet 3   • rosuvastatin (CRESTOR) 10 MG tablet Take 0.5 tablets by mouth Daily. 45 tablet 5   • tamsulosin (FLOMAX) 0.4 MG capsule 24 hr capsule Take 1 capsule by mouth Daily.     • Enbrel SureClick 50 MG/ML solution auto-injector      • etanercept (ENBREL) 50 MG/ML solution prefilled syringe injection Inject 50 mg under the skin into the appropriate area as directed 1 (One) Time Per Week. Wed     • [DISCONTINUED] HYDROcodone-acetaminophen (NORCO)  MG per tablet Take 1 tablet by mouth Every 8 (Eight) Hours As Needed for Moderate Pain . 40 tablet 0     No current facility-administered medications on file prior to visit.     Current outpatient and discharge medications have been reconciled for the patient.  Reviewed by: Junaid Marquez MD      Allergies   Allergen Reactions   • Oxycodone-Acetaminophen GI Intolerance   • Acetaminophen Nausea And Vomiting   • Adhesive Tape Unknown (See Comments)     BLISTERS       Review of Systems   Constitutional: Negative for fever.   Gastrointestinal: Negative for abdominal pain.   Genitourinary: Negative for dysuria.   Musculoskeletal: Positive for back pain.   Neurological: Positive for tingling.     I have reviewed and confirmed the accuracy of the ROS as documented by the MA/LPN/RN Junaid Marquez MD    Objective   Visit Vitals  /80 (BP Location: Right arm, Patient Position: Sitting, Cuff Size: Adult)   Pulse 80   Temp 97.5 °F (36.4 °C) (Temporal)   Resp 18   Ht 179.1 cm (70.5\")   Wt 84.2 kg (185 lb 9.6 oz)   SpO2 98% Comment: room air   BMI 26.25 kg/m²       Physical Exam  Constitutional:       Appearance: He is well-developed.   HENT:      Head: Normocephalic and atraumatic.      Right Ear: External ear normal.      Left Ear: External ear normal.      Nose: Nose normal.   Eyes:      Pupils: Pupils are equal, round, and reactive to light.   Cardiovascular:      Rate and Rhythm: Normal rate and regular rhythm.      " Heart sounds: Normal heart sounds.   Pulmonary:      Effort: Pulmonary effort is normal.      Breath sounds: Normal breath sounds.   Abdominal:      General: Bowel sounds are normal.      Palpations: Abdomen is soft.   Musculoskeletal:      Cervical back: Normal range of motion and neck supple.   Skin:     General: Skin is warm and dry.   Neurological:      Mental Status: He is alert and oriented to person, place, and time.   Psychiatric:         Behavior: Behavior normal.         Thought Content: Thought content normal.         Judgment: Judgment normal.         Assessment/Plan .  Diagnoses and all orders for this visit:    1. Chronic bilateral low back pain, unspecified whether sciatica present (Primary)  -     POCT urinalysis dipstick, manual    2. Former tobacco use    3. Herniation of lumbar intervertebral disc with radiculopathy  -     Ambulatory Referral to Neurosurgery    4. Spinal stenosis of lumbar region with neurogenic claudication  -     HYDROcodone-acetaminophen (NORCO)  MG per tablet; Take 1 tablet by mouth Every 8 (Eight) Hours As Needed for Moderate Pain  for up to 30 days.  Dispense: 90 tablet; Refill: 0    5. Need for influenza vaccination  -     FluLaval/Fluarix (VFC) >6 Months     Findings discussed. All questions answered.  Referral placed today  Due for Medicare Wellness after 12/4/2021  Pan med refilled, pt takes sparingly.  Medication and medication adverse effects discussed.  Drug education given and explained to patient. Patient verbalized understanding.        I wore protective equipment throughout this patient encounter to include mask and gloves. Hand hygiene was performed before donning protective equipment and after removal when leaving the room

## 2021-10-11 RX ORDER — DIGOXIN 125 MCG
TABLET ORAL
Qty: 90 TABLET | Refills: 3 | Status: SHIPPED | OUTPATIENT
Start: 2021-10-11 | End: 2022-10-05

## 2021-10-11 NOTE — TELEPHONE ENCOUNTER
Rx Refill Note  Requested Prescriptions     Pending Prescriptions Disp Refills   • digoxin (LANOXIN) 125 MCG tablet [Pharmacy Med Name: DIGOXIN TABS 0.125MG] 90 tablet 3     Sig: TAKE 1 TABLET DAILY      Last office visit with prescribing clinician: 4/15/2021      Next office visit with prescribing clinician: 11/2/2021            Diane Paige MA  10/11/21, 08:49 EDT

## 2021-10-13 ENCOUNTER — TELEPHONE (OUTPATIENT)
Dept: FAMILY MEDICINE CLINIC | Facility: CLINIC | Age: 61
End: 2021-10-13

## 2021-10-13 NOTE — TELEPHONE ENCOUNTER
Caller: VINNIE MUNSON    Relationship: Emergency Contact    Best call back number: [phone] WIFE   [phone] PATIENT     What is the medical concern/diagnosis: SEVERE BACK ISSUES      What specialty or service is being requested: NEUROLOGIST     What is the provider, practice or medical service name: DR. FRIEND     What is the office location: [facility]    FAX   [phone]    Any additional details: WIFE STATES PATIENT DOES NOT LIKE PREVIOUS NEUROLOGIST AT [facility] AND IS REQUESTING ANEW REFERRAL SENT TO [facility].     WIFE STATES THAT [facility] NEEDS PATIENTS DEMOGRAPHICS, INSURANCE INFORMATION, DIAGNOSIS, AND ANY PREVIOUS MRI OR IMAGING IN THE PAST 6 MONTHS.

## 2021-10-15 ENCOUNTER — TELEPHONE (OUTPATIENT)
Dept: FAMILY MEDICINE CLINIC | Facility: CLINIC | Age: 61
End: 2021-10-15

## 2021-10-15 NOTE — TELEPHONE ENCOUNTER
Called and spoke to patient. I faxed demographics, insurance card, and previous imaging to [facility]. Patient states he would like to see Dr. Yvette Briones. P: [phone] F: [phone]

## 2021-10-18 DIAGNOSIS — M51.16 HERNIATION OF LUMBAR INTERVERTEBRAL DISC WITH RADICULOPATHY: Primary | ICD-10-CM

## 2021-11-03 DIAGNOSIS — M51.37 DEGENERATION OF LUMBAR OR LUMBOSACRAL INTERVERTEBRAL DISC: ICD-10-CM

## 2021-11-04 RX ORDER — AMITRIPTYLINE HYDROCHLORIDE 50 MG/1
TABLET, FILM COATED ORAL
Qty: 180 TABLET | Refills: 3 | Status: SHIPPED | OUTPATIENT
Start: 2021-11-04 | End: 2022-10-31

## 2021-12-16 ENCOUNTER — CLINICAL SUPPORT NO REQUIREMENTS (OUTPATIENT)
Dept: CARDIOLOGY | Facility: CLINIC | Age: 61
End: 2021-12-16

## 2021-12-16 ENCOUNTER — OFFICE VISIT (OUTPATIENT)
Dept: CARDIOLOGY | Facility: CLINIC | Age: 61
End: 2021-12-16

## 2021-12-16 VITALS
BODY MASS INDEX: 26.63 KG/M2 | SYSTOLIC BLOOD PRESSURE: 119 MMHG | HEART RATE: 65 BPM | HEIGHT: 70 IN | DIASTOLIC BLOOD PRESSURE: 77 MMHG | WEIGHT: 186 LBS | OXYGEN SATURATION: 95 %

## 2021-12-16 DIAGNOSIS — Z95.0 PRESENCE OF CARDIAC PACEMAKER: ICD-10-CM

## 2021-12-16 DIAGNOSIS — I45.5 SINUS PAUSE: ICD-10-CM

## 2021-12-16 DIAGNOSIS — I49.5 TACHYCARDIA-BRADYCARDIA (HCC): Primary | ICD-10-CM

## 2021-12-16 DIAGNOSIS — I45.5 SINUS PAUSE: Primary | ICD-10-CM

## 2021-12-16 DIAGNOSIS — I49.5 TACHYCARDIA-BRADYCARDIA (HCC): ICD-10-CM

## 2021-12-16 DIAGNOSIS — I49.5 SICK SINUS SYNDROME (HCC): ICD-10-CM

## 2021-12-16 DIAGNOSIS — I10 ESSENTIAL HYPERTENSION: ICD-10-CM

## 2021-12-16 PROCEDURE — 93000 ELECTROCARDIOGRAM COMPLETE: CPT | Performed by: INTERNAL MEDICINE

## 2021-12-16 PROCEDURE — 93280 PM DEVICE PROGR EVAL DUAL: CPT | Performed by: INTERNAL MEDICINE

## 2021-12-16 PROCEDURE — 99214 OFFICE O/P EST MOD 30 MIN: CPT | Performed by: INTERNAL MEDICINE

## 2021-12-16 NOTE — PROGRESS NOTES
Encounter Date:12/16/2021  Last seen 4/15/2020    Patient ID: Steven Hammonds is a 61 y.o. male.    Chief Complaint:    Status post pacemaker  Recent chest discomfort  Hypertension  History of renal dysfunction  Preoperative cardiovascular valuation prior to having back surgery.     History of present illness  =========    Since I have last seen, the patient has been without any chest discomfort ,shortness of breath, palpitations, dizziness or syncope.  Denies having any headache ,abdominal pain ,nausea, vomiting , diarrhea constipation, loss of weight or loss of appetite.  Denies having any excessive bruising ,hematuria or blood in the stool.    Review of all systems negative except as indicated.    Reviewed ROS.  [[[[[[[[[[[[[[[[[[[[[  Impression  ============  -History of chest discomfort-improved.  EKG showed no acute changes.  Troponin levels are negative.  Abnormal stress Cardiolite test with inferior ischemia.  Echocardiogram 2/2/2021-normal     Cardiac catheterization 2/4/2021 revealed  Normal left ventricle size and contractility with ejection fraction of 60%.  Essentially normal coronary arteries with dominant right coronary artery system with luminal irregularities.     -status post permanent dual-chamber pacemaker implantation ([employer] MRI Compatible ) 04/13/2018  - sick sinus syndrome-tachy-irvin syndrome.  Patient has history of atrial fibrillation with rapid ventricular response.  Patient had significant pauses of 5-6 seconds.  Symptomatic with dizziness and near-syncope     - history of Near-syncope associated with palpitations and dizziness .  -improved     -cardiac catheterization 03/18/2017 revealed normal left ventricular function and normal coronary arteries.  History of normal echocardiogram     -status post loop recorder placement 03/24/2017. -removal of loop recorder 04/13/2018.     -hypertension  Renal dysfunction BUN 17 creatinine 1.4 GERD depression Psoriatic arthritis  Hypokalemia     -history of Anemia and neutropenia     -Status post hip replacement bilateral arthroscopic knee surgery right shoulder surgery  CBP- with chronic myelopathy, s/p multiple lumbar surgeries; continue soma, gabapentin     -Allergic to Percocet  ================  Plan  ==============  Preoperative cardiovascular valuation prior to having back surgery.  Patient is not having any angina pectoris or congestive heart failure.  EKG showed sinus rhythm nonspecific ST-T wave changes-12/16/2021.  Okay with planned surgery.  Patient was provided with supporting document.    History of labile blood pressure.-Improved    Hypertension-119/77.  Continue metoprolol 50 mg a day  Continue lisinopril to 20 mg a day.     Paroxysmal atrial fibrillation.-Maintaining sinus rhythm.     Anticoagulation.  Patient has intermittent atrial fibrillation.  Continue Eliquis 5 mg twice a day.    Status post pacemaker  Interrogation of the pacemaker revealed excellent pacing parameters.  (Today 4/15/2021)  Device reprogrammed recently to avoid PMT recently.  Pacemaker site looks normal.     Medications were reviewed and updated.    Follow-up in the office in 6 months with pacemaker interrogation.  Further plan will depend on patient's progress.  [[[[[[[[[[[[[[[[[[[[[[[[[[[[[[[[           Diagnosis Plan   1. Tachycardia-bradycardia (HCC)     2. Essential hypertension     3. Sick sinus syndrome (HCC)     4. Presence of cardiac pacemaker     5. Sinus pause     LAB RESULTS (LAST 7 DAYS)    CBC        BMP        CMP         BNP        TROPONIN        CoAg        Creatinine Clearance  CrCl cannot be calculated (Patient's most recent lab result is older than the maximum 30 days allowed.).    ABG        Radiology  No radiology results for the last day                The following portions of the patient's history were reviewed and updated as appropriate: allergies, current medications, past family history, past medical history, past social  history, past surgical history and problem list.    Review of Systems   Constitutional: Negative for malaise/fatigue.   Cardiovascular: Positive for palpitations. Negative for chest pain, leg swelling and syncope.   Respiratory: Negative for shortness of breath.    Skin: Negative for rash.   Gastrointestinal: Negative for nausea and vomiting.   Neurological: Positive for dizziness and light-headedness. Negative for numbness.   All other systems reviewed and are negative.        Current Outpatient Medications:   •  amitriptyline (ELAVIL) 50 MG tablet, TAKE 2 TABLETS AT BEDTIME, Disp: 180 tablet, Rfl: 3  •  ascorbic acid (VITAMIN C) 500 MG tablet, Take 500 mg by mouth every night at bedtime., Disp: , Rfl:   •  baclofen (LIORESAL) 10 MG tablet, Take 1 tablet by mouth 3 (Three) Times a Day As Needed for Muscle Spasms., Disp: 270 tablet, Rfl: 1  •  cetirizine (zyrTEC) 10 MG tablet, Take 10 mg by mouth Daily., Disp: , Rfl:   •  Chlorhexidine Gluconate (HIBICLENS EX), Apply  topically. AS DIRECTED PREOP, Disp: , Rfl:   •  cholecalciferol (VITAMIN D3) 25 MCG (1000 UT) tablet, Take 3,000 Units by mouth every night at bedtime., Disp: , Rfl:   •  digoxin (LANOXIN) 125 MCG tablet, TAKE 1 TABLET DAILY, Disp: 90 tablet, Rfl: 3  •  Eliquis 5 MG tablet tablet, Take 1 tablet by mouth 2 (Two) Times a Day. (Patient taking differently: Take 5 mg by mouth 2 (Two) Times a Day. PT TO STOP PER MD INSTRUCTION), Disp: 180 tablet, Rfl: 3  •  Enbrel SureClick 50 MG/ML solution auto-injector, , Disp: , Rfl:   •  esomeprazole (NEXIUM) 40 MG capsule, Take 1 capsule by mouth Daily., Disp: , Rfl:   •  fluticasone (FLONASE) 50 MCG/ACT nasal spray, USE 2 SPRAYS IN EACH NOSTRIL ONCE DAILY, Disp: 48 g, Rfl: 3  •  gabapentin (NEURONTIN) 600 MG tablet, TAKE 1 TABLET FOUR TIMES A DAY, Disp: 360 tablet, Rfl: 3  •  hydroCHLOROthiazide (HYDRODIURIL) 25 MG tablet, TAKE 1 TABLET BY MOUTH DAILY, Disp: 90 tablet, Rfl: 3  •  lisinopril (PRINIVIL,ZESTRIL) 20 MG  tablet, Take 1 tablet by mouth Daily., Disp: 90 tablet, Rfl: 3  •  metoprolol succinate XL (Toprol XL) 50 MG 24 hr tablet, Take 0.5 tablets by mouth 2 (Two) Times a Day. (Patient taking differently: Take 50 mg by mouth Daily.), Disp: 180 tablet, Rfl: 3  •  rosuvastatin (CRESTOR) 10 MG tablet, Take 0.5 tablets by mouth Daily., Disp: 45 tablet, Rfl: 5  •  tamsulosin (FLOMAX) 0.4 MG capsule 24 hr capsule, Take 1 capsule by mouth Daily., Disp: , Rfl:   •  etanercept (ENBREL) 50 MG/ML solution prefilled syringe injection, Inject 50 mg under the skin into the appropriate area as directed 1 (One) Time Per Week. Wed, Disp: , Rfl:     Allergies   Allergen Reactions   • Oxycodone-Acetaminophen GI Intolerance   • Acetaminophen Nausea And Vomiting   • Adhesive Tape Unknown (See Comments)     BLISTERS       Family History   Problem Relation Age of Onset   • Other Father         Substance Abuse   • Cancer Father         Lung   • Malig Hyperthermia Neg Hx        Past Surgical History:   Procedure Laterality Date   • CARDIAC CATHETERIZATION N/A 2/4/2021    Procedure: Left Heart Cath and coronary angiogram;  Surgeon: Claudia Benson MD;  Location: [facility] CATH INVASIVE LOCATION;  Service: Cardiovascular;  Laterality: N/A;   • CARDIAC PACEMAKER PLACEMENT     • COLONOSCOPY     • ENDOSCOPY     • EPIDURAL Left 3/11/2021    Procedure: Left L3 LUMBAR/SACRAL TRANSFORAMINAL EPIDURAL;  Surgeon: Danial Diaz MD;  Location: [facility] MAIN OR;  Service: Pain Management;  Laterality: Left;   • HERNIA REPAIR      umbilical   • KNEE SURGERY Right 2016   • LUMBAR DISCECTOMY  10/2015    Comments: lumbar disk decompression   • PACEMAKER IMPLANTATION     • TOTAL HIP ARTHROPLASTY Right 12/09/2016       Past Medical History:   Diagnosis Date   • Allergic rhinitis     Impression: persistent   • Arthralgia of right hip     Impression: needs right hip relacement   • Atrial fibrillation (HCC)    • Bobby's esophagus with dysplasia     Impression: scope  2013 improved from previous. Recheck due in  Packet given   • Cervical disc disease with myelopathy     Impression: seen on MRi 2012. Workup in progress.   • Chronic low back pain with sciatica     Unspecified. Impression: scheduled for surgery   • Chronic pain of right knee     Impression: needs knee replacement   • Colon cancer screening     Impression: packet given   • Dizziness    • Eczema    • ED (erectile dysfunction)    • GERD (gastroesophageal reflux disease)    • History of transfusion    • Hydrocele    • Hyperlipidemia    • Hypertension    • Insomnia    • Left leg weakness     Impression: due to recent surgery. May benefit for home health. Pt homebound due severe pain postop back surgery. Also needs 3-in-1 Commode   • Medicare annual wellness visit, subsequent     With abnormal findings.   • Non-seasonal allergic rhinitis due to pollen    • Other specified persistent mood disorders (HCC)     Story: stress   • Pacemaker    • PONV (postoperative nausea and vomiting)    • Psoriasis    • Psoriatic arthritis (HCC)     Impression: refer to Dr Rodriguez   • Screening for alcoholism    • SSS (sick sinus syndrome) ([facility])    • Trigger ring finger of right hand     Impression: refer to hand surgeon   • Ulnar neuropathy at elbow of right upper extremity     Impression: suspect stinger occured during surgery. Natural course and self-limited nature of this condition discussed. Follow-up in 4-6 weeks if not better. Follow-up sooner for worsening symptoms or for any concerns. EMG if not improved   • Umbilical hernia        Family History   Problem Relation Age of Onset   • Other Father         Substance Abuse   • Cancer Father         Lung   • Malig Hyperthermia Neg Hx        Social History     Socioeconomic History   • Marital status:    Tobacco Use   • Smoking status: Former Smoker     Quit date:      Years since quittin.9   • Smokeless tobacco: Former User   Vaping Use   • Vaping Use: Never used  "  Substance and Sexual Activity   • Alcohol use: No   • Drug use: No   • Sexual activity: Defer           ECG 12 Lead    Date/Time: 12/16/2021 10:12 AM  Performed by: Claudia Benson MD  Authorized by: Claudia Benson MD   Comparison: compared with previous ECG   Similar to previous ECG  Comparison to previous ECG: Normal sinus rhythm right bundle branch block 64-minute nonspecific ST-T wave changes normal axis no ectopy no significant change from 2/2/2021                Objective:       Physical Exam    /77 (BP Location: Right arm, Patient Position: Sitting, Cuff Size: Adult)   Pulse 65   Ht 177.8 cm (70\")   Wt 84.4 kg (186 lb)   SpO2 95%   BMI 26.69 kg/m²   The patient is alert, oriented and in no distress.    Vital signs as noted above.    Head and neck revealed no carotid bruits or jugular venous distension.  No thyromegaly or lymphadenopathy is present.    Lungs clear.  No wheezing.  Breath sounds are normal bilaterally.    Heart normal first and second heart sounds.  No murmur..  No pericardial rub is present.  No gallop is present.    Abdomen soft and nontender.  No organomegaly is present.    Extremities revealed good peripheral pulses without any pedal edema.    Skin warm and dry.  Pacemaker site looks normal.    Musculoskeletal system is grossly normal.    CNS grossly normal.    Reviewed and updated.        "

## 2021-12-26 PROCEDURE — 93294 REM INTERROG EVL PM/LDLS PM: CPT | Performed by: INTERNAL MEDICINE

## 2021-12-26 PROCEDURE — 93296 REM INTERROG EVL PM/IDS: CPT | Performed by: INTERNAL MEDICINE

## 2022-01-04 DIAGNOSIS — M51.37 DEGENERATION OF LUMBAR OR LUMBOSACRAL INTERVERTEBRAL DISC: ICD-10-CM

## 2022-01-06 RX ORDER — BACLOFEN 10 MG/1
TABLET ORAL
Qty: 270 TABLET | Refills: 3 | OUTPATIENT
Start: 2022-01-06

## 2022-02-11 DIAGNOSIS — I10 ESSENTIAL HYPERTENSION: ICD-10-CM

## 2022-02-11 RX ORDER — HYDROCHLOROTHIAZIDE 25 MG/1
TABLET ORAL
Qty: 90 TABLET | Refills: 3 | Status: SHIPPED | OUTPATIENT
Start: 2022-02-11 | End: 2023-04-06 | Stop reason: SDUPTHER

## 2022-02-21 ENCOUNTER — TRANSCRIBE ORDERS (OUTPATIENT)
Dept: ADMINISTRATIVE | Facility: HOSPITAL | Age: 62
End: 2022-02-21

## 2022-02-21 DIAGNOSIS — M54.12 CERVICAL RADICULOPATHY: Primary | ICD-10-CM

## 2022-03-17 ENCOUNTER — HOSPITAL ENCOUNTER (OUTPATIENT)
Dept: MRI IMAGING | Facility: HOSPITAL | Age: 62
Discharge: HOME | End: 2022-03-17
Admitting: ORTHOPAEDIC SURGERY

## 2022-03-17 DIAGNOSIS — M54.12 CERVICAL RADICULOPATHY: ICD-10-CM

## 2022-03-17 PROCEDURE — 72141 MRI NECK SPINE W/O DYE: CPT

## 2022-03-25 ENCOUNTER — TELEPHONE (OUTPATIENT)
Dept: CARDIOLOGY | Facility: CLINIC | Age: 62
End: 2022-03-25

## 2022-03-25 NOTE — TELEPHONE ENCOUNTER
DR. ORELLANA  PH: [phone] EXT [phone]  FAX: [phone]  ANTERIOR LUMBAR INTERBODY FUSION L2-L5, POSTERIOR LUMBAR DECOMPRESSION AND FUSION T12-L1-PELVIS  SCHEDULED: TBD  HOLD ELIQUIS 3 DAYS PRIOR?

## 2022-03-31 DIAGNOSIS — I10 ESSENTIAL HYPERTENSION: ICD-10-CM

## 2022-04-04 RX ORDER — LISINOPRIL 20 MG/1
TABLET ORAL
Qty: 90 TABLET | Refills: 3 | Status: SHIPPED | OUTPATIENT
Start: 2022-04-04 | End: 2023-03-23

## 2022-05-12 DIAGNOSIS — I10 ESSENTIAL HYPERTENSION: ICD-10-CM

## 2022-05-12 RX ORDER — METOPROLOL SUCCINATE 50 MG/1
50 TABLET, EXTENDED RELEASE ORAL DAILY
Qty: 90 TABLET | Refills: 3 | Status: SHIPPED | OUTPATIENT
Start: 2022-05-12 | End: 2023-03-14

## 2022-05-12 NOTE — TELEPHONE ENCOUNTER
Rx Refill Note  Requested Prescriptions     Pending Prescriptions Disp Refills   • metoprolol succinate XL (TOPROL-XL) 50 MG 24 hr tablet [Pharmacy Med Name: METOPROLOL SUCCINATE ER TABS 50MG] 90 tablet 3     Sig: Take 1 tablet by mouth Daily.      Last office visit with prescribing clinician: 12/16/2021      Next office visit with prescribing clinician: 6/30/2022            SINAI GUO MA  05/12/22, 10:39 EDT

## 2022-05-24 ENCOUNTER — TELEPHONE (OUTPATIENT)
Dept: CARDIOLOGY | Facility: CLINIC | Age: 62
End: 2022-05-24

## 2022-05-24 NOTE — TELEPHONE ENCOUNTER
Pt had lumbar fusion surgery 5/3  HR 170s @ hospital post surgery  Lots of dizziness since being home.   Offered appointment today @ 210 or 4p  Declined, as I was in middle of offering another appointment for tomorrow she hung up on me.   Called back and wouldn't answer, left VM stating I was trying to offer another appointment and if Steven keeps falling he needs to go to ER

## 2022-06-02 RX ORDER — APIXABAN 5 MG/1
TABLET, FILM COATED ORAL
Qty: 180 TABLET | Refills: 3 | Status: SHIPPED | OUTPATIENT
Start: 2022-06-02 | End: 2023-04-04

## 2022-06-06 ENCOUNTER — OFFICE VISIT (OUTPATIENT)
Dept: FAMILY MEDICINE CLINIC | Facility: CLINIC | Age: 62
End: 2022-06-06

## 2022-06-06 VITALS
DIASTOLIC BLOOD PRESSURE: 82 MMHG | BODY MASS INDEX: 26.74 KG/M2 | WEIGHT: 186.8 LBS | HEIGHT: 70 IN | RESPIRATION RATE: 18 BRPM | TEMPERATURE: 97.5 F | HEART RATE: 98 BPM | SYSTOLIC BLOOD PRESSURE: 120 MMHG | OXYGEN SATURATION: 96 %

## 2022-06-06 DIAGNOSIS — I48.0 PAROXYSMAL ATRIAL FIBRILLATION: ICD-10-CM

## 2022-06-06 DIAGNOSIS — I10 ESSENTIAL HYPERTENSION: Primary | ICD-10-CM

## 2022-06-06 PROBLEM — M40.15 OTHER SECONDARY KYPHOSIS, THORACOLUMBAR REGION: Status: ACTIVE | Noted: 2021-12-20

## 2022-06-06 PROBLEM — S32.009K PSEUDOARTHROSIS OF LUMBAR SPINE: Status: ACTIVE | Noted: 2021-12-20

## 2022-06-06 PROCEDURE — 99214 OFFICE O/P EST MOD 30 MIN: CPT | Performed by: FAMILY MEDICINE

## 2022-06-06 RX ORDER — HYDROCODONE BITARTRATE AND ACETAMINOPHEN 10; 325 MG/1; MG/1
TABLET ORAL
COMMUNITY
Start: 2022-05-16 | End: 2022-09-20

## 2022-06-06 RX ORDER — DOCUSATE SODIUM -SENNOSIDES 50; 8.6 MG/1; MG/1
TABLET, COATED ORAL
COMMUNITY
Start: 2022-05-09 | End: 2022-11-04

## 2022-06-06 NOTE — PROGRESS NOTES
Bo Hammonds is a 61 y.o. male.   Chief Complaint   Patient presents with   • Hospital Follow Up Visit     Back surgery         Patient is here to follow up after a hospital admission on 5/3/22.   Discharge date: 5/9/2022    Procedure(s) and Anesthesia Type:  * STAGE 2 POSTERIOR SPINE FUSION WITH INSTRUMENTATION L2-S1, DECOMPRESSION L3-4, HARDWARE REMOVAL L4-S1, DUROTOMY REPAIR, BMP, AUTOGRAFT - General    Admission Diagnoses:   ICD-10-CM ICD-9-CM   1. Neurogenic claudication due to lumbar spinal stenosis M48.062 724.03   2. Pseudarthrosis after fusion or arthrodesis M96.0 996.49   V45.4       Problem List:  2022-05: Paroxysmal atrial fibrillation (CMS/HCC)  2022-05: Hypertension  2022-05: Hyperlipidemia  2022-04: Lumbar stenosis with neurogenic claudication  2022-02: S/P lumbar fusion  2021-12: Pseudoarthrosis of lumbar spine  2021-12: Other secondary kyphosis, thoracolumbar region  2021-11: Low back pain  2020-09: History of colon polyps  2020-09: Rectal bleeding  2020-09: Bobby's esophagus without dysplasia  2020-09: Change in bowel habits  2020-09: Heartburn  2017-11: Colon cancer screening    Discharge Diagnoses:   1. Same as above    2. Obesity,     3. Acute Blood loss anemia, hgb   Hemoglobin (g/dL)   Date Value   05/08/2022 9.0 (L)   05/07/2022 8.8 (L)   05/05/2022 9.4 (L)   05/05/2022 11.2 (L)   05/04/2022 12.9 (L)            The following portions of the patient's history were reviewed and updated as appropriate: allergies, current medications, past family history, past medical history, past social history, past surgical history and problem list.    Patient Active Problem List   Diagnosis   • Degeneration of lumbar or lumbosacral intervertebral disc   • Disc narrowing   • Colon cancer screening   • Gastroesophageal reflux disease   • Hypertension   • Paroxysmal atrial fibrillation (HCC)   • Spinal stenosis of lumbar region with neurogenic claudication   • Spondylolysis   • Status post  arthroscopy of knee   • Sinus pause   • Tachycardia-bradycardia (HCC)   • Thoracic or lumbosacral neuritis or radiculitis   • Tremor   • Presence of cardiac pacemaker   • Allergic rhinitis   • Hyperlipidemia   • Insomnia   • Umbilical hernia   • Tinea   • Impetigo   • Labile blood pressure   • Other specified persistent mood disorders (HCC)   • Non-seasonal allergic rhinitis due to pollen   • Chronic low back pain with sciatica   • Bobby's esophagus with dysplasia   • Former tobacco use   • Cervical disc disease with myelopathy   • Psoriatic arthritis (HCC)   • Chest pain   • Hypertensive urgency   • Headache   • Abnormal nuclear stress test   • Herniation of lumbar intervertebral disc with radiculopathy   • History of lumbar spinal fusion   • Other chronic pain   • Sacroiliac joint dysfunction of right side   • Spondylolisthesis at L2-L3 level   • Spondylolisthesis at L3-L4 level   • Other secondary kyphosis, thoracolumbar region   • Pseudoarthrosis of lumbar spine       Current Outpatient Medications on File Prior to Visit   Medication Sig Dispense Refill   • amitriptyline (ELAVIL) 50 MG tablet TAKE 2 TABLETS AT BEDTIME 180 tablet 3   • ascorbic acid (VITAMIN C) 500 MG tablet Take 500 mg by mouth every night at bedtime.     • baclofen (LIORESAL) 10 MG tablet Take 1 tablet by mouth 3 (Three) Times a Day As Needed for Muscle Spasms. 270 tablet 1   • cetirizine (zyrTEC) 10 MG tablet Take 10 mg by mouth Daily.     • Chlorhexidine Gluconate (HIBICLENS EX) Apply  topically. AS DIRECTED PREOP     • cholecalciferol (VITAMIN D3) 25 MCG (1000 UT) tablet Take 3,000 Units by mouth every night at bedtime.     • digoxin (LANOXIN) 125 MCG tablet TAKE 1 TABLET DAILY 90 tablet 3   • Eliquis 5 MG tablet tablet TAKE 1 TABLET TWICE A  tablet 3   • Enbrel SureClick 50 MG/ML solution auto-injector      • esomeprazole (nexIUM) 40 MG capsule Take 1 capsule by mouth Daily.     • etanercept (ENBREL) 50 MG/ML solution prefilled  syringe injection Inject 50 mg under the skin into the appropriate area as directed 1 (One) Time Per Week. Wed     • fluticasone (FLONASE) 50 MCG/ACT nasal spray USE 2 SPRAYS IN EACH NOSTRIL ONCE DAILY 48 g 3   • gabapentin (NEURONTIN) 600 MG tablet TAKE 1 TABLET FOUR TIMES A  tablet 3   • hydroCHLOROthiazide (HYDRODIURIL) 25 MG tablet TAKE 1 TABLET DAILY 90 tablet 3   • HYDROcodone-acetaminophen (NORCO)  MG per tablet      • lisinopril (PRINIVIL,ZESTRIL) 20 MG tablet TAKE 1 TABLET DAILY 90 tablet 3   • metoprolol succinate XL (TOPROL-XL) 50 MG 24 hr tablet Take 1 tablet by mouth Daily. 90 tablet 3   • rosuvastatin (CRESTOR) 10 MG tablet Take 0.5 tablets by mouth Daily. 45 tablet 5   • Senexon-S 8.6-50 MG per tablet      • tamsulosin (FLOMAX) 0.4 MG capsule 24 hr capsule Take 1 capsule by mouth Daily.       No current facility-administered medications on file prior to visit.     Current outpatient and discharge medications have been reconciled for the patient.  Reviewed by: Junaid Marquez MD      Allergies   Allergen Reactions   • Oxycodone-Acetaminophen GI Intolerance   • Acetaminophen Nausea And Vomiting   • Adhesive Tape Unknown (See Comments)     BLISTERS       Review of Systems   Constitutional: Negative for activity change, appetite change, fatigue and fever.   HENT: Negative for ear pain, swollen glands and voice change.    Eyes: Negative for visual disturbance.   Respiratory: Negative for shortness of breath and wheezing.    Cardiovascular: Negative for chest pain and leg swelling.   Gastrointestinal: Negative for abdominal pain, blood in stool, constipation, diarrhea, nausea and vomiting.   Endocrine: Negative for polydipsia and polyuria.   Genitourinary: Negative for dysuria, frequency and hematuria.   Musculoskeletal: Negative for joint swelling, neck pain and neck stiffness.   Skin: Negative for rash and wound.   Neurological: Negative for weakness, numbness and headache.  "  Psychiatric/Behavioral: Negative for suicidal ideas and depressed mood.     I have reviewed and confirmed the accuracy of the ROS as documented by the MA/LPN/RN Junaid Marquez MD    Objective   Visit Vitals  /82 (BP Location: Right arm, Patient Position: Sitting, Cuff Size: Adult)   Pulse 98   Temp 97.5 °F (36.4 °C)   Resp 18   Ht 177.8 cm (70\")   Wt 84.7 kg (186 lb 12.8 oz)   SpO2 96%   BMI 26.80 kg/m²       Physical Exam  Constitutional:       Appearance: He is well-developed.   HENT:      Head: Normocephalic and atraumatic.      Right Ear: External ear normal.      Left Ear: External ear normal.      Nose: Nose normal.   Eyes:      Pupils: Pupils are equal, round, and reactive to light.   Cardiovascular:      Rate and Rhythm: Normal rate and regular rhythm.      Heart sounds: Normal heart sounds.   Pulmonary:      Effort: Pulmonary effort is normal.      Breath sounds: Normal breath sounds.   Abdominal:      General: Bowel sounds are normal.      Palpations: Abdomen is soft.   Musculoskeletal:         General: Normal range of motion.      Cervical back: Normal range of motion and neck supple.   Skin:     General: Skin is warm and dry.   Neurological:      Mental Status: He is alert and oriented to person, place, and time.   Psychiatric:         Behavior: Behavior normal.         Thought Content: Thought content normal.         Judgment: Judgment normal.       Derm Physical Exam    Diagnoses and all orders for this visit:    1. Essential hypertension (Primary)  -     Comprehensive Metabolic Panel  -     CBC Auto Differential    2. Paroxysmal atrial fibrillation (HCC)  Comments:  Pt has appt with cardiology on 6/15     Findings discussed. All questions answered.  Recheck labs  Follow up in 3 months for recheck, sooner for concerns.     I wore protective equipment throughout this patient encounter to include mask and gloves. Hand hygiene was performed before donning protective equipment and after " removal when leaving the room

## 2022-06-07 ENCOUNTER — TELEPHONE (OUTPATIENT)
Dept: FAMILY MEDICINE CLINIC | Facility: CLINIC | Age: 62
End: 2022-06-07

## 2022-06-07 LAB
ALBUMIN SERPL-MCNC: 4.3 G/DL (ref 3.8–4.8)
ALBUMIN/GLOB SERPL: 1.5 {RATIO} (ref 1.2–2.2)
ALP SERPL-CCNC: 171 IU/L (ref 44–121)
ALT SERPL-CCNC: 16 IU/L (ref 0–44)
AST SERPL-CCNC: 17 IU/L (ref 0–40)
BASOPHILS # BLD AUTO: 0.1 X10E3/UL (ref 0–0.2)
BASOPHILS NFR BLD AUTO: 1 %
BILIRUB SERPL-MCNC: 0.3 MG/DL (ref 0–1.2)
BUN SERPL-MCNC: 13 MG/DL (ref 8–27)
BUN/CREAT SERPL: 12 (ref 10–24)
CALCIUM SERPL-MCNC: 9.5 MG/DL (ref 8.6–10.2)
CHLORIDE SERPL-SCNC: 99 MMOL/L (ref 96–106)
CO2 SERPL-SCNC: 24 MMOL/L (ref 20–29)
CREAT SERPL-MCNC: 1.05 MG/DL (ref 0.76–1.27)
EGFRCR SERPLBLD CKD-EPI 2021: 80 ML/MIN/1.73
EOSINOPHIL # BLD AUTO: 0.3 X10E3/UL (ref 0–0.4)
EOSINOPHIL NFR BLD AUTO: 3 %
ERYTHROCYTE [DISTWIDTH] IN BLOOD BY AUTOMATED COUNT: 12.4 % (ref 11.6–15.4)
GLOBULIN SER CALC-MCNC: 2.8 G/DL (ref 1.5–4.5)
GLUCOSE SERPL-MCNC: 99 MG/DL (ref 65–99)
HCT VFR BLD AUTO: 35.3 % (ref 37.5–51)
HGB BLD-MCNC: 11.3 G/DL (ref 13–17.7)
IMM GRANULOCYTES # BLD AUTO: 0 X10E3/UL (ref 0–0.1)
IMM GRANULOCYTES NFR BLD AUTO: 0 %
LYMPHOCYTES # BLD AUTO: 2.1 X10E3/UL (ref 0.7–3.1)
LYMPHOCYTES NFR BLD AUTO: 21 %
MCH RBC QN AUTO: 27.6 PG (ref 26.6–33)
MCHC RBC AUTO-ENTMCNC: 32 G/DL (ref 31.5–35.7)
MCV RBC AUTO: 86 FL (ref 79–97)
MONOCYTES # BLD AUTO: 1 X10E3/UL (ref 0.1–0.9)
MONOCYTES NFR BLD AUTO: 9 %
NEUTROPHILS # BLD AUTO: 6.7 X10E3/UL (ref 1.4–7)
NEUTROPHILS NFR BLD AUTO: 66 %
PLATELET # BLD AUTO: 388 X10E3/UL (ref 150–450)
POTASSIUM SERPL-SCNC: 4.7 MMOL/L (ref 3.5–5.2)
PROT SERPL-MCNC: 7.1 G/DL (ref 6–8.5)
RBC # BLD AUTO: 4.1 X10E6/UL (ref 4.14–5.8)
SODIUM SERPL-SCNC: 138 MMOL/L (ref 134–144)
WBC # BLD AUTO: 10.2 X10E3/UL (ref 3.4–10.8)

## 2022-06-07 NOTE — TELEPHONE ENCOUNTER
----- Message from Junaid Marquez MD sent at 6/7/2022  8:42 AM EDT -----  Please notify patient that labs are stable/looked ok.

## 2022-06-27 DIAGNOSIS — M51.37 DEGENERATION OF LUMBAR OR LUMBOSACRAL INTERVERTEBRAL DISC: ICD-10-CM

## 2022-06-28 RX ORDER — GABAPENTIN 600 MG/1
TABLET ORAL
Qty: 360 TABLET | Refills: 3 | Status: SHIPPED | OUTPATIENT
Start: 2022-06-28

## 2022-08-08 RX ORDER — ROSUVASTATIN CALCIUM 10 MG/1
TABLET, COATED ORAL
Qty: 45 TABLET | Refills: 3 | Status: SHIPPED | OUTPATIENT
Start: 2022-08-08

## 2022-09-13 DIAGNOSIS — J30.9 ALLERGIC RHINITIS, UNSPECIFIED SEASONALITY, UNSPECIFIED TRIGGER: ICD-10-CM

## 2022-09-13 RX ORDER — FLUTICASONE PROPIONATE 50 MCG
SPRAY, SUSPENSION (ML) NASAL
Qty: 48 G | Refills: 3 | Status: SHIPPED | OUTPATIENT
Start: 2022-09-13

## 2022-09-19 NOTE — PROGRESS NOTES
The ABCs of the Annual Wellness Visit  Subsequent Medicare Wellness Visit    Subjective    History of Present Illness:  Steven Hammonds is a 62 y.o. male who presents for a Subsequent Medicare Wellness Visit.    The following portions of the patient's history were reviewed and   updated as appropriate: allergies, current medications, past family history, past medical history, past social history, past surgical history and problem list.  Family History   Problem Relation Age of Onset   • Other Father         Substance Abuse   • Cancer Father         Lung   • Malig Hyperthermia Neg Hx      Past Surgical History:   Procedure Laterality Date   • CARDIAC CATHETERIZATION N/A 2/4/2021    Procedure: Left Heart Cath and coronary angiogram;  Surgeon: Claudia Benson MD;  Location: [facility] CATH INVASIVE LOCATION;  Service: Cardiovascular;  Laterality: N/A;   • CARDIAC PACEMAKER PLACEMENT     • COLONOSCOPY     • ENDOSCOPY     • EPIDURAL Left 3/11/2021    Procedure: Left L3 LUMBAR/SACRAL TRANSFORAMINAL EPIDURAL;  Surgeon: Danial Diaz MD;  Location: [facility] OR;  Service: Pain Management;  Laterality: Left;   • HERNIA REPAIR      umbilical   • KNEE SURGERY Right 2016   • LUMBAR DISCECTOMY  10/2015    Comments: lumbar disk decompression   • PACEMAKER IMPLANTATION     • TOTAL HIP ARTHROPLASTY Right 12/09/2016        Compared to one year ago, the patient feels his physical   health is the same.    Compared to one year ago, the patient feels his mental   health is the same.    Recent Hospitalizations:  He was not admitted to the hospital during the last year.       Current Medical Providers:  Patient Care Team:  Junaid Marquez MD as PCP - General  Junaid Marquez MD as PCP - Family Medicine  Junaid Marquez MD Gondi, Bapineedu, MD as Consulting Physician (Cardiology)    Outpatient Medications Prior to Visit   Medication Sig Dispense Refill   • amitriptyline (ELAVIL) 50 MG tablet TAKE 2 TABLETS AT  BEDTIME 180 tablet 3   • ascorbic acid (VITAMIN C) 500 MG tablet Take 500 mg by mouth every night at bedtime.     • baclofen (LIORESAL) 10 MG tablet Take 1 tablet by mouth 3 (Three) Times a Day As Needed for Muscle Spasms. 270 tablet 1   • cetirizine (zyrTEC) 10 MG tablet Take 10 mg by mouth Daily.     • Chlorhexidine Gluconate (HIBICLENS EX) Apply  topically. AS DIRECTED PREOP     • cholecalciferol (VITAMIN D3) 25 MCG (1000 UT) tablet Take 3,000 Units by mouth every night at bedtime.     • digoxin (LANOXIN) 125 MCG tablet TAKE 1 TABLET DAILY 90 tablet 3   • Eliquis 5 MG tablet tablet TAKE 1 TABLET TWICE A  tablet 3   • Enbrel SureClick 50 MG/ML solution auto-injector      • esomeprazole (nexIUM) 40 MG capsule Take 1 capsule by mouth Daily.     • etanercept (ENBREL) 50 MG/ML solution prefilled syringe injection Inject 50 mg under the skin into the appropriate area as directed 1 (One) Time Per Week. Wed     • fluticasone (FLONASE) 50 MCG/ACT nasal spray USE 2 SPRAYS IN EACH NOSTRIL ONCE DAILY 48 g 3   • gabapentin (NEURONTIN) 600 MG tablet TAKE 1 TABLET FOUR TIMES A  tablet 3   • hydroCHLOROthiazide (HYDRODIURIL) 25 MG tablet TAKE 1 TABLET DAILY 90 tablet 3   • HYDROcodone-acetaminophen (NORCO)  MG per tablet      • lisinopril (PRINIVIL,ZESTRIL) 20 MG tablet TAKE 1 TABLET DAILY 90 tablet 3   • metoprolol succinate XL (TOPROL-XL) 50 MG 24 hr tablet Take 1 tablet by mouth Daily. 90 tablet 3   • rosuvastatin (CRESTOR) 10 MG tablet TAKE ONE-HALF (1/2) TABLET DAILY 45 tablet 3   • Senexon-S 8.6-50 MG per tablet      • tamsulosin (FLOMAX) 0.4 MG capsule 24 hr capsule Take 1 capsule by mouth Daily.       No facility-administered medications prior to visit.     There were no vitals filed for this visit.   Opioid medication/s are on active medication list.  and I have evaluated his active treatment plan and pain score trends (see table).  There were no vitals filed for this visit.  I have reviewed the  chart for potential of high risk medication and harmful drug interactions in the elderly.            Aspirin is not on active medication list.  Aspirin use is not indicated based on review of current medical condition/s. Risk of harm outweighs potential benefits.  .    Patient Active Problem List   Diagnosis   • Degeneration of lumbar or lumbosacral intervertebral disc   • Disc narrowing   • Colon cancer screening   • Gastroesophageal reflux disease   • Hypertension   • Paroxysmal atrial fibrillation (HCC)   • Spinal stenosis of lumbar region with neurogenic claudication   • Spondylolysis   • Status post arthroscopy of knee   • Sinus pause   • Tachycardia-bradycardia (HCC)   • Thoracic or lumbosacral neuritis or radiculitis   • Tremor   • Presence of cardiac pacemaker   • Allergic rhinitis   • Hyperlipidemia   • Insomnia   • Umbilical hernia   • Tinea   • Impetigo   • Labile blood pressure   • Other specified persistent mood disorders (HCC)   • Non-seasonal allergic rhinitis due to pollen   • Chronic low back pain with sciatica   • Bobby's esophagus with dysplasia   • Former tobacco use   • Cervical disc disease with myelopathy   • Psoriatic arthritis (HCC)   • Chest pain   • Hypertensive urgency   • Headache   • Abnormal nuclear stress test   • Herniation of lumbar intervertebral disc with radiculopathy   • History of lumbar spinal fusion   • Other chronic pain   • Sacroiliac joint dysfunction of right side   • Spondylolisthesis at L2-L3 level   • Spondylolisthesis at L3-L4 level   • Other secondary kyphosis, thoracolumbar region   • Pseudoarthrosis of lumbar spine   • Over weight     Advance Care Planning  Advance Directive is on file.  ACP discussion was declined by the patient. Patient has an advance directive in EMR which is still valid.           Objective    Vitals:    09/20/22 1135   BP: 134/84   BP Location: Right arm   Patient Position: Sitting   Cuff Size: Adult   Pulse: 79   Resp: 18   Temp: 97.9 °F  "(36.6 °C)   SpO2: 96%   Weight: 80.9 kg (178 lb 6.4 oz)   Height: 177.8 cm (70\")       Does the patient have evidence of cognitive impairment? No           HEALTH RISK ASSESSMENT    Smoking Status:  Social History     Tobacco Use   Smoking Status Former Smoker   • Quit date:    • Years since quittin.7   Smokeless Tobacco Former User     Alcohol Consumption:  Social History     Substance and Sexual Activity   Alcohol Use No     Fall Risk Screen:    JENNIFERADI Fall Risk Assessment was completed, and patient is at LOW risk for falls.Assessment completed on:2022    Depression Screening:  PHQ-2/PHQ-9 Depression Screening 2022   Retired Total Score -   Little Interest or Pleasure in Doing Things 0-->not at all   Feeling Down, Depressed or Hopeless 0-->not at all   PHQ-9: Brief Depression Severity Measure Score 0       Health Habits and Functional and Cognitive Screening:  Functional & Cognitive Status 2022   Do you have difficulty preparing food and eating? No   Do you have difficulty bathing yourself, getting dressed or grooming yourself? No   Do you have difficulty using the toilet? No   Do you have difficulty moving around from place to place? No   Do you have trouble with steps or getting out of a bed or a chair? No   Current Diet Well Balanced Diet   Dental Exam Up to date   Eye Exam Up to date   Exercise (times per week) 0 times per week   Current Exercises Include No Regular Exercise   Current Exercise Activities Include -   Do you need help using the phone?  No   Are you deaf or do you have serious difficulty hearing?  No   Do you need help with transportation? No   Do you need help shopping? No   Do you need help preparing meals?  No   Do you need help with housework?  No   Do you need help with laundry? No   Do you need help taking your medications? No   Do you need help managing money? No   Do you ever drive or ride in a car without wearing a seat belt? No   Have you felt unusual stress, " anger or loneliness in the last month? No   Who do you live with? Spouse   If you need help, do you have trouble finding someone available to you? No   Have you been bothered in the last four weeks by sexual problems? No   Do you have difficulty concentrating, remembering or making decisions? No       Age-appropriate Screening Schedule:  Refer to the list below for future screening recommendations based on patient's age, sex and/or medical conditions. Orders for these recommended tests are listed in the plan section. The patient has been provided with a written plan.    Health Maintenance   Topic Date Due   • TDAP/TD VACCINES (1 - Tdap) Never done   • ZOSTER VACCINE (2 of 2) 2021   • LIPID PANEL  2022   • INFLUENZA VACCINE  10/01/2022              Assessment & Plan   Medically necessary, significant, and separately identifiable medical problems identified during this visit are addressed on a separate visit note.    CMS Preventative Services Quick Reference  Risk Factors Identified During Encounter  Inactivity/Sedentary  The above risks/problems have been discussed with the patient.  Follow up actions/plans if indicated are seen below in the Assessment/Plan Section.  Pertinent information has been shared with the patient in the After Visit Summary.    Follow Up:   Return in about 6 months (around 3/20/2023).     An After Visit Summary and PPPS were made available to the patient.    Medicare Wellness  Personal Prevention Plan of Service     Date of Office Visit:  2022  Encounter Provider:  Junaid Marquez MD  Place of Service:  [facility]  Patient Name: Steven Hammonds  :  1960    As part of the Medicare Wellness portion of your visit today, we are providing you with this personalized preventive plan of services (PPPS). This plan is based upon recommendations of the [place] Preventive Services Task Force (USPSTF) and the Advisory Committee on  Immunization Practices (ACIP).    This lists the preventive care services that should be considered, and provides dates of when you are due. Items listed as completed are up-to-date and do not require any further intervention.    Health Maintenance   Topic Date Due   • TDAP/TD VACCINES (1 - Tdap) Never done   • HEPATITIS C SCREENING  Never done   • ZOSTER VACCINE (2 of 2) 07/06/2021   • COVID-19 Vaccine (3 - Booster for Moderna series) 08/25/2021   • LIPID PANEL  01/21/2022   • INFLUENZA VACCINE  10/01/2022   • ANNUAL WELLNESS VISIT  09/20/2023   • COLORECTAL CANCER SCREENING  09/25/2030   • Pneumococcal Vaccine 0-64  Aged Out       Orders Placed This Encounter   Procedures   • POC Urinalysis Dipstick, Multipro     Order Specific Question:   Release to patient     Answer:   Routine Release       Return in about 6 months (around 3/20/2023).  Sit-to-Stand Exercise    The sit-to-stand exercise (also known as the chair stand or chair rise exercise) strengthens your lower body and helps you maintain or improve your mobility and independence. The goal is to do the sit-to-stand exercise without using your hands. This will be easier as you become stronger. You should always talk with your health care provider before starting any exercise program, especially if you have had recent surgery.  Do the exercise exactly as told by your health care provider and adjust it as directed. It is normal to feel mild stretching, pulling, tightness, or discomfort as you do this exercise, but you should stop right away if you feel sudden pain or your pain gets worse. Do not begin doing this exercise until told by your health care provider.  What the sit-to-stand exercise does  The sit-to-stand exercise helps to strengthen the muscles in your thighs and the muscles in the center of your body that give you stability (core muscles). This exercise is especially helpful if:  · You have had knee or hip surgery.  · You have trouble getting up from  a chair, out of a car, or off the toilet.  How to do the sit-to-stand exercise  1. Sit toward the front edge of a sturdy chair without armrests. Your knees should be bent and your feet should be flat on the floor and shoulder-width apart.  2. Place your hands lightly on each side of the seat. Keep your back and neck as straight as possible, with your chest slightly forward.  3. Breathe in slowly. Lean forward and slightly shift your weight to the front of your feet.  4. Breathe out as you slowly stand up. Use your hands as little as possible.  5. Stand and pause for a full breath in and out.  6. Breathe in as you sit down slowly. Tighten your core and abdominal muscles to control your lowering as much as possible.  7. Breathe out slowly.  8. Do this exercise 10-15 times. If needed, do it fewer times until you build up strength.  9. Rest for 1 minute, then do another set of 10-15 repetitions.  To change the difficulty of the sit-to-stand exercise  · If the exercise is too difficult, use a chair with sturdy armrests, and push off the armrests to help you come to the standing position. You can also use the armrests to help slowly lower yourself back to sitting. As this gets easier, try to use your arms less. You can also place a firm cushion or pillow on the chair to make the surface higher.  · If this exercise is too easy, do not use your arms to help raise or lower yourself. You can also wear a weighted vest, use hand weights, increase your repetitions, or try a lower chair.  General tips  · You may feel tired when starting an exercise routine. This is normal.  · You may have muscle soreness that lasts a few days. This is normal. As you get stronger, you may not feel muscle soreness.  · Use smooth, steady movements.  · Do not  hold your breath during strength exercises. This can cause unsafe changes in your blood pressure.  · Breathe in slowly through your nose, and breathe out slowly through your  mouth.  Summary  · Strengthening your lower body is an important step to help you move safely and independently.  · The sit-to-stand exercise helps strengthen the muscles in your thighs and core.  · You should always talk with your health care provider before starting any exercise program, especially if you have had recent surgery.  This information is not intended to replace advice given to you by your health care provider. Make sure you discuss any questions you have with your health care provider.  Document Revised: 10/16/2019 Document Reviewed: 02/08/2018  Elsevier Patient Education © 2021 [employer]    Advance Care Planning and Advance Directives     You make decisions on a daily basis - decisions about where you want to live, your career, your home, your life. Perhaps one of the most important decisions you face is your choice for future medical care. Take time to talk with your family and your healthcare team and start planning today.  Advance Care Planning is a process that can help you:  · Understand possible future healthcare decisions in light of your own experiences  · Reflect on those decision in light of your goals and values  · Discuss your decisions with those closest to you and the healthcare professionals that care for you  · Make a plan by creating a document that reflects your wishes    Surrogate Decision Maker  In the event of a medical emergency, which has left you unable to communicate or to make your own decisions, you would need someone to make decisions for you.  It is important to discuss your preferences for medical treatment with this person while you are in good health.     Qualities of a surrogate decision maker:  • Willing to take on this role and responsibility  • Knows what you want for future medical care  • Willing to follow your wishes even if they don't agree with them  • Able to make difficult medical decisions under stressful circumstances    Advance Directives  These are  legal documents you can create that will guide your healthcare team and decision maker(s) when needed. These documents can be stored in the electronic medical record.    · Living Will - a legal document to guide your care if you have a terminal condition or a serious illness and are unable to communicate. States vary by statute in document names/types, but most forms may include one or more of the following:        -  Directions regarding life-prolonging treatments        -  Directions regarding artificially provided nutrition/hydration        -  Choosing a healthcare decision maker        -  Direction regarding organ/tissue donation    · Durable Power of  for Healthcare - this document names an -in-fact to make medical decisions for you, but it may also allow this person to make personal and financial decisions for you. Please seek the advice of an  if you need this type of document.    **Advance Directives are not required and no one may discriminate against you if you do not sign one.    Medical Orders  Many states allow specific forms/orders signed by your physician to record your wishes for medical treatment in your current state of health. This form, signed in personal communication with your physician, addresses resuscitation and other medical interventions that you may or may not want.  For more information or to schedule a time with a [facility] Advance Care Planning Facilitator contact: [email or web address] or call [phone] and someone will contact you directly.    Fall Prevention in the Home, Adult  Falls can cause injuries and can happen to people of all ages. There are many things you can do to make your home safe and to help prevent falls. Ask for help when making these changes.  What actions can I take to prevent falls?  General Instructions  1. Use good lighting in all rooms. Replace any light bulbs that burn out.  2. Turn on the lights in dark areas. Use  night-lights.  3. Keep items that you use often in easy-to-reach places. Lower the shelves around your home if needed.  4. Set up your furniture so you have a clear path. Avoid moving your furniture around.  5. Do not have throw rugs or other things on the floor that can make you trip.  6. Avoid walking on wet floors.  7. If any of your floors are uneven, fix them.  8. Add color or contrast paint or tape to clearly jarad and help you see:  ? Grab bars or handrails.  ? First and last steps of staircases.  ? Where the edge of each step is.  9. If you use a stepladder:  ? Make sure that it is fully opened. Do not climb a closed stepladder.  ? Make sure the sides of the stepladder are locked in place.  ? Ask someone to hold the stepladder while you use it.  10. Know where your pets are when moving through your home.  What can I do in the bathroom?         · Keep the floor dry. Clean up any water on the floor right away.  · Remove soap buildup in the tub or shower.  · Use nonskid mats or decals on the floor of the tub or shower.  · Attach bath mats securely with double-sided, nonslip rug tape.  · If you need to sit down in the shower, use a plastic, nonslip stool.  · Install grab bars by the toilet and in the tub and shower. Do not use towel bars as grab bars.  What can I do in the bedroom?  · Make sure that you have a light by your bed that is easy to reach.  · Do not use any sheets or blankets for your bed that hang to the floor.  · Have a firm chair with side arms that you can use for support when you get dressed.  What can I do in the kitchen?  · Clean up any spills right away.  · If you need to reach something above you, use a step stool with a grab bar.  · Keep electrical cords out of the way.  · Do not use floor polish or wax that makes floors slippery.  What can I do with my stairs?  · Do not leave any items on the stairs.  · Make sure that you have a light switch at the top and the bottom of the stairs.  · Make  sure that there are handrails on both sides of the stairs. Fix handrails that are broken or loose.  · Install nonslip stair treads on all your stairs.  · Avoid having throw rugs at the top or bottom of the stairs.  · Choose a carpet that does not hide the edge of the steps on the stairs.  · Check carpeting to make sure that it is firmly attached to the stairs. Fix carpet that is loose or worn.  What can I do on the outside of my home?  · Use bright outdoor lighting.  · Fix the edges of walkways and driveways and fix any cracks.  · Remove anything that might make you trip as you walk through a door, such as a raised step or threshold.  · Trim any bushes or trees on paths to your home.  · Check to see if handrails are loose or broken and that both sides of all steps have handrails.  · Install guardrails along the edges of any raised decks and porches.  · Clear paths of anything that can make you trip, such as tools or rocks.  · Have leaves, snow, or ice cleared regularly.  · Use sand or salt on paths during winter.  · Clean up any spills in your garage right away. This includes grease or oil spills.  What other actions can I take?  1. Wear shoes that:  ? Have a low heel. Do not wear high heels.  ? Have rubber bottoms.  ? Feel good on your feet and fit well.  ? Are closed at the toe. Do not wear open-toe sandals.  2. Use tools that help you move around if needed. These include:  ? Canes.  ? Walkers.  ? Scooters.  ? Crutches.  3. Review your medicines with your doctor. Some medicines can make you feel dizzy. This can increase your chance of falling.  Ask your doctor what else you can do to help prevent falls.  Where to find more information  · Centers for Disease Control and Prevention, STEADI: www.cdc.gov  · National [place] on Aging: www.eliu.nih.gov  Contact a doctor if:  · You are afraid of falling at home.  · You feel weak, drowsy, or dizzy at home.  · You fall at home.  Summary  · There are many simple things  that you can do to make your home safe and to help prevent falls.  · Ways to make your home safe include removing things that can make you trip and installing grab bars in the bathroom.  · Ask for help when making these changes in your home.  This information is not intended to replace advice given to you by your health care provider. Make sure you discuss any questions you have with your health care provider.  Document Revised: 07/21/2021 Document Reviewed: 07/21/2021  Elsevier Patient Education © 2021 [employer] Inc[employer]

## 2022-09-20 ENCOUNTER — OFFICE VISIT (OUTPATIENT)
Dept: FAMILY MEDICINE CLINIC | Facility: CLINIC | Age: 62
End: 2022-09-20

## 2022-09-20 ENCOUNTER — TELEPHONE (OUTPATIENT)
Dept: FAMILY MEDICINE CLINIC | Facility: CLINIC | Age: 62
End: 2022-09-20

## 2022-09-20 ENCOUNTER — PATIENT MESSAGE (OUTPATIENT)
Dept: FAMILY MEDICINE CLINIC | Facility: CLINIC | Age: 62
End: 2022-09-20

## 2022-09-20 VITALS
TEMPERATURE: 97.9 F | SYSTOLIC BLOOD PRESSURE: 134 MMHG | DIASTOLIC BLOOD PRESSURE: 84 MMHG | HEIGHT: 70 IN | WEIGHT: 178.4 LBS | OXYGEN SATURATION: 96 % | RESPIRATION RATE: 18 BRPM | BODY MASS INDEX: 25.54 KG/M2 | HEART RATE: 79 BPM

## 2022-09-20 DIAGNOSIS — Z95.0 PRESENCE OF CARDIAC PACEMAKER: ICD-10-CM

## 2022-09-20 DIAGNOSIS — I10 PRIMARY HYPERTENSION: ICD-10-CM

## 2022-09-20 DIAGNOSIS — Z87.891 FORMER TOBACCO USE: ICD-10-CM

## 2022-09-20 DIAGNOSIS — E66.3 OVER WEIGHT: ICD-10-CM

## 2022-09-20 DIAGNOSIS — M48.062 SPINAL STENOSIS OF LUMBAR REGION WITH NEUROGENIC CLAUDICATION: ICD-10-CM

## 2022-09-20 DIAGNOSIS — E78.2 MIXED HYPERLIPIDEMIA: ICD-10-CM

## 2022-09-20 DIAGNOSIS — Z12.5 PROSTATE CANCER SCREENING: ICD-10-CM

## 2022-09-20 DIAGNOSIS — Z11.59 NEED FOR HEPATITIS C SCREENING TEST: ICD-10-CM

## 2022-09-20 DIAGNOSIS — Z23 NEED FOR INFLUENZA VACCINATION: ICD-10-CM

## 2022-09-20 DIAGNOSIS — Z00.00 MEDICARE ANNUAL WELLNESS VISIT, SUBSEQUENT: Primary | ICD-10-CM

## 2022-09-20 LAB
BILIRUB BLD-MCNC: NEGATIVE MG/DL
CLARITY, POC: CLEAR
COLOR UR: YELLOW
GLUCOSE UR STRIP-MCNC: NEGATIVE MG/DL
KETONES UR QL: NEGATIVE
LEUKOCYTE EST, POC: NEGATIVE
NITRITE UR-MCNC: NEGATIVE MG/ML
PH UR: 7 [PH] (ref 5–8)
PROT UR STRIP-MCNC: NEGATIVE MG/DL
RBC # UR STRIP: ABNORMAL /UL
SP GR UR: 1.01 (ref 1–1.03)
UROBILINOGEN UR QL: NORMAL

## 2022-09-20 PROCEDURE — 90686 IIV4 VACC NO PRSV 0.5 ML IM: CPT | Performed by: FAMILY MEDICINE

## 2022-09-20 PROCEDURE — G0439 PPPS, SUBSEQ VISIT: HCPCS | Performed by: FAMILY MEDICINE

## 2022-09-20 PROCEDURE — 81003 URINALYSIS AUTO W/O SCOPE: CPT | Performed by: FAMILY MEDICINE

## 2022-09-20 PROCEDURE — 1159F MED LIST DOCD IN RCRD: CPT | Performed by: FAMILY MEDICINE

## 2022-09-20 PROCEDURE — G0008 ADMIN INFLUENZA VIRUS VAC: HCPCS | Performed by: FAMILY MEDICINE

## 2022-09-20 PROCEDURE — 96160 PT-FOCUSED HLTH RISK ASSMT: CPT | Performed by: FAMILY MEDICINE

## 2022-09-20 RX ORDER — HYDROCODONE BITARTRATE AND ACETAMINOPHEN 5; 325 MG/1; MG/1
1 TABLET ORAL EVERY 6 HOURS PRN
Qty: 28 TABLET | Refills: 0 | Status: SHIPPED | OUTPATIENT
Start: 2022-09-20 | End: 2022-09-20 | Stop reason: SDUPTHER

## 2022-09-20 RX ORDER — CYCLOBENZAPRINE HCL 5 MG
5 TABLET ORAL DAILY PRN
Qty: 30 TABLET | Refills: 0 | Status: SHIPPED | OUTPATIENT
Start: 2022-09-20

## 2022-09-20 RX ORDER — HYDROCODONE BITARTRATE AND ACETAMINOPHEN 5; 325 MG/1; MG/1
1 TABLET ORAL EVERY 6 HOURS PRN
Qty: 28 TABLET | Refills: 0 | Status: SHIPPED | OUTPATIENT
Start: 2022-09-20 | End: 2022-09-27

## 2022-09-20 RX ORDER — CYCLOBENZAPRINE HCL 5 MG
5 TABLET ORAL DAILY PRN
Qty: 30 TABLET | Refills: 0 | Status: SHIPPED | OUTPATIENT
Start: 2022-09-20 | End: 2022-09-20 | Stop reason: SDUPTHER

## 2022-09-20 NOTE — TELEPHONE ENCOUNTER
Caller: VINNIE MUNSON    Relationship: Emergency Contact    Best call back number: [phone]    Requested Prescriptions:   Requested Prescriptions     Pending Prescriptions Disp Refills   • HYDROcodone-acetaminophen (NORCO) 5-325 MG per tablet 28 tablet 0     Sig: Take 1 tablet by mouth Every 6 (Six) Hours As Needed for Moderate Pain for up to 7 days.   • cyclobenzaprine (FLEXERIL) 5 MG tablet 30 tablet 0     Sig: Take 1 tablet by mouth Daily As Needed for Muscle Spasms.        [id number] - [address] AT [facility]  & [facility] - [phone]  - [phone] FX     Additional details provided by patient: PATIENT'S WIFE IS ASKING FOR MEDICATIONS TO BE SENT TO PHARMACY IN [facility], PLEASE ADVISE WHEN SENT TO PHARMACY ASAP    Does the patient have less than a 3 day supply:  [x] Yes  [] No    Wes Morrison Rep   09/20/22 12:58 EDT

## 2022-09-20 NOTE — TELEPHONE ENCOUNTER
----- Message from Steven Hammonds sent at 9/20/2022 12:51 PM EDT -----  Regarding: Rx   Could Dr. Marquez please give Steven a Rx for a lift chair he has problems with getting up and down out of a chair he forgot to ask him

## 2022-09-20 NOTE — TELEPHONE ENCOUNTER
Pt's wife notified he will need a separate appointment for that. They will need to find out where they are getting it and call the company to see what paperwork is needed. Once they have everything call scheduling and make appointment with Dr. Marquez.

## 2022-09-20 NOTE — PROGRESS NOTES
Subjective   Stevne Hammonds is a 62 y.o. male.   No chief complaint on file.      History of Present Illness  The patient is here: for coordination of medical care.  Patient has: minimal activity with work/home activities    TDAP/TD VACCINES(1 - Tdap) Never done  HEPATITIS C SCREENING Never done  ZOSTER VACCINE(2 of 2) due on 07/06/2021  COVID-19 Vaccine(3 - Booster for Moderna series) due on 08/25/2021  LIPID PANEL due on 01/21/2022  INFLUENZA VACCINE due on 10/01/2022  ANNUAL WELLNESS VISIT due on 09/20/2023  COLORECTAL CANCER SCREENING due on 09/25/2030  Pneumococcal Vaccine 0-64 Aged Out  Current pain scale 0/10.    Needs rx for lortab and flexeril, which are new prescriptions for him        The following portions of the patient's history were reviewed and updated as appropriate: allergies, current medications, past family history, past medical history, past social history, past surgical history and problem list.    Patient Active Problem List   Diagnosis   • Degeneration of lumbar or lumbosacral intervertebral disc   • Disc narrowing   • Colon cancer screening   • Gastroesophageal reflux disease   • Hypertension   • Paroxysmal atrial fibrillation (HCC)   • Spinal stenosis of lumbar region with neurogenic claudication   • Spondylolysis   • Status post arthroscopy of knee   • Sinus pause   • Tachycardia-bradycardia (HCC)   • Thoracic or lumbosacral neuritis or radiculitis   • Tremor   • Presence of cardiac pacemaker   • Allergic rhinitis   • Hyperlipidemia   • Insomnia   • Umbilical hernia   • Tinea   • Impetigo   • Labile blood pressure   • Other specified persistent mood disorders (HCC)   • Non-seasonal allergic rhinitis due to pollen   • Chronic low back pain with sciatica   • Bobby's esophagus with dysplasia   • Former tobacco use   • Cervical disc disease with myelopathy   • Psoriatic arthritis (HCC)   • Chest pain   • Hypertensive urgency   • Headache   • Abnormal nuclear stress test   • Herniation of  lumbar intervertebral disc with radiculopathy   • History of lumbar spinal fusion   • Other chronic pain   • Sacroiliac joint dysfunction of right side   • Spondylolisthesis at L2-L3 level   • Spondylolisthesis at L3-L4 level   • Other secondary kyphosis, thoracolumbar region   • Pseudoarthrosis of lumbar spine   • Over weight       Current Outpatient Medications on File Prior to Visit   Medication Sig Dispense Refill   • amitriptyline (ELAVIL) 50 MG tablet TAKE 2 TABLETS AT BEDTIME 180 tablet 3   • ascorbic acid (VITAMIN C) 500 MG tablet Take 500 mg by mouth every night at bedtime.     • cetirizine (zyrTEC) 10 MG tablet Take 10 mg by mouth Daily.     • Chlorhexidine Gluconate (HIBICLENS EX) Apply  topically. AS DIRECTED PREOP     • cholecalciferol (VITAMIN D3) 25 MCG (1000 UT) tablet Take 3,000 Units by mouth every night at bedtime.     • digoxin (LANOXIN) 125 MCG tablet TAKE 1 TABLET DAILY 90 tablet 3   • Eliquis 5 MG tablet tablet TAKE 1 TABLET TWICE A  tablet 3   • Enbrel SureClick 50 MG/ML solution auto-injector      • esomeprazole (nexIUM) 40 MG capsule Take 1 capsule by mouth Daily.     • etanercept (ENBREL) 50 MG/ML solution prefilled syringe injection Inject 50 mg under the skin into the appropriate area as directed 1 (One) Time Per Week. Wed     • fluticasone (FLONASE) 50 MCG/ACT nasal spray USE 2 SPRAYS IN EACH NOSTRIL ONCE DAILY 48 g 3   • gabapentin (NEURONTIN) 600 MG tablet TAKE 1 TABLET FOUR TIMES A  tablet 3   • hydroCHLOROthiazide (HYDRODIURIL) 25 MG tablet TAKE 1 TABLET DAILY 90 tablet 3   • lisinopril (PRINIVIL,ZESTRIL) 20 MG tablet TAKE 1 TABLET DAILY 90 tablet 3   • metoprolol succinate XL (TOPROL-XL) 50 MG 24 hr tablet Take 1 tablet by mouth Daily. 90 tablet 3   • rosuvastatin (CRESTOR) 10 MG tablet TAKE ONE-HALF (1/2) TABLET DAILY 45 tablet 3   • Senexon-S 8.6-50 MG per tablet      • tamsulosin (FLOMAX) 0.4 MG capsule 24 hr capsule Take 1 capsule by mouth Daily.     •  "[DISCONTINUED] baclofen (LIORESAL) 10 MG tablet Take 1 tablet by mouth 3 (Three) Times a Day As Needed for Muscle Spasms. 270 tablet 1   • [DISCONTINUED] HYDROcodone-acetaminophen (NORCO)  MG per tablet        No current facility-administered medications on file prior to visit.     Current outpatient and discharge medications have been reconciled for the patient.  Reviewed by: Junaid Marquez MD      Allergies   Allergen Reactions   • Oxycodone-Acetaminophen GI Intolerance   • Acetaminophen Nausea And Vomiting   • Adhesive Tape Unknown (See Comments)     BLISTERS       Review of Systems   Constitutional: Negative for activity change, appetite change, fatigue and fever.   HENT: Negative for ear pain, swollen glands and voice change.    Eyes: Negative for visual disturbance.   Respiratory: Negative for shortness of breath and wheezing.    Cardiovascular: Negative for chest pain and leg swelling.   Gastrointestinal: Negative for abdominal pain, blood in stool, constipation, diarrhea, nausea and vomiting.   Endocrine: Negative for polydipsia and polyuria.   Genitourinary: Negative for dysuria, frequency and hematuria.   Musculoskeletal: Negative for joint swelling, neck pain and neck stiffness.   Skin: Negative for rash and wound.   Neurological: Negative for weakness, numbness and headache.   Psychiatric/Behavioral: Negative for suicidal ideas and depressed mood.     I have reviewed and confirmed the accuracy of the ROS as documented by the MA/LPN/RN Junaid Marquez MD    Objective   Visit Vitals  /84 (BP Location: Right arm, Patient Position: Sitting, Cuff Size: Adult)   Pulse 79   Temp 97.9 °F (36.6 °C)   Resp 18   Ht 177.8 cm (70\")   Wt 80.9 kg (178 lb 6.4 oz)   SpO2 96%   BMI 25.60 kg/m²       Physical Exam  Constitutional:       Appearance: He is well-developed.   HENT:      Head: Normocephalic and atraumatic.      Right Ear: External ear normal.      Left Ear: External ear normal.      " Nose: Nose normal.   Eyes:      Pupils: Pupils are equal, round, and reactive to light.   Cardiovascular:      Rate and Rhythm: Normal rate and regular rhythm.      Heart sounds: Normal heart sounds.   Pulmonary:      Effort: Pulmonary effort is normal.      Breath sounds: Normal breath sounds.   Abdominal:      General: Bowel sounds are normal.      Palpations: Abdomen is soft.   Musculoskeletal:         General: Normal range of motion.      Cervical back: Normal range of motion and neck supple.   Skin:     General: Skin is warm and dry.   Neurological:      Mental Status: He is alert and oriented to person, place, and time.   Psychiatric:         Behavior: Behavior normal.         Thought Content: Thought content normal.         Judgment: Judgment normal.       Derm Physical Exam    Diagnoses and all orders for this visit:    1. Medicare annual wellness visit, subsequent (Primary)  -     POC Urinalysis Dipstick, Multipro    2. Former tobacco use    3. Over weight    4. Presence of cardiac pacemaker    5. Primary hypertension  -     Comprehensive Metabolic Panel  -     CBC & Differential    6. Mixed hyperlipidemia  -     Comprehensive Metabolic Panel  -     Lipid Panel With / Chol / HDL Ratio  -     TSH    7. Prostate cancer screening  -     PSA Screen    8. Spinal stenosis of lumbar region with neurogenic claudication  -     Discontinue: HYDROcodone-acetaminophen (NORCO) 5-325 MG per tablet; Take 1 tablet by mouth Every 6 (Six) Hours As Needed for Moderate Pain for up to 7 days.  Dispense: 28 tablet; Refill: 0  -     Discontinue: cyclobenzaprine (FLEXERIL) 5 MG tablet; Take 1 tablet by mouth Daily As Needed for Muscle Spasms.  Dispense: 30 tablet; Refill: 0    9. Need for hepatitis C screening test    10. Need for influenza vaccination  -     FluLaval/Fluarix/Fluzone >6 Months     Findings discussed. All questions answered.  Medication and medication adverse effects discussed.  Drug education given and explained  to patient. Patient verbalized understanding.  Follow-up for routine health maintenance as directed  This separate E/M service is medically necessary, significant, and separately identifiable.      Expected course, medications, and adverse effects discussed as appropriate.  Call or return if worsening or persistent symptoms.  I wore protective equipment throughout this patient encounter to include mask and eye protection. Hand hygiene was performed before donning protective equipment and after removal when leaving the room.       This document is intended for medical expert use only. Reading of this document by patients and/or patient's family without participating medical staff guidance may result in misinterpretation and unintended morbidity. Any interpretation of such data is the responsibility of the patient and/or family member responsible for the patient in concert with their primary or specialist providers, not to be left for sources of online searches such as [employer], [employer] or similar queries. Relying on these approaches to knowledge may result in misinterpretation, misguided goals of care and even death should patients or family members try recommendations outside of the realm of professional medical care.

## 2022-09-20 NOTE — TELEPHONE ENCOUNTER
Called and cancelled rx that got sent to [facility] in [place]. Rx will be resent to [facility] in [place].

## 2022-09-21 LAB
ALBUMIN SERPL-MCNC: 4.7 G/DL (ref 3.8–4.8)
ALBUMIN/GLOB SERPL: 2 {RATIO} (ref 1.2–2.2)
ALP SERPL-CCNC: 129 IU/L (ref 44–121)
ALT SERPL-CCNC: 22 IU/L (ref 0–44)
AST SERPL-CCNC: 23 IU/L (ref 0–40)
BASOPHILS # BLD AUTO: 0.1 X10E3/UL (ref 0–0.2)
BASOPHILS NFR BLD AUTO: 1 %
BILIRUB SERPL-MCNC: 0.6 MG/DL (ref 0–1.2)
BUN SERPL-MCNC: 13 MG/DL (ref 8–27)
BUN/CREAT SERPL: 12 (ref 10–24)
CALCIUM SERPL-MCNC: 9.6 MG/DL (ref 8.6–10.2)
CHLORIDE SERPL-SCNC: 99 MMOL/L (ref 96–106)
CHOLEST SERPL-MCNC: 165 MG/DL (ref 100–199)
CHOLEST/HDLC SERPL: 4.1 RATIO (ref 0–5)
CO2 SERPL-SCNC: 23 MMOL/L (ref 20–29)
CREAT SERPL-MCNC: 1.08 MG/DL (ref 0.76–1.27)
EGFRCR SERPLBLD CKD-EPI 2021: 77 ML/MIN/1.73
EOSINOPHIL # BLD AUTO: 0.2 X10E3/UL (ref 0–0.4)
EOSINOPHIL NFR BLD AUTO: 3 %
ERYTHROCYTE [DISTWIDTH] IN BLOOD BY AUTOMATED COUNT: 14.4 % (ref 11.6–15.4)
GLOBULIN SER CALC-MCNC: 2.4 G/DL (ref 1.5–4.5)
GLUCOSE SERPL-MCNC: 107 MG/DL (ref 65–99)
HCT VFR BLD AUTO: 40.4 % (ref 37.5–51)
HDLC SERPL-MCNC: 40 MG/DL
HGB BLD-MCNC: 13.7 G/DL (ref 13–17.7)
IMM GRANULOCYTES # BLD AUTO: 0 X10E3/UL (ref 0–0.1)
IMM GRANULOCYTES NFR BLD AUTO: 0 %
LDLC SERPL CALC-MCNC: 102 MG/DL (ref 0–99)
LYMPHOCYTES # BLD AUTO: 1.7 X10E3/UL (ref 0.7–3.1)
LYMPHOCYTES NFR BLD AUTO: 24 %
MCH RBC QN AUTO: 27.8 PG (ref 26.6–33)
MCHC RBC AUTO-ENTMCNC: 33.9 G/DL (ref 31.5–35.7)
MCV RBC AUTO: 82 FL (ref 79–97)
MONOCYTES # BLD AUTO: 0.8 X10E3/UL (ref 0.1–0.9)
MONOCYTES NFR BLD AUTO: 12 %
NEUTROPHILS # BLD AUTO: 4.2 X10E3/UL (ref 1.4–7)
NEUTROPHILS NFR BLD AUTO: 60 %
PLATELET # BLD AUTO: 277 X10E3/UL (ref 150–450)
POTASSIUM SERPL-SCNC: 4.6 MMOL/L (ref 3.5–5.2)
PROT SERPL-MCNC: 7.1 G/DL (ref 6–8.5)
PSA SERPL-MCNC: 0.3 NG/ML (ref 0–4)
RBC # BLD AUTO: 4.93 X10E6/UL (ref 4.14–5.8)
SODIUM SERPL-SCNC: 139 MMOL/L (ref 134–144)
TRIGL SERPL-MCNC: 130 MG/DL (ref 0–149)
TSH SERPL DL<=0.005 MIU/L-ACNC: 2.19 UIU/ML (ref 0.45–4.5)
VLDLC SERPL CALC-MCNC: 23 MG/DL (ref 5–40)
WBC # BLD AUTO: 7 X10E3/UL (ref 3.4–10.8)

## 2022-09-22 ENCOUNTER — PATIENT MESSAGE (OUTPATIENT)
Dept: FAMILY MEDICINE CLINIC | Facility: CLINIC | Age: 62
End: 2022-09-22

## 2022-10-05 RX ORDER — DIGOXIN 125 MCG
TABLET ORAL
Qty: 90 TABLET | Refills: 3 | Status: SHIPPED | OUTPATIENT
Start: 2022-10-05

## 2022-10-05 NOTE — TELEPHONE ENCOUNTER
Rx Refill Note  Requested Prescriptions     Pending Prescriptions Disp Refills   • digoxin (LANOXIN) 125 MCG tablet [Pharmacy Med Name: DIGOXIN TABS 0.125MG] 90 tablet 3     Sig: TAKE 1 TABLET DAILY      Last office visit with prescribing clinician: 12/16/2021      Next office visit with prescribing clinician: Visit date not found            Evon Bond MA  10/05/22, 08:27 EDT

## 2022-10-19 ENCOUNTER — TELEPHONE (OUTPATIENT)
Dept: CARDIOLOGY | Facility: CLINIC | Age: 62
End: 2022-10-19

## 2022-10-19 NOTE — TELEPHONE ENCOUNTER
Pt cancelled June appt with Dr. Benson, he needs appt to see Dr. Benson and Marry for device check. Thanks. Curt

## 2022-10-31 DIAGNOSIS — M51.37 DEGENERATION OF LUMBAR OR LUMBOSACRAL INTERVERTEBRAL DISC: ICD-10-CM

## 2022-10-31 RX ORDER — AMITRIPTYLINE HYDROCHLORIDE 50 MG/1
TABLET, FILM COATED ORAL
Qty: 180 TABLET | Refills: 3 | Status: SHIPPED | OUTPATIENT
Start: 2022-10-31

## 2022-11-04 ENCOUNTER — OFFICE VISIT (OUTPATIENT)
Dept: FAMILY MEDICINE CLINIC | Facility: CLINIC | Age: 62
End: 2022-11-04

## 2022-11-04 VITALS
RESPIRATION RATE: 16 BRPM | OXYGEN SATURATION: 96 % | BODY MASS INDEX: 25.91 KG/M2 | TEMPERATURE: 97.7 F | DIASTOLIC BLOOD PRESSURE: 79 MMHG | WEIGHT: 181 LBS | SYSTOLIC BLOOD PRESSURE: 115 MMHG | HEART RATE: 78 BPM | HEIGHT: 70 IN

## 2022-11-04 DIAGNOSIS — H10.13 ALLERGIC CONJUNCTIVITIS OF BOTH EYES: Primary | ICD-10-CM

## 2022-11-04 DIAGNOSIS — H61.21 EXCESSIVE CERUMEN IN EAR CANAL, RIGHT: ICD-10-CM

## 2022-11-04 PROCEDURE — 99213 OFFICE O/P EST LOW 20 MIN: CPT

## 2022-11-04 RX ORDER — OFLOXACIN 3 MG/ML
1 SOLUTION/ DROPS OPHTHALMIC 4 TIMES DAILY
Qty: 1 EACH | Refills: 0 | Status: SHIPPED | OUTPATIENT
Start: 2022-11-04 | End: 2022-11-11

## 2022-11-04 RX ORDER — OLOPATADINE HYDROCHLORIDE 2 MG/ML
1 SOLUTION/ DROPS OPHTHALMIC DAILY
Qty: 2.5 ML | Refills: 12 | Status: SHIPPED | OUTPATIENT
Start: 2022-11-04 | End: 2022-12-04

## 2022-11-04 NOTE — PATIENT INSTRUCTIONS
Continue current plan of care as discussed.   Take medication as ordered (if applicable).    Change daily allergy medication to cetirizine 10mg daily (Zyrtec) and triamcinolone nasal spray (Nasacort).          Practice good sleep hygiene.  Eat a well balanced diet with fresh fruit and vegetables.    Drink at least 8 bottles of water or equivalent per day.     Limit sweetened beverages, sodas, juices.    Bake, boil, broil or grill your food, avoid eating fried foods.   Exercise at least 150 minutes per week.

## 2022-11-04 NOTE — PROGRESS NOTES
"Subjective   Steven Hammonds is a 62 y.o. male.   Chief Complaint   Patient presents with   • Eye Drainage     History of Present Illness  Patient is here for an acute visit complaint.  He is managed by Dr. Marquez in the office.  He reports a history of eye burning and irritation that began approximately 7 days ago.  Patient with significant seasonal allergies normally controlled with otc loratadine, fluticasone and pataday here with c/o excess bilateral eye tearing, burning and itching.  Has also tried Benadryl and Sudafed without relief.    Upon exam there was no purulent or stringy discharge noted to his eyes.  He does not wear contacts but does wear glasses.  There is significant right earwax and the left is okay.  He agrees to use Debrox eardrops at home to remove the earwax.    Antibiotic medication given as plan of care noted below.  Patient agrees to return to primary care provider if symptoms persist or worsen.  Discussion was given regarding acute worrisome findings of the eyes and when to seek emergency care.  Eye Burn  This is a new (Blowing leaves 7 days ago ) problem. The current episode started in the past 7 days. The problem occurs constantly. The problem has been unchanged. Associated symptoms include a visual change. Pertinent negatives include no chest pain, chills, coughing, fatigue, fever, sore throat or swollen glands. Associated symptoms comments: Excess tearing, itching and burning of the eyes. Right greater than left. photophobia. Exacerbated by: bright light. He has tried position changes and ice (pataday drops) for the symptoms. The treatment provided no relief.      Blood pressure 115/79, pulse 78, temperature 97.7 °F (36.5 °C), temperature source Infrared, resp. rate 16, height 177.8 cm (70\"), weight 82.1 kg (181 lb), SpO2 96 %.    Current Outpatient Medications:   •  amitriptyline (ELAVIL) 50 MG tablet, TAKE 2 TABLETS AT BEDTIME, Disp: 180 tablet, Rfl: 3  •  ascorbic acid (VITAMIN C) " 500 MG tablet, Take 500 mg by mouth every night at bedtime., Disp: , Rfl:   •  cetirizine (zyrTEC) 10 MG tablet, Take 10 mg by mouth Daily., Disp: , Rfl:   •  cholecalciferol (VITAMIN D3) 25 MCG (1000 UT) tablet, Take 3,000 Units by mouth every night at bedtime., Disp: , Rfl:   •  cyclobenzaprine (FLEXERIL) 5 MG tablet, Take 1 tablet by mouth Daily As Needed for Muscle Spasms., Disp: 30 tablet, Rfl: 0  •  digoxin (LANOXIN) 125 MCG tablet, TAKE 1 TABLET DAILY, Disp: 90 tablet, Rfl: 3  •  Eliquis 5 MG tablet tablet, TAKE 1 TABLET TWICE A DAY, Disp: 180 tablet, Rfl: 3  •  Enbrel SureClick 50 MG/ML solution auto-injector, , Disp: , Rfl:   •  esomeprazole (nexIUM) 40 MG capsule, Take 1 capsule by mouth Daily., Disp: , Rfl:   •  fluticasone (FLONASE) 50 MCG/ACT nasal spray, USE 2 SPRAYS IN EACH NOSTRIL ONCE DAILY, Disp: 48 g, Rfl: 3  •  gabapentin (NEURONTIN) 600 MG tablet, TAKE 1 TABLET FOUR TIMES A DAY, Disp: 360 tablet, Rfl: 3  •  hydroCHLOROthiazide (HYDRODIURIL) 25 MG tablet, TAKE 1 TABLET DAILY, Disp: 90 tablet, Rfl: 3  •  lisinopril (PRINIVIL,ZESTRIL) 20 MG tablet, TAKE 1 TABLET DAILY, Disp: 90 tablet, Rfl: 3  •  metoprolol succinate XL (TOPROL-XL) 50 MG 24 hr tablet, Take 1 tablet by mouth Daily., Disp: 90 tablet, Rfl: 3  •  rosuvastatin (CRESTOR) 10 MG tablet, TAKE ONE-HALF (1/2) TABLET DAILY, Disp: 45 tablet, Rfl: 3  •  carbamide peroxide (Debrox) 6.5 % otic solution, Administer 5 drops to the right ear 2 (Two) Times a Day for 4 days., Disp: 15 mL, Rfl: 0  •  Chlorhexidine Gluconate (HIBICLENS EX), Apply  topically. AS DIRECTED PREOP, Disp: , Rfl:   •  ofloxacin (OCUFLOX) 0.3 % ophthalmic solution, Administer 1 drop to both eyes 4 (Four) Times a Day for 7 days. Can use 1-2 drops every 2-4 hours for 2 days, then 4 times daily for 5 more days., Disp: 1 each, Rfl: 0  •  olopatadine (PATADAY) 0.2 % solution ophthalmic solution, Administer 1 drop to both eyes Daily for 30 days. Use after completing antibiotic eye  drops., Disp: 2.5 mL, Rfl: 12  The following portions of the patient's history were reviewed and updated as appropriate: allergies, current medications, past family history, past medical history, past social history, past surgical history and problem list.    Review of Systems   Constitutional: Positive for activity change. Negative for appetite change, chills, fatigue and fever.   HENT: Negative for postnasal drip, rhinorrhea, sinus pressure, sneezing, sore throat, swollen glands, tinnitus and trouble swallowing.    Eyes: Positive for blurred vision, photophobia, pain, redness, itching and visual disturbance. Negative for discharge.   Respiratory: Negative for cough, shortness of breath and wheezing.    Cardiovascular: Negative for chest pain, palpitations and leg swelling.   Allergic/Immunologic: Positive for environmental allergies.   Neurological: Negative for headache.       Objective   Physical Exam  Vitals and nursing note reviewed.   Constitutional:       General: He is not in acute distress.     Appearance: Normal appearance. He is well-groomed and normal weight.   HENT:      Head: Normocephalic and atraumatic.      Right Ear: There is impacted cerumen.      Ears:      Comments: Excess cerumen       Nose: Nose normal.      Mouth/Throat:      Mouth: Mucous membranes are moist.   Eyes:      General: Vision grossly intact. No allergic shiner.        Right eye: No foreign body, discharge or hordeolum.         Left eye: No foreign body, discharge or hordeolum.      Extraocular Movements: Extraocular movements intact.      Right eye: Normal extraocular motion.      Left eye: Normal extraocular motion.      Conjunctiva/sclera:      Right eye: Right conjunctiva is injected.      Left eye: Left conjunctiva is injected.   Neck:      Vascular: No carotid bruit.   Cardiovascular:      Rate and Rhythm: Normal rate and regular rhythm.      Pulses: Normal pulses.      Heart sounds: Normal heart sounds.   Pulmonary:       Effort: Pulmonary effort is normal.      Breath sounds: Normal breath sounds.   Skin:     General: Skin is warm and dry.      Capillary Refill: Capillary refill takes less than 2 seconds.   Neurological:      Mental Status: He is alert and oriented to person, place, and time.   Psychiatric:         Mood and Affect: Mood normal.         Behavior: Behavior normal. Behavior is cooperative.           Assessment & Plan   Diagnoses and all orders for this visit:    1. Allergic conjunctivitis of both eyes (Primary)  -     ofloxacin (OCUFLOX) 0.3 % ophthalmic solution; Administer 1 drop to both eyes 4 (Four) Times a Day for 7 days. Can use 1-2 drops every 2-4 hours for 2 days, then 4 times daily for 5 more days.  Dispense: 1 each; Refill: 0  -     olopatadine (PATADAY) 0.2 % solution ophthalmic solution; Administer 1 drop to both eyes Daily for 30 days. Use after completing antibiotic eye drops.  Dispense: 2.5 mL; Refill: 12    2. Excessive cerumen in ear canal, right  -     carbamide peroxide (Debrox) 6.5 % otic solution; Administer 5 drops to the right ear 2 (Two) Times a Day for 4 days.  Dispense: 15 mL; Refill: 0

## 2022-11-09 ENCOUNTER — PATIENT MESSAGE (OUTPATIENT)
Dept: FAMILY MEDICINE CLINIC | Facility: CLINIC | Age: 62
End: 2022-11-09

## 2023-03-13 DIAGNOSIS — I10 ESSENTIAL HYPERTENSION: ICD-10-CM

## 2023-03-14 RX ORDER — METOPROLOL SUCCINATE 50 MG/1
TABLET, EXTENDED RELEASE ORAL
Qty: 90 TABLET | Refills: 3 | Status: SHIPPED | OUTPATIENT
Start: 2023-03-14

## 2023-03-14 NOTE — TELEPHONE ENCOUNTER
Rx Refill Note  Requested Prescriptions     Pending Prescriptions Disp Refills   • metoprolol succinate XL (TOPROL-XL) 50 MG 24 hr tablet [Pharmacy Med Name: Metoprolol Succinate ER 50 MG Oral Tablet Extended Release 24 Hour] 90 tablet 3     Sig: TAKE 1 TABLET DAILY      Last office visit with prescribing clinician: 12/16/2021   Last telemedicine visit with prescribing clinician: Visit date not found   Next office visit with prescribing clinician: Visit date not found                         Would you like a call back once the refill request has been completed: [] Yes [] No    If the office needs to give you a call back, can they leave a voicemail: [] Yes [] No    Evon Bond MA  03/14/23, 08:04 EDT

## 2023-03-22 DIAGNOSIS — I10 ESSENTIAL HYPERTENSION: ICD-10-CM

## 2023-03-23 RX ORDER — LISINOPRIL 20 MG/1
TABLET ORAL
Qty: 90 TABLET | Refills: 3 | Status: SHIPPED | OUTPATIENT
Start: 2023-03-23

## 2023-04-04 RX ORDER — APIXABAN 5 MG/1
TABLET, FILM COATED ORAL
Qty: 180 TABLET | Refills: 3 | Status: SHIPPED | OUTPATIENT
Start: 2023-04-04

## 2023-04-06 DIAGNOSIS — I10 ESSENTIAL HYPERTENSION: ICD-10-CM

## 2023-04-06 RX ORDER — HYDROCHLOROTHIAZIDE 25 MG/1
25 TABLET ORAL DAILY
Qty: 90 TABLET | Refills: 3 | Status: SHIPPED | OUTPATIENT
Start: 2023-04-06

## 2023-06-15 DIAGNOSIS — M51.37 DEGENERATION OF LUMBAR OR LUMBOSACRAL INTERVERTEBRAL DISC: ICD-10-CM

## 2023-06-15 RX ORDER — ROSUVASTATIN CALCIUM 10 MG/1
TABLET, COATED ORAL
Qty: 45 TABLET | Refills: 3 | Status: SHIPPED | OUTPATIENT
Start: 2023-06-15 | End: 2023-06-15

## 2023-06-15 RX ORDER — ROSUVASTATIN CALCIUM 10 MG/1
TABLET, COATED ORAL
Qty: 45 TABLET | Refills: 3 | Status: SHIPPED | OUTPATIENT
Start: 2023-06-15

## 2023-06-15 RX ORDER — GABAPENTIN 600 MG/1
TABLET ORAL
Qty: 360 TABLET | Refills: 3 | Status: SHIPPED | OUTPATIENT
Start: 2023-06-15 | End: 2023-06-16

## 2023-06-16 DIAGNOSIS — M51.37 DEGENERATION OF LUMBAR OR LUMBOSACRAL INTERVERTEBRAL DISC: ICD-10-CM

## 2023-06-16 RX ORDER — GABAPENTIN 600 MG/1
TABLET ORAL
Qty: 360 TABLET | Refills: 3 | Status: SHIPPED | OUTPATIENT
Start: 2023-06-16

## 2023-08-21 RX ORDER — DIGOXIN 125 MCG
TABLET ORAL
Qty: 90 TABLET | Refills: 3 | OUTPATIENT
Start: 2023-08-21

## 2023-08-21 NOTE — TELEPHONE ENCOUNTER
Cardiac Glycoside  [id number]/2023 11:18 PM   Protocol Details Recent or future visit with authorizing provider (12M/30D)    Normal Magnesium in last 12 months    Normal Digoxin Level in last 12 months    Normal Potassium in last 12 months       Rx Refill Note  Requested Prescriptions     Refused Prescriptions Disp Refills    digoxin (LANOXIN) 125 MCG tablet [Pharmacy Med Name: Digoxin 125 MCG Oral Tablet] 90 tablet 3     Sig: TAKE 1 TABLET DAILY     Refused By: CALLIE BLACKWOOD     Reason for Refusal: Patient needs an appointment      Last office visit with prescribing clinician: 12/16/2021   Last telemedicine visit with prescribing clinician: Visit date not found   Next office visit with prescribing clinician: Visit date not found                         Would you like a call back once the refill request has been completed: [] Yes [] No    If the office needs to give you a call back, can they leave a voicemail: [] Yes [] No    Callie Blackwood MA  08/21/23, 08:11 EDT

## 2023-11-10 DIAGNOSIS — J30.9 ALLERGIC RHINITIS, UNSPECIFIED SEASONALITY, UNSPECIFIED TRIGGER: ICD-10-CM

## 2023-11-10 RX ORDER — FLUTICASONE PROPIONATE 50 MCG
SPRAY, SUSPENSION (ML) NASAL
Qty: 48 G | Refills: 3 | Status: SHIPPED | OUTPATIENT
Start: 2023-11-10

## 2024-02-02 DIAGNOSIS — I10 ESSENTIAL HYPERTENSION: ICD-10-CM

## 2024-02-05 RX ORDER — LISINOPRIL 20 MG/1
20 TABLET ORAL DAILY
Qty: 90 TABLET | Refills: 3 | OUTPATIENT
Start: 2024-02-05

## 2024-02-05 RX ORDER — HYDROCHLOROTHIAZIDE 25 MG/1
25 TABLET ORAL DAILY
Qty: 90 TABLET | Refills: 3 | OUTPATIENT
Start: 2024-02-05

## 2024-03-05 ENCOUNTER — TELEPHONE (OUTPATIENT)
Dept: FAMILY MEDICINE CLINIC | Facility: CLINIC | Age: 64
End: 2024-03-05
Payer: MEDICARE

## 2024-03-05 NOTE — TELEPHONE ENCOUNTER
Caller: VINNIE MUNSON    Relationship to patient: Emergency Contact    Best call back number:     [phone] (Mobile)       Chief complaint: LOW HEART RATE TRIGGERING PACEMAKER, SEVERE PAIN     Patient directed to call 911 or go to their nearest emergency room.     Patient verbalized understanding: [x] Yes  [] No  If no, why?    Additional notes:PATIENTS SPOUSE AGREED TO TAKE HIM TO ER.

## 2024-03-07 RX ORDER — APIXABAN 5 MG/1
5 TABLET, FILM COATED ORAL 2 TIMES DAILY
Qty: 180 TABLET | Refills: 3 | OUTPATIENT
Start: 2024-03-07

## 2024-03-07 NOTE — TELEPHONE ENCOUNTER
Rx Refill Note  Requested Prescriptions     Pending Prescriptions Disp Refills    Eliquis 5 MG tablet tablet [Pharmacy Med Name: Eliquis 5 MG Oral Tablet] 180 tablet 3     Sig: TAKE 1 TABLET BY MOUTH TWICE  DAILY      Last office visit with prescribing clinician: Visit date not found   Last telemedicine visit with prescribing clinician: Visit date not found   Next office visit with prescribing clinician: Visit date not found                         Would you like a call back once the refill request has been completed: [] Yes [] No    If the office needs to give you a call back, can they leave a voicemail: [] Yes [] No    Florinda Garcia MA  03/07/24, 13:24 EST

## 2024-03-28 DIAGNOSIS — I10 ESSENTIAL HYPERTENSION: ICD-10-CM

## 2024-03-28 RX ORDER — LISINOPRIL 20 MG/1
20 TABLET ORAL DAILY
Qty: 30 TABLET | Refills: 0 | Status: SHIPPED | OUTPATIENT
Start: 2024-03-28

## 2024-03-28 RX ORDER — HYDROCHLOROTHIAZIDE 25 MG/1
25 TABLET ORAL DAILY
Qty: 30 TABLET | Refills: 0 | Status: SHIPPED | OUTPATIENT
Start: 2024-03-28

## 2024-04-01 NOTE — PROGRESS NOTES
The ABCs of the Annual Wellness Visit  Subsequent Medicare Wellness Visit    Subjective    History of Present Illness:  Steven Hammonds is a 63 y.o. male who presents for a Subsequent Medicare Wellness Visit.    The following portions of the patient's history were reviewed and   updated as appropriate: allergies, current medications, past family history, past medical history, past social history, past surgical history, and problem list.  Family History   Problem Relation Age of Onset    Other Father         Substance Abuse    Cancer Father         Lung    Other Mother     Malig Hyperthermia Neg Hx      Past Surgical History:   Procedure Laterality Date    CARDIAC CATHETERIZATION N/A 02/04/2021    Procedure: Left Heart Cath and coronary angiogram;  Surgeon: Claudia Benson MD;  Location: [facility] CATH INVASIVE LOCATION;  Service: Cardiovascular;  Laterality: N/A;    CARDIAC PACEMAKER PLACEMENT      COLONOSCOPY      ENDOSCOPY      EPIDURAL Left 03/11/2021    Procedure: Left L3 LUMBAR/SACRAL TRANSFORAMINAL EPIDURAL;  Surgeon: Danial Diaz MD;  Location: [facility] MAIN OR;  Service: Pain Management;  Laterality: Left;    FRACTURE SURGERY      HERNIA REPAIR      umbilical    JOINT REPLACEMENT  12- 6 - 2016    Right hip    KNEE SURGERY Right 2016    LUMBAR DISCECTOMY  10/2015    Comments: lumbar disk decompression    PACEMAKER IMPLANTATION      SPINE SURGERY      TOTAL HIP ARTHROPLASTY Right 12/09/2016    UMBILICAL HERNIA REPAIR          Compared to one year ago, the patient feels his physical   health is worse.    Compared to one year ago, the patient feels his mental   health is worse.    Recent Hospitalizations:  He was not admitted to the hospital during the last year.       Current Medical Providers:  Patient Care Team:  Junaid Marquez MD as PCP - General  Junaid Marquez MD as PCP - Family Medicine  Junaid Marquez MD Gondi, Bapineedu, MD as Consulting Physician (Cardiology)    Outpatient  Medications Prior to Visit   Medication Sig Dispense Refill    amitriptyline (ELAVIL) 50 MG tablet TAKE 2 TABLETS AT BEDTIME 180 tablet 3    ascorbic acid (VITAMIN C) 500 MG tablet Take 1 tablet by mouth every night at bedtime.      cetirizine (zyrTEC) 10 MG tablet Take 1 tablet by mouth Daily.      cholecalciferol (VITAMIN D3) 25 MCG (1000 UT) tablet Take 3 tablets by mouth every night at bedtime.      digoxin (LANOXIN) 125 MCG tablet TAKE 1 TABLET DAILY 90 tablet 3    Eliquis 5 MG tablet tablet TAKE 1 TABLET TWICE A  tablet 3    esomeprazole (nexIUM) 40 MG capsule Take 1 capsule by mouth Daily.      fluticasone (FLONASE) 50 MCG/ACT nasal spray USE 2 SPRAYS IN EACH NOSTRIL  ONCE DAILY 48 g 3    gabapentin (NEURONTIN) 600 MG tablet TAKE 1 TABLET FOUR TIMES A  tablet 3    metoprolol succinate XL (TOPROL-XL) 50 MG 24 hr tablet TAKE 1 TABLET DAILY 90 tablet 3    rosuvastatin (CRESTOR) 10 MG tablet TAKE ONE-HALF TABLET BY MOUTH  DAILY 45 tablet 3    cyclobenzaprine (FLEXERIL) 5 MG tablet Take 1 tablet by mouth Daily As Needed for Muscle Spasms. 30 tablet 0    hydroCHLOROthiazide 25 MG tablet TAKE 1 TABLET BY MOUTH DAILY 30 tablet 0    lisinopril (PRINIVIL,ZESTRIL) 20 MG tablet TAKE 1 TABLET BY MOUTH DAILY 30 tablet 0    Enbrel SureClick 50 MG/ML solution auto-injector  (Patient not taking: Reported on 4/2/2024)      Chlorhexidine Gluconate (HIBICLENS EX) Apply  topically. AS DIRECTED PREOP       No facility-administered medications prior to visit.     Pain Score    04/02/24 0854   PainSc:   8   PainLoc: Back      Opioid medication/s are on active medication list.  and I have evaluated his active treatment plan and pain score trends (see table).  Vitals:    04/02/24 0854   PainSc:   8   PainLoc: Back     I have reviewed the chart for potential of high risk medication and harmful drug interactions in the elderly.          Aspirin is not on active medication list.  Aspirin use is not indicated based on review  "of current medical condition/s. Risk of harm outweighs potential benefits.  .    Patient Active Problem List   Diagnosis    Degeneration of lumbar or lumbosacral intervertebral disc    Disc narrowing    Colon cancer screening    Gastroesophageal reflux disease    Hypertension    Paroxysmal atrial fibrillation    Spinal stenosis of lumbar region with neurogenic claudication    Spondylolysis    Status post arthroscopy of knee    Sinus pause    Tachycardia-bradycardia    Thoracic or lumbosacral neuritis or radiculitis    Tremor    Presence of cardiac pacemaker    Allergic rhinitis    Hyperlipidemia    Insomnia    Umbilical hernia    Tinea    Impetigo    Labile blood pressure    Other specified persistent mood disorders    Non-seasonal allergic rhinitis due to pollen    Chronic low back pain with sciatica    Bobby's esophagus with dysplasia    Former tobacco use    Cervical disc disease with myelopathy    Psoriatic arthritis    Chest pain    Hypertensive urgency    Headache    Abnormal nuclear stress test    Herniation of lumbar intervertebral disc with radiculopathy    History of lumbar spinal fusion    Other chronic pain    Sacroiliac joint dysfunction of right side    Spondylolisthesis at L2-L3 level    Spondylolisthesis at L3-L4 level    Other secondary kyphosis, thoracolumbar region    Pseudoarthrosis of lumbar spine    Over weight    Allergic conjunctivitis of both eyes     Advance Care Planning  Advance Directive is on file.  ACP discussion was held with the patient during this visit. Patient has an advance directive in EMR which is still valid.           Objective    Vitals:    04/02/24 0854   BP: 126/78   BP Location: Right arm   Patient Position: Sitting   Cuff Size: Adult   Pulse: 62   Resp: 20   SpO2: 98%   Weight: 93 kg (205 lb)   Height: 180.3 cm (71\")   PainSc:   8   PainLoc: Back       Does the patient have evidence of cognitive impairment? No           HEALTH RISK ASSESSMENT    Smoking Status:  Social " History     Tobacco Use   Smoking Status Former    Current packs/day: 0.00    Types: Cigarettes    Quit date: 1975    Years since quittin.7    Passive exposure: Never   Smokeless Tobacco Former     Alcohol Consumption:  Social History     Substance and Sexual Activity   Alcohol Use No     Fall Risk Screen:    SHARON Fall Risk Assessment was completed, and patient is at HIGH risk for falls. Assessment completed on:2024    Depression Screenin/2/2024     8:57 AM   PHQ-2/PHQ-9 Depression Screening   Little Interest or Pleasure in Doing Things 0-->not at all   Feeling Down, Depressed or Hopeless 0-->not at all   PHQ-9: Brief Depression Severity Measure Score 0       Health Habits and Functional and Cognitive Screenin/2/2024     8:00 AM   Functional & Cognitive Status   Do you have difficulty preparing food and eating? No   Do you have difficulty bathing yourself, getting dressed or grooming yourself? No   Do you have difficulty using the toilet? No   Do you have difficulty moving around from place to place? Yes   Do you have trouble with steps or getting out of a bed or a chair? Yes   Current Diet Limited Junk Food   Dental Exam Up to date   Eye Exam Up to date   Exercise (times per week) 5 times per week   Do you need help using the phone?  No   Are you deaf or do you have serious difficulty hearing?  Yes   Do you need help to go to places out of walking distance? Yes   Do you need help shopping? Yes   Do you need help preparing meals?  No   Do you need help with housework?  Yes   Do you need help with laundry? Yes   Do you need help taking your medications? No   Do you need help managing money? No   Do you ever drive or ride in a car without wearing a seat belt? No   Have you felt unusual stress, anger or loneliness in the last month? Yes   Who do you live with? Spouse   If you need help, do you have trouble finding someone available to you? No   Have you been bothered in the last four  weeks by sexual problems? No   Do you have difficulty concentrating, remembering or making decisions? No       Age-appropriate Screening Schedule:  Refer to the list below for future screening recommendations based on patient's age, sex and/or medical conditions. Orders for these recommended tests are listed in the plan section. The patient has been provided with a written plan.    Health Maintenance   Topic Date Due    TDAP/TD VACCINES (1 - Tdap) Never done    HEPATITIS C SCREENING  Never done    RSV Vaccine - Adults (1 - 1-dose 60+ series) Never done    ZOSTER VACCINE (2 of 2) 2021    COVID-19 Vaccine (3 - -24 season) 2023    LIPID PANEL  2023    INFLUENZA VACCINE  2024    ANNUAL WELLNESS VISIT  2025    BMI FOLLOWUP  2025    COLORECTAL CANCER SCREENING  2030    Pneumococcal Vaccine 0-64  Aged Out              Assessment & Plan   Medically necessary, significant, and separately identifiable medical problems identified during this visit are addressed on a separate visit note.    CMS Preventative Services Quick Reference  Risk Factors Identified During Encounter  None Identified  The above risks/problems have been discussed with the patient.  Follow up actions/plans if indicated are seen below in the Assessment/Plan Section.  Pertinent information has been shared with the patient in the After Visit Summary.    Follow Up:   In 6 months or as clinical condition warrants.     An After Visit Summary and PPPS were made available to the patient.    Medicare Wellness  Personal Prevention Plan of Service     Date of Office Visit:  2024  Encounter Provider:  Junaid Marquez MD  Place of Service:  [facility]  Patient Name: Steven Hammonds  :  1960    As part of the Medicare Wellness portion of your visit today, we are providing you with this personalized preventive plan of services (PPPS). This plan is based upon  recommendations of the [place] Preventive Services Task Force (USPSTF) and the Advisory Committee on Immunization Practices (ACIP).    This lists the preventive care services that should be considered, and provides dates of when you are due. Items listed as completed are up-to-date and do not require any further intervention.    Health Maintenance   Topic Date Due    TDAP/TD VACCINES (1 - Tdap) Never done    HEPATITIS C SCREENING  Never done    RSV Vaccine - Adults (1 - 1-dose 60+ series) Never done    ZOSTER VACCINE (2 of 2) 07/06/2021    COVID-19 Vaccine (3 - 2023-24 season) 09/01/2023    LIPID PANEL  09/20/2023    INFLUENZA VACCINE  08/01/2024    ANNUAL WELLNESS VISIT  04/02/2025    BMI FOLLOWUP  04/02/2025    COLORECTAL CANCER SCREENING  09/25/2030    Pneumococcal Vaccine 0-64  Aged Out       Orders Placed This Encounter   Procedures    Hepatitis C Antibody     Order Specific Question:   Release to patient     Answer:   Routine Release [[id number]]    Comprehensive Metabolic Panel     Order Specific Question:   Release to patient     Answer:   Routine Release [[id number]]    Lipid Panel With / Chol / HDL Ratio     Order Specific Question:   Release to patient     Answer:   Routine Release [[id number]]    TSH     Order Specific Question:   Release to patient     Answer:   Routine Release [[id number]]    PSA Screen     Order Specific Question:   Release to patient     Answer:   Routine Release [[id number]]    Ambulatory Referral to Physical Therapy Evaluate and treat     Referral Priority:   Routine     Referral Type:   Physical Therapy     Referral Reason:   Specialty Services Required     Requested Specialty:   Physical Therapy     Number of Visits Requested:   1    POCT urinalysis dipstick, manual     Order Specific Question:   Release to patient     Answer:   Routine Release [[id number]]    CBC & Differential     Order Specific Question:   Manual Differential     Answer:   No     Order Specific Question:    Release to patient     Answer:   Routine Release [[id number]]       No follow-ups on file.  Sit-to-Stand Exercise    The sit-to-stand exercise (also known as the chair stand or chair rise exercise) strengthens your lower body and helps you maintain or improve your mobility and independence. The goal is to do the sit-to-stand exercise without using your hands. This will be easier as you become stronger. You should always talk with your health care provider before starting any exercise program, especially if you have had recent surgery.  Do the exercise exactly as told by your health care provider and adjust it as directed. It is normal to feel mild stretching, pulling, tightness, or discomfort as you do this exercise, but you should stop right away if you feel sudden pain or your pain gets worse. Do not begin doing this exercise until told by your health care provider.  What the sit-to-stand exercise does  The sit-to-stand exercise helps to strengthen the muscles in your thighs and the muscles in the center of your body that give you stability (core muscles). This exercise is especially helpful if:  You have had knee or hip surgery.  You have trouble getting up from a chair, out of a car, or off the toilet.  How to do the sit-to-stand exercise  Sit toward the front edge of a sturdy chair without armrests. Your knees should be bent and your feet should be flat on the floor and shoulder-width apart.  Place your hands lightly on each side of the seat. Keep your back and neck as straight as possible, with your chest slightly forward.  Breathe in slowly. Lean forward and slightly shift your weight to the front of your feet.  Breathe out as you slowly stand up. Use your hands as little as possible.  Stand and pause for a full breath in and out.  Breathe in as you sit down slowly. Tighten your core and abdominal muscles to control your lowering as much as possible.  Breathe out slowly.  Do this exercise 10-15 times. If  needed, do it fewer times until you build up strength.  Rest for 1 minute, then do another set of 10-15 repetitions.  To change the difficulty of the sit-to-stand exercise  If the exercise is too difficult, use a chair with sturdy armrests, and push off the armrests to help you come to the standing position. You can also use the armrests to help slowly lower yourself back to sitting. As this gets easier, try to use your arms less. You can also place a firm cushion or pillow on the chair to make the surface higher.  If this exercise is too easy, do not use your arms to help raise or lower yourself. You can also wear a weighted vest, use hand weights, increase your repetitions, or try a lower chair.  General tips  You may feel tired when starting an exercise routine. This is normal.  You may have muscle soreness that lasts a few days. This is normal. As you get stronger, you may not feel muscle soreness.  Use smooth, steady movements.  Do not  hold your breath during strength exercises. This can cause unsafe changes in your blood pressure.  Breathe in slowly through your nose, and breathe out slowly through your mouth.  Summary  Strengthening your lower body is an important step to help you move safely and independently.  The sit-to-stand exercise helps strengthen the muscles in your thighs and core.  You should always talk with your health care provider before starting any exercise program, especially if you have had recent surgery.  This information is not intended to replace advice given to you by your health care provider. Make sure you discuss any questions you have with your health care provider.  Document Revised: 10/16/2019 Document Reviewed: 02/08/2018  Elsevier Patient Education © 2021 [employer]    Advance Care Planning and Advance Directives     You make decisions on a daily basis - decisions about where you want to live, your career, your home, your life. Perhaps one of the most important decisions you  face is your choice for future medical care. Take time to talk with your family and your healthcare team and start planning today.  Advance Care Planning is a process that can help you:  Understand possible future healthcare decisions in light of your own experiences  Reflect on those decision in light of your goals and values  Discuss your decisions with those closest to you and the healthcare professionals that care for you  Make a plan by creating a document that reflects your wishes    Surrogate Decision Maker  In the event of a medical emergency, which has left you unable to communicate or to make your own decisions, you would need someone to make decisions for you.  It is important to discuss your preferences for medical treatment with this person while you are in good health.     Qualities of a surrogate decision maker:  Willing to take on this role and responsibility  Knows what you want for future medical care  Willing to follow your wishes even if they don't agree with them  Able to make difficult medical decisions under stressful circumstances    Advance Directives  These are legal documents you can create that will guide your healthcare team and decision maker(s) when needed. These documents can be stored in the electronic medical record.    Living Will - a legal document to guide your care if you have a terminal condition or a serious illness and are unable to communicate. States vary by statute in document names/types, but most forms may include one or more of the following:        -  Directions regarding life-prolonging treatments        -  Directions regarding artificially provided nutrition/hydration        -  Choosing a healthcare decision maker        -  Direction regarding organ/tissue donation    Durable Power of  for Healthcare - this document names an -in-fact to make medical decisions for you, but it may also allow this person to make personal and financial decisions for you.  Please seek the advice of an  if you need this type of document.    **Advance Directives are not required and no one may discriminate against you if you do not sign one.    Medical Orders  Many states allow specific forms/orders signed by your physician to record your wishes for medical treatment in your current state of health. This form, signed in personal communication with your physician, addresses resuscitation and other medical interventions that you may or may not want.  For more information or to schedule a time with a [facility] Advance Care Planning Facilitator contact: [facility] or call [phone] and someone will contact you directly.    Fall Prevention in the Home, Adult  Falls can cause injuries and can happen to people of all ages. There are many things you can do to make your home safe and to help prevent falls. Ask for help when making these changes.  What actions can I take to prevent falls?  General Instructions  Use good lighting in all rooms. Replace any light bulbs that burn out.  Turn on the lights in dark areas. Use night-lights.  Keep items that you use often in easy-to-reach places. Lower the shelves around your home if needed.  Set up your furniture so you have a clear path. Avoid moving your furniture around.  Do not have throw rugs or other things on the floor that can make you trip.  Avoid walking on wet floors.  If any of your floors are uneven, fix them.  Add color or contrast paint or tape to clearly jarad and help you see:  Grab bars or handrails.  First and last steps of staircases.  Where the edge of each step is.  If you use a stepladder:  Make sure that it is fully opened. Do not climb a closed stepladder.  Make sure the sides of the stepladder are locked in place.  Ask someone to hold the stepladder while you use it.  Know where your pets are when moving through your home.  What can I do in the bathroom?         Keep the floor dry. Clean up any water on  the floor right away.  Remove soap buildup in the tub or shower.  Use nonskid mats or decals on the floor of the tub or shower.  Attach bath mats securely with double-sided, nonslip rug tape.  If you need to sit down in the shower, use a plastic, nonslip stool.  Install grab bars by the toilet and in the tub and shower. Do not use towel bars as grab bars.  What can I do in the bedroom?  Make sure that you have a light by your bed that is easy to reach.  Do not use any sheets or blankets for your bed that hang to the floor.  Have a firm chair with side arms that you can use for support when you get dressed.  What can I do in the kitchen?  Clean up any spills right away.  If you need to reach something above you, use a step stool with a grab bar.  Keep electrical cords out of the way.  Do not use floor polish or wax that makes floors slippery.  What can I do with my stairs?  Do not leave any items on the stairs.  Make sure that you have a light switch at the top and the bottom of the stairs.  Make sure that there are handrails on both sides of the stairs. Fix handrails that are broken or loose.  Install nonslip stair treads on all your stairs.  Avoid having throw rugs at the top or bottom of the stairs.  Choose a carpet that does not hide the edge of the steps on the stairs.  Check carpeting to make sure that it is firmly attached to the stairs. Fix carpet that is loose or worn.  What can I do on the outside of my home?  Use bright outdoor lighting.  Fix the edges of walkways and driveways and fix any cracks.  Remove anything that might make you trip as you walk through a door, such as a raised step or threshold.  Trim any bushes or trees on paths to your home.  Check to see if handrails are loose or broken and that both sides of all steps have handrails.  Install guardrails along the edges of any raised decks and porches.  Clear paths of anything that can make you trip, such as tools or rocks.  Have leaves, snow, or  ice cleared regularly.  Use sand or salt on paths during winter.  Clean up any spills in your garage right away. This includes grease or oil spills.  What other actions can I take?  Wear shoes that:  Have a low heel. Do not wear high heels.  Have rubber bottoms.  Feel good on your feet and fit well.  Are closed at the toe. Do not wear open-toe sandals.  Use tools that help you move around if needed. These include:  Canes.  Walkers.  Scooters.  Crutches.  Review your medicines with your doctor. Some medicines can make you feel dizzy. This can increase your chance of falling.  Ask your doctor what else you can do to help prevent falls.  Where to find more information  Centers for Disease Control and Prevention, [place]AD[place]: www.cdc.gov  National [place] on Aging: www.eliu.nih.gov  Contact a doctor if:  You are afraid of falling at home.  You feel weak, drowsy, or dizzy at home.  You fall at home.  Summary  There are many simple things that you can do to make your home safe and to help prevent falls.  Ways to make your home safe include removing things that can make you trip and installing grab bars in the bathroom.  Ask for help when making these changes in your home.  This information is not intended to replace advice given to you by your health care provider. Make sure you discuss any questions you have with your health care provider.  Document Revised: 07/21/2021 Document Reviewed: 07/21/2021  [employer] Patient Education © 2021 [employer].

## 2024-04-01 NOTE — PROGRESS NOTES
Subjective   Steven Hammonds is a 63 y.o. male.   Chief Complaint   Patient presents with    Medicare Wellness-subsequent    Back Pain    Hypertension       History of Present Illness  The patient is here: for coordination of medical care.  Patient has: moderate activity with work/home activities    TDAP/TD VACCINES(1 - Tdap) Never done  HEPATITIS C SCREENING Never done  RSV Vaccine - Adults(1 - 1-dose 60+ series) Never done  ZOSTER VACCINE(2 of 2) due on 07/06/2021  COVID-19 Vaccine(3 - 2023-24 season) due on 09/01/2023  LIPID PANEL due on 09/20/2023  INFLUENZA VACCINE due on 08/01/2024  ANNUAL WELLNESS VISIT due on 04/02/2025  BMI FOLLOWUP due on 04/02/2025  COLORECTAL CANCER SCREENING due on 09/25/2030  Pneumococcal Vaccine 0-64 Aged Out  Current pain scale 0/10.    Ongoing back pain, worsening stiffness back and left leg. Now left foot drawing up intermittently     3 months of left sided lower back pain, stiffness, progressing.  Xray 6/2023 showed: Orthopedic hardware of anterior posterior   lumbar spine fusion L2-S1 demonstrates stable position. No hardware   displacement or fracture detected. Advanced changes of degenerative   disc disease T12-L1 and L1-L2 demonstrate. No fractures detected.   Patient status post right hip arthroplasty.     Left foot staring to dorsiflex  Hyperlipidemia  This is a chronic problem. The current episode started more than 1 year ago. Pertinent negatives include no chest pain or shortness of breath. Current antihyperlipidemic treatment includes statins. Risk factors for coronary artery disease include hypertension, male sex and dyslipidemia.   Hypertension  This is a chronic problem. The current episode started more than 1 year ago. Pertinent negatives include no chest pain, neck pain or shortness of breath. Risk factors for coronary artery disease include dyslipidemia, male gender and smoking/tobacco exposure. Current antihypertension treatment includes ACE inhibitors, beta  blockers and diuretics.        The following portions of the patient's history were reviewed and updated as appropriate: allergies, current medications, past family history, past medical history, past social history, past surgical history, and problem list.    Patient Active Problem List   Diagnosis    Degeneration of lumbar or lumbosacral intervertebral disc    Disc narrowing    Colon cancer screening    Gastroesophageal reflux disease    Hypertension    Paroxysmal atrial fibrillation    Spinal stenosis of lumbar region with neurogenic claudication    Spondylolysis    Status post arthroscopy of knee    Sinus pause    Tachycardia-bradycardia    Thoracic or lumbosacral neuritis or radiculitis    Tremor    Presence of cardiac pacemaker    Allergic rhinitis    Hyperlipidemia    Insomnia    Umbilical hernia    Tinea    Impetigo    Labile blood pressure    Other specified persistent mood disorders    Non-seasonal allergic rhinitis due to pollen    Chronic low back pain with sciatica    Bobby's esophagus with dysplasia    Former tobacco use    Cervical disc disease with myelopathy    Psoriatic arthritis    Chest pain    Hypertensive urgency    Headache    Abnormal nuclear stress test    Herniation of lumbar intervertebral disc with radiculopathy    History of lumbar spinal fusion    Other chronic pain    Sacroiliac joint dysfunction of right side    Spondylolisthesis at L2-L3 level    Spondylolisthesis at L3-L4 level    Other secondary kyphosis, thoracolumbar region    Pseudoarthrosis of lumbar spine    Over weight    Allergic conjunctivitis of both eyes       Current Outpatient Medications on File Prior to Visit   Medication Sig Dispense Refill    amitriptyline (ELAVIL) 50 MG tablet TAKE 2 TABLETS AT BEDTIME 180 tablet 3    ascorbic acid (VITAMIN C) 500 MG tablet Take 1 tablet by mouth every night at bedtime.      cetirizine (zyrTEC) 10 MG tablet Take 1 tablet by mouth Daily.      cholecalciferol (VITAMIN D3) 25 MCG  (1000 UT) tablet Take 3 tablets by mouth every night at bedtime.      digoxin (LANOXIN) 125 MCG tablet TAKE 1 TABLET DAILY 90 tablet 3    Eliquis 5 MG tablet tablet TAKE 1 TABLET TWICE A  tablet 3    esomeprazole (nexIUM) 40 MG capsule Take 1 capsule by mouth Daily.      fluticasone (FLONASE) 50 MCG/ACT nasal spray USE 2 SPRAYS IN EACH NOSTRIL  ONCE DAILY 48 g 3    gabapentin (NEURONTIN) 600 MG tablet TAKE 1 TABLET FOUR TIMES A  tablet 3    metoprolol succinate XL (TOPROL-XL) 50 MG 24 hr tablet TAKE 1 TABLET DAILY 90 tablet 3    rosuvastatin (CRESTOR) 10 MG tablet TAKE ONE-HALF TABLET BY MOUTH  DAILY 45 tablet 3    [DISCONTINUED] cyclobenzaprine (FLEXERIL) 5 MG tablet Take 1 tablet by mouth Daily As Needed for Muscle Spasms. 30 tablet 0    [DISCONTINUED] hydroCHLOROthiazide 25 MG tablet TAKE 1 TABLET BY MOUTH DAILY 30 tablet 0    [DISCONTINUED] lisinopril (PRINIVIL,ZESTRIL) 20 MG tablet TAKE 1 TABLET BY MOUTH DAILY 30 tablet 0    Enbrel SureClick 50 MG/ML solution auto-injector  (Patient not taking: Reported on 4/2/2024)      [DISCONTINUED] Chlorhexidine Gluconate (HIBICLENS EX) Apply  topically. AS DIRECTED PREOP       No current facility-administered medications on file prior to visit.     Current outpatient and discharge medications have been reconciled for the patient.  Reviewed by: Junaid Marquez MD      Allergies   Allergen Reactions    Oxycodone-Acetaminophen GI Intolerance    Acetaminophen Nausea And Vomiting    Adhesive Tape Unknown (See Comments)     BLISTERS       Review of Systems   Constitutional:  Negative for activity change, appetite change, fatigue and fever.   HENT:  Negative for ear pain, swollen glands and voice change.    Eyes:  Negative for visual disturbance.   Respiratory:  Negative for shortness of breath and wheezing.    Cardiovascular:  Negative for chest pain and leg swelling.   Gastrointestinal:  Negative for abdominal pain, blood in stool, constipation, diarrhea,  "nausea and vomiting.   Endocrine: Negative for polydipsia and polyuria.   Genitourinary:  Negative for dysuria, frequency and hematuria.   Musculoskeletal:  Positive for back pain. Negative for joint swelling, neck pain and neck stiffness.   Skin:  Negative for rash and wound.   Neurological:  Negative for weakness, numbness and headache.   Psychiatric/Behavioral:  Negative for suicidal ideas and depressed mood.      I have reviewed and confirmed the accuracy of the ROS as documented by the MA/LPN/RN Junaid Marquez MD    Objective   Visit Vitals  /78 (BP Location: Right arm, Patient Position: Sitting, Cuff Size: Adult)   Pulse 62   Resp 20   Ht 180.3 cm (71\")   Wt 93 kg (205 lb)   SpO2 98%   BMI 28.59 kg/m²      **  Physical Exam  Constitutional:       Appearance: He is well-developed.   HENT:      Head: Normocephalic and atraumatic.      Right Ear: External ear normal.      Left Ear: External ear normal.      Nose: Nose normal.   Eyes:      Pupils: Pupils are equal, round, and reactive to light.   Cardiovascular:      Rate and Rhythm: Normal rate and regular rhythm.      Heart sounds: Normal heart sounds.   Pulmonary:      Effort: Pulmonary effort is normal.      Breath sounds: Normal breath sounds.   Abdominal:      General: Bowel sounds are normal.      Palpations: Abdomen is soft.   Musculoskeletal:         General: Normal range of motion.      Cervical back: Normal range of motion and neck supple.   Skin:     General: Skin is warm and dry.   Neurological:      Mental Status: He is alert and oriented to person, place, and time.   Psychiatric:         Behavior: Behavior normal.         Thought Content: Thought content normal.         Judgment: Judgment normal.       Derm Physical Exam  Ortho Exam   Neurologic Exam     Mental Status   Oriented to person, place, and time.     Cranial Nerves     CN III, IV, VI   Pupils are equal, round, and reactive to light.         Diagnoses and all orders for this " visit:    1. Medicare annual wellness visit, subsequent (Primary)    2. Essential hypertension  -     POCT urinalysis dipstick, manual  -     CBC & Differential  -     Comprehensive Metabolic Panel  -     lisinopril (PRINIVIL,ZESTRIL) 20 MG tablet; Take 1 tablet by mouth Daily.  Dispense: 90 tablet; Refill: 3  -     hydroCHLOROthiazide 25 MG tablet; Take 1 tablet by mouth Daily.  Dispense: 90 tablet; Refill: 3    3. Overweight with body mass index (BMI) of 28 to 28.9 in adult    4. Former tobacco use    5. Spinal stenosis of lumbar region with neurogenic claudication  Comments:  try PT. May need CT and pain management referral if sx persist despite PT  Orders:  -     cyclobenzaprine (FLEXERIL) 10 MG tablet; Take 1 tablet by mouth 2 (Two) Times a Day As Needed for Muscle Spasms.  Dispense: 60 tablet; Refill: 5  -     HYDROcodone-acetaminophen (NORCO) 5-325 MG per tablet; Take 1 tablet by mouth Every 6 (Six) Hours As Needed for Severe Pain.  Dispense: 28 tablet; Refill: 0    6. Degeneration of lumbar or lumbosacral intervertebral disc  -     Ambulatory Referral to Physical Therapy Evaluate and treat    7. Need for hepatitis C screening test  -     Hepatitis C Antibody    8. Mixed hyperlipidemia  -     Comprehensive Metabolic Panel  -     Lipid Panel With / Chol / HDL Ratio  -     TSH    9. Prostate cancer screening  -     PSA Screen     Discussed anticipatory guidance, diet, exercise, and weight loss.  Discussed safety and routine screening examinations.  Discussed self-examinations.  Steven Hammonds  reports that he quit smoking about 48 years ago. His smoking use included cigarettes. He has never been exposed to tobacco smoke. He has quit using smokeless tobacco. Follow-up for routine health maintenance as indicated.    BMI is >= 25 and <30. (Overweight) The following options were offered after discussion;: exercise counseling/recommendations      Expected course, medications, and adverse effects discussed as  appropriate.  Call or return if worsening or persistent symptoms.     This document is intended for medical professional use only.   Electronically signed by Junaid Marquez MD on 04/02/2024. This content may not have been proofread.

## 2024-04-02 ENCOUNTER — OFFICE VISIT (OUTPATIENT)
Dept: FAMILY MEDICINE CLINIC | Facility: CLINIC | Age: 64
End: 2024-04-02
Payer: MEDICARE

## 2024-04-02 VITALS
OXYGEN SATURATION: 98 % | HEART RATE: 62 BPM | RESPIRATION RATE: 20 BRPM | BODY MASS INDEX: 28.7 KG/M2 | DIASTOLIC BLOOD PRESSURE: 78 MMHG | SYSTOLIC BLOOD PRESSURE: 126 MMHG | HEIGHT: 71 IN | WEIGHT: 205 LBS

## 2024-04-02 DIAGNOSIS — Z00.00 MEDICARE ANNUAL WELLNESS VISIT, SUBSEQUENT: Primary | ICD-10-CM

## 2024-04-02 DIAGNOSIS — E66.3 OVERWEIGHT WITH BODY MASS INDEX (BMI) OF 28 TO 28.9 IN ADULT: ICD-10-CM

## 2024-04-02 DIAGNOSIS — Z87.891 FORMER TOBACCO USE: ICD-10-CM

## 2024-04-02 DIAGNOSIS — M51.37 DEGENERATION OF LUMBAR OR LUMBOSACRAL INTERVERTEBRAL DISC: ICD-10-CM

## 2024-04-02 DIAGNOSIS — I10 ESSENTIAL HYPERTENSION: ICD-10-CM

## 2024-04-02 DIAGNOSIS — E78.2 MIXED HYPERLIPIDEMIA: ICD-10-CM

## 2024-04-02 DIAGNOSIS — Z12.5 PROSTATE CANCER SCREENING: ICD-10-CM

## 2024-04-02 DIAGNOSIS — M48.062 SPINAL STENOSIS OF LUMBAR REGION WITH NEUROGENIC CLAUDICATION: ICD-10-CM

## 2024-04-02 DIAGNOSIS — Z11.59 NEED FOR HEPATITIS C SCREENING TEST: ICD-10-CM

## 2024-04-02 LAB
BILIRUB BLD-MCNC: NEGATIVE MG/DL
CLARITY, POC: CLEAR
COLOR UR: YELLOW
GLUCOSE UR STRIP-MCNC: NEGATIVE MG/DL
KETONES UR QL: NEGATIVE
LEUKOCYTE EST, POC: NEGATIVE
NITRITE UR-MCNC: NEGATIVE MG/ML
PH UR: 7.5 [PH] (ref 5–8)
PROT UR STRIP-MCNC: NEGATIVE MG/DL
RBC # UR STRIP: NEGATIVE /UL
SP GR UR: 1 (ref 1–1.03)
UROBILINOGEN UR QL: ABNORMAL

## 2024-04-02 RX ORDER — LISINOPRIL 20 MG/1
20 TABLET ORAL DAILY
Qty: 90 TABLET | Refills: 3 | Status: SHIPPED | OUTPATIENT
Start: 2024-04-02

## 2024-04-02 RX ORDER — HYDROCODONE BITARTRATE AND ACETAMINOPHEN 5; 325 MG/1; MG/1
1 TABLET ORAL EVERY 6 HOURS PRN
Qty: 28 TABLET | Refills: 0 | Status: SHIPPED | OUTPATIENT
Start: 2024-04-02

## 2024-04-02 RX ORDER — HYDROCHLOROTHIAZIDE 25 MG/1
25 TABLET ORAL DAILY
Qty: 90 TABLET | Refills: 3 | Status: SHIPPED | OUTPATIENT
Start: 2024-04-02

## 2024-04-02 RX ORDER — CYCLOBENZAPRINE HCL 10 MG
10 TABLET ORAL 2 TIMES DAILY PRN
Qty: 60 TABLET | Refills: 5 | Status: SHIPPED | OUTPATIENT
Start: 2024-04-02

## 2024-04-03 LAB
ALBUMIN SERPL-MCNC: 4.5 G/DL (ref 3.9–4.9)
ALBUMIN/GLOB SERPL: 1.5 {RATIO} (ref 1.2–2.2)
ALP SERPL-CCNC: 120 IU/L (ref 44–121)
ALT SERPL-CCNC: 29 IU/L (ref 0–44)
AST SERPL-CCNC: 26 IU/L (ref 0–40)
BASOPHILS # BLD AUTO: 0.1 X10E3/UL (ref 0–0.2)
BASOPHILS NFR BLD AUTO: 1 %
BILIRUB SERPL-MCNC: 0.4 MG/DL (ref 0–1.2)
BUN SERPL-MCNC: 8 MG/DL (ref 8–27)
BUN/CREAT SERPL: 7 (ref 10–24)
CALCIUM SERPL-MCNC: 9.8 MG/DL (ref 8.6–10.2)
CHLORIDE SERPL-SCNC: 99 MMOL/L (ref 96–106)
CHOLEST SERPL-MCNC: 174 MG/DL (ref 100–199)
CHOLEST/HDLC SERPL: 4.2 RATIO (ref 0–5)
CO2 SERPL-SCNC: 26 MMOL/L (ref 20–29)
CREAT SERPL-MCNC: 1.13 MG/DL (ref 0.76–1.27)
EGFRCR SERPLBLD CKD-EPI 2021: 73 ML/MIN/1.73
EOSINOPHIL # BLD AUTO: 0.2 X10E3/UL (ref 0–0.4)
EOSINOPHIL NFR BLD AUTO: 3 %
ERYTHROCYTE [DISTWIDTH] IN BLOOD BY AUTOMATED COUNT: 13.1 % (ref 11.6–15.4)
GLOBULIN SER CALC-MCNC: 3 G/DL (ref 1.5–4.5)
GLUCOSE SERPL-MCNC: 120 MG/DL (ref 70–99)
HCT VFR BLD AUTO: 45.3 % (ref 37.5–51)
HCV IGG SERPL QL IA: NON REACTIVE
HDLC SERPL-MCNC: 41 MG/DL
HGB BLD-MCNC: 15 G/DL (ref 13–17.7)
IMM GRANULOCYTES # BLD AUTO: 0 X10E3/UL (ref 0–0.1)
IMM GRANULOCYTES NFR BLD AUTO: 0 %
LDLC SERPL CALC-MCNC: 106 MG/DL (ref 0–99)
LYMPHOCYTES # BLD AUTO: 1.8 X10E3/UL (ref 0.7–3.1)
LYMPHOCYTES NFR BLD AUTO: 26 %
MCH RBC QN AUTO: 29 PG (ref 26.6–33)
MCHC RBC AUTO-ENTMCNC: 33.1 G/DL (ref 31.5–35.7)
MCV RBC AUTO: 88 FL (ref 79–97)
MONOCYTES # BLD AUTO: 0.6 X10E3/UL (ref 0.1–0.9)
MONOCYTES NFR BLD AUTO: 9 %
NEUTROPHILS # BLD AUTO: 4.2 X10E3/UL (ref 1.4–7)
NEUTROPHILS NFR BLD AUTO: 61 %
PLATELET # BLD AUTO: 295 X10E3/UL (ref 150–450)
POTASSIUM SERPL-SCNC: 4.7 MMOL/L (ref 3.5–5.2)
PROT SERPL-MCNC: 7.5 G/DL (ref 6–8.5)
PSA SERPL-MCNC: 0.3 NG/ML (ref 0–4)
RBC # BLD AUTO: 5.18 X10E6/UL (ref 4.14–5.8)
SODIUM SERPL-SCNC: 139 MMOL/L (ref 134–144)
SPECIMEN STATUS: NORMAL
TRIGL SERPL-MCNC: 153 MG/DL (ref 0–149)
TSH SERPL DL<=0.005 MIU/L-ACNC: 2.03 UIU/ML (ref 0.45–4.5)
VLDLC SERPL CALC-MCNC: 27 MG/DL (ref 5–40)
WBC # BLD AUTO: 7 X10E3/UL (ref 3.4–10.8)

## 2024-04-04 LAB — HBA1C MFR BLD: 6.1 % (ref 4.8–5.6)

## 2024-04-11 ENCOUNTER — OFFICE VISIT (OUTPATIENT)
Dept: FAMILY MEDICINE CLINIC | Facility: CLINIC | Age: 64
End: 2024-04-11
Payer: MEDICARE

## 2024-04-11 ENCOUNTER — TREATMENT (OUTPATIENT)
Dept: PHYSICAL THERAPY | Facility: CLINIC | Age: 64
End: 2024-04-11
Payer: MEDICARE

## 2024-04-11 VITALS
HEIGHT: 71 IN | RESPIRATION RATE: 20 BRPM | OXYGEN SATURATION: 98 % | HEART RATE: 64 BPM | WEIGHT: 205 LBS | DIASTOLIC BLOOD PRESSURE: 84 MMHG | SYSTOLIC BLOOD PRESSURE: 134 MMHG | BODY MASS INDEX: 28.7 KG/M2

## 2024-04-11 DIAGNOSIS — R26.89 BALANCE PROBLEM: ICD-10-CM

## 2024-04-11 DIAGNOSIS — M54.42 CHRONIC BILATERAL LOW BACK PAIN WITH BILATERAL SCIATICA: Primary | ICD-10-CM

## 2024-04-11 DIAGNOSIS — M25.552 LEFT HIP PAIN: ICD-10-CM

## 2024-04-11 DIAGNOSIS — M48.062 SPINAL STENOSIS OF LUMBAR REGION WITH NEUROGENIC CLAUDICATION: ICD-10-CM

## 2024-04-11 DIAGNOSIS — M54.41 CHRONIC BILATERAL LOW BACK PAIN WITH BILATERAL SCIATICA: Primary | ICD-10-CM

## 2024-04-11 DIAGNOSIS — G89.29 CHRONIC BILATERAL LOW BACK PAIN WITH BILATERAL SCIATICA: Primary | ICD-10-CM

## 2024-04-11 PROCEDURE — 99213 OFFICE O/P EST LOW 20 MIN: CPT | Performed by: FAMILY MEDICINE

## 2024-04-11 PROCEDURE — 3079F DIAST BP 80-89 MM HG: CPT | Performed by: FAMILY MEDICINE

## 2024-04-11 PROCEDURE — 3075F SYST BP GE 130 - 139MM HG: CPT | Performed by: FAMILY MEDICINE

## 2024-04-11 PROCEDURE — 97530 THERAPEUTIC ACTIVITIES: CPT | Performed by: PHYSICAL THERAPIST

## 2024-04-11 PROCEDURE — 97161 PT EVAL LOW COMPLEX 20 MIN: CPT | Performed by: PHYSICAL THERAPIST

## 2024-04-11 PROCEDURE — G2211 COMPLEX E/M VISIT ADD ON: HCPCS | Performed by: FAMILY MEDICINE

## 2024-04-11 PROCEDURE — 97112 NEUROMUSCULAR REEDUCATION: CPT | Performed by: PHYSICAL THERAPIST

## 2024-04-11 RX ORDER — HYDROCODONE BITARTRATE AND ACETAMINOPHEN 5; 325 MG/1; MG/1
1 TABLET ORAL EVERY 8 HOURS PRN
Qty: 90 TABLET | Refills: 0 | Status: SHIPPED | OUTPATIENT
Start: 2024-04-11

## 2024-04-11 RX ORDER — CYCLOBENZAPRINE HCL 10 MG
10 TABLET ORAL 2 TIMES DAILY PRN
Qty: 180 TABLET | Refills: 3 | Status: SHIPPED | OUTPATIENT
Start: 2024-04-11

## 2024-04-11 NOTE — PROGRESS NOTES
Subjective   Steven Hammonds is a 63 y.o. male.   Chief Complaint   Patient presents with    Pain       History of Present Illness  Pain med refill. Stable on current treatment.  Controlled substance agreement signed and on file. PT appt today for initial evaluation        Back Pain  This is a chronic problem. The current episode started more than 1 year ago. The problem occurs constantly. The problem is unchanged (x 2 weeks). The pain is present in the lumbar spine. The quality of the pain is described as aching and stabbing. The pain radiates to the left buttock, left thigh and left anterolateral lower leg. The pain is at a severity of 6/10. The pain is The same all the time. The symptoms are aggravated by bending, lying down, sitting and standing. Stiffness is present All day. Associated symptoms include leg pain, paresthesias, pelvic pain and tingling. Pertinent negatives include no abdominal pain, bladder incontinence, bowel incontinence, chest pain, dysuria, fever, numbness, perianal numbness, weakness or weight loss. Risk factors include lack of exercise.        The following portions of the patient's history were reviewed and updated as appropriate: allergies, current medications, past family history, past medical history, past social history, past surgical history, and problem list.    Patient Active Problem List   Diagnosis    Degeneration of lumbar or lumbosacral intervertebral disc    Disc narrowing    Colon cancer screening    Gastroesophageal reflux disease    Hypertension    Paroxysmal atrial fibrillation    Spinal stenosis of lumbar region with neurogenic claudication    Spondylolysis    Status post arthroscopy of knee    Sinus pause    Tachycardia-bradycardia    Thoracic or lumbosacral neuritis or radiculitis    Tremor    Presence of cardiac pacemaker    Allergic rhinitis    Hyperlipidemia    Insomnia    Umbilical hernia    Tinea    Impetigo    Labile blood pressure    Other specified persistent  mood disorders    Non-seasonal allergic rhinitis due to pollen    Chronic low back pain with sciatica    Bobby's esophagus with dysplasia    Former tobacco use    Cervical disc disease with myelopathy    Psoriatic arthritis    Chest pain    Hypertensive urgency    Headache    Abnormal nuclear stress test    Herniation of lumbar intervertebral disc with radiculopathy    History of lumbar spinal fusion    Other chronic pain    Sacroiliac joint dysfunction of right side    Spondylolisthesis at L2-L3 level    Spondylolisthesis at L3-L4 level    Other secondary kyphosis, thoracolumbar region    Pseudoarthrosis of lumbar spine    Over weight    Allergic conjunctivitis of both eyes       Current Outpatient Medications on File Prior to Visit   Medication Sig Dispense Refill    amitriptyline (ELAVIL) 50 MG tablet TAKE 2 TABLETS AT BEDTIME 180 tablet 3    ascorbic acid (VITAMIN C) 500 MG tablet Take 1 tablet by mouth every night at bedtime.      cetirizine (zyrTEC) 10 MG tablet Take 1 tablet by mouth Daily.      cholecalciferol (VITAMIN D3) 25 MCG (1000 UT) tablet Take 3 tablets by mouth every night at bedtime.      digoxin (LANOXIN) 125 MCG tablet TAKE 1 TABLET DAILY 90 tablet 3    Eliquis 5 MG tablet tablet TAKE 1 TABLET TWICE A  tablet 3    esomeprazole (nexIUM) 40 MG capsule Take 1 capsule by mouth Daily.      fluticasone (FLONASE) 50 MCG/ACT nasal spray USE 2 SPRAYS IN EACH NOSTRIL  ONCE DAILY 48 g 3    gabapentin (NEURONTIN) 600 MG tablet TAKE 1 TABLET FOUR TIMES A  tablet 3    hydroCHLOROthiazide 25 MG tablet Take 1 tablet by mouth Daily. 90 tablet 3    lisinopril (PRINIVIL,ZESTRIL) 20 MG tablet Take 1 tablet by mouth Daily. 90 tablet 3    metoprolol succinate XL (TOPROL-XL) 50 MG 24 hr tablet TAKE 1 TABLET DAILY 90 tablet 3    rosuvastatin (CRESTOR) 10 MG tablet TAKE ONE-HALF TABLET BY MOUTH  DAILY 45 tablet 3    [DISCONTINUED] cyclobenzaprine (FLEXERIL) 10 MG tablet Take 1 tablet by mouth 2 (Two)  "Times a Day As Needed for Muscle Spasms. 60 tablet 5    [DISCONTINUED] HYDROcodone-acetaminophen (NORCO) 5-325 MG per tablet Take 1 tablet by mouth Every 6 (Six) Hours As Needed for Severe Pain. 28 tablet 0    [DISCONTINUED] Enbrel SureClick 50 MG/ML solution auto-injector  (Patient not taking: Reported on 4/2/2024)       No current facility-administered medications on file prior to visit.     Current outpatient and discharge medications have been reconciled for the patient.  Reviewed by: Junaid Marquez MD      Allergies   Allergen Reactions    Oxycodone-Acetaminophen GI Intolerance    Acetaminophen Nausea And Vomiting    Adhesive Tape Unknown (See Comments)     BLISTERS       Review of Systems   Constitutional:  Negative for fever and unexpected weight loss.   Cardiovascular:  Negative for chest pain.   Gastrointestinal:  Negative for abdominal pain and bowel incontinence.   Genitourinary:  Positive for pelvic pain. Negative for dysuria and urinary incontinence.   Musculoskeletal:  Positive for back pain.   Neurological:  Positive for tingling and paresthesias. Negative for weakness and numbness.     I have reviewed and confirmed the accuracy of the ROS as documented by the MA/LPN/RN Junaid Marquez MD    Objective   Visit Vitals  /84 (BP Location: Right arm, Patient Position: Sitting, Cuff Size: Large Adult)   Pulse 64   Resp 20   Ht 180.9 cm (71.22\")   Wt 93 kg (205 lb)   SpO2 98%   BMI 28.42 kg/m²      **  Physical Exam  Constitutional:       Appearance: He is well-developed.   HENT:      Head: Normocephalic and atraumatic.      Right Ear: External ear normal.      Left Ear: External ear normal.      Nose: Nose normal.   Eyes:      Pupils: Pupils are equal, round, and reactive to light.   Cardiovascular:      Rate and Rhythm: Normal rate and regular rhythm.      Heart sounds: Normal heart sounds.   Pulmonary:      Effort: Pulmonary effort is normal.      Breath sounds: Normal breath sounds. "   Abdominal:      General: Bowel sounds are normal.      Palpations: Abdomen is soft.   Musculoskeletal:         General: Normal range of motion.        Arms:       Cervical back: Normal range of motion and neck supple.      Comments: Spasm and numbness    Skin:     General: Skin is warm and dry.   Neurological:      Mental Status: He is alert and oriented to person, place, and time.   Psychiatric:         Behavior: Behavior normal.         Thought Content: Thought content normal.         Judgment: Judgment normal.       Physical Exam   Upper extremities         Ortho Exam   Neurologic Exam     Mental Status   Oriented to person, place, and time.     Cranial Nerves     CN III, IV, VI   Pupils are equal, round, and reactive to light.         Diagnoses and all orders for this visit:    1. Spinal stenosis of lumbar region with neurogenic claudication  Comments:  try PT. May need CT and pain management referral if sx persist despite PT  Orders:  -     HYDROcodone-acetaminophen (NORCO) 5-325 MG per tablet; Take 1 tablet by mouth Every 8 (Eight) Hours As Needed for Severe Pain.  Dispense: 90 tablet; Refill: 0  -     cyclobenzaprine (FLEXERIL) 10 MG tablet; Take 1 tablet by mouth 2 (Two) Times a Day As Needed for Muscle Spasms.  Dispense: 180 tablet; Refill: 3    Keep PT appt.Findings discussed. All questions answered.  Medication and medication adverse effects discussed.  Drug education given and explained to patient. Patient verbalized understanding.  Follow-up for routine health maintenance as indicated.   Pain contract signed today            Expected course, medications, and adverse effects discussed as appropriate.  Call or return if worsening or persistent symptoms.     This document is intended for medical professional use only.   Electronically signed by Junaid Marquez MD on 04/11/2024. This content may not have been proofread.

## 2024-04-11 NOTE — PROGRESS NOTES
Physical Therapy Initial Evaluation and Plan of Care  [address], [address], [place]  [address]    Patient: Steven Hammonds   : 1960  Diagnosis/ICD-10 Code:  Chronic bilateral low back pain with bilateral sciatica [M54.42, M54.41, G89.29]  Referring practitioner: Junaid Marquez, *  Date of Initial Visit: 2024  Today's Date: 2024  Patient seen for 1 sessions           Subjective Questionnaire: Oswestry: 64% impairment      Subjective Evaluation    History of Present Illness  Mechanism of injury: Patient is a 63 y.o. WM who presents with chronic LBP, L LE radiculopathy, L hip OA and reports at least 12 falls in the past 12 months.  Pain rating is 6/10 and increases with prolonged sitting or standing.  Pain decreases with changing positions and heat.  Personal goals:  decrease pain, be able to walk recreationally by summer, don/doff shoes/socks by crossing leg.  PMHx includes PACEMAKER, SRINIVAS R , back surgery x 6 with  most recent fusion of L2-S, and FREQUENT FALLS.      Patient Goals  Patient goals for therapy: decreased pain, improved balance, increased motion, increased strength, independence with ADLs/IADLs and return to sport/leisure activities             Objective Oswestry score indicates 64% impairment with limitations in lifting, pain, standing, sitting.  Trunk ROM deferred due to L2-S1 fusion.  L hip flexion limited to 90 deg with anterior hip pain.  B hip ER limited with piriformis tightness. L hip IR limited compared to R.  MMT: 4-4+/5 B LE.  Repeated sit to stand = 8 reps in 30 sec using hands to push up.  SLS = 1 sec on L, 4 sec on R.  Patient reports decreased sensation B feet with L worse than R.  Ambulates independently without AD with antalgia and unsteadiness.        Assessment & Plan       Assessment  Impairments: abnormal gait, abnormal muscle tone, abnormal or restricted ROM, activity intolerance, impaired balance, impaired physical strength, lacks  appropriate home exercise program and pain with function   Functional limitations: carrying objects, lifting, sleeping, walking, uncomfortable because of pain, sitting and standing     Goals  Plan Goals: Patient independent with HEP in 2 weeks  Tolerate 10 min Nustep in 2 weeks  Able to tolerate walking 15 min in 2 weeks  Decrease hip pain 25% in 2 weeks  Able to ambulate independently without AD and minimal antalgia in 2 weeks  Improve function as evidenced by Oswestry score of 20% or less in 12 weeks (64% impairment initially)  Able to don/doff socks and shoes independently in 12 weeks  Perform 10 reps of repeated sit to stand without arms in 30 seconds in 12 weeks      Plan  Therapy options: will be seen for skilled therapy services  Planned modality interventions: thermotherapy (hydrocollator packs)  Planned therapy interventions: balance/weight-bearing training, flexibility, functional ROM exercises, gait training, home exercise program, joint mobilization, manual therapy, neuromuscular re-education, postural training, strengthening, stretching and therapeutic activities  Frequency: 2x week  Duration in weeks: 12  Treatment plan discussed with: patient        Timed:         Manual Therapy:         mins  11953;     Therapeutic Exercise:         mins  47655;     Neuromuscular Dick:    10    mins  98506;    Therapeutic Activity:     15     mins  99331;     Gait Training:           mins  55457;     Ultrasound:          mins  63546;    Self-care  ____ mins 99643    Un-Timed:  Electrical Stimulation:         mins  98403 ( );  Dry Needling          mins self-pay [id number]  Traction          mins 90651  Low Eval     35     Mins  39550  Mod Eval          Mins  23279  Canal repositioning _____ mins  21745        Timed Treatment:   25   mins   Total Treatment:     60   mins    PT SIGNATURE: Robin A Sprigler, PT   [place] license # [id number]  Electronically signed by Robin A Sprigler, PT, 04/11/24, 3:57 PM EDT    Initial  Certification  Certification Period: 4/11/2024 through 7/9/2024  I certify that the therapy services are furnished while this patient is under my care.  The services outlined above are required by this patient, and will be reviewed every 90 days.     PHYSICIAN: Junaid Marquez, MD ______________________________________________________________________________________________     DATE: _________________________________________________________________________________________________________________________________    Please sign and return via fax to [phone]. Thank you, [facility].

## 2024-04-15 ENCOUNTER — TREATMENT (OUTPATIENT)
Dept: PHYSICAL THERAPY | Facility: CLINIC | Age: 64
End: 2024-04-15
Payer: MEDICARE

## 2024-04-15 DIAGNOSIS — M54.42 CHRONIC BILATERAL LOW BACK PAIN WITH BILATERAL SCIATICA: Primary | ICD-10-CM

## 2024-04-15 DIAGNOSIS — M25.552 LEFT HIP PAIN: ICD-10-CM

## 2024-04-15 DIAGNOSIS — M54.41 CHRONIC BILATERAL LOW BACK PAIN WITH BILATERAL SCIATICA: Primary | ICD-10-CM

## 2024-04-15 DIAGNOSIS — R26.89 BALANCE PROBLEM: ICD-10-CM

## 2024-04-15 DIAGNOSIS — G89.29 CHRONIC BILATERAL LOW BACK PAIN WITH BILATERAL SCIATICA: Primary | ICD-10-CM

## 2024-04-15 PROCEDURE — 97530 THERAPEUTIC ACTIVITIES: CPT | Performed by: PHYSICAL THERAPIST

## 2024-04-15 PROCEDURE — 97112 NEUROMUSCULAR REEDUCATION: CPT | Performed by: PHYSICAL THERAPIST

## 2024-04-15 NOTE — PROGRESS NOTES
Physical Therapy Daily Treatment Note      Patient: Steven Hammonds   : 1960  Diagnosis/ICD-10 Code:  Chronic bilateral low back pain with bilateral sciatica [M54.42, M54.41, G89.29]  Referring practitioner: Junaid Marquez, *  Date of Initial Visit: Type: THERAPY  Noted: 2024  Today's Date: 4/15/2024  Patient seen for 2 sessions         Steven Hammonds reports: his pain in his back is 5/10 today he states this is a daily norm for him. Pt. States the Eval went good and he was some sore after but nothing intolerable. Pt. states his L LE is his primary compliant of pain but today he has groin cramping of the R LE.    Objective   See Exercise, Manual, and Modality Logs for complete treatment.     Assessment/Plan  Pt. Tolerates treatment well this visit and continues to benefit form stretch and strengthen Pt. was encouraged to keep strengthening despite soreness because it will take bit for the body to catch up.    Goals  Plan Goals: Patient independent with HEP in 2 weeks  Tolerate 10 min Nustep in 2 weeks  Able to tolerate walking 15 min in 2 weeks  Decrease hip pain 25% in 2 weeks  Able to ambulate independently without AD and minimal antalgia in 2 weeks  Improve function as evidenced by Oswestry score of 20% or less in 12 weeks (64% impairment initially)  Able to don/doff socks and shoes independently in 12 weeks  Perform 10 reps of repeated sit to stand without arms in 30 seconds in 12 weeks    Progress strengthening /stabilization /functional activity           Timed:         Manual Therapy:    10     mins  09745;     Neuromuscular Dick:    10    mins  35454;    Therapeutic Activity:     10     mins  32014;         Timed Treatment:   30   mins   Total Treatment:     30   mins    Alicia Barajas PTA  Physical Therapist Assistant License #[id number]

## 2024-04-22 ENCOUNTER — TREATMENT (OUTPATIENT)
Dept: PHYSICAL THERAPY | Facility: CLINIC | Age: 64
End: 2024-04-22
Payer: MEDICARE

## 2024-04-22 DIAGNOSIS — M54.42 CHRONIC BILATERAL LOW BACK PAIN WITH BILATERAL SCIATICA: Primary | ICD-10-CM

## 2024-04-22 DIAGNOSIS — M25.552 LEFT HIP PAIN: ICD-10-CM

## 2024-04-22 DIAGNOSIS — R26.89 BALANCE PROBLEM: ICD-10-CM

## 2024-04-22 DIAGNOSIS — G89.29 CHRONIC BILATERAL LOW BACK PAIN WITH BILATERAL SCIATICA: Primary | ICD-10-CM

## 2024-04-22 DIAGNOSIS — M54.41 CHRONIC BILATERAL LOW BACK PAIN WITH BILATERAL SCIATICA: Primary | ICD-10-CM

## 2024-04-22 PROCEDURE — 97110 THERAPEUTIC EXERCISES: CPT | Performed by: PHYSICAL THERAPIST

## 2024-04-22 PROCEDURE — 97112 NEUROMUSCULAR REEDUCATION: CPT | Performed by: PHYSICAL THERAPIST

## 2024-04-22 NOTE — PROGRESS NOTES
Physical Therapy Daily Treatment Note    Patient: Steven Hammonds   : 1960  Diagnosis/ICD-10 Code:  Chronic bilateral low back pain with bilateral sciatica [M54.42, M54.41, G89.29]  Referring practitioner: Junaid Marquez, *  Date of Initial Visit: Type: THERAPY  Noted: 2024  Today's Date: 2024  Patient seen for 3 sessions             Subjective Patient reports cramping and tough night after last visit.  No falls since starting PT.    Objective   See Exercise, Manual, and Modality Logs for complete treatment. Initial Oswestry score indicates 64% impairment with limitations in lifting, pain, standing, sitting.  L hip flexion limited to 90 deg with anterior hip pain.  B hip ER limited with piriformis tightness. L hip IR limited compared to R.  MMT: 4-4+/5 B LE.  Repeated sit to stand = 10 reps in 30 sec without using hands to push up, improved significantly since initial evaluation.  SLS = 6 sec on L, 15 sec on R.  Patient reports decreased sensation B feet with L worse than R.  Ambulates independently without AD with antalgia and unsteadiness. Reviewed and updated HEP with SLS, open book.        Assessment/Plan  Patient independent with HEP in 2 weeks - MET  Tolerate 10 min Nustep/Bike in 2 weeks - MET  Able to tolerate walking 15 min in 2 weeks  Decrease hip pain 25% in 2 weeks  Able to ambulate independently without AD and minimal antalgia in 2 weeks  Improve function as evidenced by Oswestry score of 20% or less in 12 weeks (64% impairment initially)  Able to don/doff socks and shoes independently in 12 weeks  Perform 10 reps of repeated sit to stand without arms in 30 seconds in 12 weeks - MET     Progress per Plan of Care exp 24           Timed:         Manual Therapy:    5     mins  61077;     Therapeutic Exercise:    15     mins  28864;     Neuromuscular Dick:    10    mins  96110;    Therapeutic Activity:          mins  53837;     Gait Training:           mins  40642;      Ultrasound:          mins  57511;    Ionto                                   mins   57728  Self Care                            mins   39110      Un-Timed:  Electrical Stimulation:         mins  31552 ( );  Dry Needling          mins self-pay  Traction          mins 48312  Low Eval          Mins  82188  Mod Eval          Mins  72718  High Eval                            Mins  62205  Canalith Repos                   mins  26825    Timed Treatment:   30   mins   Total Treatment:     30   mins    Robin A Sprigler, PT  Physical Therapist

## 2024-04-25 ENCOUNTER — TREATMENT (OUTPATIENT)
Dept: PHYSICAL THERAPY | Facility: CLINIC | Age: 64
End: 2024-04-25
Payer: MEDICARE

## 2024-04-25 DIAGNOSIS — G89.29 CHRONIC BILATERAL LOW BACK PAIN WITH BILATERAL SCIATICA: Primary | ICD-10-CM

## 2024-04-25 DIAGNOSIS — M54.41 CHRONIC BILATERAL LOW BACK PAIN WITH BILATERAL SCIATICA: Primary | ICD-10-CM

## 2024-04-25 DIAGNOSIS — M54.42 CHRONIC BILATERAL LOW BACK PAIN WITH BILATERAL SCIATICA: Primary | ICD-10-CM

## 2024-04-25 DIAGNOSIS — R26.89 BALANCE PROBLEM: ICD-10-CM

## 2024-04-25 DIAGNOSIS — M25.552 LEFT HIP PAIN: ICD-10-CM

## 2024-04-25 PROCEDURE — 97110 THERAPEUTIC EXERCISES: CPT | Performed by: PHYSICAL THERAPIST

## 2024-04-25 PROCEDURE — 97112 NEUROMUSCULAR REEDUCATION: CPT | Performed by: PHYSICAL THERAPIST

## 2024-04-25 NOTE — PROGRESS NOTES
Physical Therapy Daily Treatment Note    Patient: Steven Hammonds   : 1960  Diagnosis/ICD-10 Code:  Chronic bilateral low back pain with bilateral sciatica [M54.42, M54.41, G89.29]  Referring practitioner: Junaid Marquez, *  Date of Initial Visit: Type: THERAPY  Noted: 2024  Today's Date: 2024  Patient seen for 4 sessions             Subjective Patient reports still unable to put shoes/socks on L foot.  Discussed sock aid and long-handled shoe horn to assist donning socks and shoes. No falls since starting PT.     Objective   See Exercise, Manual, and Modality Logs for complete treatment. Discussed sock aid and long-handled shoe horn to assist donning socks and shoes. Initial Oswestry score indicates 64% impairment with limitations in lifting, pain, standing, sitting.  L hip flexion improved to 100 deg without anterior hip pain.  B hip ER limited with piriformis tightness. L hip IR limited compared to R.  MMT: 4-4+/5 B LE.  Repeated sit to stand = 10 reps in 30 sec without using hands to push up, improved significantly since initial evaluation.  SLS = 8 sec on L, 25 sec on R.  Patient reports decreased sensation B feet with L worse than R.  Ambulates independently without AD with antalgia and unsteadiness. Reviewed and updated HEP.  Able to stand EC on foam NBOS x 15 sec, 30 sec with eyes open.         Assessment/Plan  Patient independent with HEP in 2 weeks - MET  Tolerate 10 min Nustep/Bike in 2 weeks - MET  Able to tolerate walking 15 min in 2 weeks - NOT MET  Decrease hip pain 25% in 2 weeks - NOT MET  Able to ambulate independently without AD and minimal antalgia in 2 weeks - MET  Improve function as evidenced by Oswestry score of 20% or less in 12 weeks (64% impairment initially) - NOT MET  Able to don/doff socks and shoes independently in 12 weeks - NOT MET  Perform 10 reps of repeated sit to stand without arms in 30 seconds in 12 weeks - MET     Progress per Plan of Care exp  7/4/24           Timed:         Manual Therapy:    5     mins  43522;     Therapeutic Exercise:    15     mins  53439;     Neuromuscular Dick:    10    mins  70823;    Therapeutic Activity:          mins  19217;     Gait Training:           mins  94575;     Ultrasound:          mins  37939;    Ionto                                   mins   61465  Self Care                            mins   97177      Un-Timed:  Electrical Stimulation:         mins  60356 ( );  Dry Needling          mins self-pay  Traction          mins 63661  Low Eval          Mins  24320  Mod Eval          Mins  80856  High Eval                            Mins  76390  Canalith Repos                   mins  44489    Timed Treatment:   30   mins   Total Treatment:     30   mins    Robin A Sprigler, PT  Physical Therapist

## 2024-04-29 ENCOUNTER — TREATMENT (OUTPATIENT)
Dept: PHYSICAL THERAPY | Facility: CLINIC | Age: 64
End: 2024-04-29
Payer: MEDICARE

## 2024-04-29 DIAGNOSIS — M54.41 CHRONIC BILATERAL LOW BACK PAIN WITH BILATERAL SCIATICA: Primary | ICD-10-CM

## 2024-04-29 DIAGNOSIS — M54.42 CHRONIC BILATERAL LOW BACK PAIN WITH BILATERAL SCIATICA: Primary | ICD-10-CM

## 2024-04-29 DIAGNOSIS — M25.552 LEFT HIP PAIN: ICD-10-CM

## 2024-04-29 DIAGNOSIS — R26.89 BALANCE PROBLEM: ICD-10-CM

## 2024-04-29 DIAGNOSIS — G89.29 CHRONIC BILATERAL LOW BACK PAIN WITH BILATERAL SCIATICA: Primary | ICD-10-CM

## 2024-04-29 PROCEDURE — 97112 NEUROMUSCULAR REEDUCATION: CPT | Performed by: PHYSICAL THERAPIST

## 2024-04-29 PROCEDURE — 97110 THERAPEUTIC EXERCISES: CPT | Performed by: PHYSICAL THERAPIST

## 2024-04-29 NOTE — PROGRESS NOTES
Physical Therapy Daily Treatment Note    Patient: Steven Hammonds   : 1960  Diagnosis/ICD-10 Code:  Chronic bilateral low back pain with bilateral sciatica [M54.42, M54.41, G89.29]  Referring practitioner: Junaid Marquez, *  Date of Initial Visit: Type: THERAPY  Noted: 2024  Today's Date: 2024  Patient seen for 5 sessions             Subjective Patient reports bad leg cramps at night.  He is still unable to put shoes/socks on L foot.  Discussed sock aid and long-handled shoe horn to assist donning socks and shoes. No falls since starting PT.        Objective   See Exercise, Manual, and Modality Logs for complete treatment. Discussed sock aid and long-handled shoe horn to assist donning socks and shoes. Initial Oswestry score indicates 64% impairment with limitations in lifting, pain, standing, sitting.  L hip flexion improved to 100 deg without anterior hip pain.  B hip ER limited with piriformis tightness. L hip IR limited compared to R.  MMT: 4-4+/5 B LE.  Repeated sit to stand = 10 reps in 30 sec without using hands to push up, improved significantly since initial evaluation.  SLS = 8 sec on L, 25 sec on R.  Patient reports decreased sensation B feet with L worse than R.  Ambulates independently without AD with antalgia and unsteadiness. Reviewed and updated HEP.  Able to stand EC on foam NBOS x 25 sec, 30 sec with eyes open.       Assessment/Plan  Patient independent with HEP in 2 weeks - MET  Tolerate 10 min Nustep/Bike in 2 weeks - MET  Able to tolerate walking 15 min in 2 weeks - NOT MET  Decrease hip pain 25% in 2 weeks - NOT MET  Able to ambulate independently without AD and minimal antalgia in 2 weeks - MET  Improve function as evidenced by Oswestry score of 20% or less in 12 weeks (64% impairment initially) - NOT MET  Able to don/doff socks and shoes independently in 12 weeks - NOT MET  Perform 10 reps of repeated sit to stand without arms in 30 seconds in 12 weeks - MET      Progress per Plan of Care exp 7/4/24           Timed:         Manual Therapy:    5     mins  77483;     Therapeutic Exercise:    15     mins  64932;     Neuromuscular Dick:    10    mins  36903;    Therapeutic Activity:          mins  29780;     Gait Training:           mins  43109;     Ultrasound:          mins  62261;    Ionto                                   mins   64939  Self Care                            mins   28098      Un-Timed:  Electrical Stimulation:         mins  60722 ( );  Dry Needling          mins self-pay  Traction          mins 68303  Low Eval          Mins  86769  Mod Eval          Mins  44307  High Eval                            Mins  12463  Canalith Repos                   mins  25843    Timed Treatment:   30   mins   Total Treatment:     30   mins    Robin A Sprigler, PT  Physical Therapist

## 2024-05-02 ENCOUNTER — TREATMENT (OUTPATIENT)
Dept: PHYSICAL THERAPY | Facility: CLINIC | Age: 64
End: 2024-05-02
Payer: MEDICARE

## 2024-05-02 DIAGNOSIS — R26.89 BALANCE PROBLEM: ICD-10-CM

## 2024-05-02 DIAGNOSIS — G89.29 CHRONIC BILATERAL LOW BACK PAIN WITH BILATERAL SCIATICA: Primary | ICD-10-CM

## 2024-05-02 DIAGNOSIS — M25.552 LEFT HIP PAIN: ICD-10-CM

## 2024-05-02 DIAGNOSIS — M54.41 CHRONIC BILATERAL LOW BACK PAIN WITH BILATERAL SCIATICA: Primary | ICD-10-CM

## 2024-05-02 DIAGNOSIS — M54.42 CHRONIC BILATERAL LOW BACK PAIN WITH BILATERAL SCIATICA: Primary | ICD-10-CM

## 2024-05-02 PROCEDURE — 97110 THERAPEUTIC EXERCISES: CPT | Performed by: PHYSICAL THERAPIST

## 2024-05-02 PROCEDURE — 97112 NEUROMUSCULAR REEDUCATION: CPT | Performed by: PHYSICAL THERAPIST

## 2024-05-02 NOTE — PROGRESS NOTES
Physical Therapy Daily Treatment Note    Patient: Steven Hammonds   : 1960  Diagnosis/ICD-10 Code:  Chronic bilateral low back pain with bilateral sciatica [M54.42, M54.41, G89.29]  Referring practitioner: Junaid Marquez, *  Date of Initial Visit: Type: THERAPY  Noted: 2024  Today's Date: 2024  Patient seen for 6 sessions             Subjective Patient reports leg cramps are getting worse.  Walking tolerance ~ 5 min.  He made an appointment with his doctor for .  MD mentioned possible MRI to see if anything new has developed.  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  He is participating in an honor flight with his brother. He has been using reacher for sock don socks and shoes and it's working well.    Objective   See Exercise, Manual, and Modality Logs for complete treatment. Discussed sock aid and long-handled shoe horn to assist donning socks and shoes. Initial Oswestry score indicates 64% impairment with limitations in lifting, pain, standing, sitting.  L hip flexion improved to 100 deg without anterior hip pain.  B hip ER limited with piriformis tightness. L hip IR limited compared to R.  MMT: 4-4+/5 B LE.  Repeated sit to stand = 10 reps in 30 sec without using hands to push up, improved significantly since initial evaluation.  SLS = 8 sec on L, 25 sec on R.  Patient reports decreased sensation B feet with L worse than R.  Ambulates independently without AD with antalgia and unsteadiness. Reviewed and updated HEP.  Able to stand EC on foam NBOS x 30 sec, 30 sec with eyes open. Decreased leg press to 50# B, held single leg press.      Assessment/Plan  Patient independent with HEP in 2 weeks - MET  Tolerate 10 min Nustep/Bike in 2 weeks - MET  Able to tolerate walking 15 min in 2 weeks - NOT MET, currently 5 min  Decrease hip pain 25% in 2 weeks - NOT MET  Able to ambulate independently without AD  and minimal antalgia in 2 weeks - MET  Improve function as evidenced by Oswestry score of 20% or less in 12 weeks (64% impairment initially) - NOT MET  Able to don/doff socks and shoes independently in 12 weeks - PARTIALLY MET  Perform 10 reps of repeated sit to stand without arms in 30 seconds in 12 weeks - MET     Progress per Plan of Care exp 7/4/24           Timed:         Manual Therapy:    5     mins  88220;     Therapeutic Exercise:    15     mins  58572;     Neuromuscular Dick:    10    mins  32179;    Therapeutic Activity:          mins  86995;     Gait Training:           mins  62667;     Ultrasound:          mins  86618;    Ionto                                   mins   34110  Self Care                            mins   56516      Un-Timed:  Electrical Stimulation:         mins  44469 ( );  Dry Needling          mins self-pay  Traction          mins 51913  Low Eval          Mins  45336  Mod Eval          Mins  16628  High Eval                            Mins  42821  Canalith Repos                   mins  01237    Timed Treatment:   30   mins   Total Treatment:     30   mins    Robin A Sprigler, PT  Physical Therapist

## 2024-05-06 ENCOUNTER — TREATMENT (OUTPATIENT)
Dept: PHYSICAL THERAPY | Facility: CLINIC | Age: 64
End: 2024-05-06
Payer: MEDICARE

## 2024-05-06 DIAGNOSIS — M54.41 CHRONIC BILATERAL LOW BACK PAIN WITH BILATERAL SCIATICA: Primary | ICD-10-CM

## 2024-05-06 DIAGNOSIS — M25.552 LEFT HIP PAIN: ICD-10-CM

## 2024-05-06 DIAGNOSIS — G89.29 CHRONIC BILATERAL LOW BACK PAIN WITH BILATERAL SCIATICA: Primary | ICD-10-CM

## 2024-05-06 DIAGNOSIS — M54.42 CHRONIC BILATERAL LOW BACK PAIN WITH BILATERAL SCIATICA: Primary | ICD-10-CM

## 2024-05-06 DIAGNOSIS — R26.89 BALANCE PROBLEM: ICD-10-CM

## 2024-05-06 PROCEDURE — 97112 NEUROMUSCULAR REEDUCATION: CPT | Performed by: PHYSICAL THERAPIST

## 2024-05-06 PROCEDURE — 97110 THERAPEUTIC EXERCISES: CPT | Performed by: PHYSICAL THERAPIST

## 2024-05-08 ENCOUNTER — OFFICE VISIT (OUTPATIENT)
Dept: FAMILY MEDICINE CLINIC | Facility: CLINIC | Age: 64
End: 2024-05-08
Payer: MEDICARE

## 2024-05-08 VITALS
HEIGHT: 71 IN | BODY MASS INDEX: 27.16 KG/M2 | WEIGHT: 194 LBS | RESPIRATION RATE: 20 BRPM | SYSTOLIC BLOOD PRESSURE: 124 MMHG | HEART RATE: 84 BPM | OXYGEN SATURATION: 98 % | DIASTOLIC BLOOD PRESSURE: 86 MMHG

## 2024-05-08 DIAGNOSIS — Z87.891 FORMER TOBACCO USE: ICD-10-CM

## 2024-05-08 DIAGNOSIS — R25.1 TREMOR: ICD-10-CM

## 2024-05-08 DIAGNOSIS — M51.16 HERNIATION OF LUMBAR INTERVERTEBRAL DISC WITH RADICULOPATHY: ICD-10-CM

## 2024-05-08 DIAGNOSIS — K21.9 GASTROESOPHAGEAL REFLUX DISEASE, UNSPECIFIED WHETHER ESOPHAGITIS PRESENT: ICD-10-CM

## 2024-05-08 DIAGNOSIS — M51.37 DEGENERATION OF LUMBAR OR LUMBOSACRAL INTERVERTEBRAL DISC: Primary | ICD-10-CM

## 2024-05-08 PROCEDURE — 1111F DSCHRG MED/CURRENT MED MERGE: CPT | Performed by: FAMILY MEDICINE

## 2024-05-08 PROCEDURE — 1125F AMNT PAIN NOTED PAIN PRSNT: CPT | Performed by: FAMILY MEDICINE

## 2024-05-08 PROCEDURE — 3074F SYST BP LT 130 MM HG: CPT | Performed by: FAMILY MEDICINE

## 2024-05-08 PROCEDURE — 99214 OFFICE O/P EST MOD 30 MIN: CPT | Performed by: FAMILY MEDICINE

## 2024-05-08 PROCEDURE — 3079F DIAST BP 80-89 MM HG: CPT | Performed by: FAMILY MEDICINE

## 2024-05-08 PROCEDURE — G2211 COMPLEX E/M VISIT ADD ON: HCPCS | Performed by: FAMILY MEDICINE

## 2024-05-08 RX ORDER — CARISOPRODOL 350 MG/1
350 TABLET ORAL 3 TIMES DAILY PRN
Qty: 60 TABLET | Refills: 0 | Status: SHIPPED | OUTPATIENT
Start: 2024-05-08

## 2024-05-13 RX ORDER — ROSUVASTATIN CALCIUM 10 MG/1
TABLET, COATED ORAL
Qty: 45 TABLET | Refills: 3 | Status: SHIPPED | OUTPATIENT
Start: 2024-05-13

## 2024-05-20 ENCOUNTER — TELEPHONE (OUTPATIENT)
Dept: FAMILY MEDICINE CLINIC | Facility: CLINIC | Age: 64
End: 2024-05-20
Payer: MEDICARE

## 2024-05-20 NOTE — TELEPHONE ENCOUNTER
Caller: Steven Hammonds    Relationship: Self    Best call back number:       Steven Hammonds (Self) [phone] (Mobile)       What was the call regarding: THE INSURANCE HAS NOT RECEIVED ANYTHING ON THE CT SCAN YET     CAN YOU CALL WITH AN UPDATE ON THAT ORDER      Is it okay if the provider responds through MyChart: CALL

## 2024-05-28 DIAGNOSIS — M51.37 DEGENERATION OF LUMBAR OR LUMBOSACRAL INTERVERTEBRAL DISC: ICD-10-CM

## 2024-05-28 RX ORDER — CARISOPRODOL 350 MG/1
350 TABLET ORAL 3 TIMES DAILY PRN
Qty: 60 TABLET | Refills: 0 | Status: SHIPPED | OUTPATIENT
Start: 2024-05-28

## 2024-05-29 DIAGNOSIS — M51.37 DEGENERATION OF LUMBAR OR LUMBOSACRAL INTERVERTEBRAL DISC: ICD-10-CM

## 2024-05-30 RX ORDER — CARISOPRODOL 350 MG/1
350 TABLET ORAL 3 TIMES DAILY PRN
Qty: 60 TABLET | Refills: 0 | OUTPATIENT
Start: 2024-05-30

## 2024-05-31 ENCOUNTER — TELEPHONE (OUTPATIENT)
Dept: FAMILY MEDICINE CLINIC | Facility: CLINIC | Age: 64
End: 2024-05-31
Payer: MEDICARE

## 2024-05-31 ENCOUNTER — PATIENT MESSAGE (OUTPATIENT)
Dept: FAMILY MEDICINE CLINIC | Facility: CLINIC | Age: 64
End: 2024-05-31
Payer: MEDICARE

## 2024-05-31 DIAGNOSIS — M51.37 DEGENERATION OF LUMBAR OR LUMBOSACRAL INTERVERTEBRAL DISC: ICD-10-CM

## 2024-05-31 RX ORDER — CARISOPRODOL 350 MG/1
350 TABLET ORAL 3 TIMES DAILY PRN
Qty: 60 TABLET | Refills: 0 | OUTPATIENT
Start: 2024-05-31

## 2024-05-31 NOTE — TELEPHONE ENCOUNTER
Please notify patient that: CT showed foramen narrowing on the left which could be contributing to sx. [facility] pain management appt to consider injections.

## 2024-06-04 ENCOUNTER — OFFICE VISIT (OUTPATIENT)
Dept: FAMILY MEDICINE CLINIC | Facility: CLINIC | Age: 64
End: 2024-06-04
Payer: MEDICARE

## 2024-06-04 VITALS
WEIGHT: 190 LBS | BODY MASS INDEX: 26.6 KG/M2 | HEIGHT: 71 IN | SYSTOLIC BLOOD PRESSURE: 126 MMHG | HEART RATE: 60 BPM | OXYGEN SATURATION: 99 % | RESPIRATION RATE: 20 BRPM | DIASTOLIC BLOOD PRESSURE: 88 MMHG

## 2024-06-04 DIAGNOSIS — M48.062 SPINAL STENOSIS OF LUMBAR REGION WITH NEUROGENIC CLAUDICATION: ICD-10-CM

## 2024-06-04 DIAGNOSIS — M51.37 DEGENERATION OF LUMBAR OR LUMBOSACRAL INTERVERTEBRAL DISC: ICD-10-CM

## 2024-06-04 DIAGNOSIS — M54.32 SCIATICA OF LEFT SIDE: Primary | ICD-10-CM

## 2024-06-04 PROCEDURE — 3074F SYST BP LT 130 MM HG: CPT | Performed by: FAMILY MEDICINE

## 2024-06-04 PROCEDURE — G2211 COMPLEX E/M VISIT ADD ON: HCPCS | Performed by: FAMILY MEDICINE

## 2024-06-04 PROCEDURE — 1125F AMNT PAIN NOTED PAIN PRSNT: CPT | Performed by: FAMILY MEDICINE

## 2024-06-04 PROCEDURE — 3079F DIAST BP 80-89 MM HG: CPT | Performed by: FAMILY MEDICINE

## 2024-06-04 PROCEDURE — 99214 OFFICE O/P EST MOD 30 MIN: CPT | Performed by: FAMILY MEDICINE

## 2024-06-04 PROCEDURE — 1111F DSCHRG MED/CURRENT MED MERGE: CPT | Performed by: FAMILY MEDICINE

## 2024-06-04 RX ORDER — CARISOPRODOL 350 MG/1
350 TABLET ORAL 3 TIMES DAILY PRN
Qty: 90 TABLET | Refills: 3 | Status: SHIPPED | OUTPATIENT
Start: 2024-06-04

## 2024-06-04 RX ORDER — PREDNISONE 20 MG/1
20 TABLET ORAL DAILY
Qty: 20 TABLET | Refills: 0 | Status: SHIPPED | OUTPATIENT
Start: 2024-06-04 | End: 2024-06-17

## 2024-06-04 RX ORDER — HYDROCODONE BITARTRATE AND ACETAMINOPHEN 5; 325 MG/1; MG/1
1 TABLET ORAL EVERY 8 HOURS PRN
Qty: 90 TABLET | Refills: 0 | Status: SHIPPED | OUTPATIENT
Start: 2024-06-04

## 2024-06-04 NOTE — PROGRESS NOTES
Subjective   Steven Hammonds is a 63 y.o. male.   Chief Complaint   Patient presents with    Back Pain       History of Present Illness  Pain med refill. Stable on current treatment.  Controlled substance agreement signed and on file.      Follow up CT. [facility] at [facility] showed foraminal narrowing L5-S1 left which coincides with sx   Pt interested in pain management       Back Pain  This is a chronic problem. The current episode started more than 1 year ago. The problem occurs constantly. The problem is unchanged (x 2 weeks). The pain is present in the lumbar spine. The quality of the pain is described as aching and stabbing. The pain radiates to the left buttock, left thigh and left anterolateral lower leg. The pain is at a severity of 6/10. The pain is The same all the time. The symptoms are aggravated by bending, lying down, sitting and standing. Stiffness is present All day. Associated symptoms include leg pain, paresthesias, pelvic pain, tingling and weakness (left leg). Pertinent negatives include no abdominal pain, bladder incontinence, bowel incontinence, chest pain, dysuria, fever, numbness, perianal numbness or weight loss. Risk factors include lack of exercise.   Extremity Pain   Associated symptoms include tingling. Pertinent negatives include no fever or numbness.        The following portions of the patient's history were reviewed and updated as appropriate: allergies, current medications, past family history, past medical history, past social history, past surgical history, and problem list.    Patient Active Problem List   Diagnosis    Degeneration of lumbar or lumbosacral intervertebral disc    Disc narrowing    Colon cancer screening    Gastroesophageal reflux disease    Hypertension    Paroxysmal atrial fibrillation    Spinal stenosis of lumbar region with neurogenic claudication    Spondylolysis    Status post arthroscopy of knee    Sinus pause    Tachycardia-bradycardia    Thoracic or lumbosacral  neuritis or radiculitis    Tremor    Presence of cardiac pacemaker    Allergic rhinitis    Hyperlipidemia    Insomnia    Umbilical hernia    Tinea    Impetigo    Labile blood pressure    Other specified persistent mood disorders    Non-seasonal allergic rhinitis due to pollen    Chronic low back pain with sciatica    Bobby's esophagus with dysplasia    Former tobacco use    Cervical disc disease with myelopathy    Psoriatic arthritis    Chest pain    Hypertensive urgency    Headache    Abnormal nuclear stress test    Herniation of lumbar intervertebral disc with radiculopathy    History of lumbar spinal fusion    Other chronic pain    Sacroiliac joint dysfunction of right side    Spondylolisthesis at L2-L3 level    Spondylolisthesis at L3-L4 level    Other secondary kyphosis, thoracolumbar region    Pseudoarthrosis of lumbar spine    Over weight    Allergic conjunctivitis of both eyes       Current Outpatient Medications on File Prior to Visit   Medication Sig Dispense Refill    amitriptyline (ELAVIL) 50 MG tablet TAKE 2 TABLETS AT BEDTIME 180 tablet 3    ascorbic acid (VITAMIN C) 500 MG tablet Take 1 tablet by mouth every night at bedtime.      cetirizine (zyrTEC) 10 MG tablet Take 1 tablet by mouth Daily.      cholecalciferol (VITAMIN D3) 25 MCG (1000 UT) tablet Take 3 tablets by mouth every night at bedtime.      cyclobenzaprine (FLEXERIL) 10 MG tablet Take 1 tablet by mouth 2 (Two) Times a Day As Needed for Muscle Spasms. 180 tablet 3    digoxin (LANOXIN) 125 MCG tablet TAKE 1 TABLET DAILY 90 tablet 3    Eliquis 5 MG tablet tablet TAKE 1 TABLET TWICE A  tablet 3    esomeprazole (nexIUM) 40 MG capsule Take 1 capsule by mouth Daily.      fluticasone (FLONASE) 50 MCG/ACT nasal spray USE 2 SPRAYS IN EACH NOSTRIL  ONCE DAILY 48 g 3    gabapentin (NEURONTIN) 600 MG tablet TAKE 1 TABLET FOUR TIMES A  tablet 3    hydroCHLOROthiazide 25 MG tablet Take 1 tablet by mouth Daily. 90 tablet 3    lisinopril  "(PRINIVIL,ZESTRIL) 20 MG tablet Take 1 tablet by mouth Daily. 90 tablet 3    metoprolol succinate XL (TOPROL-XL) 50 MG 24 hr tablet TAKE 1 TABLET DAILY 90 tablet 3    rosuvastatin (CRESTOR) 10 MG tablet TAKE ONE-HALF TABLET BY MOUTH  DAILY 45 tablet 3    [DISCONTINUED] carisoprodol (SOMA) 350 MG tablet Take 1 tablet by mouth three times daily as needed for muscle spasm 60 tablet 0    [DISCONTINUED] HYDROcodone-acetaminophen (NORCO) 5-325 MG per tablet Take 1 tablet by mouth Every 8 (Eight) Hours As Needed for Severe Pain. 90 tablet 0     No current facility-administered medications on file prior to visit.     Current outpatient and discharge medications have been reconciled for the patient.  Reviewed by: Junaid Marquez MD      Allergies   Allergen Reactions    Oxycodone-Acetaminophen GI Intolerance    Acetaminophen Nausea And Vomiting    Adhesive Tape Unknown (See Comments)     BLISTERS       Review of Systems   Constitutional:  Negative for fever and unexpected weight loss.   Cardiovascular:  Negative for chest pain.   Gastrointestinal:  Negative for abdominal pain and bowel incontinence.   Genitourinary:  Positive for pelvic pain. Negative for dysuria and urinary incontinence.   Musculoskeletal:  Positive for back pain.   Neurological:  Positive for tingling, weakness (left leg) and paresthesias. Negative for numbness.     I have reviewed and confirmed the accuracy of the ROS as documented by the MA/LPN/RN Junaid Marquez MD    Objective   Visit Vitals  /88 (BP Location: Left arm, Patient Position: Sitting, Cuff Size: Large Adult)   Pulse 60   Resp 20   Ht 180.9 cm (71.22\")   Wt 86.2 kg (190 lb)   SpO2 99%   BMI 26.34 kg/m²      **  Physical Exam  Constitutional:       Appearance: He is well-developed.   HENT:      Head: Normocephalic and atraumatic.      Right Ear: External ear normal.      Left Ear: External ear normal.      Nose: Nose normal.   Eyes:      Pupils: Pupils are equal, round, " and reactive to light.   Cardiovascular:      Rate and Rhythm: Normal rate and regular rhythm.      Heart sounds: Normal heart sounds.   Pulmonary:      Effort: Pulmonary effort is normal.      Breath sounds: Normal breath sounds.   Abdominal:      General: Bowel sounds are normal.      Palpations: Abdomen is soft.   Musculoskeletal:         General: Normal range of motion.        Arms:       Cervical back: Normal range of motion and neck supple.      Comments: Spasm and numbness    Skin:     General: Skin is warm and dry.   Neurological:      Mental Status: He is alert and oriented to person, place, and time.   Psychiatric:         Behavior: Behavior normal.         Thought Content: Thought content normal.         Judgment: Judgment normal.       Physical Exam   Upper extremities         Ortho Exam   Neurologic Exam     Mental Status   Oriented to person, place, and time.     Cranial Nerves     CN III, IV, VI   Pupils are equal, round, and reactive to light.         Diagnoses and all orders for this visit:    1. Sciatica of left side (Primary)  -     Ambulatory Referral to Pain Management    2. Degeneration of lumbar or lumbosacral intervertebral disc  -     carisoprodol (SOMA) 350 MG tablet; Take 1 tablet by mouth 3 (Three) Times a Day As Needed for Muscle Spasms. for muscle spams  Dispense: 90 tablet; Refill: 3  -     predniSONE (DELTASONE) 20 MG tablet; Take 1 tablet by mouth Daily for 13 days. TID x 3 days, BID x 3 days, QD x 3 days, 1/2 tab daily x 4 days  Dispense: 20 tablet; Refill: 0  -     Ambulatory Referral to Pain Management    3. Spinal stenosis of lumbar region with neurogenic claudication  Comments:  try PT. May need CT and pain management referral if sx persist despite PT  Orders:  -     HYDROcodone-acetaminophen (NORCO) 5-325 MG per tablet; Take 1 tablet by mouth Every 8 (Eight) Hours As Needed for Severe Pain.  Dispense: 90 tablet; Refill: 0        Findings discussed. All questions  answered.  Differential diagnosis discussed.   Medication and medication adverse effects discussed.  Drug education given and explained to patient. Patient verbalized understanding.  Follow-up in 2 weeks if not better.  Follow-up sooner for worsening symptoms or for any concerns.   Follow up in 1 month for reevaluation, sooner for concerns.        Expected course, medications, and adverse effects discussed as appropriate.  Call or return if worsening or persistent symptoms.     This document is intended for medical professional use only.   Electronically signed by Junaid Marquez MD on 06/04/2024. This content may not have been proofread.

## 2024-06-10 RX ORDER — APIXABAN 5 MG/1
5 TABLET, FILM COATED ORAL 2 TIMES DAILY
Qty: 180 TABLET | Refills: 3 | OUTPATIENT
Start: 2024-06-10

## 2024-06-10 NOTE — TELEPHONE ENCOUNTER
Rx Refill Note  Requested Prescriptions     Pending Prescriptions Disp Refills    Eliquis 5 MG tablet tablet [Pharmacy Med Name: Eliquis 5 MG Oral Tablet] 180 tablet 3     Sig: TAKE 1 TABLET BY MOUTH TWICE  DAILY      Last office visit with prescribing clinician: Visit date not found   Last telemedicine visit with prescribing clinician: Visit date not found   Next office visit with prescribing clinician: Visit date not found                         Would you like a call back once the refill request has been completed: [] Yes [] No    If the office needs to give you a call back, can they leave a voicemail: [] Yes [] No    Evon Bond MA  06/10/24, 08:15 EDT

## 2024-07-15 ENCOUNTER — OFFICE VISIT (OUTPATIENT)
Dept: PAIN MEDICINE | Facility: CLINIC | Age: 64
End: 2024-07-15
Payer: MEDICARE

## 2024-07-15 VITALS
OXYGEN SATURATION: 95 % | SYSTOLIC BLOOD PRESSURE: 141 MMHG | DIASTOLIC BLOOD PRESSURE: 83 MMHG | RESPIRATION RATE: 16 BRPM | HEART RATE: 62 BPM

## 2024-07-15 DIAGNOSIS — M48.062 SPINAL STENOSIS OF LUMBAR REGION WITH NEUROGENIC CLAUDICATION: ICD-10-CM

## 2024-07-15 DIAGNOSIS — Z98.1 HISTORY OF LUMBAR SPINAL FUSION: ICD-10-CM

## 2024-07-15 DIAGNOSIS — G89.29 CHRONIC LOW BACK PAIN WITH LEFT-SIDED SCIATICA, UNSPECIFIED BACK PAIN LATERALITY: Primary | ICD-10-CM

## 2024-07-15 DIAGNOSIS — M54.16 LUMBAR RADICULOPATHY: ICD-10-CM

## 2024-07-15 DIAGNOSIS — M51.16 HERNIATION OF LUMBAR INTERVERTEBRAL DISC WITH RADICULOPATHY: ICD-10-CM

## 2024-07-15 DIAGNOSIS — M54.42 CHRONIC LOW BACK PAIN WITH LEFT-SIDED SCIATICA, UNSPECIFIED BACK PAIN LATERALITY: Primary | ICD-10-CM

## 2024-07-15 DIAGNOSIS — M51.37 DEGENERATION OF LUMBAR OR LUMBOSACRAL INTERVERTEBRAL DISC: ICD-10-CM

## 2024-07-15 PROCEDURE — 1125F AMNT PAIN NOTED PAIN PRSNT: CPT | Performed by: PHYSICAL MEDICINE & REHABILITATION

## 2024-07-15 PROCEDURE — G0463 HOSPITAL OUTPT CLINIC VISIT: HCPCS | Performed by: PHYSICAL MEDICINE & REHABILITATION

## 2024-07-15 PROCEDURE — 3077F SYST BP >= 140 MM HG: CPT | Performed by: PHYSICAL MEDICINE & REHABILITATION

## 2024-07-15 PROCEDURE — 3079F DIAST BP 80-89 MM HG: CPT | Performed by: PHYSICAL MEDICINE & REHABILITATION

## 2024-07-15 PROCEDURE — 99204 OFFICE O/P NEW MOD 45 MIN: CPT | Performed by: PHYSICAL MEDICINE & REHABILITATION

## 2024-07-15 PROCEDURE — 1159F MED LIST DOCD IN RCRD: CPT | Performed by: PHYSICAL MEDICINE & REHABILITATION

## 2024-07-15 PROCEDURE — 1160F RVW MEDS BY RX/DR IN RCRD: CPT | Performed by: PHYSICAL MEDICINE & REHABILITATION

## 2024-07-15 RX ORDER — CARISOPRODOL 350 MG/1
350 TABLET ORAL 3 TIMES DAILY PRN
Qty: 90 TABLET | Refills: 3 | Status: SHIPPED | OUTPATIENT
Start: 2024-07-15

## 2024-07-15 RX ORDER — HYDROCODONE BITARTRATE AND ACETAMINOPHEN 5; 325 MG/1; MG/1
1 TABLET ORAL EVERY 8 HOURS PRN
Qty: 90 TABLET | Refills: 0 | Status: SHIPPED | OUTPATIENT
Start: 2024-07-15

## 2024-07-15 RX ORDER — CELECOXIB 100 MG/1
1 CAPSULE ORAL EVERY 12 HOURS SCHEDULED
COMMUNITY
Start: 2024-07-03

## 2024-07-15 NOTE — PROGRESS NOTES
Subjective   Steven Hammonds is a 64 y.o. male.     History of Present Illness  LBP to medial and lateral LLE to foot, 8/10 at worst, 5/10 at best, began years ago, worsening, s/p lumbar fusion, aching, throbbing, with numbness, worse with bending and lifting, interferes with ADLs, activity, sleep, failed surgery, PT, medications. CT L-spine with L2-S1, neuroforaminal narrowing worst at left L5/S1. Saw PCP, notes reviewed, referred for injections only, on Eliquis, has pacemaker. No FH of substance abuse.   Back Pain  Associated symptoms include numbness. Pertinent negatives include no abdominal pain, bladder incontinence, chest pain, fever or weakness.        The following portions of the patient's history were reviewed and updated as appropriate: allergies, current medications, past family history, past medical history, past social history, past surgical history, and problem list.    Review of Systems   Constitutional:  Negative for chills, fatigue and fever.   HENT:  Negative for hearing loss and trouble swallowing.    Eyes:  Negative for visual disturbance.   Respiratory:  Negative for shortness of breath.    Cardiovascular:  Negative for chest pain.   Gastrointestinal:  Negative for abdominal pain, constipation, diarrhea, nausea and vomiting.   Genitourinary:  Negative for urinary incontinence.   Musculoskeletal:  Positive for back pain. Negative for arthralgias, joint swelling, myalgias and neck pain.   Neurological:  Positive for numbness. Negative for dizziness, weakness and headache.       Objective   Physical Exam  Constitutional:       Appearance: Normal appearance. He is well-developed.   HENT:      Head: Normocephalic and atraumatic.   Eyes:      Pupils: Pupils are equal, round, and reactive to light.   Cardiovascular:      Rate and Rhythm: Normal rate and regular rhythm.      Heart sounds: Normal heart sounds.   Pulmonary:      Effort: Pulmonary effort is normal.      Breath sounds: Normal breath  sounds.   Abdominal:      General: Bowel sounds are normal. There is no distension.      Palpations: Abdomen is soft.      Tenderness: There is no abdominal tenderness.   Musculoskeletal:      Cervical back: Normal range of motion.   Neurological:      Mental Status: He is alert and oriented to person, place, and time.      Sensory: Sensory deficit present.      Deep Tendon Reflexes: Reflexes are normal and symmetric. Reflexes normal.      Comments: Loss of sensation in LLE in L4 distribution   Psychiatric:         Mood and Affect: Mood normal.         Behavior: Behavior normal.         Thought Content: Thought content normal.         Judgment: Judgment normal.           Assessment & Plan   Problems Addressed this Visit          Other    Spinal stenosis of lumbar region with neurogenic claudication    Chronic low back pain with sciatica - Primary    Herniation of lumbar intervertebral disc with radiculopathy    History of lumbar spinal fusion    Lumbar radiculopathy     Diagnoses         Codes Comments    Chronic low back pain with left-sided sciatica, unspecified back pain laterality    -  Primary ICD-10-CM: M54.42, G89.29  ICD-9-CM: 724.2, 724.3, 338.29     Herniation of lumbar intervertebral disc with radiculopathy     ICD-10-CM: M51.16  ICD-9-CM: 722.10, 724.4     History of lumbar spinal fusion     ICD-10-CM: Z98.1  ICD-9-CM: V45.4     Spinal stenosis of lumbar region with neurogenic claudication     ICD-10-CM: M48.062  ICD-9-CM: 724.03     Lumbar radiculopathy     ICD-10-CM: M54.16  ICD-9-CM: 724.4             Schedule left L4 and L5 TFESI, need clearance to hold Eliquis for 2 days  No change to meds today, referred for injections only, gets [facility] 5mg TID prn from PCP  RTC for TFESI then in 2 months for f/u

## 2024-07-16 ENCOUNTER — TELEPHONE (OUTPATIENT)
Dept: PAIN MEDICINE | Facility: CLINIC | Age: 64
End: 2024-07-16
Payer: MEDICARE

## 2024-07-16 NOTE — TELEPHONE ENCOUNTER
Caller: Steven Hammonds    Relationship to patient: Self    Best call back number: [phone] (home)      Chief complaint: BACK PAIN     Type of visit: INJECTION     Requested date:  ANYTIME CLOSE TO 07/31/24 JUST NOT ON THAT DAY     If rescheduling, when is the original appointment: 07/31/24      Additional notes:NA

## 2024-07-22 ENCOUNTER — OFFICE VISIT (OUTPATIENT)
Dept: FAMILY MEDICINE CLINIC | Facility: CLINIC | Age: 64
End: 2024-07-22
Payer: MEDICARE

## 2024-07-22 VITALS
DIASTOLIC BLOOD PRESSURE: 80 MMHG | SYSTOLIC BLOOD PRESSURE: 132 MMHG | HEART RATE: 52 BPM | OXYGEN SATURATION: 98 % | HEIGHT: 71 IN | RESPIRATION RATE: 20 BRPM | WEIGHT: 188 LBS | BODY MASS INDEX: 26.32 KG/M2

## 2024-07-22 DIAGNOSIS — M51.37 DEGENERATION OF LUMBAR OR LUMBOSACRAL INTERVERTEBRAL DISC: ICD-10-CM

## 2024-07-22 DIAGNOSIS — R25.1 TREMOR: Primary | ICD-10-CM

## 2024-07-22 PROCEDURE — G2211 COMPLEX E/M VISIT ADD ON: HCPCS | Performed by: FAMILY MEDICINE

## 2024-07-22 PROCEDURE — 1125F AMNT PAIN NOTED PAIN PRSNT: CPT | Performed by: FAMILY MEDICINE

## 2024-07-22 PROCEDURE — 99214 OFFICE O/P EST MOD 30 MIN: CPT | Performed by: FAMILY MEDICINE

## 2024-07-22 PROCEDURE — 3079F DIAST BP 80-89 MM HG: CPT | Performed by: FAMILY MEDICINE

## 2024-07-22 PROCEDURE — 3075F SYST BP GE 130 - 139MM HG: CPT | Performed by: FAMILY MEDICINE

## 2024-07-22 PROCEDURE — 1111F DSCHRG MED/CURRENT MED MERGE: CPT | Performed by: FAMILY MEDICINE

## 2024-07-22 RX ORDER — AMITRIPTYLINE HYDROCHLORIDE 50 MG/1
100 TABLET, FILM COATED ORAL
Qty: 180 TABLET | Refills: 3 | Status: SHIPPED | OUTPATIENT
Start: 2024-07-22

## 2024-07-22 NOTE — PROGRESS NOTES
Subjective   Steven Hammonds is a 64 y.o. male.   Chief Complaint   Patient presents with    Back Pain       History of Present Illness    Lesion right shoulder area, nontender    Needs refill on elavil and lortab     Ongoing tremors I hands, pt would like referral to Dr Lai  Back Pain  This is a chronic problem. The current episode started more than 1 year ago. The problem occurs constantly. The problem has been worse since onset. The pain is present in the lumbar spine. The quality of the pain is described as stabbing and shooting. The pain radiates to the left buttock and left thigh. The pain is at a severity of 6/10. The pain is severe. The pain is The same all the time. The symptoms are aggravated by position, standing, twisting, stress, sitting, lying down, bending and coughing. Stiffness is present All day. Associated symptoms include leg pain, numbness, tingling and weakness. Pertinent negatives include no abdominal pain, chest pain, dysuria or fever. The treatment provided no relief.        The following portions of the patient's history were reviewed and updated as appropriate: allergies, current medications, past family history, past medical history, past social history, past surgical history, and problem list.    Patient Active Problem List   Diagnosis    Degeneration of lumbar or lumbosacral intervertebral disc    Disc narrowing    Colon cancer screening    Gastroesophageal reflux disease    Hypertension    Paroxysmal atrial fibrillation    Spinal stenosis of lumbar region with neurogenic claudication    Spondylolysis    Status post arthroscopy of knee    Sinus pause    Tachycardia-bradycardia    Thoracic or lumbosacral neuritis or radiculitis    Tremor    Presence of cardiac pacemaker    Allergic rhinitis    Hyperlipidemia    Insomnia    Umbilical hernia    Tinea    Impetigo    Labile blood pressure    Other specified persistent mood disorders    Non-seasonal allergic rhinitis due to pollen    Chronic  low back pain with sciatica    Bobby's esophagus with dysplasia    Former tobacco use    Cervical disc disease with myelopathy    Psoriatic arthritis    Chest pain    Hypertensive urgency    Headache    Abnormal nuclear stress test    Herniation of lumbar intervertebral disc with radiculopathy    History of lumbar spinal fusion    Other chronic pain    Sacroiliac joint dysfunction of right side    Spondylolisthesis at L2-L3 level    Spondylolisthesis at L3-L4 level    Other secondary kyphosis, thoracolumbar region    Pseudoarthrosis of lumbar spine    Over weight    Allergic conjunctivitis of both eyes    Lumbar radiculopathy       Current Outpatient Medications on File Prior to Visit   Medication Sig Dispense Refill    ascorbic acid (VITAMIN C) 500 MG tablet Take 1 tablet by mouth every night at bedtime.      carisoprodol (SOMA) 350 MG tablet Take 1 tablet by mouth 3 (Three) Times a Day As Needed for Muscle Spasms. for muscle spams 90 tablet 3    celecoxib (CeleBREX) 100 MG capsule Take 1 capsule by mouth Every 12 (Twelve) Hours.      cetirizine (zyrTEC) 10 MG tablet Take 1 tablet by mouth Daily.      cholecalciferol (VITAMIN D3) 25 MCG (1000 UT) tablet Take 3 tablets by mouth every night at bedtime.      digoxin (LANOXIN) 125 MCG tablet TAKE 1 TABLET DAILY 90 tablet 3    Eliquis 5 MG tablet tablet TAKE 1 TABLET TWICE A  tablet 3    esomeprazole (nexIUM) 40 MG capsule Take 1 capsule by mouth Daily.      fluticasone (FLONASE) 50 MCG/ACT nasal spray USE 2 SPRAYS IN EACH NOSTRIL  ONCE DAILY 48 g 3    gabapentin (NEURONTIN) 600 MG tablet TAKE 1 TABLET FOUR TIMES A  tablet 3    hydroCHLOROthiazide 25 MG tablet Take 1 tablet by mouth Daily. 90 tablet 3    HYDROcodone-acetaminophen (NORCO) 5-325 MG per tablet Take 1 tablet by mouth Every 8 (Eight) Hours As Needed for Severe Pain. 90 tablet 0    lisinopril (PRINIVIL,ZESTRIL) 20 MG tablet Take 1 tablet by mouth Daily. 90 tablet 3    metoprolol succinate XL  "(TOPROL-XL) 50 MG 24 hr tablet TAKE 1 TABLET DAILY 90 tablet 3    rosuvastatin (CRESTOR) 10 MG tablet TAKE ONE-HALF TABLET BY MOUTH  DAILY 45 tablet 3    [DISCONTINUED] amitriptyline (ELAVIL) 50 MG tablet TAKE 2 TABLETS AT BEDTIME 180 tablet 3     No current facility-administered medications on file prior to visit.     Current outpatient and discharge medications have been reconciled for the patient.  Reviewed by: Junaid Marquez MD      Allergies   Allergen Reactions    Oxycodone-Acetaminophen GI Intolerance    Acetaminophen Nausea And Vomiting    Adhesive Tape Unknown (See Comments)     BLISTERS       Review of Systems   Constitutional:  Negative for activity change, appetite change, fatigue and fever.   HENT:  Negative for ear pain, swollen glands and voice change.    Eyes:  Negative for visual disturbance.   Respiratory:  Negative for shortness of breath and wheezing.    Cardiovascular:  Negative for chest pain and leg swelling.   Gastrointestinal:  Negative for abdominal pain, blood in stool, constipation, diarrhea, nausea and vomiting.   Endocrine: Negative for polydipsia and polyuria.   Genitourinary:  Negative for dysuria, frequency and hematuria.   Musculoskeletal:  Positive for back pain. Negative for joint swelling, neck pain and neck stiffness.   Skin:  Negative for rash and wound.   Neurological:  Positive for tingling, tremors, weakness and numbness. Negative for headache.   Psychiatric/Behavioral:  Negative for suicidal ideas and depressed mood.      I have reviewed and confirmed the accuracy of the ROS as documented by the MA/LPN/RN Junaid Marquez MD    Objective   Visit Vitals  /80 (BP Location: Right arm, Patient Position: Sitting, Cuff Size: Adult)   Pulse 52   Resp 20   Ht 180.9 cm (71.22\")   Wt 85.3 kg (188 lb)   SpO2 98%   BMI 26.06 kg/m²      **  Physical Exam  Constitutional:       Appearance: He is well-developed.   HENT:      Head: Normocephalic and atraumatic.      " Right Ear: External ear normal.      Left Ear: External ear normal.      Nose: Nose normal.   Eyes:      Pupils: Pupils are equal, round, and reactive to light.   Cardiovascular:      Rate and Rhythm: Normal rate and regular rhythm.      Heart sounds: Normal heart sounds.   Pulmonary:      Effort: Pulmonary effort is normal.      Breath sounds: Normal breath sounds.   Abdominal:      General: Bowel sounds are normal.      Palpations: Abdomen is soft.   Musculoskeletal:         General: Normal range of motion.      Cervical back: Normal range of motion and neck supple.   Skin:     General: Skin is warm and dry.   Neurological:      Mental Status: He is alert and oriented to person, place, and time.   Psychiatric:         Behavior: Behavior normal.         Thought Content: Thought content normal.         Judgment: Judgment normal.       Derm Physical Exam  Ortho Exam   Neurologic Exam     Mental Status   Oriented to person, place, and time.     Cranial Nerves     CN III, IV, VI   Pupils are equal, round, and reactive to light.         Diagnoses and all orders for this visit:    1. Tremor (Primary)  -     Ambulatory Referral to Neurology    2. Degeneration of lumbar or lumbosacral intervertebral disc  -     amitriptyline (ELAVIL) 50 MG tablet; Take 2 tablets by mouth every night at bedtime.  Dispense: 180 tablet; Refill: 3     Lortab refilled last week as he couldn't get appt until today    Findings discussed. All questions answered.  Medication and medication adverse effects discussed.  Drug education given and explained to patient. Patient verbalized understanding.  Follow-up for routine health maintenance as indicated.      Expected course, medications, and adverse effects discussed as appropriate.  Call or return if worsening or persistent symptoms.     This document is intended for medical professional use only.   Electronically signed by Junaid Marquez MD on 07/22/2024. This content may not have been  proofread.    Answers submitted by the patient for this visit:  Primary Reason for Visit (Submitted on 7/17/2024)  What is the primary reason for your visit?: Other  Other (Submitted on 7/17/2024)  Please describe your symptoms.: Mass on right shoulder and collarbone area  Have you had these symptoms before?: No  How long have you been having these symptoms?: Greater than 2 weeks  Please describe any probable cause for these symptoms. : I have a mass on my right collarbone and shoulder area

## 2024-08-07 ENCOUNTER — HOSPITAL ENCOUNTER (OUTPATIENT)
Dept: GENERAL RADIOLOGY | Facility: HOSPITAL | Age: 64
Discharge: HOME | End: 2024-08-07
Payer: MEDICARE

## 2024-08-07 ENCOUNTER — HOSPITAL ENCOUNTER (OUTPATIENT)
Dept: PAIN MEDICINE | Facility: HOSPITAL | Age: 64
Discharge: HOME | End: 2024-08-07
Payer: MEDICARE

## 2024-08-07 VITALS
TEMPERATURE: 96.9 F | SYSTOLIC BLOOD PRESSURE: 150 MMHG | HEART RATE: 59 BPM | OXYGEN SATURATION: 95 % | RESPIRATION RATE: 16 BRPM | DIASTOLIC BLOOD PRESSURE: 98 MMHG

## 2024-08-07 DIAGNOSIS — R52 PAIN: ICD-10-CM

## 2024-08-07 DIAGNOSIS — G89.29 CHRONIC LOW BACK PAIN WITH LEFT-SIDED SCIATICA, UNSPECIFIED BACK PAIN LATERALITY: Primary | ICD-10-CM

## 2024-08-07 DIAGNOSIS — M54.42 CHRONIC LOW BACK PAIN WITH LEFT-SIDED SCIATICA, UNSPECIFIED BACK PAIN LATERALITY: Primary | ICD-10-CM

## 2024-08-07 DIAGNOSIS — M54.16 LUMBAR RADICULOPATHY: ICD-10-CM

## 2024-08-07 PROCEDURE — 77003 FLUOROGUIDE FOR SPINE INJECT: CPT

## 2024-08-07 PROCEDURE — 25010000002 DEXAMETHASONE SODIUM PHOSPHATE 10 MG/ML SOLUTION: Performed by: PHYSICAL MEDICINE & REHABILITATION

## 2024-08-07 PROCEDURE — 25510000001 IOPAMIDOL 41 % SOLUTION: Performed by: PHYSICAL MEDICINE & REHABILITATION

## 2024-08-07 PROCEDURE — 64483 NJX AA&/STRD TFRM EPI L/S 1: CPT | Performed by: PHYSICAL MEDICINE & REHABILITATION

## 2024-08-07 PROCEDURE — 64484 NJX AA&/STRD TFRM EPI L/S EA: CPT | Performed by: PHYSICAL MEDICINE & REHABILITATION

## 2024-08-07 RX ORDER — DEXAMETHASONE SODIUM PHOSPHATE 10 MG/ML
10 INJECTION, SOLUTION INTRAMUSCULAR; INTRAVENOUS ONCE
Status: COMPLETED | OUTPATIENT
Start: 2024-08-07 | End: 2024-08-07

## 2024-08-07 RX ORDER — LIDOCAINE HYDROCHLORIDE 10 MG/ML
5 INJECTION, SOLUTION EPIDURAL; INFILTRATION; INTRACAUDAL; PERINEURAL ONCE
Status: COMPLETED | OUTPATIENT
Start: 2024-08-07 | End: 2024-08-07

## 2024-08-07 RX ORDER — IOPAMIDOL 408 MG/ML
10 INJECTION, SOLUTION INTRATHECAL
Status: COMPLETED | OUTPATIENT
Start: 2024-08-07 | End: 2024-08-07

## 2024-08-07 RX ADMIN — DEXAMETHASONE SODIUM PHOSPHATE 10 MG: 10 INJECTION, SOLUTION INTRAMUSCULAR; INTRAVENOUS at 09:56

## 2024-08-07 RX ADMIN — LIDOCAINE HYDROCHLORIDE 5 ML: 10 INJECTION, SOLUTION EPIDURAL; INFILTRATION; INTRACAUDAL; PERINEURAL at 09:56

## 2024-08-07 RX ADMIN — IOPAMIDOL 10 ML: 408 INJECTION, SOLUTION INTRATHECAL at 09:56

## 2024-08-07 NOTE — PROCEDURES
Procedures    Here for left L4 and L5 TFESI. Denies allergy to iodine or contrast, current infection or ABX, holding Eliquis for 2 days.      Lumbar Transforaminal Epidural Steroid Injection    PREOPERATIVE DIAGNOSIS: Lumbar radiculopathy    POSTOPERATIVE DIAGNOSIS: Lumbar radiculopathy    PROCEDURE PERFORMED: Transforaminal Epidural, left L4 and L5    The patient presents with a history of  lumbar degenerative disc disease with stenosis. The patient presents today for a [ transforaminal epidural ] at left L4 and L5.  This is the [ first ] procedure. The patient understands the risks and benefits of the procedure and wishes to proceed. The patient was seen in the preoperative area.  Patient's consent was obtained and updated.  Vitals were taken.  Patient was then brought to the procedure suite and placed in a prone position. The appropriate anatomic area was widely prepped with Chloroprep and draped in a sterile fashion.  Under fluoroscopic guidance using oblique view, a 25 guage curved tip spinal needle  was passed through skin anesthetized with 1% Lidocaine without epinephrine. The needle tip was advanced to the inferior medial aspect of the transverse process and carefully walked into the neuroforamin.  At no time were parathesias elicited. Preservative free contrast 1 ml was injected under live fluoro to verify epidural placement. At this point [ 2.5 ] mL of a solution containing [ Dexamethasone 10mg and Lidocaine PF 1% 4ml ] were injected. The patient reported no discomfort with injection.  The procedure was repeated in all respects at the left L5-S1 neuroforamen.  A sterile dressing was placed over the puncture sites.    The patient tolerated the procedure with [ no complications ]. They were then brought to the post procedure area where they recovered nicely.    Discharge:  The patient will be discharged home in stable condition.   Patient understands to contact the Center with any post procedure questions or  concerns.  Discharge instructions given by nursing staff.

## 2024-09-04 ENCOUNTER — OFFICE VISIT (OUTPATIENT)
Dept: FAMILY MEDICINE CLINIC | Facility: CLINIC | Age: 64
End: 2024-09-04
Payer: MEDICARE

## 2024-09-04 VITALS
SYSTOLIC BLOOD PRESSURE: 107 MMHG | WEIGHT: 195 LBS | OXYGEN SATURATION: 96 % | HEART RATE: 64 BPM | HEIGHT: 71 IN | DIASTOLIC BLOOD PRESSURE: 68 MMHG | TEMPERATURE: 98 F | BODY MASS INDEX: 27.3 KG/M2 | RESPIRATION RATE: 18 BRPM

## 2024-09-04 DIAGNOSIS — Z23 NEED FOR PNEUMOCOCCAL 20-VALENT CONJUGATE VACCINATION: ICD-10-CM

## 2024-09-04 DIAGNOSIS — M51.37 DEGENERATION OF LUMBAR OR LUMBOSACRAL INTERVERTEBRAL DISC: Primary | ICD-10-CM

## 2024-09-04 DIAGNOSIS — M48.062 SPINAL STENOSIS OF LUMBAR REGION WITH NEUROGENIC CLAUDICATION: ICD-10-CM

## 2024-09-04 PROCEDURE — 1125F AMNT PAIN NOTED PAIN PRSNT: CPT | Performed by: FAMILY MEDICINE

## 2024-09-04 PROCEDURE — G0009 ADMIN PNEUMOCOCCAL VACCINE: HCPCS | Performed by: FAMILY MEDICINE

## 2024-09-04 PROCEDURE — 1111F DSCHRG MED/CURRENT MED MERGE: CPT | Performed by: FAMILY MEDICINE

## 2024-09-04 PROCEDURE — 3074F SYST BP LT 130 MM HG: CPT | Performed by: FAMILY MEDICINE

## 2024-09-04 PROCEDURE — 90677 PCV20 VACCINE IM: CPT | Performed by: FAMILY MEDICINE

## 2024-09-04 PROCEDURE — 99214 OFFICE O/P EST MOD 30 MIN: CPT | Performed by: FAMILY MEDICINE

## 2024-09-04 PROCEDURE — 3078F DIAST BP <80 MM HG: CPT | Performed by: FAMILY MEDICINE

## 2024-09-04 RX ORDER — HYDROCODONE BITARTRATE AND ACETAMINOPHEN 5; 325 MG/1; MG/1
1 TABLET ORAL EVERY 8 HOURS PRN
Qty: 90 TABLET | Refills: 0 | Status: SHIPPED | OUTPATIENT
Start: 2024-09-04

## 2024-09-04 RX ORDER — CELECOXIB 100 MG/1
100 CAPSULE ORAL EVERY 12 HOURS SCHEDULED
Qty: 180 CAPSULE | Refills: 3 | Status: SHIPPED | OUTPATIENT
Start: 2024-09-04

## 2024-09-04 NOTE — PROGRESS NOTES
Subjective   Steven Hammonds is a 64 y.o. male.   Chief Complaint   Patient presents with    Med Refill       History of Present Illness  Needs refill on lortab an celebrex  Has follow up appt with pain management next week   Back Pain  This is a chronic problem. The current episode started more than 1 year ago. The problem occurs constantly. The problem has been worse since onset. The pain is present in the lumbar spine. The quality of the pain is described as stabbing and shooting. The pain radiates to the left buttock and left thigh. The pain is at a severity of 6/10. The pain is severe. The pain is The same all the time. The symptoms are aggravated by position, standing, twisting, stress, sitting, lying down, bending and coughing. Stiffness is present All day. Associated symptoms include leg pain, numbness, paresthesias, pelvic pain, tingling and weakness. Pertinent negatives include no abdominal pain, bladder incontinence, bowel incontinence, chest pain, dysuria, fever, perianal numbness or weight loss. The treatment provided no relief.        The following portions of the patient's history were reviewed and updated as appropriate: allergies, current medications, past family history, past medical history, past social history, past surgical history, and problem list.    Patient Active Problem List   Diagnosis    Degeneration of lumbar or lumbosacral intervertebral disc    Disc narrowing    Colon cancer screening    Gastroesophageal reflux disease    Hypertension    Paroxysmal atrial fibrillation    Spinal stenosis of lumbar region with neurogenic claudication    Spondylolysis    Status post arthroscopy of knee    Sinus pause    Tachycardia-bradycardia    Thoracic or lumbosacral neuritis or radiculitis    Tremor    Presence of cardiac pacemaker    Allergic rhinitis    Hyperlipidemia    Insomnia    Umbilical hernia    Tinea    Impetigo    Labile blood pressure    Other specified persistent mood disorders     Non-seasonal allergic rhinitis due to pollen    Chronic low back pain with sciatica    Bobby's esophagus with dysplasia    Former tobacco use    Cervical disc disease with myelopathy    Psoriatic arthritis    Chest pain    Hypertensive urgency    Headache    Abnormal nuclear stress test    Herniation of lumbar intervertebral disc with radiculopathy    History of lumbar spinal fusion    Other chronic pain    Sacroiliac joint dysfunction of right side    Spondylolisthesis at L2-L3 level    Spondylolisthesis at L3-L4 level    Other secondary kyphosis, thoracolumbar region    Pseudoarthrosis of lumbar spine    Over weight    Allergic conjunctivitis of both eyes    Lumbar radiculopathy       Current Outpatient Medications on File Prior to Visit   Medication Sig Dispense Refill    amitriptyline (ELAVIL) 50 MG tablet Take 2 tablets by mouth every night at bedtime. 180 tablet 3    ascorbic acid (VITAMIN C) 500 MG tablet Take 1 tablet by mouth every night at bedtime.      carisoprodol (SOMA) 350 MG tablet Take 1 tablet by mouth 3 (Three) Times a Day As Needed for Muscle Spasms. for muscle spams 90 tablet 3    cetirizine (zyrTEC) 10 MG tablet Take 1 tablet by mouth Daily.      cholecalciferol (VITAMIN D3) 25 MCG (1000 UT) tablet Take 3 tablets by mouth every night at bedtime.      digoxin (LANOXIN) 125 MCG tablet TAKE 1 TABLET DAILY 90 tablet 3    Eliquis 5 MG tablet tablet TAKE 1 TABLET TWICE A  tablet 3    esomeprazole (nexIUM) 40 MG capsule Take 1 capsule by mouth Daily.      fluticasone (FLONASE) 50 MCG/ACT nasal spray USE 2 SPRAYS IN EACH NOSTRIL  ONCE DAILY 48 g 3    gabapentin (NEURONTIN) 600 MG tablet TAKE 1 TABLET FOUR TIMES A  tablet 3    hydroCHLOROthiazide 25 MG tablet Take 1 tablet by mouth Daily. 90 tablet 3    lisinopril (PRINIVIL,ZESTRIL) 20 MG tablet Take 1 tablet by mouth Daily. 90 tablet 3    metoprolol succinate XL (TOPROL-XL) 50 MG 24 hr tablet TAKE 1 TABLET DAILY 90 tablet 3     rosuvastatin (CRESTOR) 10 MG tablet TAKE ONE-HALF TABLET BY MOUTH  DAILY 45 tablet 3    [DISCONTINUED] celecoxib (CeleBREX) 100 MG capsule Take 1 capsule by mouth Every 12 (Twelve) Hours.      [DISCONTINUED] HYDROcodone-acetaminophen (NORCO) 5-325 MG per tablet Take 1 tablet by mouth Every 8 (Eight) Hours As Needed for Severe Pain. 90 tablet 0     No current facility-administered medications on file prior to visit.     Current outpatient and discharge medications have been reconciled for the patient.  Reviewed by: Junaid Marquez MD      Allergies   Allergen Reactions    Oxycodone-Acetaminophen GI Intolerance    Acetaminophen Nausea And Vomiting    Adhesive Tape Unknown (See Comments)     BLISTERS       Review of Systems   Constitutional:  Negative for activity change, appetite change, fatigue, fever and unexpected weight loss.   HENT:  Negative for ear pain, swollen glands and voice change.    Eyes:  Negative for visual disturbance.   Respiratory:  Negative for shortness of breath and wheezing.    Cardiovascular:  Negative for chest pain and leg swelling.   Gastrointestinal:  Negative for abdominal pain, blood in stool, bowel incontinence, constipation, diarrhea, nausea and vomiting.   Endocrine: Negative for polydipsia and polyuria.   Genitourinary:  Positive for pelvic pain. Negative for dysuria, frequency, hematuria and urinary incontinence.   Musculoskeletal:  Positive for back pain. Negative for joint swelling, neck pain and neck stiffness.   Skin:  Negative for rash and wound.   Neurological:  Positive for tingling, weakness, numbness and paresthesias. Negative for headache.   Psychiatric/Behavioral:  Negative for suicidal ideas and depressed mood.      I have reviewed and confirmed the accuracy of the ROS as documented by the MA/LPN/RN Junaid Marquez MD    Objective   Visit Vitals  /68 (BP Location: Right arm, Patient Position: Sitting, Cuff Size: Large Adult)   Pulse 64   Temp 98 °F  "(36.7 °C) (Temporal)   Resp 18   Ht 180.9 cm (71.22\")   Wt 88.5 kg (195 lb)   SpO2 96%   BMI 27.03 kg/m²      **  Physical Exam  Constitutional:       Appearance: He is well-developed.   HENT:      Head: Normocephalic and atraumatic.      Right Ear: External ear normal.      Left Ear: External ear normal.      Nose: Nose normal.   Eyes:      Pupils: Pupils are equal, round, and reactive to light.   Cardiovascular:      Rate and Rhythm: Normal rate and regular rhythm.      Heart sounds: Normal heart sounds.   Pulmonary:      Effort: Pulmonary effort is normal.      Breath sounds: Normal breath sounds.   Abdominal:      General: Bowel sounds are normal.      Palpations: Abdomen is soft.   Musculoskeletal:         General: Normal range of motion.      Cervical back: Normal range of motion and neck supple.   Skin:     General: Skin is warm and dry.   Neurological:      Mental Status: He is alert and oriented to person, place, and time.   Psychiatric:         Behavior: Behavior normal.         Thought Content: Thought content normal.         Judgment: Judgment normal.       Derm Physical Exam  Ortho Exam   Neurologic Exam     Mental Status   Oriented to person, place, and time.     Cranial Nerves     CN III, IV, VI   Pupils are equal, round, and reactive to light.         Diagnoses and all orders for this visit:    1. Degeneration of lumbar or lumbosacral intervertebral disc (Primary)  -     celecoxib (CeleBREX) 100 MG capsule; Take 1 capsule by mouth Every 12 (Twelve) Hours.  Dispense: 180 capsule; Refill: 3    2. Spinal stenosis of lumbar region with neurogenic claudication  Comments:  try PT. May need CT and pain management referral if sx persist despite PT  Orders:  -     HYDROcodone-acetaminophen (NORCO) 5-325 MG per tablet; Take 1 tablet by mouth Every 8 (Eight) Hours As Needed for Severe Pain.  Dispense: 90 tablet; Refill: 0    3. Need for pneumococcal 20-valent conjugate vaccination  -     Pneumococcal Conjugate " Vaccine 20-Valent (PCV20)       Findings discussed. All questions answered.  Medication and medication adverse effects discussed.  Drug education given and explained to patient. Patient verbalized understanding.  Follow-up for routine health maintenance as indicated.  Follow up in 1 month for reevaluation, sooner for concerns.        Expected course, medications, and adverse effects discussed as appropriate.  Call or return if worsening or persistent symptoms.     This document is intended for medical professional use only.   Electronically signed by Junaid Marquez MD on 09/04/2024. This content may not have been proofread.      Answers submitted by the patient for this visit:  Primary Reason for Visit (Submitted on 8/30/2024)  What is the primary reason for your visit?: Back Pain

## 2024-09-06 ENCOUNTER — PATIENT MESSAGE (OUTPATIENT)
Dept: FAMILY MEDICINE CLINIC | Facility: CLINIC | Age: 64
End: 2024-09-06
Payer: MEDICARE

## 2024-09-06 DIAGNOSIS — M51.37 DEGENERATION OF LUMBAR OR LUMBOSACRAL INTERVERTEBRAL DISC: ICD-10-CM

## 2024-09-06 RX ORDER — CELECOXIB 100 MG/1
100 CAPSULE ORAL EVERY 12 HOURS SCHEDULED
Qty: 180 CAPSULE | Refills: 3 | OUTPATIENT
Start: 2024-09-06

## 2024-09-09 ENCOUNTER — OFFICE VISIT (OUTPATIENT)
Dept: PAIN MEDICINE | Facility: CLINIC | Age: 64
End: 2024-09-09
Payer: MEDICARE

## 2024-09-09 VITALS
HEART RATE: 51 BPM | OXYGEN SATURATION: 95 % | RESPIRATION RATE: 16 BRPM | SYSTOLIC BLOOD PRESSURE: 108 MMHG | DIASTOLIC BLOOD PRESSURE: 73 MMHG

## 2024-09-09 DIAGNOSIS — M51.37 DEGENERATION OF LUMBAR OR LUMBOSACRAL INTERVERTEBRAL DISC: ICD-10-CM

## 2024-09-09 DIAGNOSIS — M48.062 SPINAL STENOSIS OF LUMBAR REGION WITH NEUROGENIC CLAUDICATION: ICD-10-CM

## 2024-09-09 DIAGNOSIS — M51.16 HERNIATION OF LUMBAR INTERVERTEBRAL DISC WITH RADICULOPATHY: ICD-10-CM

## 2024-09-09 DIAGNOSIS — M54.16 LUMBAR RADICULOPATHY: ICD-10-CM

## 2024-09-09 DIAGNOSIS — M54.42 CHRONIC LOW BACK PAIN WITH LEFT-SIDED SCIATICA, UNSPECIFIED BACK PAIN LATERALITY: Primary | ICD-10-CM

## 2024-09-09 DIAGNOSIS — G89.29 CHRONIC LOW BACK PAIN WITH LEFT-SIDED SCIATICA, UNSPECIFIED BACK PAIN LATERALITY: Primary | ICD-10-CM

## 2024-09-09 DIAGNOSIS — M53.3 SACROILIAC JOINT DYSFUNCTION OF RIGHT SIDE: ICD-10-CM

## 2024-09-09 DIAGNOSIS — G89.29 OTHER CHRONIC PAIN: ICD-10-CM

## 2024-09-09 DIAGNOSIS — M50.00 CERVICAL DISC DISEASE WITH MYELOPATHY: ICD-10-CM

## 2024-09-09 PROCEDURE — 99214 OFFICE O/P EST MOD 30 MIN: CPT | Performed by: PHYSICAL MEDICINE & REHABILITATION

## 2024-09-09 PROCEDURE — 1125F AMNT PAIN NOTED PAIN PRSNT: CPT | Performed by: PHYSICAL MEDICINE & REHABILITATION

## 2024-09-09 PROCEDURE — 1160F RVW MEDS BY RX/DR IN RCRD: CPT | Performed by: PHYSICAL MEDICINE & REHABILITATION

## 2024-09-09 PROCEDURE — 3078F DIAST BP <80 MM HG: CPT | Performed by: PHYSICAL MEDICINE & REHABILITATION

## 2024-09-09 PROCEDURE — 1159F MED LIST DOCD IN RCRD: CPT | Performed by: PHYSICAL MEDICINE & REHABILITATION

## 2024-09-09 PROCEDURE — G0463 HOSPITAL OUTPT CLINIC VISIT: HCPCS | Performed by: PHYSICAL MEDICINE & REHABILITATION

## 2024-09-09 PROCEDURE — 3074F SYST BP LT 130 MM HG: CPT | Performed by: PHYSICAL MEDICINE & REHABILITATION

## 2024-09-09 NOTE — PROGRESS NOTES
Subjective   Steven Hammonds is a 64 y.o. male.     History of Present Illness  LBP to medial and lateral LLE to foot, 8/10 at worst, 5/10 at best, began years ago, worsening, s/p lumbar fusion, aching, throbbing, with numbness, worse with bending and lifting, interferes with ADLs, activity, sleep, failed surgery, PT, medications. CT L-spine with L2-S1, neuroforaminal narrowing worst at left L5/S1. Saw PCP, notes reviewed, referred for injections only, on Eliquis, has pacemaker. No FH of substance abuse.   Back Pain  Associated symptoms include chest pain and numbness. Pertinent negatives include no abdominal pain, bladder incontinence, fever or weakness.   Chest Pain   Associated symptoms include back pain and numbness. Pertinent negatives include no abdominal pain, dizziness, fever, nausea, shortness of breath, vomiting or weakness.        The following portions of the patient's history were reviewed and updated as appropriate: allergies, current medications, past family history, past medical history, past social history, past surgical history, and problem list.    Review of Systems   Constitutional:  Negative for chills, fatigue and fever.   HENT:  Negative for hearing loss and trouble swallowing.    Eyes:  Negative for visual disturbance.   Respiratory:  Negative for shortness of breath.    Cardiovascular:  Positive for chest pain.   Gastrointestinal:  Negative for abdominal pain, constipation, diarrhea, nausea and vomiting.   Genitourinary:  Negative for urinary incontinence.   Musculoskeletal:  Positive for back pain. Negative for arthralgias, joint swelling, myalgias and neck pain.   Neurological:  Positive for numbness. Negative for dizziness, weakness and headache.       Objective   Physical Exam  Constitutional:       Appearance: Normal appearance. He is well-developed.   HENT:      Head: Normocephalic and atraumatic.   Eyes:      Pupils: Pupils are equal, round, and reactive to light.   Cardiovascular:       Rate and Rhythm: Normal rate and regular rhythm.      Heart sounds: Normal heart sounds.   Pulmonary:      Effort: Pulmonary effort is normal.      Breath sounds: Normal breath sounds.   Abdominal:      General: Bowel sounds are normal. There is no distension.      Palpations: Abdomen is soft.      Tenderness: There is no abdominal tenderness.   Musculoskeletal:      Cervical back: Normal range of motion.   Neurological:      Mental Status: He is alert and oriented to person, place, and time.      Sensory: Sensory deficit present.      Deep Tendon Reflexes: Reflexes are normal and symmetric. Reflexes normal.      Comments: Loss of sensation in LLE in L4 distribution   Psychiatric:         Mood and Affect: Mood normal.         Behavior: Behavior normal.         Thought Content: Thought content normal.         Judgment: Judgment normal.           Assessment & Plan   Problems Addressed this Visit          Other    Degeneration of lumbar or lumbosacral intervertebral disc    Spinal stenosis of lumbar region with neurogenic claudication    Chronic low back pain with sciatica - Primary    Cervical disc disease with myelopathy    Herniation of lumbar intervertebral disc with radiculopathy    Other chronic pain    Sacroiliac joint dysfunction of right side    Lumbar radiculopathy     Diagnoses         Codes Comments    Chronic low back pain with left-sided sciatica, unspecified back pain laterality    -  Primary ICD-10-CM: M54.42, G89.29  ICD-9-CM: 724.2, 724.3, 338.29     Lumbar radiculopathy     ICD-10-CM: M54.16  ICD-9-CM: 724.4     Cervical disc disease with myelopathy     ICD-10-CM: M50.00  ICD-9-CM: 722.71     Degeneration of lumbar or lumbosacral intervertebral disc     ICD-10-CM: M51.37  ICD-9-CM: 722.52     Herniation of lumbar intervertebral disc with radiculopathy     ICD-10-CM: M51.16  ICD-9-CM: 722.10, 724.4     Other chronic pain     ICD-10-CM: G89.29  ICD-9-CM: 338.29     Sacroiliac joint dysfunction of  right side     ICD-10-CM: M53.3  ICD-9-CM: 724.6     Spinal stenosis of lumbar region with neurogenic claudication     ICD-10-CM: M48.062  ICD-9-CM: 724.03             Performed left L4 and L5 TFESI, has clearance to hold Eliquis for 2 days, with > 50% relief, schedule repeat.  No change to meds today, referred for injections only, gets [place] 5mg TID prn from PCP  RTC for TFESI then in 2 months for f/u  Pt having pressure in his chest, HR below 60 despite presence of pacemaker, appears calm and non-diaphoretic, taken by nursing upstairs to his PCP for evaluation, decided to simply head to ED at [facility] instead, which is very close by, wife is driving.

## 2024-09-10 ENCOUNTER — TELEPHONE (OUTPATIENT)
Dept: FAMILY MEDICINE CLINIC | Facility: CLINIC | Age: 64
End: 2024-09-10
Payer: MEDICARE

## 2024-09-16 ENCOUNTER — TELEPHONE (OUTPATIENT)
Dept: PAIN MEDICINE | Facility: CLINIC | Age: 64
End: 2024-09-16
Payer: MEDICARE

## 2024-09-18 ENCOUNTER — TELEPHONE (OUTPATIENT)
Dept: PAIN MEDICINE | Facility: CLINIC | Age: 64
End: 2024-09-18
Payer: MEDICARE

## 2024-10-02 ENCOUNTER — HOSPITAL ENCOUNTER (OUTPATIENT)
Dept: PAIN MEDICINE | Facility: HOSPITAL | Age: 64
Discharge: HOME | End: 2024-10-02
Payer: MEDICARE

## 2024-10-02 ENCOUNTER — HOSPITAL ENCOUNTER (OUTPATIENT)
Dept: GENERAL RADIOLOGY | Facility: HOSPITAL | Age: 64
Discharge: HOME | End: 2024-10-02
Payer: MEDICARE

## 2024-10-02 VITALS
OXYGEN SATURATION: 96 % | TEMPERATURE: 98.1 F | RESPIRATION RATE: 16 BRPM | HEART RATE: 60 BPM | SYSTOLIC BLOOD PRESSURE: 136 MMHG | DIASTOLIC BLOOD PRESSURE: 89 MMHG

## 2024-10-02 DIAGNOSIS — M54.16 LUMBAR RADICULOPATHY: ICD-10-CM

## 2024-10-02 DIAGNOSIS — R52 PAIN: ICD-10-CM

## 2024-10-02 DIAGNOSIS — M54.42 CHRONIC LOW BACK PAIN WITH LEFT-SIDED SCIATICA, UNSPECIFIED BACK PAIN LATERALITY: Primary | ICD-10-CM

## 2024-10-02 DIAGNOSIS — G89.29 CHRONIC LOW BACK PAIN WITH LEFT-SIDED SCIATICA, UNSPECIFIED BACK PAIN LATERALITY: Primary | ICD-10-CM

## 2024-10-02 PROCEDURE — 25010000002 DEXAMETHASONE SODIUM PHOSPHATE 10 MG/ML SOLUTION: Performed by: PHYSICAL MEDICINE & REHABILITATION

## 2024-10-02 PROCEDURE — 25010000002 LIDOCAINE PF 1% 1 % SOLUTION: Performed by: PHYSICAL MEDICINE & REHABILITATION

## 2024-10-02 PROCEDURE — 64483 NJX AA&/STRD TFRM EPI L/S 1: CPT | Performed by: PHYSICAL MEDICINE & REHABILITATION

## 2024-10-02 PROCEDURE — 64484 NJX AA&/STRD TFRM EPI L/S EA: CPT | Performed by: PHYSICAL MEDICINE & REHABILITATION

## 2024-10-02 PROCEDURE — 25510000001 IOPAMIDOL 41 % SOLUTION: Performed by: PHYSICAL MEDICINE & REHABILITATION

## 2024-10-02 PROCEDURE — 77003 FLUOROGUIDE FOR SPINE INJECT: CPT

## 2024-10-02 RX ORDER — PRAMIPEXOLE DIHYDROCHLORIDE 0.25 MG/1
TABLET ORAL
COMMUNITY
Start: 2024-09-25

## 2024-10-02 RX ORDER — IOPAMIDOL 408 MG/ML
10 INJECTION, SOLUTION INTRATHECAL
Status: COMPLETED | OUTPATIENT
Start: 2024-10-02 | End: 2024-10-02

## 2024-10-02 RX ORDER — DEXAMETHASONE SODIUM PHOSPHATE 10 MG/ML
10 INJECTION, SOLUTION INTRAMUSCULAR; INTRAVENOUS ONCE
Status: COMPLETED | OUTPATIENT
Start: 2024-10-02 | End: 2024-10-02

## 2024-10-02 RX ORDER — LIDOCAINE HYDROCHLORIDE 10 MG/ML
5 INJECTION, SOLUTION EPIDURAL; INFILTRATION; INTRACAUDAL; PERINEURAL ONCE
Status: COMPLETED | OUTPATIENT
Start: 2024-10-02 | End: 2024-10-02

## 2024-10-02 RX ADMIN — DEXAMETHASONE SODIUM PHOSPHATE 10 MG: 10 INJECTION, SOLUTION INTRAMUSCULAR; INTRAVENOUS at 09:39

## 2024-10-02 RX ADMIN — IOPAMIDOL 10 ML: 408 INJECTION, SOLUTION INTRATHECAL at 09:39

## 2024-10-02 RX ADMIN — LIDOCAINE HYDROCHLORIDE 5 ML: 10 INJECTION, SOLUTION EPIDURAL; INFILTRATION; INTRACAUDAL; PERINEURAL at 09:39

## 2024-10-02 NOTE — PROCEDURES
Procedures    Here for left L4 and L5 TFESI, s/p lumbar fusion. Denies allergy to iodine or contrast, current infection or ABX, holding Eliquis for 2 days.      Lumbar Transforaminal Epidural Steroid Injection    PREOPERATIVE DIAGNOSIS: Lumbar radiculopathy    POSTOPERATIVE DIAGNOSIS: Lumbar radiculopathy    PROCEDURE PERFORMED: Transforaminal Epidural, left L4 and L5    The patient presents with a history of  lumbar degenerative disc disease with stenosis. The patient presents today for a [ transforaminal epidural ] at left L4 and L5.  This is the [ second ] procedure. The patient understands the risks and benefits of the procedure and wishes to proceed. The patient was seen in the preoperative area.  Patient's consent was obtained and updated.  Vitals were taken.  Patient was then brought to the procedure suite and placed in a prone position. The appropriate anatomic area was widely prepped with Chloroprep and draped in a sterile fashion.  Under fluoroscopic guidance using oblique view, a 25 guage curved tip spinal needle  was passed through skin anesthetized with 1% Lidocaine without epinephrine. The needle tip was advanced to the inferior medial aspect of the transverse process and carefully walked into the neuroforamin.  At no time were parathesias elicited. Preservative free contrast 1 ml was injected under live fluoro to verify epidural placement. At this point [ 2.5 ] mL of a solution containing [ Dexamethasone 10mg and Lidocaine PF 1% 4ml ] were injected. The patient reported no discomfort with injection.  The procedure was repeated in all respects at the left L5-S1 neuroforamen.  A sterile dressing was placed over the puncture sites.    The patient tolerated the procedure with [ no complications ]. They were then brought to the post procedure area where they recovered nicely.    Discharge:  The patient will be discharged home in stable condition.   Patient understands to contact the Center with any post  procedure questions or concerns.  Discharge instructions given by nursing staff.

## 2024-10-15 NOTE — PROGRESS NOTES
Subjective   Steven Hammonds is a 64 y.o. male.   Chief Complaint   Patient presents with   • Back Pain       History of Present Illness  Ongoing back pain, worsening stiffness back and left leg. Left foot drawing up intermittently     Xray 6/2023 showed: Orthopedic hardware of anterior posterior   lumbar spine fusion L2-S1 demonstrates stable position. No hardware   displacement or fracture detected. Advanced changes of degenerative   disc disease T12-L1 and L1-L2 demonstrate. No fractures detected.   Patient status post right hip arthroplasty.     Left foot staring to dorsiflex    Answers submitted by the patient for this visit:  Primary Reason for Visit (Submitted on 10/15/2024)  What is the primary reason for your visit?: Back Pain  Back Pain  This is a chronic problem. The current episode started more than 1 year ago. The problem occurs constantly. The problem has been worse since onset. The pain is present in the lumbar spine. The quality of the pain is described as stabbing and shooting. The pain radiates to the left buttock and left thigh. The pain is at a severity of 6/10. The pain is severe. The pain is The same all the time. The symptoms are aggravated by position, standing, twisting, stress, sitting, lying down, bending and coughing. Stiffness is present All day. Associated symptoms include leg pain, numbness, paresthesias, pelvic pain, tingling and weakness. Pertinent negatives include no abdominal pain, bladder incontinence, bowel incontinence, chest pain, dysuria, fever, perianal numbness or weight loss. The treatment provided no relief.   Hyperlipidemia  This is a chronic problem. The current episode started more than 1 year ago. Associated symptoms include leg pain. Pertinent negatives include no chest pain or shortness of breath. Current antihyperlipidemic treatment includes statins. Risk factors for coronary artery disease include hypertension, male sex and dyslipidemia.   Hypertension  This is a  chronic problem. The current episode started more than 1 year ago. Pertinent negatives include no chest pain, neck pain or shortness of breath. Risk factors for coronary artery disease include dyslipidemia, male gender and smoking/tobacco exposure. Current antihypertension treatment includes ACE inhibitors, beta blockers and diuretics.        The following portions of the patient's history were reviewed and updated as appropriate: allergies, current medications, past family history, past medical history, past social history, past surgical history, and problem list.    Patient Active Problem List   Diagnosis   • Degeneration of lumbar or lumbosacral intervertebral disc   • Disc narrowing   • Colon cancer screening   • Gastroesophageal reflux disease   • Hypertension   • Paroxysmal atrial fibrillation   • Spinal stenosis of lumbar region with neurogenic claudication   • Spondylolysis   • Status post arthroscopy of knee   • Sinus pause   • Tachycardia-bradycardia   • Thoracic or lumbosacral neuritis or radiculitis   • Tremor   • Presence of cardiac pacemaker   • Allergic rhinitis   • Hyperlipidemia   • Insomnia   • Umbilical hernia   • Tinea   • Impetigo   • Labile blood pressure   • Other specified persistent mood disorders   • Non-seasonal allergic rhinitis due to pollen   • Chronic low back pain with sciatica   • Bobby's esophagus with dysplasia   • Former tobacco use   • Cervical disc disease with myelopathy   • Psoriatic arthritis   • Chest pain   • Hypertensive urgency   • Headache   • Abnormal nuclear stress test   • Herniation of lumbar intervertebral disc with radiculopathy   • History of lumbar spinal fusion   • Other chronic pain   • Sacroiliac joint dysfunction of right side   • Spondylolisthesis at L2-L3 level   • Spondylolisthesis at L3-L4 level   • Other secondary kyphosis, thoracolumbar region   • Pseudoarthrosis of lumbar spine   • Over weight   • Allergic conjunctivitis of both eyes   • Lumbar  radiculopathy       Current Outpatient Medications on File Prior to Visit   Medication Sig Dispense Refill   • amitriptyline (ELAVIL) 50 MG tablet Take 2 tablets by mouth every night at bedtime. 180 tablet 3   • ascorbic acid (VITAMIN C) 500 MG tablet Take 1 tablet by mouth every night at bedtime.     • carisoprodol (SOMA) 350 MG tablet Take 1 tablet by mouth 3 (Three) Times a Day As Needed for Muscle Spasms. for muscle spams 90 tablet 3   • celecoxib (CeleBREX) 100 MG capsule Take 1 capsule by mouth Every 12 (Twelve) Hours. 180 capsule 3   • cetirizine (zyrTEC) 10 MG tablet Take 1 tablet by mouth Daily.     • cholecalciferol (VITAMIN D3) 25 MCG (1000 UT) tablet Take 3 tablets by mouth every night at bedtime.     • digoxin (LANOXIN) 125 MCG tablet TAKE 1 TABLET DAILY 90 tablet 3   • Eliquis 5 MG tablet tablet TAKE 1 TABLET TWICE A  tablet 3   • esomeprazole (nexIUM) 40 MG capsule Take 1 capsule by mouth Daily.     • fluticasone (FLONASE) 50 MCG/ACT nasal spray USE 2 SPRAYS IN EACH NOSTRIL  ONCE DAILY 48 g 3   • gabapentin (NEURONTIN) 600 MG tablet TAKE 1 TABLET FOUR TIMES A  tablet 3   • hydroCHLOROthiazide 25 MG tablet Take 1 tablet by mouth Daily. 90 tablet 3   • lisinopril (PRINIVIL,ZESTRIL) 20 MG tablet Take 1 tablet by mouth Daily. 90 tablet 3   • pramipexole (MIRAPEX) 0.25 MG tablet      • rosuvastatin (CRESTOR) 10 MG tablet TAKE ONE-HALF TABLET BY MOUTH  DAILY 45 tablet 3   • [DISCONTINUED] HYDROcodone-acetaminophen (NORCO) 5-325 MG per tablet Take 1 tablet by mouth Every 8 (Eight) Hours As Needed for Severe Pain. 90 tablet 0   • [DISCONTINUED] metoprolol succinate XL (TOPROL-XL) 50 MG 24 hr tablet TAKE 1 TABLET DAILY 90 tablet 3     No current facility-administered medications on file prior to visit.     Current outpatient and discharge medications have been reconciled for the patient.  Reviewed by: Junaid Marquez MD      Allergies   Allergen Reactions   • Oxycodone-Acetaminophen GI  "Intolerance   • Acetaminophen Nausea And Vomiting   • Adhesive Tape Unknown (See Comments)     BLISTERS       Review of Systems   Constitutional:  Negative for fever and unexpected weight loss.   Respiratory:  Negative for shortness of breath.    Cardiovascular:  Negative for chest pain.   Gastrointestinal:  Negative for abdominal pain and bowel incontinence.   Genitourinary:  Positive for pelvic pain. Negative for dysuria and urinary incontinence.   Musculoskeletal:  Positive for back pain. Negative for neck pain.   Neurological:  Positive for tingling, weakness, numbness and paresthesias.     I have reviewed and confirmed the accuracy of the ROS as documented by the MA/LPN/RN Junaid Marquez MD    Objective   Visit Vitals  /88 (BP Location: Right arm, Patient Position: Sitting, Cuff Size: Large Adult)   Pulse 90   Resp 20   Ht 180.9 cm (71.22\")   Wt 88 kg (194 lb)   BMI 26.89 kg/m²      **  Physical Exam  Constitutional:       Appearance: He is well-developed.   HENT:      Head: Normocephalic and atraumatic.      Right Ear: External ear normal.      Left Ear: External ear normal.      Nose: Nose normal.   Eyes:      Pupils: Pupils are equal, round, and reactive to light.   Cardiovascular:      Rate and Rhythm: Normal rate and regular rhythm.      Heart sounds: Normal heart sounds.   Pulmonary:      Effort: Pulmonary effort is normal.      Breath sounds: Normal breath sounds.   Abdominal:      General: Bowel sounds are normal.      Palpations: Abdomen is soft.   Musculoskeletal:         General: Normal range of motion.      Cervical back: Normal range of motion and neck supple.   Skin:     General: Skin is warm and dry.   Neurological:      Mental Status: He is alert and oriented to person, place, and time.   Psychiatric:         Behavior: Behavior normal.         Thought Content: Thought content normal.         Judgment: Judgment normal.     Derm Physical Exam  Ortho Exam   Neurological Exam  Mental " Status  Alert. Oriented to person, place, and time.    Cranial Nerves  CN III, IV, VI: Pupils equal round and reactive to light bilaterally.         Diagnoses and all orders for this visit:    1. Essential hypertension (Primary)  -     CBC & Differential  -     Comprehensive Metabolic Panel    2. Spinal stenosis of lumbar region with neurogenic claudication  Comments:  try PT. May need CT and pain management referral if sx persist despite PT  Orders:  -     HYDROcodone-acetaminophen (NORCO) 5-325 MG per tablet; Take 1 tablet by mouth Every 8 (Eight) Hours As Needed for Severe Pain.  Dispense: 90 tablet; Refill: 0    3. Mixed hyperlipidemia  -     Comprehensive Metabolic Panel  -     TSH  -     Lipid Panel With / Chol / HDL Ratio        Findings discussed. All questions answered.  Medication and medication adverse effects discussed.  Drug education given and explained to patient. Patient verbalized understanding.  Follow-up for routine health maintenance as indicated.  Follow up in 1 month for reevaluation, sooner for concerns.        Expected course, medications, and adverse effects discussed as appropriate.  Call or return if worsening or persistent symptoms.     This document is intended for medical professional use only.   Electronically signed by Junaid Marquez MD on 10/16/2024. This content may not have been proofread.

## 2024-10-16 ENCOUNTER — OFFICE VISIT (OUTPATIENT)
Dept: FAMILY MEDICINE CLINIC | Facility: CLINIC | Age: 64
End: 2024-10-16
Payer: MEDICARE

## 2024-10-16 VITALS
DIASTOLIC BLOOD PRESSURE: 88 MMHG | BODY MASS INDEX: 27.16 KG/M2 | HEIGHT: 71 IN | HEART RATE: 90 BPM | RESPIRATION RATE: 20 BRPM | WEIGHT: 194 LBS | SYSTOLIC BLOOD PRESSURE: 138 MMHG

## 2024-10-16 DIAGNOSIS — I10 ESSENTIAL HYPERTENSION: Primary | ICD-10-CM

## 2024-10-16 DIAGNOSIS — E78.2 MIXED HYPERLIPIDEMIA: ICD-10-CM

## 2024-10-16 DIAGNOSIS — M48.062 SPINAL STENOSIS OF LUMBAR REGION WITH NEUROGENIC CLAUDICATION: ICD-10-CM

## 2024-10-16 PROCEDURE — 3079F DIAST BP 80-89 MM HG: CPT | Performed by: FAMILY MEDICINE

## 2024-10-16 PROCEDURE — G2211 COMPLEX E/M VISIT ADD ON: HCPCS | Performed by: FAMILY MEDICINE

## 2024-10-16 PROCEDURE — 1126F AMNT PAIN NOTED NONE PRSNT: CPT | Performed by: FAMILY MEDICINE

## 2024-10-16 PROCEDURE — 99214 OFFICE O/P EST MOD 30 MIN: CPT | Performed by: FAMILY MEDICINE

## 2024-10-16 PROCEDURE — 1111F DSCHRG MED/CURRENT MED MERGE: CPT | Performed by: FAMILY MEDICINE

## 2024-10-16 PROCEDURE — 3075F SYST BP GE 130 - 139MM HG: CPT | Performed by: FAMILY MEDICINE

## 2024-10-16 RX ORDER — HYDROCODONE BITARTRATE AND ACETAMINOPHEN 5; 325 MG/1; MG/1
1 TABLET ORAL EVERY 8 HOURS PRN
Qty: 90 TABLET | Refills: 0 | Status: SHIPPED | OUTPATIENT
Start: 2024-10-16

## 2024-10-17 LAB
ALBUMIN SERPL-MCNC: 4.5 G/DL (ref 3.9–4.9)
ALP SERPL-CCNC: 103 IU/L (ref 44–121)
ALT SERPL-CCNC: 23 IU/L (ref 0–44)
AST SERPL-CCNC: 32 IU/L (ref 0–40)
BASOPHILS # BLD AUTO: 0.1 X10E3/UL (ref 0–0.2)
BASOPHILS NFR BLD AUTO: 1 %
BILIRUB SERPL-MCNC: 0.5 MG/DL (ref 0–1.2)
BUN SERPL-MCNC: 16 MG/DL (ref 8–27)
BUN/CREAT SERPL: 13 (ref 10–24)
CALCIUM SERPL-MCNC: 9.4 MG/DL (ref 8.6–10.2)
CHLORIDE SERPL-SCNC: 100 MMOL/L (ref 96–106)
CHOLEST SERPL-MCNC: 161 MG/DL (ref 100–199)
CHOLEST/HDLC SERPL: 3.5 RATIO (ref 0–5)
CO2 SERPL-SCNC: 25 MMOL/L (ref 20–29)
CREAT SERPL-MCNC: 1.27 MG/DL (ref 0.76–1.27)
EGFRCR SERPLBLD CKD-EPI 2021: 63 ML/MIN/1.73
EOSINOPHIL # BLD AUTO: 0.2 X10E3/UL (ref 0–0.4)
EOSINOPHIL NFR BLD AUTO: 4 %
ERYTHROCYTE [DISTWIDTH] IN BLOOD BY AUTOMATED COUNT: 12.5 % (ref 11.6–15.4)
GLOBULIN SER CALC-MCNC: 2.3 G/DL (ref 1.5–4.5)
GLUCOSE SERPL-MCNC: 107 MG/DL (ref 70–99)
HCT VFR BLD AUTO: 41.3 % (ref 37.5–51)
HDLC SERPL-MCNC: 46 MG/DL
HGB BLD-MCNC: 13.4 G/DL (ref 13–17.7)
IMM GRANULOCYTES # BLD AUTO: 0 X10E3/UL (ref 0–0.1)
IMM GRANULOCYTES NFR BLD AUTO: 0 %
LDLC SERPL CALC-MCNC: 97 MG/DL (ref 0–99)
LYMPHOCYTES # BLD AUTO: 1.6 X10E3/UL (ref 0.7–3.1)
LYMPHOCYTES NFR BLD AUTO: 23 %
MCH RBC QN AUTO: 29.1 PG (ref 26.6–33)
MCHC RBC AUTO-ENTMCNC: 32.4 G/DL (ref 31.5–35.7)
MCV RBC AUTO: 90 FL (ref 79–97)
MONOCYTES # BLD AUTO: 0.6 X10E3/UL (ref 0.1–0.9)
MONOCYTES NFR BLD AUTO: 9 %
NEUTROPHILS # BLD AUTO: 4.3 X10E3/UL (ref 1.4–7)
NEUTROPHILS NFR BLD AUTO: 63 %
PLATELET # BLD AUTO: 327 X10E3/UL (ref 150–450)
POTASSIUM SERPL-SCNC: 4.4 MMOL/L (ref 3.5–5.2)
PROT SERPL-MCNC: 6.8 G/DL (ref 6–8.5)
RBC # BLD AUTO: 4.61 X10E6/UL (ref 4.14–5.8)
SODIUM SERPL-SCNC: 139 MMOL/L (ref 134–144)
TRIGL SERPL-MCNC: 95 MG/DL (ref 0–149)
TSH SERPL DL<=0.005 MIU/L-ACNC: 2.21 UIU/ML (ref 0.45–4.5)
VLDLC SERPL CALC-MCNC: 18 MG/DL (ref 5–40)
WBC # BLD AUTO: 6.8 X10E3/UL (ref 3.4–10.8)

## 2024-11-25 ENCOUNTER — OFFICE VISIT (OUTPATIENT)
Dept: PAIN MEDICINE | Facility: CLINIC | Age: 64
End: 2024-11-25
Payer: MEDICARE

## 2024-11-25 VITALS
DIASTOLIC BLOOD PRESSURE: 81 MMHG | HEART RATE: 60 BPM | RESPIRATION RATE: 16 BRPM | SYSTOLIC BLOOD PRESSURE: 122 MMHG | OXYGEN SATURATION: 95 %

## 2024-11-25 DIAGNOSIS — M53.3 SACROILIAC JOINT DYSFUNCTION OF RIGHT SIDE: ICD-10-CM

## 2024-11-25 DIAGNOSIS — G89.29 CHRONIC BILATERAL LOW BACK PAIN WITH BILATERAL SCIATICA: Primary | ICD-10-CM

## 2024-11-25 DIAGNOSIS — M54.41 CHRONIC BILATERAL LOW BACK PAIN WITH BILATERAL SCIATICA: Primary | ICD-10-CM

## 2024-11-25 DIAGNOSIS — M51.16 HERNIATION OF LUMBAR INTERVERTEBRAL DISC WITH RADICULOPATHY: ICD-10-CM

## 2024-11-25 DIAGNOSIS — M51.372 DEGENERATION OF INTERVERTEBRAL DISC OF LUMBOSACRAL REGION WITH DISCOGENIC BACK PAIN AND LOWER EXTREMITY PAIN: ICD-10-CM

## 2024-11-25 DIAGNOSIS — M48.062 SPINAL STENOSIS OF LUMBAR REGION WITH NEUROGENIC CLAUDICATION: ICD-10-CM

## 2024-11-25 DIAGNOSIS — M54.16 LUMBAR RADICULOPATHY: ICD-10-CM

## 2024-11-25 DIAGNOSIS — M54.42 CHRONIC BILATERAL LOW BACK PAIN WITH BILATERAL SCIATICA: Primary | ICD-10-CM

## 2024-11-25 PROCEDURE — 1159F MED LIST DOCD IN RCRD: CPT | Performed by: PHYSICAL MEDICINE & REHABILITATION

## 2024-11-25 PROCEDURE — 1160F RVW MEDS BY RX/DR IN RCRD: CPT | Performed by: PHYSICAL MEDICINE & REHABILITATION

## 2024-11-25 PROCEDURE — 99214 OFFICE O/P EST MOD 30 MIN: CPT | Performed by: PHYSICAL MEDICINE & REHABILITATION

## 2024-11-25 PROCEDURE — G0463 HOSPITAL OUTPT CLINIC VISIT: HCPCS | Performed by: PHYSICAL MEDICINE & REHABILITATION

## 2024-11-25 PROCEDURE — 3074F SYST BP LT 130 MM HG: CPT | Performed by: PHYSICAL MEDICINE & REHABILITATION

## 2024-11-25 PROCEDURE — 3079F DIAST BP 80-89 MM HG: CPT | Performed by: PHYSICAL MEDICINE & REHABILITATION

## 2024-11-25 PROCEDURE — 1125F AMNT PAIN NOTED PAIN PRSNT: CPT | Performed by: PHYSICAL MEDICINE & REHABILITATION

## 2024-11-25 RX ORDER — PROPRANOLOL HYDROCHLORIDE 120 MG/1
120 CAPSULE, EXTENDED RELEASE ORAL DAILY
COMMUNITY
Start: 2024-09-25

## 2024-11-25 NOTE — PROGRESS NOTES
Subjective   Steven Hammonds is a 64 y.o. male.     History of Present Illness  LBP to medial and lateral LLE to foot, 8/10 at worst, 5/10 at best, began years ago, worsening, s/p lumbar fusion, aching, throbbing, with numbness, worse with bending and lifting, interferes with ADLs, activity, sleep, failed surgery, PT, medications. CT L-spine with L2-S1, neuroforaminal narrowing worst at left L5/S1. Saw PCP, notes reviewed, referred for injections only, on Eliquis, has pacemaker. No FH of substance abuse.     Symptoms include chest pain and numbness.  Pertinent negative symptoms include no abdominal pain, no chills, no fatigue, no fever, no myalgias, no nausea, no neck pain, no vomiting and no weakness.   Back Pain  Associated symptoms include chest pain and numbness. Pertinent negatives include no abdominal pain, bladder incontinence, fever or weakness.   Chest Pain   Associated symptoms include back pain and numbness. Pertinent negatives include no abdominal pain, dizziness, fever, nausea, shortness of breath, vomiting or weakness.        The following portions of the patient's history were reviewed and updated as appropriate: allergies, current medications, past family history, past medical history, past social history, past surgical history, and problem list.    Review of Systems   Constitutional:  Negative for chills, fatigue and fever.   HENT:  Negative for hearing loss and trouble swallowing.    Eyes:  Negative for visual disturbance.   Respiratory:  Negative for shortness of breath.    Cardiovascular:  Positive for chest pain.   Gastrointestinal:  Negative for abdominal pain, constipation, diarrhea, nausea and vomiting.   Genitourinary:  Negative for urinary incontinence.   Musculoskeletal:  Positive for back pain. Negative for arthralgias, joint swelling, myalgias and neck pain.   Neurological:  Positive for numbness. Negative for dizziness, weakness and headache.       Objective   Physical  Exam  Constitutional:       Appearance: Normal appearance. He is well-developed.   HENT:      Head: Normocephalic and atraumatic.   Eyes:      Pupils: Pupils are equal, round, and reactive to light.   Cardiovascular:      Rate and Rhythm: Normal rate and regular rhythm.      Heart sounds: Normal heart sounds.   Pulmonary:      Effort: Pulmonary effort is normal.      Breath sounds: Normal breath sounds.   Abdominal:      General: Bowel sounds are normal. There is no distension.      Palpations: Abdomen is soft.      Tenderness: There is no abdominal tenderness.   Musculoskeletal:      Cervical back: Normal range of motion.   Neurological:      Mental Status: He is alert and oriented to person, place, and time.      Sensory: Sensory deficit present.      Deep Tendon Reflexes: Reflexes are normal and symmetric. Reflexes normal.      Comments: Loss of sensation in LLE in L4 distribution   Psychiatric:         Mood and Affect: Mood normal.         Behavior: Behavior normal.         Thought Content: Thought content normal.         Judgment: Judgment normal.           Assessment & Plan   Problems Addressed this Visit          Other    Degeneration of intervertebral disc of lumbosacral region with discogenic back pain and lower extremity pain    Spinal stenosis of lumbar region with neurogenic claudication    Chronic low back pain with sciatica - Primary    Herniation of lumbar intervertebral disc with radiculopathy    Sacroiliac joint dysfunction of right side    Lumbar radiculopathy     Diagnoses         Codes Comments    Chronic low back pain with left-sided sciatica, unspecified back pain laterality    -  Primary ICD-10-CM: M54.42, G89.29  ICD-9-CM: 724.2, 724.3, 338.29     Degeneration of intervertebral disc of lumbosacral region with discogenic back pain and lower extremity pain     ICD-10-CM: M51.372  ICD-9-CM: 722.52     Herniation of lumbar intervertebral disc with radiculopathy     ICD-10-CM: M51.16  ICD-9-CM:  722.10, 724.4     Lumbar radiculopathy     ICD-10-CM: M54.16  ICD-9-CM: 724.4     Sacroiliac joint dysfunction of right side     ICD-10-CM: M53.3  ICD-9-CM: 724.6     Spinal stenosis of lumbar region with neurogenic claudication     ICD-10-CM: M48.062  ICD-9-CM: 724.03             Performed left L4 and L5 TFESI, has clearance to hold Eliquis for 2 days, with > 50% relief, performed repeat with even greater relief, schedule b/l L5 TFESI as RLE pain now significant as LLE improves.  No change to meds today, referred for injections only, gets [place] 5mg TID prn from PCP  RTC for TFESI then in 2 months for f/u                 normal

## 2024-12-02 ENCOUNTER — OFFICE VISIT (OUTPATIENT)
Dept: FAMILY MEDICINE CLINIC | Facility: CLINIC | Age: 64
End: 2024-12-02
Payer: MEDICARE

## 2024-12-02 VITALS
HEART RATE: 80 BPM | SYSTOLIC BLOOD PRESSURE: 130 MMHG | DIASTOLIC BLOOD PRESSURE: 76 MMHG | HEIGHT: 71 IN | BODY MASS INDEX: 29.82 KG/M2 | RESPIRATION RATE: 20 BRPM | WEIGHT: 213 LBS | OXYGEN SATURATION: 98 %

## 2024-12-02 DIAGNOSIS — M48.062 SPINAL STENOSIS OF LUMBAR REGION WITH NEUROGENIC CLAUDICATION: ICD-10-CM

## 2024-12-02 PROCEDURE — 3075F SYST BP GE 130 - 139MM HG: CPT | Performed by: FAMILY MEDICINE

## 2024-12-02 PROCEDURE — 3078F DIAST BP <80 MM HG: CPT | Performed by: FAMILY MEDICINE

## 2024-12-02 PROCEDURE — 99213 OFFICE O/P EST LOW 20 MIN: CPT | Performed by: FAMILY MEDICINE

## 2024-12-02 PROCEDURE — 1111F DSCHRG MED/CURRENT MED MERGE: CPT | Performed by: FAMILY MEDICINE

## 2024-12-02 PROCEDURE — G2211 COMPLEX E/M VISIT ADD ON: HCPCS | Performed by: FAMILY MEDICINE

## 2024-12-02 PROCEDURE — 1125F AMNT PAIN NOTED PAIN PRSNT: CPT | Performed by: FAMILY MEDICINE

## 2024-12-02 RX ORDER — HYDROCODONE BITARTRATE AND ACETAMINOPHEN 5; 325 MG/1; MG/1
1 TABLET ORAL EVERY 8 HOURS PRN
Qty: 90 TABLET | Refills: 0 | Status: SHIPPED | OUTPATIENT
Start: 2024-12-02

## 2024-12-02 NOTE — PROGRESS NOTES
Subjective   Steven Hammonds is a 64 y.o. male.   Chief Complaint   Patient presents with    Pain       History of Present Illness  Pain med refill. Stable on current treatment. Controlled substance agreement signed and on file.    Back Pain  This is a chronic problem. The current episode started more than 1 year ago. The problem occurs constantly. The problem has been worse since onset. The pain is present in the lumbar spine. The quality of the pain is described as stabbing and shooting. The pain radiates to the left buttock and left thigh. The pain is at a severity of 6/10. The pain is severe. The pain is The same all the time. The symptoms are aggravated by position, standing, twisting, stress, sitting, lying down, bending and coughing. Stiffness is present All day. Associated symptoms include numbness, paresthesias, pelvic pain, tingling and weakness. Pertinent negatives include no abdominal pain, bladder incontinence, bowel incontinence, dysuria, fever, perianal numbness or weight loss. The treatment provided no relief.   Extremity Pain   Associated symptoms include numbness and tingling. Pertinent negatives include no fever.        The following portions of the patient's history were reviewed and updated as appropriate: allergies, current medications, past family history, past medical history, past social history, past surgical history, and problem list.    Patient Active Problem List   Diagnosis    Degeneration of intervertebral disc of lumbosacral region with discogenic back pain and lower extremity pain    Disc narrowing    Colon cancer screening    Gastroesophageal reflux disease    Hypertension    Paroxysmal atrial fibrillation    Spinal stenosis of lumbar region with neurogenic claudication    Spondylolysis    Status post arthroscopy of knee    Sinus pause    Tachycardia-bradycardia    Thoracic or lumbosacral neuritis or radiculitis    Tremor    Presence of cardiac pacemaker    Allergic rhinitis     Hyperlipidemia    Insomnia    Umbilical hernia    Tinea    Impetigo    Labile blood pressure    Other specified persistent mood disorders    Non-seasonal allergic rhinitis due to pollen    Chronic bilateral low back pain with bilateral sciatica    Bobby's esophagus with dysplasia    Former tobacco use    Cervical disc disease with myelopathy    Psoriatic arthritis    Chest pain    Hypertensive urgency    Headache    Abnormal nuclear stress test    Herniation of lumbar intervertebral disc with radiculopathy    History of lumbar spinal fusion    Other chronic pain    Sacroiliac joint dysfunction of right side    Spondylolisthesis at L2-L3 level    Spondylolisthesis at L3-L4 level    Other secondary kyphosis, thoracolumbar region    Pseudoarthrosis of lumbar spine    Over weight    Allergic conjunctivitis of both eyes    Lumbar radiculopathy       Current Outpatient Medications on File Prior to Visit   Medication Sig Dispense Refill    amitriptyline (ELAVIL) 50 MG tablet Take 2 tablets by mouth every night at bedtime. 180 tablet 3    ascorbic acid (VITAMIN C) 500 MG tablet Take 1 tablet by mouth every night at bedtime.      carisoprodol (SOMA) 350 MG tablet Take 1 tablet by mouth 3 (Three) Times a Day As Needed for Muscle Spasms. for muscle spams 90 tablet 3    celecoxib (CeleBREX) 100 MG capsule Take 1 capsule by mouth Every 12 (Twelve) Hours. 180 capsule 3    cetirizine (zyrTEC) 10 MG tablet Take 1 tablet by mouth Daily.      cholecalciferol (VITAMIN D3) 25 MCG (1000 UT) tablet Take 3 tablets by mouth every night at bedtime.      digoxin (LANOXIN) 125 MCG tablet TAKE 1 TABLET DAILY 90 tablet 3    Eliquis 5 MG tablet tablet TAKE 1 TABLET TWICE A  tablet 3    esomeprazole (nexIUM) 40 MG capsule Take 1 capsule by mouth Daily.      fluticasone (FLONASE) 50 MCG/ACT nasal spray USE 2 SPRAYS IN EACH NOSTRIL  ONCE DAILY 48 g 3    gabapentin (NEURONTIN) 600 MG tablet TAKE 1 TABLET FOUR TIMES A  tablet 3     "hydroCHLOROthiazide 25 MG tablet Take 1 tablet by mouth Daily. 90 tablet 3    lisinopril (PRINIVIL,ZESTRIL) 20 MG tablet Take 1 tablet by mouth Daily. 90 tablet 3    pramipexole (MIRAPEX) 0.25 MG tablet       propranolol LA (INDERAL LA) 120 MG 24 hr capsule Take 1 capsule by mouth Daily.      rosuvastatin (CRESTOR) 10 MG tablet TAKE ONE-HALF TABLET BY MOUTH  DAILY 45 tablet 3    [DISCONTINUED] HYDROcodone-acetaminophen (NORCO) 5-325 MG per tablet Take 1 tablet by mouth Every 8 (Eight) Hours As Needed for Severe Pain. 90 tablet 0     No current facility-administered medications on file prior to visit.     Current outpatient and discharge medications have been reconciled for the patient.  Reviewed by: Junaid Marquez MD      Allergies   Allergen Reactions    Oxycodone-Acetaminophen GI Intolerance    Acetaminophen Nausea And Vomiting    Adhesive Tape Unknown (See Comments)     BLISTERS       Review of Systems   Constitutional:  Negative for fever and unexpected weight loss.   Gastrointestinal:  Negative for abdominal pain and bowel incontinence.   Genitourinary:  Positive for pelvic pain. Negative for dysuria and urinary incontinence.   Musculoskeletal:  Positive for back pain.   Neurological:  Positive for tingling, weakness, numbness and paresthesias.   All other systems reviewed and are negative.    I have reviewed and confirmed the accuracy of the ROS as documented by the MA/LPN/RN Junaid Marquez MD    Objective   Visit Vitals  /76 (BP Location: Right arm, Patient Position: Sitting, Cuff Size: Adult)   Pulse 80   Resp 20   Ht 180.9 cm (71.22\")   Wt 96.6 kg (213 lb)   SpO2 98%   BMI 29.52 kg/m²      **  Physical Exam  Constitutional:       Appearance: He is well-developed.   HENT:      Head: Normocephalic and atraumatic.      Right Ear: External ear normal.      Left Ear: External ear normal.      Nose: Nose normal.   Eyes:      Pupils: Pupils are equal, round, and reactive to light. "   Cardiovascular:      Rate and Rhythm: Normal rate and regular rhythm.      Heart sounds: Normal heart sounds.   Pulmonary:      Effort: Pulmonary effort is normal.      Breath sounds: Normal breath sounds.   Abdominal:      General: Bowel sounds are normal.      Palpations: Abdomen is soft.   Musculoskeletal:         General: Normal range of motion.      Cervical back: Normal range of motion and neck supple.   Skin:     General: Skin is warm and dry.   Neurological:      Mental Status: He is alert and oriented to person, place, and time.   Psychiatric:         Behavior: Behavior normal.         Thought Content: Thought content normal.         Judgment: Judgment normal.       Derm Physical Exam  Ortho Exam   Neurological Exam  Mental Status  Alert. Oriented to person, place, and time.    Cranial Nerves  CN III, IV, VI: Pupils equal round and reactive to light bilaterally.         Assessment & Plan  Spinal stenosis of lumbar region with neurogenic claudication    Orders:    HYDROcodone-acetaminophen (NORCO) 5-325 MG per tablet; Take 1 tablet by mouth Every 8 (Eight) Hours As Needed for Severe Pain.    Findings discussed. All questions answered.  Medication and medication adverse effects discussed.  Drug education given and explained to patient. Patient verbalized understanding..  Follow up in 1 month for reevaluation, sooner for concerns.             Expected course, medications, and adverse effects discussed as appropriate.  Call or return if worsening or persistent symptoms.     This document is intended for medical professional use only.   Electronically signed by Junaid Marquez MD on 12/02/2024. This content may not have been proofread.

## 2024-12-02 NOTE — ASSESSMENT & PLAN NOTE
Orders:    HYDROcodone-acetaminophen (NORCO) 5-325 MG per tablet; Take 1 tablet by mouth Every 8 (Eight) Hours As Needed for Severe Pain.

## 2025-01-08 ENCOUNTER — TELEPHONE (OUTPATIENT)
Dept: PAIN MEDICINE | Facility: CLINIC | Age: 65
End: 2025-01-08
Payer: MEDICARE

## 2025-01-08 NOTE — TELEPHONE ENCOUNTER
Caller: Steven Hammonds    Relationship to patient: Self    Best call back number: [phone]    Type of visit: INJECTION    Requested date: ASAP     If rescheduling, when is the original appointment: 01/08/25     Additional notes: PT STATES HE DID NOT STOP HIS BLOOD THINNERS BEFORE INJECTION TODAY. PLEASE CONTACT PT TO RESCHEDULE.

## 2025-01-10 ENCOUNTER — TELEPHONE (OUTPATIENT)
Dept: PAIN MEDICINE | Facility: CLINIC | Age: 65
End: 2025-01-10
Payer: MEDICARE

## 2025-01-10 DIAGNOSIS — I10 ESSENTIAL HYPERTENSION: ICD-10-CM

## 2025-01-10 DIAGNOSIS — J30.9 ALLERGIC RHINITIS, UNSPECIFIED SEASONALITY, UNSPECIFIED TRIGGER: ICD-10-CM

## 2025-01-10 RX ORDER — FLUTICASONE PROPIONATE 50 MCG
SPRAY, SUSPENSION (ML) NASAL
Qty: 48 G | Refills: 3 | Status: SHIPPED | OUTPATIENT
Start: 2025-01-10

## 2025-01-10 RX ORDER — LISINOPRIL 20 MG/1
20 TABLET ORAL DAILY
Qty: 90 TABLET | Refills: 3 | Status: SHIPPED | OUTPATIENT
Start: 2025-01-10

## 2025-01-10 RX ORDER — HYDROCHLOROTHIAZIDE 25 MG/1
25 TABLET ORAL DAILY
Qty: 90 TABLET | Refills: 3 | Status: SHIPPED | OUTPATIENT
Start: 2025-01-10

## 2025-01-10 NOTE — TELEPHONE ENCOUNTER
The [facility] received a fax that requires your attention. The document has been indexed to the patient’s chart for your review.      Reason for sending: RECEIVED CLEARANCE RESPONSE FROM DR HINES'S OFFICE STATING THAT THIS IS NOT THEIR PATIENT    Documents Description: CLEARANCE RESPONSE-DR HINES-1/9/2025    Name of Sender: DR HINES    Date Indexed: 1/10/2025

## 2025-01-13 ENCOUNTER — OFFICE VISIT (OUTPATIENT)
Dept: FAMILY MEDICINE CLINIC | Facility: CLINIC | Age: 65
End: 2025-01-13
Payer: MEDICARE

## 2025-01-13 VITALS
OXYGEN SATURATION: 98 % | RESPIRATION RATE: 20 BRPM | SYSTOLIC BLOOD PRESSURE: 142 MMHG | DIASTOLIC BLOOD PRESSURE: 96 MMHG | BODY MASS INDEX: 30.69 KG/M2 | HEART RATE: 65 BPM | HEIGHT: 71 IN | WEIGHT: 219.2 LBS

## 2025-01-13 DIAGNOSIS — I73.9 CLAUDICATION: Primary | ICD-10-CM

## 2025-01-13 DIAGNOSIS — M51.379 DEGENERATION OF LUMBAR OR LUMBOSACRAL INTERVERTEBRAL DISC: ICD-10-CM

## 2025-01-13 DIAGNOSIS — M48.062 SPINAL STENOSIS OF LUMBAR REGION WITH NEUROGENIC CLAUDICATION: ICD-10-CM

## 2025-01-13 PROCEDURE — 1125F AMNT PAIN NOTED PAIN PRSNT: CPT | Performed by: FAMILY MEDICINE

## 2025-01-13 PROCEDURE — 99214 OFFICE O/P EST MOD 30 MIN: CPT | Performed by: FAMILY MEDICINE

## 2025-01-13 PROCEDURE — 3077F SYST BP >= 140 MM HG: CPT | Performed by: FAMILY MEDICINE

## 2025-01-13 PROCEDURE — G2211 COMPLEX E/M VISIT ADD ON: HCPCS | Performed by: FAMILY MEDICINE

## 2025-01-13 PROCEDURE — 1111F DSCHRG MED/CURRENT MED MERGE: CPT | Performed by: FAMILY MEDICINE

## 2025-01-13 PROCEDURE — 3080F DIAST BP >= 90 MM HG: CPT | Performed by: FAMILY MEDICINE

## 2025-01-13 RX ORDER — HYDROCODONE BITARTRATE AND ACETAMINOPHEN 5; 325 MG/1; MG/1
1 TABLET ORAL EVERY 8 HOURS PRN
Qty: 90 TABLET | Refills: 0 | Status: SHIPPED | OUTPATIENT
Start: 2025-01-13

## 2025-01-13 RX ORDER — CARISOPRODOL 350 MG/1
350 TABLET ORAL 3 TIMES DAILY PRN
Qty: 90 TABLET | Refills: 3 | Status: SHIPPED | OUTPATIENT
Start: 2025-01-13

## 2025-01-13 NOTE — PROGRESS NOTES
Subjective   Steven Hammonds is a 64 y.o. male.   Chief Complaint   Patient presents with    Toe Injury    Pain     History of Present Illness  Patient noticed severe discoloration of toes on left foot about two weeks ago, has improved. Has chronic numbness       The following portions of the patient's history were reviewed and updated as appropriate: allergies, current medications, past family history, past medical history, past social history, past surgical history, and problem list.    Patient Active Problem List   Diagnosis    Degeneration of intervertebral disc of lumbosacral region with discogenic back pain and lower extremity pain    Disc narrowing    Colon cancer screening    Gastroesophageal reflux disease    Hypertension    Paroxysmal atrial fibrillation    Spinal stenosis of lumbar region with neurogenic claudication    Spondylolysis    Status post arthroscopy of knee    Sinus pause    Tachycardia-bradycardia    Thoracic or lumbosacral neuritis or radiculitis    Tremor    Presence of cardiac pacemaker    Allergic rhinitis    Hyperlipidemia    Insomnia    Umbilical hernia    Tinea    Impetigo    Labile blood pressure    Other specified persistent mood disorders    Non-seasonal allergic rhinitis due to pollen    Chronic bilateral low back pain with bilateral sciatica    Bobby's esophagus with dysplasia    Former tobacco use    Cervical disc disease with myelopathy    Psoriatic arthritis    Chest pain    Hypertensive urgency    Headache    Abnormal nuclear stress test    Herniation of lumbar intervertebral disc with radiculopathy    History of lumbar spinal fusion    Other chronic pain    Sacroiliac joint dysfunction of right side    Spondylolisthesis at L2-L3 level    Spondylolisthesis at L3-L4 level    Other secondary kyphosis, thoracolumbar region    Pseudoarthrosis of lumbar spine    Over weight    Allergic conjunctivitis of both eyes    Lumbar radiculopathy       Current Outpatient Medications on File  Prior to Visit   Medication Sig Dispense Refill    amitriptyline (ELAVIL) 50 MG tablet Take 2 tablets by mouth every night at bedtime. 180 tablet 3    ascorbic acid (VITAMIN C) 500 MG tablet Take 1 tablet by mouth every night at bedtime.      celecoxib (CeleBREX) 100 MG capsule Take 1 capsule by mouth Every 12 (Twelve) Hours. 180 capsule 3    cetirizine (zyrTEC) 10 MG tablet Take 1 tablet by mouth Daily.      cholecalciferol (VITAMIN D3) 25 MCG (1000 UT) tablet Take 3 tablets by mouth every night at bedtime.      digoxin (LANOXIN) 125 MCG tablet TAKE 1 TABLET DAILY 90 tablet 3    Eliquis 5 MG tablet tablet TAKE 1 TABLET TWICE A  tablet 3    esomeprazole (nexIUM) 40 MG capsule Take 1 capsule by mouth Daily.      fluticasone (FLONASE) 50 MCG/ACT nasal spray USE 2 SPRAYS IN BOTH NOSTRILS  ONCE DAILY 48 g 3    gabapentin (NEURONTIN) 600 MG tablet TAKE 1 TABLET FOUR TIMES A  tablet 3    hydroCHLOROthiazide 25 MG tablet TAKE 1 TABLET BY MOUTH DAILY 90 tablet 3    lisinopril (PRINIVIL,ZESTRIL) 20 MG tablet TAKE 1 TABLET BY MOUTH DAILY 90 tablet 3    pramipexole (MIRAPEX) 0.25 MG tablet       propranolol LA (INDERAL LA) 120 MG 24 hr capsule Take 1 capsule by mouth Daily.      rosuvastatin (CRESTOR) 10 MG tablet TAKE ONE-HALF TABLET BY MOUTH  DAILY 45 tablet 3    [DISCONTINUED] carisoprodol (SOMA) 350 MG tablet Take 1 tablet by mouth 3 (Three) Times a Day As Needed for Muscle Spasms. for muscle spams 90 tablet 3    [DISCONTINUED] HYDROcodone-acetaminophen (NORCO) 5-325 MG per tablet Take 1 tablet by mouth Every 8 (Eight) Hours As Needed for Severe Pain. 90 tablet 0     No current facility-administered medications on file prior to visit.     Current outpatient and discharge medications have been reconciled for the patient.  Reviewed by: Junaid Marquez MD      Allergies   Allergen Reactions    Oxycodone-Acetaminophen GI Intolerance    Acetaminophen Nausea And Vomiting    Adhesive Tape Unknown (See  "Comments)     BLISTERS       Review of Systems   Skin:  Positive for color change.   Neurological:  Positive for numbness.     I have reviewed and confirmed the accuracy of the ROS as documented by the MA/LPN/RN Junaid Marquez MD    Objective   Visit Vitals  /96 (BP Location: Right arm, Patient Position: Sitting, Cuff Size: Large Adult)   Pulse 65   Resp 20   Ht 180.9 cm (71.22\")   Wt 99.4 kg (219 lb 3.2 oz)   SpO2 98%   BMI 30.38 kg/m²      **  Physical Exam  Cardiovascular:      Pulses:           Dorsalis pedis pulses are 2+ on the right side and 2+ on the left side.   Musculoskeletal:        Feet:    Feet:      Comments: discoloration      Derm Physical Exam  Ortho Exam   Neurological Exam       Assessment & Plan  Claudication    Orders:    US Arterial Doppler Lower Extremity Bilateral; Future    Degeneration of lumbar or lumbosacral intervertebral disc    Orders:    carisoprodol (SOMA) 350 MG tablet; Take 1 tablet by mouth 3 (Three) Times a Day As Needed for Muscle Spasms. for muscle spams    Spinal stenosis of lumbar region with neurogenic claudication    Orders:    HYDROcodone-acetaminophen (NORCO) 5-325 MG per tablet; Take 1 tablet by mouth Every 8 (Eight) Hours As Needed for Severe Pain.    Given location and comorbidities, will verify adequate bloodflow to area. Suspect local trauma in setting of blood thinner as nonphysiologic pattern of what appears to be bruising           Expected course, medications, and adverse effects discussed as appropriate.  Call or return if worsening or persistent symptoms.     This document is intended for medical professional use only.   Electronically signed by Junaid Marquez MD on 01/13/2025. This content may not have been proofread.    "

## 2025-01-22 DIAGNOSIS — I73.9 CLAUDICATION: Primary | ICD-10-CM

## 2025-02-05 ENCOUNTER — HOSPITAL ENCOUNTER (OUTPATIENT)
Dept: GENERAL RADIOLOGY | Facility: HOSPITAL | Age: 65
Discharge: HOME | End: 2025-02-05
Payer: MEDICARE

## 2025-02-05 ENCOUNTER — HOSPITAL ENCOUNTER (OUTPATIENT)
Dept: PAIN MEDICINE | Facility: HOSPITAL | Age: 65
Discharge: HOME | End: 2025-02-05
Payer: MEDICARE

## 2025-02-05 VITALS
TEMPERATURE: 97.9 F | SYSTOLIC BLOOD PRESSURE: 134 MMHG | OXYGEN SATURATION: 96 % | HEART RATE: 61 BPM | DIASTOLIC BLOOD PRESSURE: 96 MMHG | RESPIRATION RATE: 16 BRPM

## 2025-02-05 DIAGNOSIS — M54.41 CHRONIC BILATERAL LOW BACK PAIN WITH BILATERAL SCIATICA: Primary | ICD-10-CM

## 2025-02-05 DIAGNOSIS — M54.42 CHRONIC BILATERAL LOW BACK PAIN WITH BILATERAL SCIATICA: Primary | ICD-10-CM

## 2025-02-05 DIAGNOSIS — M54.16 LUMBAR RADICULOPATHY: ICD-10-CM

## 2025-02-05 DIAGNOSIS — G89.29 CHRONIC BILATERAL LOW BACK PAIN WITH BILATERAL SCIATICA: Primary | ICD-10-CM

## 2025-02-05 DIAGNOSIS — R52 PAIN: ICD-10-CM

## 2025-02-05 PROCEDURE — 25010000002 DEXAMETHASONE SODIUM PHOSPHATE 10 MG/ML SOLUTION: Performed by: PHYSICAL MEDICINE & REHABILITATION

## 2025-02-05 PROCEDURE — 77003 FLUOROGUIDE FOR SPINE INJECT: CPT

## 2025-02-05 PROCEDURE — 25510000001 IOPAMIDOL 41 % SOLUTION: Performed by: PHYSICAL MEDICINE & REHABILITATION

## 2025-02-05 PROCEDURE — 25010000002 LIDOCAINE PF 1% 1 % SOLUTION: Performed by: PHYSICAL MEDICINE & REHABILITATION

## 2025-02-05 RX ORDER — IOPAMIDOL 408 MG/ML
10 INJECTION, SOLUTION INTRATHECAL
Status: COMPLETED | OUTPATIENT
Start: 2025-02-05 | End: 2025-02-05

## 2025-02-05 RX ORDER — DEXAMETHASONE SODIUM PHOSPHATE 10 MG/ML
10 INJECTION, SOLUTION INTRAMUSCULAR; INTRAVENOUS ONCE
Status: COMPLETED | OUTPATIENT
Start: 2025-02-05 | End: 2025-02-05

## 2025-02-05 RX ORDER — LIDOCAINE HYDROCHLORIDE 10 MG/ML
5 INJECTION, SOLUTION EPIDURAL; INFILTRATION; INTRACAUDAL; PERINEURAL ONCE
Status: COMPLETED | OUTPATIENT
Start: 2025-02-05 | End: 2025-02-05

## 2025-02-05 RX ADMIN — IOPAMIDOL 10 ML: 408 INJECTION, SOLUTION INTRATHECAL at 10:41

## 2025-02-05 RX ADMIN — DEXAMETHASONE SODIUM PHOSPHATE 10 MG: 10 INJECTION, SOLUTION INTRAMUSCULAR; INTRAVENOUS at 10:41

## 2025-02-05 RX ADMIN — LIDOCAINE HYDROCHLORIDE 5 ML: 10 INJECTION, SOLUTION EPIDURAL; INFILTRATION; INTRACAUDAL; PERINEURAL at 10:41

## 2025-02-05 NOTE — PROCEDURES
Procedures    Here for bilateral L5 TFESI, s/p lumbar fusion. Denies allergy to iodine or contrast, current infection or ABX, holding Eliquis for 3 days.      Lumbar Transforaminal Epidural Steroid Injection    PREOPERATIVE DIAGNOSIS: Lumbar radiculopathy    POSTOPERATIVE DIAGNOSIS: Lumbar radiculopathy    PROCEDURE PERFORMED: Transforaminal Epidural, bilateral L5    The patient presents with a history of  lumbar degenerative disc disease with radiculopathy. The patient presents today for a [ transforaminal epidural ] at bilateral L5. The patient understands the risks and benefits of the procedure and wishes to proceed. The patient was seen in the preoperative area.  Patient's consent was obtained and updated.  Vitals were taken.  Patient was then brought to the procedure suite and placed in a prone position. The appropriate anatomic area was widely prepped with Chloroprep and draped in a sterile fashion.  Under fluoroscopic guidance using oblique view, a 25 guage curved tip spinal needle  was passed through skin anesthetized with 1% Lidocaine without epinephrine. The needle tip was advanced to the inferior medial aspect of the transverse process and carefully walked into the neuroforamin.  At no time were parathesias elicited. Preservative free contrast 1 ml was injected under live fluoro to verify epidural placement. At this point [ 2.5 ] mL of a solution containing [ Dexamethasone 10mg and Lidocaine PF 1% 4ml ] were injected.  The procedure was repeated in all respects at the right L5-S1 neuroforamen.  A sterile dressing was placed over the puncture sites.    The patient tolerated the procedure with [ no complications ]. They were then brought to the post procedure area where they recovered nicely.    Discharge:  The patient will be discharged home in stable condition.   Patient understands to contact the Center with any post procedure questions or concerns.  Discharge instructions given by nursing staff.

## 2025-02-06 ENCOUNTER — HOSPITAL ENCOUNTER (OUTPATIENT)
Dept: CARDIOLOGY | Facility: HOSPITAL | Age: 65
Discharge: HOME | End: 2025-02-06
Admitting: FAMILY MEDICINE
Payer: MEDICARE

## 2025-02-06 LAB
BH CV LOWER ARTERIAL LEFT ABI RATIO: 1.19
BH CV LOWER ARTERIAL LEFT DORSALIS PEDIS SYS MAX: 132
BH CV LOWER ARTERIAL LEFT GREAT TOE SYS MAX: 110
BH CV LOWER ARTERIAL LEFT POST TIBIAL SYS MAX: 142
BH CV LOWER ARTERIAL LEFT TBI RATIO: 0.92
BH CV LOWER ARTERIAL RIGHT ABI RATIO: 1.18
BH CV LOWER ARTERIAL RIGHT DORSALIS PEDIS SYS MAX: 134
BH CV LOWER ARTERIAL RIGHT GREAT TOE SYS MAX: 97
BH CV LOWER ARTERIAL RIGHT POST TIBIAL SYS MAX: 140
BH CV LOWER ARTERIAL RIGHT TBI RATIO: 0.82
UPPER ARTERIAL LEFT ARM BRACHIAL SYS MAX: 119
UPPER ARTERIAL RIGHT ARM BRACHIAL SYS MAX: 119

## 2025-02-06 PROCEDURE — 93922 UPR/L XTREMITY ART 2 LEVELS: CPT

## 2025-02-10 ENCOUNTER — TELEPHONE (OUTPATIENT)
Dept: FAMILY MEDICINE CLINIC | Facility: CLINIC | Age: 65
End: 2025-02-10
Payer: MEDICARE

## 2025-02-10 NOTE — TELEPHONE ENCOUNTER
Caller: Steven Hammonds    Relationship: Self    Best call back number: [phone]         What was the call regarding: PATIENT RECEIVED LETTER TO HAVE DOPPLER DONE AND IT WAS ALREADY DONE 2/6/25

## 2025-02-17 ENCOUNTER — OFFICE VISIT (OUTPATIENT)
Dept: FAMILY MEDICINE CLINIC | Facility: CLINIC | Age: 65
End: 2025-02-17
Payer: MEDICARE

## 2025-02-17 ENCOUNTER — OFFICE VISIT (OUTPATIENT)
Dept: PAIN MEDICINE | Facility: CLINIC | Age: 65
End: 2025-02-17
Payer: MEDICARE

## 2025-02-17 VITALS
SYSTOLIC BLOOD PRESSURE: 132 MMHG | OXYGEN SATURATION: 99 % | RESPIRATION RATE: 20 BRPM | WEIGHT: 224 LBS | HEART RATE: 64 BPM | DIASTOLIC BLOOD PRESSURE: 80 MMHG | HEIGHT: 71 IN | BODY MASS INDEX: 31.36 KG/M2

## 2025-02-17 VITALS
SYSTOLIC BLOOD PRESSURE: 136 MMHG | RESPIRATION RATE: 16 BRPM | HEART RATE: 70 BPM | DIASTOLIC BLOOD PRESSURE: 92 MMHG | OXYGEN SATURATION: 98 %

## 2025-02-17 DIAGNOSIS — M48.062 SPINAL STENOSIS OF LUMBAR REGION WITH NEUROGENIC CLAUDICATION: ICD-10-CM

## 2025-02-17 DIAGNOSIS — M54.41 CHRONIC BILATERAL LOW BACK PAIN WITH BILATERAL SCIATICA: Primary | ICD-10-CM

## 2025-02-17 DIAGNOSIS — M54.42 CHRONIC BILATERAL LOW BACK PAIN WITH BILATERAL SCIATICA: Primary | ICD-10-CM

## 2025-02-17 DIAGNOSIS — G89.29 CHRONIC BILATERAL LOW BACK PAIN WITH BILATERAL SCIATICA: Primary | ICD-10-CM

## 2025-02-17 DIAGNOSIS — M51.372 DEGENERATION OF INTERVERTEBRAL DISC OF LUMBOSACRAL REGION WITH DISCOGENIC BACK PAIN AND LOWER EXTREMITY PAIN: ICD-10-CM

## 2025-02-17 DIAGNOSIS — I73.9 CLAUDICATION: Primary | ICD-10-CM

## 2025-02-17 DIAGNOSIS — M51.16 HERNIATION OF LUMBAR INTERVERTEBRAL DISC WITH RADICULOPATHY: ICD-10-CM

## 2025-02-17 DIAGNOSIS — M53.3 SACROILIAC JOINT DYSFUNCTION OF RIGHT SIDE: ICD-10-CM

## 2025-02-17 DIAGNOSIS — M54.16 LUMBAR RADICULOPATHY: ICD-10-CM

## 2025-02-17 PROCEDURE — 3075F SYST BP GE 130 - 139MM HG: CPT | Performed by: PHYSICAL MEDICINE & REHABILITATION

## 2025-02-17 PROCEDURE — 1160F RVW MEDS BY RX/DR IN RCRD: CPT | Performed by: PHYSICAL MEDICINE & REHABILITATION

## 2025-02-17 PROCEDURE — G0463 HOSPITAL OUTPT CLINIC VISIT: HCPCS | Performed by: PHYSICAL MEDICINE & REHABILITATION

## 2025-02-17 PROCEDURE — 1159F MED LIST DOCD IN RCRD: CPT | Performed by: PHYSICAL MEDICINE & REHABILITATION

## 2025-02-17 PROCEDURE — G2211 COMPLEX E/M VISIT ADD ON: HCPCS | Performed by: PHYSICAL MEDICINE & REHABILITATION

## 2025-02-17 PROCEDURE — 99214 OFFICE O/P EST MOD 30 MIN: CPT | Performed by: PHYSICAL MEDICINE & REHABILITATION

## 2025-02-17 PROCEDURE — 99213 OFFICE O/P EST LOW 20 MIN: CPT | Performed by: FAMILY MEDICINE

## 2025-02-17 PROCEDURE — 1125F AMNT PAIN NOTED PAIN PRSNT: CPT | Performed by: PHYSICAL MEDICINE & REHABILITATION

## 2025-02-17 PROCEDURE — 3080F DIAST BP >= 90 MM HG: CPT | Performed by: PHYSICAL MEDICINE & REHABILITATION

## 2025-02-17 RX ORDER — HYDROCODONE BITARTRATE AND ACETAMINOPHEN 5; 325 MG/1; MG/1
1 TABLET ORAL EVERY 8 HOURS PRN
Qty: 90 TABLET | Refills: 0 | Status: SHIPPED | OUTPATIENT
Start: 2025-02-17

## 2025-02-17 NOTE — PROGRESS NOTES
Subjective   Steven Hammonds is a 64 y.o. male.   Chief Complaint   Patient presents with    Pain     History of Present Illness  Pain med refill. Stable on current treatment. Controlled substance agreement signed and on file.    Back Pain  This is a chronic problem. The current episode started more than 1 year ago. The problem occurs constantly. The problem has been worse since onset. The pain is present in the lumbar spine. The quality of the pain is described as stabbing and shooting. The pain radiates to the left buttock and left thigh. The pain is at a severity of 6/10. The pain is severe. The pain is The same all the time. The symptoms are aggravated by position, standing, twisting, stress, sitting, lying down, bending and coughing. Stiffness is present All day. Associated symptoms include leg pain, numbness (stable), paresthesias, pelvic pain, tingling and weakness (stable). Pertinent negatives include no abdominal pain, bladder incontinence, chest pain, dysuria or fever. He has tried NSAIDs and muscle relaxant for the symptoms. The treatment provided moderate relief.        The following portions of the patient's history were reviewed and updated as appropriate: allergies, current medications, past family history, past medical history, past social history, past surgical history, and problem list.    Patient Active Problem List   Diagnosis    Degeneration of intervertebral disc of lumbosacral region with discogenic back pain and lower extremity pain    Disc narrowing    Colon cancer screening    Gastroesophageal reflux disease    Hypertension    Paroxysmal atrial fibrillation    Spinal stenosis of lumbar region with neurogenic claudication    Spondylolysis    Status post arthroscopy of knee    Sinus pause    Tachycardia-bradycardia    Thoracic or lumbosacral neuritis or radiculitis    Tremor    Presence of cardiac pacemaker    Allergic rhinitis    Hyperlipidemia    Insomnia    Umbilical hernia    Tinea     Impetigo    Labile blood pressure    Other specified persistent mood disorders    Non-seasonal allergic rhinitis due to pollen    Chronic bilateral low back pain with bilateral sciatica    Bobby's esophagus with dysplasia    Former tobacco use    Cervical disc disease with myelopathy    Psoriatic arthritis    Chest pain    Hypertensive urgency    Headache    Abnormal nuclear stress test    Herniation of lumbar intervertebral disc with radiculopathy    History of lumbar spinal fusion    Other chronic pain    Sacroiliac joint dysfunction of right side    Spondylolisthesis at L2-L3 level    Spondylolisthesis at L3-L4 level    Other secondary kyphosis, thoracolumbar region    Pseudoarthrosis of lumbar spine    Over weight    Allergic conjunctivitis of both eyes    Lumbar radiculopathy       Current Outpatient Medications on File Prior to Visit   Medication Sig Dispense Refill    amitriptyline (ELAVIL) 50 MG tablet Take 2 tablets by mouth every night at bedtime. 180 tablet 3    ascorbic acid (VITAMIN C) 500 MG tablet Take 1 tablet by mouth every night at bedtime.      carisoprodol (SOMA) 350 MG tablet Take 1 tablet by mouth 3 (Three) Times a Day As Needed for Muscle Spasms. for muscle spams 90 tablet 3    celecoxib (CeleBREX) 100 MG capsule Take 1 capsule by mouth Every 12 (Twelve) Hours. 180 capsule 3    cetirizine (zyrTEC) 10 MG tablet Take 1 tablet by mouth Daily.      cholecalciferol (VITAMIN D3) 25 MCG (1000 UT) tablet Take 3 tablets by mouth every night at bedtime.      digoxin (LANOXIN) 125 MCG tablet TAKE 1 TABLET DAILY 90 tablet 3    Eliquis 5 MG tablet tablet TAKE 1 TABLET TWICE A  tablet 3    esomeprazole (nexIUM) 40 MG capsule Take 1 capsule by mouth Daily.      fluticasone (FLONASE) 50 MCG/ACT nasal spray USE 2 SPRAYS IN BOTH NOSTRILS  ONCE DAILY 48 g 3    gabapentin (NEURONTIN) 600 MG tablet TAKE 1 TABLET FOUR TIMES A  tablet 3    hydroCHLOROthiazide 25 MG tablet TAKE 1 TABLET BY MOUTH DAILY  "90 tablet 3    lisinopril (PRINIVIL,ZESTRIL) 20 MG tablet TAKE 1 TABLET BY MOUTH DAILY 90 tablet 3    pramipexole (MIRAPEX) 0.25 MG tablet       propranolol LA (INDERAL LA) 120 MG 24 hr capsule Take 1 capsule by mouth Daily.      rosuvastatin (CRESTOR) 10 MG tablet TAKE ONE-HALF TABLET BY MOUTH  DAILY 45 tablet 3    [DISCONTINUED] HYDROcodone-acetaminophen (NORCO) 5-325 MG per tablet Take 1 tablet by mouth Every 8 (Eight) Hours As Needed for Severe Pain. 90 tablet 0     No current facility-administered medications on file prior to visit.     Current outpatient and discharge medications have been reconciled for the patient.  Reviewed by: Junaid Marquez MD      Allergies   Allergen Reactions    Oxycodone-Acetaminophen GI Intolerance    Acetaminophen Nausea And Vomiting    Adhesive Tape Unknown (See Comments)     BLISTERS       Review of Systems   Constitutional:  Negative for fever.   Cardiovascular:  Negative for chest pain.   Gastrointestinal:  Negative for abdominal pain.   Genitourinary:  Positive for pelvic pain. Negative for dysuria and urinary incontinence.   Musculoskeletal:  Positive for back pain.   Neurological:  Positive for tingling, weakness (stable), numbness (stable) and paresthesias.     I have reviewed and confirmed the accuracy of the ROS as documented by the MA/LPN/RN Junaid Marquez MD    Objective   Visit Vitals  /80 (BP Location: Right arm, Patient Position: Sitting, Cuff Size: Adult)   Pulse 64   Resp 20   Ht 180.9 cm (71.22\")   Wt 102 kg (224 lb)   SpO2 99%   BMI 31.05 kg/m²      **  Physical Exam  Vitals reviewed.   Constitutional:       Appearance: He is well-developed.   HENT:      Head: Normocephalic.      Right Ear: External ear normal.      Left Ear: External ear normal.      Nose: Nose normal.   Eyes:      Conjunctiva/sclera: Conjunctivae normal.   Cardiovascular:      Rate and Rhythm: Normal rate.   Pulmonary:      Effort: Pulmonary effort is normal. No respiratory " distress.   Abdominal:      General: Abdomen is flat.   Musculoskeletal:         General: No signs of injury.      Cervical back: Normal range of motion.   Skin:     Findings: No rash.   Neurological:      Mental Status: He is alert and oriented to person, place, and time.   Psychiatric:         Behavior: Behavior normal.         Thought Content: Thought content normal.         Judgment: Judgment normal.       Derm Physical Exam  Ortho Exam   Neurological Exam  Mental Status  Alert. Oriented to person, place, and time.         Assessment & Plan  Spinal stenosis of lumbar region with neurogenic claudication    Orders:    HYDROcodone-acetaminophen (NORCO) 5-325 MG per tablet; Take 1 tablet by mouth Every 8 (Eight) Hours As Needed for Severe Pain.    Claudication  YUKO normal. Will follow  clinically        Findings discussed. All questions answered.  Medication and medication adverse effects discussed.  Drug education given and explained to patient. Patient verbalized understanding.Follow-up for routine health maintenance as indicated.  Follow up in 1 month for reevaluation, sooner for concerns.             Expected course, medications, and adverse effects discussed as appropriate.  Call or return if worsening or persistent symptoms.     This document is intended for medical professional use only.   Electronically signed by Junaid Marquez MD on 02/17/2025. This content may not have been proofread.

## 2025-02-17 NOTE — PROGRESS NOTES
Subjective   Steven Hammonds is a 64 y.o. male.     History of Present Illness  CC: low back pain    LBP to medial and lateral LLE to foot, 8/10 at worst, 5/10 at best, began > 10 years ago, treated by me since 7/14/24, worsening, s/p lumbar fusion, aching, throbbing, with numbness, worse with bending and lifting, interferes with ADLs, activity, sleep, failed surgery, PT, medications. CT L-spine with L2-S1, neuroforaminal narrowing worst at left L5/S1. Saw PCP, notes reviewed, referred for injections only, on Eliquis, has pacemaker. No FH of substance abuse.   Back Pain  Associated symptoms include chest pain and numbness. Pertinent negatives include no abdominal pain, bladder incontinence, fever or weakness.   Pain  Symptoms include chest pain and numbness.    Pertinent negative symptoms include no abdominal pain, no chills, no fatigue, no fever, no myalgias, no nausea, no neck pain, no vomiting and no weakness.   Chest Pain   Associated symptoms include back pain and numbness. Pertinent negatives include no abdominal pain, dizziness, fever, nausea, shortness of breath, vomiting or weakness.        The following portions of the patient's history were reviewed and updated as appropriate: allergies, current medications, past family history, past medical history, past social history, past surgical history, and problem list.    Review of Systems   Constitutional:  Negative for chills, fatigue and fever.   HENT:  Negative for hearing loss and trouble swallowing.    Eyes:  Negative for visual disturbance.   Respiratory:  Negative for shortness of breath.    Cardiovascular:  Positive for chest pain.   Gastrointestinal:  Negative for abdominal pain, constipation, diarrhea, nausea and vomiting.   Genitourinary:  Negative for urinary incontinence.   Musculoskeletal:  Positive for back pain. Negative for arthralgias, joint swelling, myalgias and neck pain.   Neurological:  Positive for numbness. Negative for dizziness,  weakness and headache.       Objective   Physical Exam  Constitutional:       Appearance: Normal appearance. He is well-developed.   HENT:      Head: Normocephalic and atraumatic.   Eyes:      Pupils: Pupils are equal, round, and reactive to light.   Cardiovascular:      Rate and Rhythm: Normal rate and regular rhythm.      Heart sounds: Normal heart sounds.   Pulmonary:      Effort: Pulmonary effort is normal.      Breath sounds: Normal breath sounds.   Abdominal:      General: Bowel sounds are normal. There is no distension.      Palpations: Abdomen is soft.      Tenderness: There is no abdominal tenderness.   Musculoskeletal:      Cervical back: Normal range of motion.   Neurological:      Mental Status: He is alert and oriented to person, place, and time.      Sensory: Sensory deficit present.      Deep Tendon Reflexes: Reflexes are normal and symmetric. Reflexes normal.      Comments: Loss of sensation in LLE in L4 distribution   Psychiatric:         Mood and Affect: Mood normal.         Behavior: Behavior normal.         Thought Content: Thought content normal.         Judgment: Judgment normal.           Assessment & Plan   Problems Addressed this Visit          Other    Degeneration of intervertebral disc of lumbosacral region with discogenic back pain and lower extremity pain    Spinal stenosis of lumbar region with neurogenic claudication    Chronic bilateral low back pain with bilateral sciatica - Primary    Herniation of lumbar intervertebral disc with radiculopathy    Sacroiliac joint dysfunction of right side    Lumbar radiculopathy     Diagnoses         Codes Comments    Chronic bilateral low back pain with bilateral sciatica    -  Primary ICD-10-CM: M54.42, M54.41, G89.29  ICD-9-CM: 724.2, 724.3, 338.29     Degeneration of intervertebral disc of lumbosacral region with discogenic back pain and lower extremity pain     ICD-10-CM: M51.372  ICD-9-CM: 722.52     Herniation of lumbar intervertebral disc  with radiculopathy     ICD-10-CM: M51.16  ICD-9-CM: 722.10, 724.4     Lumbar radiculopathy     ICD-10-CM: M54.16  ICD-9-CM: 724.4     Sacroiliac joint dysfunction of right side     ICD-10-CM: M53.3  ICD-9-CM: 724.6     Spinal stenosis of lumbar region with neurogenic claudication     ICD-10-CM: M48.062  ICD-9-CM: 724.03             Performed left L4 and L5 TFESI, has clearance to hold Eliquis for 2 days, with > 50% relief, performed repeat with even greater relief, performed b/l L5 TFESI as RLE pain now significant as LLE improves with best relief yet, schedule repeat b/l L5 TFESI.  No change to meds today, referred for injections only, gets [place] 5mg TID prn from PCP  RTC for b/l L5 TFESI then in 2 months for f/u

## 2025-03-19 ENCOUNTER — TELEPHONE (OUTPATIENT)
Dept: FAMILY MEDICINE CLINIC | Facility: CLINIC | Age: 65
End: 2025-03-19
Payer: MEDICARE

## 2025-03-19 NOTE — TELEPHONE ENCOUNTER
Patient called requesting refill of Hydrocodone-acetaminophen 5-325mg be sent to [facility] [place]. Patient then stated his wife wanted him to notify us that he vomited all night last Thursday & the vomit was brown. Wife is concerned that he was vomiting blood. No other symptoms verbalized. Please advise.

## 2025-03-20 ENCOUNTER — TELEPHONE (OUTPATIENT)
Dept: FAMILY MEDICINE CLINIC | Facility: CLINIC | Age: 65
End: 2025-03-20
Payer: MEDICARE

## 2025-03-20 DIAGNOSIS — M48.062 SPINAL STENOSIS OF LUMBAR REGION WITH NEUROGENIC CLAUDICATION: ICD-10-CM

## 2025-03-20 RX ORDER — HYDROCODONE BITARTRATE AND ACETAMINOPHEN 5; 325 MG/1; MG/1
1 TABLET ORAL EVERY 8 HOURS PRN
Qty: 90 TABLET | Refills: 0 | Status: SHIPPED | OUTPATIENT
Start: 2025-03-20

## 2025-03-20 RX ORDER — ROSUVASTATIN CALCIUM 10 MG/1
5 TABLET, COATED ORAL DAILY
Qty: 45 TABLET | Refills: 3 | Status: SHIPPED | OUTPATIENT
Start: 2025-03-20

## 2025-03-20 NOTE — TELEPHONE ENCOUNTER
Caller: VINNIE MUNSON    Relationship: Emergency Contact    Best call back number: [phone] OR [phone]    What is the best time to reach you: ANY    Who are you requesting to speak with (clinical staff, provider,  specific staff member): CLINICAL    Do you know the name of the person who called: VINNIE    What was the call regarding: PATIENT IS OUT OF HIS PAIN MEDICATION AND DR. COLÓN IS OUT OF OFFICE. PATIENT WANTS TO KNOW WHAT HE CAN DO TO GET HIS MEDICATION SO HE DOESN'T END UP IN THE EMERGENCY ROOM.    Is it okay if the provider responds through MyChart:

## 2025-04-07 ENCOUNTER — OFFICE VISIT (OUTPATIENT)
Dept: PAIN MEDICINE | Facility: CLINIC | Age: 65
End: 2025-04-07
Payer: MEDICARE

## 2025-04-07 VITALS
OXYGEN SATURATION: 95 % | DIASTOLIC BLOOD PRESSURE: 94 MMHG | SYSTOLIC BLOOD PRESSURE: 140 MMHG | RESPIRATION RATE: 16 BRPM | HEART RATE: 58 BPM

## 2025-04-07 DIAGNOSIS — M54.41 CHRONIC BILATERAL LOW BACK PAIN WITH BILATERAL SCIATICA: Primary | ICD-10-CM

## 2025-04-07 DIAGNOSIS — M51.16 HERNIATION OF LUMBAR INTERVERTEBRAL DISC WITH RADICULOPATHY: ICD-10-CM

## 2025-04-07 DIAGNOSIS — M54.42 CHRONIC BILATERAL LOW BACK PAIN WITH BILATERAL SCIATICA: Primary | ICD-10-CM

## 2025-04-07 DIAGNOSIS — G89.29 CHRONIC BILATERAL LOW BACK PAIN WITH BILATERAL SCIATICA: Primary | ICD-10-CM

## 2025-04-07 DIAGNOSIS — M50.00 CERVICAL DISC DISEASE WITH MYELOPATHY: ICD-10-CM

## 2025-04-07 DIAGNOSIS — M53.3 SACROILIAC JOINT DYSFUNCTION OF RIGHT SIDE: ICD-10-CM

## 2025-04-07 DIAGNOSIS — M48.062 SPINAL STENOSIS OF LUMBAR REGION WITH NEUROGENIC CLAUDICATION: ICD-10-CM

## 2025-04-07 DIAGNOSIS — M51.372 DEGENERATION OF INTERVERTEBRAL DISC OF LUMBOSACRAL REGION WITH DISCOGENIC BACK PAIN AND LOWER EXTREMITY PAIN: ICD-10-CM

## 2025-04-07 DIAGNOSIS — M54.16 LUMBAR RADICULOPATHY: ICD-10-CM

## 2025-04-07 PROCEDURE — 1160F RVW MEDS BY RX/DR IN RCRD: CPT | Performed by: PHYSICAL MEDICINE & REHABILITATION

## 2025-04-07 PROCEDURE — G2211 COMPLEX E/M VISIT ADD ON: HCPCS | Performed by: PHYSICAL MEDICINE & REHABILITATION

## 2025-04-07 PROCEDURE — G0463 HOSPITAL OUTPT CLINIC VISIT: HCPCS | Performed by: PHYSICAL MEDICINE & REHABILITATION

## 2025-04-07 PROCEDURE — 1125F AMNT PAIN NOTED PAIN PRSNT: CPT | Performed by: PHYSICAL MEDICINE & REHABILITATION

## 2025-04-07 PROCEDURE — 99214 OFFICE O/P EST MOD 30 MIN: CPT | Performed by: PHYSICAL MEDICINE & REHABILITATION

## 2025-04-07 PROCEDURE — 3080F DIAST BP >= 90 MM HG: CPT | Performed by: PHYSICAL MEDICINE & REHABILITATION

## 2025-04-07 PROCEDURE — 3077F SYST BP >= 140 MM HG: CPT | Performed by: PHYSICAL MEDICINE & REHABILITATION

## 2025-04-07 PROCEDURE — 1159F MED LIST DOCD IN RCRD: CPT | Performed by: PHYSICAL MEDICINE & REHABILITATION

## 2025-04-07 RX ORDER — SILODOSIN 8 MG/1
1 CAPSULE ORAL DAILY
COMMUNITY
Start: 2025-04-01

## 2025-04-07 NOTE — PROGRESS NOTES
Subjective   Steven Hammonds is a 64 y.o. male.     History of Present Illness  CC: low back pain    LBP to medial and lateral LLE to foot, 8/10 at worst, 5/10 at best, began > 10 years ago, treated by me since 7/14/24, worsening, s/p lumbar fusion, aching, throbbing, with numbness, worse with bending and lifting, interferes with ADLs, activity, sleep, failed surgery, PT, medications. CT L-spine with L2-S1, neuroforaminal narrowing worst at left L5/S1. Saw PCP, notes reviewed, referred for injections only, on Eliquis, has pacemaker. No FH of substance abuse. Had b/l L5 TFESI with much > 50% relief for > 3 months.   Back Pain  Associated symptoms include chest pain and numbness. Pertinent negatives include no abdominal pain, bladder incontinence, fever or weakness.   Pain  Symptoms include chest pain and numbness.    Pertinent negative symptoms include no abdominal pain, no chills, no fatigue, no fever, no myalgias, no nausea, no neck pain, no vomiting and no weakness.   Chest Pain   Associated symptoms include back pain and numbness. Pertinent negatives include no abdominal pain, dizziness, fever, nausea, shortness of breath, vomiting or weakness.        The following portions of the patient's history were reviewed and updated as appropriate: allergies, current medications, past family history, past medical history, past social history, past surgical history, and problem list.    Review of Systems   Constitutional:  Negative for chills, fatigue and fever.   HENT:  Negative for hearing loss and trouble swallowing.    Eyes:  Negative for visual disturbance.   Respiratory:  Negative for shortness of breath.    Cardiovascular:  Positive for chest pain.   Gastrointestinal:  Negative for abdominal pain, constipation, diarrhea, nausea and vomiting.   Genitourinary:  Negative for urinary incontinence.   Musculoskeletal:  Positive for back pain. Negative for arthralgias, joint swelling, myalgias and neck pain.    Neurological:  Positive for numbness. Negative for dizziness, weakness and headache.       Objective   Physical Exam  Constitutional:       Appearance: Normal appearance. He is well-developed.   HENT:      Head: Normocephalic and atraumatic.   Eyes:      Pupils: Pupils are equal, round, and reactive to light.   Cardiovascular:      Rate and Rhythm: Normal rate and regular rhythm.      Heart sounds: Normal heart sounds.   Pulmonary:      Effort: Pulmonary effort is normal.      Breath sounds: Normal breath sounds.   Abdominal:      General: Bowel sounds are normal. There is no distension.      Palpations: Abdomen is soft.      Tenderness: There is no abdominal tenderness.   Musculoskeletal:      Cervical back: Normal range of motion.   Neurological:      Mental Status: He is alert and oriented to person, place, and time.      Sensory: Sensory deficit present.      Deep Tendon Reflexes: Reflexes are normal and symmetric. Reflexes normal.      Comments: Loss of sensation in LLE in L4 distribution   Psychiatric:         Mood and Affect: Mood normal.         Behavior: Behavior normal.         Thought Content: Thought content normal.         Judgment: Judgment normal.           Assessment & Plan   Problems Addressed this Visit          Other    Degeneration of intervertebral disc of lumbosacral region with discogenic back pain and lower extremity pain    Spinal stenosis of lumbar region with neurogenic claudication    Chronic bilateral low back pain with bilateral sciatica - Primary    Cervical disc disease with myelopathy    Herniation of lumbar intervertebral disc with radiculopathy    Sacroiliac joint dysfunction of right side    Lumbar radiculopathy     Diagnoses         Codes Comments      Chronic bilateral low back pain with bilateral sciatica    -  Primary ICD-10-CM: M54.42, M54.41, G89.29  ICD-9-CM: 724.2, 724.3, 338.29       Cervical disc disease with myelopathy     ICD-10-CM: M50.00  ICD-9-CM: 722.71        Degeneration of intervertebral disc of lumbosacral region with discogenic back pain and lower extremity pain     ICD-10-CM: M51.372  ICD-9-CM: 722.52       Herniation of lumbar intervertebral disc with radiculopathy     ICD-10-CM: M51.16  ICD-9-CM: 722.10, 724.4       Lumbar radiculopathy     ICD-10-CM: M54.16  ICD-9-CM: 724.4       Sacroiliac joint dysfunction of right side     ICD-10-CM: M53.3  ICD-9-CM: 724.6       Spinal stenosis of lumbar region with neurogenic claudication     ICD-10-CM: M48.062  ICD-9-CM: 724.03             Performed left L4 and L5 TFESI, has clearance to hold Eliquis for 2 days, with > 50% relief, performed repeat with even greater relief, performed b/l L5 TFESI as RLE pain now significant as LLE improves with best relief yet, schedule repeat b/l L5 TFESI.  No change to meds today, referred for injections only, gets [place] 5mg TID prn from PCP  RTC for b/l L5 TFESI then in 2 months for f/u. Hold Eliquis for 3 days.

## 2025-04-17 NOTE — PROGRESS NOTES
Subjective   Steven Hammonds is a 64 y.o. male.   Chief Complaint   Patient presents with    Medicare Wellness-subsequent     History of Present Illness  The patient is here: for coordination of medical care.  Patient has: sedentary activity    TDAP/TD VACCINES(1 - Tdap) Never done  ZOSTER VACCINE(2 of 2) due on 07/06/2021  COVID-19 Vaccine(6 - 2024-25 season) due on 09/01/2024  ANNUAL WELLNESS VISIT due on 04/02/2025  INFLUENZA VACCINE due on 07/01/2025  LIPID PANEL due on 10/16/2025  COLORECTAL CANCER SCREENING due on 09/25/2030  HEPATITIS C SCREENING Completed  Pneumococcal Vaccine 50+ Completed  AAA SCREEN ONCE Completed  Current pain scale 0/10.         Pain med refill. Stable on current treatment. Controlled substance agreement signed and on file.    Back Pain  This is a chronic problem. The current episode started more than 1 year ago. The problem occurs constantly. The problem has been worse since onset. The pain is present in the lumbar spine. The quality of the pain is described as stabbing and shooting. The pain radiates to the left buttock and left thigh. The pain is at a severity of 6/10. The pain is severe. The pain is The same all the time. The symptoms are aggravated by position, standing, twisting, stress, sitting, lying down, bending and coughing. Stiffness is present All day. Associated symptoms include leg pain, numbness (stable), paresthesias, pelvic pain, tingling and weakness (stable). Pertinent negatives include no abdominal pain, bladder incontinence, chest pain, dysuria or fever. He has tried NSAIDs and muscle relaxant for the symptoms. The treatment provided moderate relief.   Pack pain increasing some, some right leg weakness. Has injection scheduled next week     The following portions of the patient's history were reviewed and updated as appropriate: allergies, current medications, past family history, past medical history, past social history, past surgical history, and problem  list.    Patient Active Problem List   Diagnosis    Degeneration of intervertebral disc of lumbosacral region with discogenic back pain and lower extremity pain    Disc narrowing    Colon cancer screening    Gastroesophageal reflux disease    Hypertension    Paroxysmal atrial fibrillation    Spinal stenosis of lumbar region with neurogenic claudication    Spondylolysis    Status post arthroscopy of knee    Sinus pause    Tachycardia-bradycardia    Thoracic or lumbosacral neuritis or radiculitis    Tremor    Presence of cardiac pacemaker    Allergic rhinitis    Hyperlipidemia    Insomnia    Umbilical hernia    Tinea    Impetigo    Labile blood pressure    Other specified persistent mood disorders    Non-seasonal allergic rhinitis due to pollen    Chronic bilateral low back pain with bilateral sciatica    Bobby's esophagus with dysplasia    Former tobacco use    Cervical disc disease with myelopathy    Psoriatic arthritis    Chest pain    Hypertensive urgency    Headache    Abnormal nuclear stress test    Herniation of lumbar intervertebral disc with radiculopathy    History of lumbar spinal fusion    Other chronic pain    Sacroiliac joint dysfunction of right side    Spondylolisthesis at L2-L3 level    Spondylolisthesis at L3-L4 level    Other secondary kyphosis, thoracolumbar region    Pseudoarthrosis of lumbar spine    Over weight    Allergic conjunctivitis of both eyes    Lumbar radiculopathy       Current Outpatient Medications on File Prior to Visit   Medication Sig Dispense Refill    amitriptyline (ELAVIL) 50 MG tablet Take 2 tablets by mouth every night at bedtime. 180 tablet 3    ascorbic acid (VITAMIN C) 500 MG tablet Take 1 tablet by mouth every night at bedtime.      carisoprodol (SOMA) 350 MG tablet Take 1 tablet by mouth 3 (Three) Times a Day As Needed for Muscle Spasms. for muscle spams 90 tablet 3    celecoxib (CeleBREX) 100 MG capsule Take 1 capsule by mouth Every 12 (Twelve) Hours. 180 capsule 3     cetirizine (zyrTEC) 10 MG tablet Take 1 tablet by mouth Daily.      cholecalciferol (VITAMIN D3) 25 MCG (1000 UT) tablet Take 3 tablets by mouth every night at bedtime.      digoxin (LANOXIN) 125 MCG tablet TAKE 1 TABLET DAILY 90 tablet 3    Eliquis 5 MG tablet tablet TAKE 1 TABLET TWICE A  tablet 3    esomeprazole (nexIUM) 40 MG capsule Take 1 capsule by mouth Daily.      fluticasone (FLONASE) 50 MCG/ACT nasal spray USE 2 SPRAYS IN BOTH NOSTRILS  ONCE DAILY 48 g 3    hydroCHLOROthiazide 25 MG tablet TAKE 1 TABLET BY MOUTH DAILY 90 tablet 3    lisinopril (PRINIVIL,ZESTRIL) 20 MG tablet TAKE 1 TABLET BY MOUTH DAILY 90 tablet 3    pramipexole (MIRAPEX) 0.25 MG tablet       propranolol LA (INDERAL LA) 120 MG 24 hr capsule Take 1 capsule by mouth Daily.      rosuvastatin (CRESTOR) 10 MG tablet TAKE ONE-HALF TABLET BY MOUTH  DAILY 45 tablet 3    silodosin (RAPAFLO) 8 MG capsule capsule Take 1 capsule by mouth Daily.      [DISCONTINUED] gabapentin (NEURONTIN) 600 MG tablet TAKE 1 TABLET FOUR TIMES A  tablet 3    [DISCONTINUED] HYDROcodone-acetaminophen (NORCO) 5-325 MG per tablet Take 1 tablet by mouth Every 8 (Eight) Hours As Needed for Severe Pain. 90 tablet 0     No current facility-administered medications on file prior to visit.     Current outpatient and discharge medications have been reconciled for the patient.  Reviewed by: Junaid Marquez MD      Allergies   Allergen Reactions    Oxycodone-Acetaminophen GI Intolerance    Acetaminophen Nausea And Vomiting    Adhesive Tape Unknown (See Comments)     BLISTERS       Review of Systems   Constitutional:  Negative for activity change, appetite change, fever and unexpected weight gain.   Respiratory:  Negative for cough.    Cardiovascular:  Negative for chest pain.   Gastrointestinal:  Negative for abdominal distention, abdominal pain, blood in stool, constipation, diarrhea and vomiting.   Genitourinary:  Positive for pelvic pain. Negative for  "dysuria and urinary incontinence.   Musculoskeletal:  Positive for back pain.   Skin:  Negative for color change.   Neurological:  Positive for tingling, weakness (stable), numbness (stable) and paresthesias.   Psychiatric/Behavioral:  Negative for behavioral problems.      I have reviewed and confirmed the accuracy of the ROS as documented by the MA/LPN/RN Junaid Marquez MD    Objective   Visit Vitals  /68 (BP Location: Right arm, Patient Position: Sitting, Cuff Size: Adult)   Pulse 76   Temp 97.5 °F (36.4 °C) (Temporal)   Resp 18   Ht 180.3 cm (71\")   Wt 101 kg (222 lb 9.6 oz)   SpO2 98%   BMI 31.05 kg/m²      **  Physical Exam  Vitals reviewed.   Constitutional:       Appearance: He is well-developed.   HENT:      Head: Normocephalic and atraumatic.      Right Ear: External ear normal.      Left Ear: External ear normal.      Nose: Nose normal.   Eyes:      Conjunctiva/sclera: Conjunctivae normal.      Pupils: Pupils are equal, round, and reactive to light.   Cardiovascular:      Rate and Rhythm: Normal rate and regular rhythm.      Heart sounds: Normal heart sounds.   Pulmonary:      Effort: Pulmonary effort is normal. No respiratory distress.      Breath sounds: Normal breath sounds.   Abdominal:      General: Abdomen is flat. Bowel sounds are normal.      Palpations: Abdomen is soft.   Musculoskeletal:         General: No signs of injury. Normal range of motion.      Cervical back: Normal range of motion and neck supple.   Skin:     General: Skin is warm and dry.      Findings: No rash.   Neurological:      Mental Status: He is alert and oriented to person, place, and time.   Psychiatric:         Behavior: Behavior normal.         Thought Content: Thought content normal.         Judgment: Judgment normal.       Derm Physical Exam  Ortho Exam   Neurological Exam  Mental Status  Alert. Oriented to person, place, and time.    Cranial Nerves  CN III, IV, VI: Pupils equal round and reactive to light " bilaterally.         Assessment & Plan  Spinal stenosis of lumbar region with neurogenic claudication    Orders:    HYDROcodone-acetaminophen (NORCO) 5-325 MG per tablet; Take 1 tablet by mouth Every 8 (Eight) Hours As Needed for Severe Pain.     Degeneration of lumbar or lumbosacral intervertebral disc    Orders:    gabapentin (NEURONTIN) 600 MG tablet; Take 1 tablet by mouth 4 (Four) Times a Day.    Non-seasonal allergic rhinitis due to pollen    Orders:    montelukast (Singulair) 10 MG tablet; Take 1 tablet by mouth Every Night.    Mixed hyperlipidemia  Stable, continue current management.   Orders:    Comprehensive Metabolic Panel    TSH    Lipid Panel With / Chol / HDL Ratio    Essential hypertension  Stable, continue current management.     Orders:    CBC & Differential    Comprehensive Metabolic Panel    Prostate cancer screening  Recheck    Orders:    PSA Screen    Elevated blood sugar    Orders:    Hemoglobin A1c    Discussed anticipatory guidance, diet, exercise, and weight loss.  Discussed safety and routine screening examinations.  Discussed self-examinations.  Steven Hammonds  reports that he quit smoking about 21 years ago. His smoking use included cigarettes. He started smoking about 49 years ago. He has a 30 pack-year smoking history. He has never been exposed to tobacco smoke. He has quit using smokeless tobacco.   Follow-up for routine health maintenance as indicated.   BMI is >= 30 and <35. (Class 1 Obesity). The following options were offered after discussion;: weight loss educational material (shared in after visit summary)  Continue quality of life management. Pt's condition not expected to improve.   Benefits of his medications outweigh risks.      Expected course, medications, and adverse effects discussed as appropriate.  Call or return if worsening or persistent symptoms.     This document is intended for medical professional use only.   Electronically signed by Junaid Marquez MD on  04/18/2025. This content may not have been proofread.

## 2025-04-17 NOTE — PROGRESS NOTES
The ABCs of the Annual Wellness Visit  Subsequent Medicare Wellness Visit    Subjective    History of Present Illness:  Steven Hammonds is a 64 y.o. male who presents for a Subsequent Medicare Wellness Visit.    The following portions of the patient's history were reviewed and   updated as appropriate: allergies, current medications, past family history, past medical history, past social history, past surgical history, and problem list.  Family History   Problem Relation Age of Onset    Cancer Father         Lung    Cancer Mother     Malig Hyperthermia Neg Hx      Past Surgical History:   Procedure Laterality Date    CARDIAC CATHETERIZATION N/A 02/04/2021    Procedure: Left Heart Cath and coronary angiogram;  Surgeon: Claudia Benson MD;  Location: [facility] CATH INVASIVE LOCATION;  Service: Cardiovascular;  Laterality: N/A;    CARDIAC PACEMAKER PLACEMENT      COLONOSCOPY      ENDOSCOPY      EPIDURAL Left 03/11/2021    Procedure: Left L3 LUMBAR/SACRAL TRANSFORAMINAL EPIDURAL;  Surgeon: Danial Diaz MD;  [place] [facility] [place];  Service: Pain Management;  Laterality: Left;    FRACTURE SURGERY      HERNIA REPAIR      umbilical    JOINT REPLACEMENT  12- 6 - 2016    Right hip    KNEE SURGERY Right 2016    LUMBAR DISCECTOMY  10/2015    Comments: lumbar disk decompression    PACEMAKER IMPLANTATION      SPINE SURGERY      TOTAL HIP ARTHROPLASTY Right 12/09/2016    UMBILICAL HERNIA REPAIR          Compared to one year ago, the patient feels his physical   health is the same.    Compared to one year ago, the patient feels his mental   health is the same.    Recent Hospitalizations:  He was not admitted to the hospital during the last year.       Current Medical Providers:  Patient Care Team:  Junaid Marquez MD as PCP - General  Junaid Marquez MD as PCP - Family Medicine  Junaid Marquez MD Gondi, Bapineedu, MD as Consulting Physician (Cardiology)    Outpatient Medications Prior to Visit    Medication Sig Dispense Refill    amitriptyline (ELAVIL) 50 MG tablet Take 2 tablets by mouth every night at bedtime. 180 tablet 3    ascorbic acid (VITAMIN C) 500 MG tablet Take 1 tablet by mouth every night at bedtime.      carisoprodol (SOMA) 350 MG tablet Take 1 tablet by mouth 3 (Three) Times a Day As Needed for Muscle Spasms. for muscle spams 90 tablet 3    celecoxib (CeleBREX) 100 MG capsule Take 1 capsule by mouth Every 12 (Twelve) Hours. 180 capsule 3    cetirizine (zyrTEC) 10 MG tablet Take 1 tablet by mouth Daily.      cholecalciferol (VITAMIN D3) 25 MCG (1000 UT) tablet Take 3 tablets by mouth every night at bedtime.      digoxin (LANOXIN) 125 MCG tablet TAKE 1 TABLET DAILY 90 tablet 3    Eliquis 5 MG tablet tablet TAKE 1 TABLET TWICE A  tablet 3    esomeprazole (nexIUM) 40 MG capsule Take 1 capsule by mouth Daily.      fluticasone (FLONASE) 50 MCG/ACT nasal spray USE 2 SPRAYS IN BOTH NOSTRILS  ONCE DAILY 48 g 3    hydroCHLOROthiazide 25 MG tablet TAKE 1 TABLET BY MOUTH DAILY 90 tablet 3    lisinopril (PRINIVIL,ZESTRIL) 20 MG tablet TAKE 1 TABLET BY MOUTH DAILY 90 tablet 3    pramipexole (MIRAPEX) 0.25 MG tablet       propranolol LA (INDERAL LA) 120 MG 24 hr capsule Take 1 capsule by mouth Daily.      rosuvastatin (CRESTOR) 10 MG tablet TAKE ONE-HALF TABLET BY MOUTH  DAILY 45 tablet 3    silodosin (RAPAFLO) 8 MG capsule capsule Take 1 capsule by mouth Daily.      gabapentin (NEURONTIN) 600 MG tablet TAKE 1 TABLET FOUR TIMES A  tablet 3    HYDROcodone-acetaminophen (NORCO) 5-325 MG per tablet Take 1 tablet by mouth Every 8 (Eight) Hours As Needed for Severe Pain. 90 tablet 0     No facility-administered medications prior to visit.     There were no vitals filed for this visit.   Opioid medication/s are on active medication list.  and I have evaluated his active treatment plan and pain score trends (see table).  There were no vitals filed for this visit.  I have reviewed the chart for  potential of high risk medication and harmful drug interactions in the elderly.          Aspirin is not on active medication list.  Aspirin use is not indicated based on review of current medical condition/s. Risk of harm outweighs potential benefits.  .    Patient Active Problem List   Diagnosis    Degeneration of intervertebral disc of lumbosacral region with discogenic back pain and lower extremity pain    Disc narrowing    Colon cancer screening    Gastroesophageal reflux disease    Hypertension    Paroxysmal atrial fibrillation    Spinal stenosis of lumbar region with neurogenic claudication    Spondylolysis    Status post arthroscopy of knee    Sinus pause    Tachycardia-bradycardia    Thoracic or lumbosacral neuritis or radiculitis    Tremor    Presence of cardiac pacemaker    Allergic rhinitis    Hyperlipidemia    Insomnia    Umbilical hernia    Tinea    Impetigo    Labile blood pressure    Other specified persistent mood disorders    Non-seasonal allergic rhinitis due to pollen    Chronic bilateral low back pain with bilateral sciatica    Bobby's esophagus with dysplasia    Former tobacco use    Cervical disc disease with myelopathy    Psoriatic arthritis    Chest pain    Hypertensive urgency    Headache    Abnormal nuclear stress test    Herniation of lumbar intervertebral disc with radiculopathy    History of lumbar spinal fusion    Other chronic pain    Sacroiliac joint dysfunction of right side    Spondylolisthesis at L2-L3 level    Spondylolisthesis at L3-L4 level    Other secondary kyphosis, thoracolumbar region    Pseudoarthrosis of lumbar spine    Over weight    Allergic conjunctivitis of both eyes    Lumbar radiculopathy     Advance Care Planning  Advance Directive is on file.  ACP discussion was declined by the patient. Patient has an advance directive in EMR which is still valid.           Objective    Vitals:    04/18/25 0937   BP: 112/68   BP Location: Right arm   Patient Position: Sitting  "  Cuff Size: Adult   Pulse: 76   Resp: 18   Temp: 97.5 °F (36.4 °C)   TempSrc: Temporal   SpO2: 98%   Weight: 101 kg (222 lb 9.6 oz)   Height: 180.3 cm (71\")       Does the patient have evidence of cognitive impairment? No           HEALTH RISK ASSESSMENT    Smoking Status:  Social History     Tobacco Use   Smoking Status Former    Current packs/day: 0.00    Average packs/day: 1 pack/day for 30.0 years (30.0 ttl pk-yrs)    Types: Cigarettes    Start date: 1975    Quit date: 10/23/2003    Years since quittin.5    Passive exposure: Never   Smokeless Tobacco Former     Alcohol Consumption:  Social History     Substance and Sexual Activity   Alcohol Use Not Currently     Fall Risk Screen:    SHARON Fall Risk Assessment was completed, and patient is at HIGH risk for falls. Assessment completed on:2025    Depression Screenin/18/2025     9:00 AM   PHQ-2/PHQ-9 Depression Screening   Little interest or pleasure in doing things Not at all   Feeling down, depressed, or hopeless Not at all       Health Habits and Functional and Cognitive Screenin/17/2025     4:17 PM   Functional & Cognitive Status   Do you have difficulty preparing food and eating? No   Do you have difficulty bathing yourself, getting dressed or grooming yourself? Yes   Do you have difficulty using the toilet? No   Do you have difficulty moving around from place to place? Yes   Do you have trouble with steps or getting out of a bed or a chair? Yes   Current Diet Well Balanced Diet   Dental Exam Not up to date   Eye Exam Up to date   Exercise (times per week) 3 times per week   Current Exercises Include Walking   Do you need help using the phone?  No   Are you deaf or do you have serious difficulty hearing?  Yes   Do you need help to go to places out of walking distance? Yes   Do you need help shopping? Yes   Do you need help preparing meals?  Yes   Do you need help with housework?  Yes   Do you need help with laundry? Yes   Do " you need help taking your medications? Yes   Do you need help managing money? No   Do you ever drive or ride in a car without wearing a seat belt? No   Have you felt unusual stress, anger or loneliness in the last month? No   Who do you live with? Spouse   If you need help, do you have trouble finding someone available to you? No   Have you been bothered in the last four weeks by sexual problems? No   Do you have difficulty concentrating, remembering or making decisions? Yes       Age-appropriate Screening Schedule:  Refer to the list below for future screening recommendations based on patient's age, sex and/or medical conditions. Orders for these recommended tests are listed in the plan section. The patient has been provided with a written plan.    Health Maintenance   Topic Date Due    TDAP/TD VACCINES (1 - Tdap) Never done    ZOSTER VACCINE (2 of 2) 07/06/2021    COVID-19 Vaccine (6 - 2024-25 season) 09/01/2024    INFLUENZA VACCINE  07/01/2025    LIPID PANEL  10/16/2025    ANNUAL WELLNESS VISIT  04/18/2026    COLORECTAL CANCER SCREENING  09/25/2030    HEPATITIS C SCREENING  Completed    Pneumococcal Vaccine 50+  Completed    AAA SCREEN ONCE  Completed              Assessment & Plan   Medically necessary, significant, and separately identifiable medical problems identified during this visit are addressed on a separate visit note.    CMS Preventative Services Quick Reference  Risk Factors Identified During Encounter  None Identified  The above risks/problems have been discussed with the patient.  Follow up actions/plans if indicated are seen below in the Assessment/Plan Section.  Pertinent information has been shared with the patient in the After Visit Summary.    Follow Up:   In 6 months or as clinical condition warrants.     An After Visit Summary and PPPS were made available to the patient.    Medicare Wellness  Personal Prevention Plan of Service     Date of Office Visit:  04/18/2025  Encounter Provider:   Junaid Marquez MD  Place of Service:  [facility]  Patient Name: Steven Hammonds  :  1960    As part of the Medicare Wellness portion of your visit today, we are providing you with this personalized preventive plan of services (PPPS). This plan is based upon recommendations of the [place] Preventive Services Task Force (USPSTF) and the Advisory Committee on Immunization Practices (ACIP).    This lists the preventive care services that should be considered, and provides dates of when you are due. Items listed as completed are up-to-date and do not require any further intervention.    Health Maintenance   Topic Date Due    TDAP/TD VACCINES (1 - Tdap) Never done    ZOSTER VACCINE (2 of 2) 2021    COVID-19 Vaccine (6 - - season) 2024    INFLUENZA VACCINE  2025    LIPID PANEL  10/16/2025    ANNUAL WELLNESS VISIT  2026    COLORECTAL CANCER SCREENING  2030    HEPATITIS C SCREENING  Completed    Pneumococcal Vaccine 50+  Completed    AAA SCREEN ONCE  Completed       Orders Placed This Encounter   Procedures    Comprehensive Metabolic Panel     Release to patient:   Routine Release [[id number]]    TSH     Release to patient:   Routine Release [[id number]]    Lipid Panel With / Chol / HDL Ratio     Release to patient:   Routine Release [[id number]]    Hemoglobin A1c     Release to patient:   Routine Release [[id number]]    PSA Screen     Release to patient:   Routine Release [[id number]]    CBC & Differential     Manual Differential:   No     Release to patient:   Routine Release [[id number]]       No follow-ups on file.  Sit-to-Stand Exercise    The sit-to-stand exercise (also known as the chair stand or chair rise exercise) strengthens your lower body and helps you maintain or improve your mobility and independence. The goal is to do the sit-to-stand exercise without using your hands. This will be easier as you become stronger. You  should always talk with your health care provider before starting any exercise program, especially if you have had recent surgery.  Do the exercise exactly as told by your health care provider and adjust it as directed. It is normal to feel mild stretching, pulling, tightness, or discomfort as you do this exercise, but you should stop right away if you feel sudden pain or your pain gets worse. Do not begin doing this exercise until told by your health care provider.  What the sit-to-stand exercise does  The sit-to-stand exercise helps to strengthen the muscles in your thighs and the muscles in the center of your body that give you stability (core muscles). This exercise is especially helpful if:  You have had knee or hip surgery.  You have trouble getting up from a chair, out of a car, or off the toilet.  How to do the sit-to-stand exercise  Sit toward the front edge of a sturdy chair without armrests. Your knees should be bent and your feet should be flat on the floor and shoulder-width apart.  Place your hands lightly on each side of the seat. Keep your back and neck as straight as possible, with your chest slightly forward.  Breathe in slowly. Lean forward and slightly shift your weight to the front of your feet.  Breathe out as you slowly stand up. Use your hands as little as possible.  Stand and pause for a full breath in and out.  Breathe in as you sit down slowly. Tighten your core and abdominal muscles to control your lowering as much as possible.  Breathe out slowly.  Do this exercise 10-15 times. If needed, do it fewer times until you build up strength.  Rest for 1 minute, then do another set of 10-15 repetitions.  To change the difficulty of the sit-to-stand exercise  If the exercise is too difficult, use a chair with sturdy armrests, and push off the armrests to help you come to the standing position. You can also use the armrests to help slowly lower yourself back to sitting. As this gets easier, try to  use your arms less. You can also place a firm cushion or pillow on the chair to make the surface higher.  If this exercise is too easy, do not use your arms to help raise or lower yourself. You can also wear a weighted vest, use hand weights, increase your repetitions, or try a lower chair.  General tips  You may feel tired when starting an exercise routine. This is normal.  You may have muscle soreness that lasts a few days. This is normal. As you get stronger, you may not feel muscle soreness.  Use smooth, steady movements.  Do not  hold your breath during strength exercises. This can cause unsafe changes in your blood pressure.  Breathe in slowly through your nose, and breathe out slowly through your mouth.  Summary  Strengthening your lower body is an important step to help you move safely and independently.  The sit-to-stand exercise helps strengthen the muscles in your thighs and core.  You should always talk with your health care provider before starting any exercise program, especially if you have had recent surgery.  This information is not intended to replace advice given to you by your health care provider. Make sure you discuss any questions you have with your health care provider.  Document Revised: 10/16/2019 Document Reviewed: 02/08/2018  Elsevier Patient Education © 2021 [employer] Inc[employer]    Advance Care Planning and Advance Directives     You make decisions on a daily basis - decisions about where you want to live, your career, your home, your life. Perhaps one of the most important decisions you face is your choice for future medical care. Take time to talk with your family and your healthcare team and start planning today.  Advance Care Planning is a process that can help you:  Understand possible future healthcare decisions in light of your own experiences  Reflect on those decision in light of your goals and values  Discuss your decisions with those closest to you and the healthcare professionals  that care for you  Make a plan by creating a document that reflects your wishes    Surrogate Decision Maker  In the event of a medical emergency, which has left you unable to communicate or to make your own decisions, you would need someone to make decisions for you.  It is important to discuss your preferences for medical treatment with this person while you are in good health.     Qualities of a surrogate decision maker:  Willing to take on this role and responsibility  Knows what you want for future medical care  Willing to follow your wishes even if they don't agree with them  Able to make difficult medical decisions under stressful circumstances    Advance Directives  These are legal documents you can create that will guide your healthcare team and decision maker(s) when needed. These documents can be stored in the electronic medical record.    Living Will - a legal document to guide your care if you have a terminal condition or a serious illness and are unable to communicate. States vary by statute in document names/types, but most forms may include one or more of the following:        -  Directions regarding life-prolonging treatments        -  Directions regarding artificially provided nutrition/hydration        -  Choosing a healthcare decision maker        -  Direction regarding organ/tissue donation    Durable Power of  for Healthcare - this document names an -in-fact to make medical decisions for you, but it may also allow this person to make personal and financial decisions for you. Please seek the advice of an  if you need this type of document.    **Advance Directives are not required and no one may discriminate against you if you do not sign one.    Medical Orders  Many states allow specific forms/orders signed by your physician to record your wishes for medical treatment in your current state of health. This form, signed in personal communication with your physician,  addresses resuscitation and other medical interventions that you may or may not want.  For more information or to schedule a time with a [facility] Advance Care Planning Facilitator contact: [facility] or call [phone] and someone will contact you directly.    Fall Prevention in the Home, Adult  Falls can cause injuries and can happen to people of all ages. There are many things you can do to make your home safe and to help prevent falls. Ask for help when making these changes.  What actions can I take to prevent falls?  General Instructions  Use good lighting in all rooms. Replace any light bulbs that burn out.  Turn on the lights in dark areas. Use night-lights.  Keep items that you use often in easy-to-reach places. Lower the shelves around your home if needed.  Set up your furniture so you have a clear path. Avoid moving your furniture around.  Do not have throw rugs or other things on the floor that can make you trip.  Avoid walking on wet floors.  If any of your floors are uneven, fix them.  Add color or contrast paint or tape to clearly jarad and help you see:  Grab bars or handrails.  First and last steps of staircases.  Where the edge of each step is.  If you use a stepladder:  Make sure that it is fully opened. Do not climb a closed stepladder.  Make sure the sides of the stepladder are locked in place.  Ask someone to hold the stepladder while you use it.  Know where your pets are when moving through your home.  What can I do in the bathroom?         Keep the floor dry. Clean up any water on the floor right away.  Remove soap buildup in the tub or shower.  Use nonskid mats or decals on the floor of the tub or shower.  Attach bath mats securely with double-sided, nonslip rug tape.  If you need to sit down in the shower, use a plastic, nonslip stool.  Install grab bars by the toilet and in the tub and shower. Do not use towel bars as grab bars.  What can I do in the bedroom?  Make sure  that you have a light by your bed that is easy to reach.  Do not use any sheets or blankets for your bed that hang to the floor.  Have a firm chair with side arms that you can use for support when you get dressed.  What can I do in the kitchen?  Clean up any spills right away.  If you need to reach something above you, use a step stool with a grab bar.  Keep electrical cords out of the way.  Do not use floor polish or wax that makes floors slippery.  What can I do with my stairs?  Do not leave any items on the stairs.  Make sure that you have a light switch at the top and the bottom of the stairs.  Make sure that there are handrails on both sides of the stairs. Fix handrails that are broken or loose.  Install nonslip stair treads on all your stairs.  Avoid having throw rugs at the top or bottom of the stairs.  Choose a carpet that does not hide the edge of the steps on the stairs.  Check carpeting to make sure that it is firmly attached to the stairs. Fix carpet that is loose or worn.  What can I do on the outside of my home?  Use bright outdoor lighting.  Fix the edges of walkways and driveways and fix any cracks.  Remove anything that might make you trip as you walk through a door, such as a raised step or threshold.  Trim any bushes or trees on paths to your home.  Check to see if handrails are loose or broken and that both sides of all steps have handrails.  Install guardrails along the edges of any raised decks and porches.  Clear paths of anything that can make you trip, such as tools or rocks.  Have leaves, snow, or ice cleared regularly.  Use sand or salt on paths during winter.  Clean up any spills in your garage right away. This includes grease or oil spills.  What other actions can I take?  Wear shoes that:  Have a low heel. Do not wear high heels.  Have rubber bottoms.  Feel good on your feet and fit well.  Are closed at the toe. Do not wear open-toe sandals.  Use tools that help you move around if  needed. These include:  Canes.  Walkers.  Scooters.  Crutches.  Review your medicines with your doctor. Some medicines can make you feel dizzy. This can increase your chance of falling.  Ask your doctor what else you can do to help prevent falls.  Where to find more information  Centers for Disease Control and Prevention, [place]: www.cdc.gov  National [place] on Aging: www.eliu.nih.gov  Contact a doctor if:  You are afraid of falling at home.  You feel weak, drowsy, or dizzy at home.  You fall at home.  Summary  There are many simple things that you can do to make your home safe and to help prevent falls.  Ways to make your home safe include removing things that can make you trip and installing grab bars in the bathroom.  Ask for help when making these changes in your home.  This information is not intended to replace advice given to you by your health care provider. Make sure you discuss any questions you have with your health care provider.  Document Revised: 07/21/2021 Document Reviewed: 07/21/2021  Elsevier Patient Education © 2021 [employer] Inc.

## 2025-04-18 ENCOUNTER — OFFICE VISIT (OUTPATIENT)
Dept: FAMILY MEDICINE CLINIC | Facility: CLINIC | Age: 65
End: 2025-04-18
Payer: MEDICARE

## 2025-04-18 VITALS
HEIGHT: 71 IN | SYSTOLIC BLOOD PRESSURE: 112 MMHG | OXYGEN SATURATION: 98 % | TEMPERATURE: 97.5 F | HEART RATE: 76 BPM | WEIGHT: 222.6 LBS | RESPIRATION RATE: 18 BRPM | BODY MASS INDEX: 31.16 KG/M2 | DIASTOLIC BLOOD PRESSURE: 68 MMHG

## 2025-04-18 DIAGNOSIS — I10 ESSENTIAL HYPERTENSION: ICD-10-CM

## 2025-04-18 DIAGNOSIS — Z12.5 PROSTATE CANCER SCREENING: ICD-10-CM

## 2025-04-18 DIAGNOSIS — J30.1 NON-SEASONAL ALLERGIC RHINITIS DUE TO POLLEN: Primary | ICD-10-CM

## 2025-04-18 DIAGNOSIS — M48.062 SPINAL STENOSIS OF LUMBAR REGION WITH NEUROGENIC CLAUDICATION: ICD-10-CM

## 2025-04-18 DIAGNOSIS — R73.9 ELEVATED BLOOD SUGAR: ICD-10-CM

## 2025-04-18 DIAGNOSIS — E78.2 MIXED HYPERLIPIDEMIA: ICD-10-CM

## 2025-04-18 DIAGNOSIS — M51.379 DEGENERATION OF LUMBAR OR LUMBOSACRAL INTERVERTEBRAL DISC: ICD-10-CM

## 2025-04-18 RX ORDER — GABAPENTIN 600 MG/1
600 TABLET ORAL 4 TIMES DAILY
Qty: 360 TABLET | Refills: 3 | Status: SHIPPED | OUTPATIENT
Start: 2025-04-18

## 2025-04-18 RX ORDER — HYDROCODONE BITARTRATE AND ACETAMINOPHEN 5; 325 MG/1; MG/1
1 TABLET ORAL EVERY 8 HOURS PRN
Qty: 90 TABLET | Refills: 0 | Status: SHIPPED | OUTPATIENT
Start: 2025-04-18

## 2025-04-18 RX ORDER — MONTELUKAST SODIUM 10 MG/1
10 TABLET ORAL NIGHTLY
Qty: 90 TABLET | Refills: 3 | Status: SHIPPED | OUTPATIENT
Start: 2025-04-18

## 2025-04-18 NOTE — ASSESSMENT & PLAN NOTE
Stable, continue current management.   Orders:    Comprehensive Metabolic Panel    TSH    Lipid Panel With / Chol / HDL Ratio

## 2025-04-19 LAB
ALBUMIN SERPL-MCNC: 4.2 G/DL (ref 3.9–4.9)
ALP SERPL-CCNC: 121 IU/L (ref 44–121)
ALT SERPL-CCNC: 22 IU/L (ref 0–44)
AST SERPL-CCNC: 25 IU/L (ref 0–40)
BASOPHILS # BLD AUTO: 0.1 X10E3/UL (ref 0–0.2)
BASOPHILS NFR BLD AUTO: 2 %
BILIRUB SERPL-MCNC: 0.3 MG/DL (ref 0–1.2)
BUN SERPL-MCNC: 17 MG/DL (ref 8–27)
BUN/CREAT SERPL: 14 (ref 10–24)
CALCIUM SERPL-MCNC: 9.4 MG/DL (ref 8.6–10.2)
CHLORIDE SERPL-SCNC: 99 MMOL/L (ref 96–106)
CHOLEST SERPL-MCNC: 154 MG/DL (ref 100–199)
CHOLEST/HDLC SERPL: 3.9 RATIO (ref 0–5)
CO2 SERPL-SCNC: 26 MMOL/L (ref 20–29)
CREAT SERPL-MCNC: 1.23 MG/DL (ref 0.76–1.27)
EGFRCR SERPLBLD CKD-EPI 2021: 65 ML/MIN/1.73
EOSINOPHIL # BLD AUTO: 0.4 X10E3/UL (ref 0–0.4)
EOSINOPHIL NFR BLD AUTO: 5 %
ERYTHROCYTE [DISTWIDTH] IN BLOOD BY AUTOMATED COUNT: 12.7 % (ref 11.6–15.4)
GLOBULIN SER CALC-MCNC: 2.5 G/DL (ref 1.5–4.5)
GLUCOSE SERPL-MCNC: 106 MG/DL (ref 70–99)
HBA1C MFR BLD: 6.1 % (ref 4.8–5.6)
HCT VFR BLD AUTO: 42.6 % (ref 37.5–51)
HDLC SERPL-MCNC: 39 MG/DL
HGB BLD-MCNC: 14.2 G/DL (ref 13–17.7)
IMM GRANULOCYTES # BLD AUTO: 0 X10E3/UL (ref 0–0.1)
IMM GRANULOCYTES NFR BLD AUTO: 0 %
LDLC SERPL CALC-MCNC: 89 MG/DL (ref 0–99)
LYMPHOCYTES # BLD AUTO: 1.5 X10E3/UL (ref 0.7–3.1)
LYMPHOCYTES NFR BLD AUTO: 22 %
MCH RBC QN AUTO: 29.4 PG (ref 26.6–33)
MCHC RBC AUTO-ENTMCNC: 33.3 G/DL (ref 31.5–35.7)
MCV RBC AUTO: 88 FL (ref 79–97)
MONOCYTES # BLD AUTO: 0.7 X10E3/UL (ref 0.1–0.9)
MONOCYTES NFR BLD AUTO: 11 %
NEUTROPHILS # BLD AUTO: 4 X10E3/UL (ref 1.4–7)
NEUTROPHILS NFR BLD AUTO: 60 %
PLATELET # BLD AUTO: 292 X10E3/UL (ref 150–450)
POTASSIUM SERPL-SCNC: 4.5 MMOL/L (ref 3.5–5.2)
PROT SERPL-MCNC: 6.7 G/DL (ref 6–8.5)
PSA SERPL-MCNC: 0.3 NG/ML (ref 0–4)
RBC # BLD AUTO: 4.83 X10E6/UL (ref 4.14–5.8)
SODIUM SERPL-SCNC: 138 MMOL/L (ref 134–144)
TRIGL SERPL-MCNC: 147 MG/DL (ref 0–149)
TSH SERPL DL<=0.005 MIU/L-ACNC: 2.07 UIU/ML (ref 0.45–4.5)
VLDLC SERPL CALC-MCNC: 26 MG/DL (ref 5–40)
WBC # BLD AUTO: 6.7 X10E3/UL (ref 3.4–10.8)

## 2025-04-21 ENCOUNTER — RESULTS FOLLOW-UP (OUTPATIENT)
Dept: FAMILY MEDICINE CLINIC | Facility: CLINIC | Age: 65
End: 2025-04-21
Payer: MEDICARE

## 2025-05-07 ENCOUNTER — HOSPITAL ENCOUNTER (OUTPATIENT)
Dept: PAIN MEDICINE | Facility: HOSPITAL | Age: 65
Discharge: HOME | End: 2025-05-07
Payer: MEDICARE

## 2025-05-07 ENCOUNTER — HOSPITAL ENCOUNTER (OUTPATIENT)
Dept: GENERAL RADIOLOGY | Facility: HOSPITAL | Age: 65
Discharge: HOME | End: 2025-05-07
Payer: MEDICARE

## 2025-05-07 VITALS
RESPIRATION RATE: 16 BRPM | SYSTOLIC BLOOD PRESSURE: 115 MMHG | TEMPERATURE: 97.8 F | OXYGEN SATURATION: 95 % | HEART RATE: 63 BPM | DIASTOLIC BLOOD PRESSURE: 76 MMHG

## 2025-05-07 DIAGNOSIS — R52 PAIN: ICD-10-CM

## 2025-05-07 DIAGNOSIS — M54.41 CHRONIC BILATERAL LOW BACK PAIN WITH BILATERAL SCIATICA: Primary | ICD-10-CM

## 2025-05-07 DIAGNOSIS — M54.42 CHRONIC BILATERAL LOW BACK PAIN WITH BILATERAL SCIATICA: Primary | ICD-10-CM

## 2025-05-07 DIAGNOSIS — G89.29 CHRONIC BILATERAL LOW BACK PAIN WITH BILATERAL SCIATICA: Primary | ICD-10-CM

## 2025-05-07 DIAGNOSIS — M54.16 LUMBAR RADICULOPATHY: ICD-10-CM

## 2025-05-07 PROCEDURE — 25010000002 LIDOCAINE PF 1% 1 % SOLUTION: Performed by: PHYSICAL MEDICINE & REHABILITATION

## 2025-05-07 PROCEDURE — 25510000001 IOPAMIDOL 41 % SOLUTION: Performed by: PHYSICAL MEDICINE & REHABILITATION

## 2025-05-07 PROCEDURE — 77003 FLUOROGUIDE FOR SPINE INJECT: CPT

## 2025-05-07 PROCEDURE — 25010000002 DEXAMETHASONE SODIUM PHOSPHATE 10 MG/ML SOLUTION: Performed by: PHYSICAL MEDICINE & REHABILITATION

## 2025-05-07 RX ORDER — IOPAMIDOL 408 MG/ML
10 INJECTION, SOLUTION INTRATHECAL
Status: COMPLETED | OUTPATIENT
Start: 2025-05-07 | End: 2025-05-07

## 2025-05-07 RX ORDER — DEXAMETHASONE SODIUM PHOSPHATE 10 MG/ML
10 INJECTION, SOLUTION INTRAMUSCULAR; INTRAVENOUS ONCE
Status: COMPLETED | OUTPATIENT
Start: 2025-05-07 | End: 2025-05-07

## 2025-05-07 RX ORDER — LIDOCAINE HYDROCHLORIDE 10 MG/ML
5 INJECTION, SOLUTION EPIDURAL; INFILTRATION; INTRACAUDAL; PERINEURAL ONCE
Status: COMPLETED | OUTPATIENT
Start: 2025-05-07 | End: 2025-05-07

## 2025-05-07 RX ADMIN — DEXAMETHASONE SODIUM PHOSPHATE 10 MG: 10 INJECTION, SOLUTION INTRAMUSCULAR; INTRAVENOUS at 10:00

## 2025-05-07 RX ADMIN — LIDOCAINE HYDROCHLORIDE 5 ML: 10 INJECTION, SOLUTION EPIDURAL; INFILTRATION; INTRACAUDAL; PERINEURAL at 10:00

## 2025-05-07 RX ADMIN — IOPAMIDOL 10 ML: 408 INJECTION, SOLUTION INTRATHECAL at 10:00

## 2025-05-19 DIAGNOSIS — M48.062 SPINAL STENOSIS OF LUMBAR REGION WITH NEUROGENIC CLAUDICATION: ICD-10-CM

## 2025-05-19 DIAGNOSIS — M51.379 DEGENERATION OF LUMBAR OR LUMBOSACRAL INTERVERTEBRAL DISC: ICD-10-CM

## 2025-05-22 DIAGNOSIS — M51.379 DEGENERATION OF LUMBAR OR LUMBOSACRAL INTERVERTEBRAL DISC: ICD-10-CM

## 2025-05-23 RX ORDER — AMITRIPTYLINE HYDROCHLORIDE 50 MG/1
100 TABLET ORAL
Qty: 180 TABLET | Refills: 3 | Status: SHIPPED | OUTPATIENT
Start: 2025-05-23

## 2025-05-23 NOTE — TELEPHONE ENCOUNTER
Patient's wife called the office to follow up on this. They have not received a call back about this yet and her  is almost out of his medication. Please advise and call.

## 2025-05-28 RX ORDER — HYDROCODONE BITARTRATE AND ACETAMINOPHEN 5; 325 MG/1; MG/1
1 TABLET ORAL EVERY 8 HOURS PRN
Qty: 90 TABLET | Refills: 0 | Status: CANCELLED | OUTPATIENT
Start: 2025-05-28

## 2025-05-28 RX ORDER — CARISOPRODOL 350 MG/1
350 TABLET ORAL 3 TIMES DAILY PRN
Qty: 90 TABLET | Refills: 3 | Status: CANCELLED | OUTPATIENT
Start: 2025-05-28

## 2025-05-29 ENCOUNTER — OFFICE VISIT (OUTPATIENT)
Dept: FAMILY MEDICINE CLINIC | Facility: CLINIC | Age: 65
End: 2025-05-29
Payer: MEDICARE

## 2025-05-29 VITALS
HEART RATE: 84 BPM | SYSTOLIC BLOOD PRESSURE: 110 MMHG | WEIGHT: 223 LBS | DIASTOLIC BLOOD PRESSURE: 70 MMHG | BODY MASS INDEX: 31.22 KG/M2 | HEIGHT: 71 IN | RESPIRATION RATE: 20 BRPM | OXYGEN SATURATION: 94 %

## 2025-05-29 DIAGNOSIS — M51.379 DEGENERATION OF LUMBAR OR LUMBOSACRAL INTERVERTEBRAL DISC: ICD-10-CM

## 2025-05-29 DIAGNOSIS — M48.062 SPINAL STENOSIS OF LUMBAR REGION WITH NEUROGENIC CLAUDICATION: ICD-10-CM

## 2025-05-29 RX ORDER — HYDROCODONE BITARTRATE AND ACETAMINOPHEN 5; 325 MG/1; MG/1
1 TABLET ORAL EVERY 8 HOURS PRN
Qty: 90 TABLET | Refills: 0 | Status: SHIPPED | OUTPATIENT
Start: 2025-05-29

## 2025-05-29 RX ORDER — CARISOPRODOL 350 MG/1
350 TABLET ORAL 3 TIMES DAILY PRN
Qty: 90 TABLET | Refills: 3 | Status: SHIPPED | OUTPATIENT
Start: 2025-05-29

## 2025-05-29 RX ORDER — MELOXICAM 15 MG/1
15 TABLET ORAL DAILY
Qty: 90 TABLET | Refills: 3 | Status: SHIPPED | OUTPATIENT
Start: 2025-05-29

## 2025-05-29 NOTE — PROGRESS NOTES
Subjective   Steven Hammonds is a 64 y.o. male.   Chief Complaint   Patient presents with    Pain     History of Present Illness  Celebrex not effective, pt would like to try nsaid  Back Pain  Chronicity:  Chronic  Onset:  More than 1 year ago  Frequency:  Constantly  Progression since onset:  Worsening  Pain location:  Gluteal, lumbar spine and sacro-iliac  Pain quality:  Aching, shooting and stabbing  Radiates to:  Left thigh, left anterolateral lower leg, left foot and left buttock  Pain-numeric:  7/10  Pain is:  The same all the time  Aggravated by:  Bending, position, lying down, sitting, standing and twisting  Stiffness is present:  All day  Associated symptoms: leg pain, numbness, paresthesias, pelvic pain, tingling and weakness    Associated symptoms: no abdominal pain, no bladder incontinence, no bowel incontinence, no chest pain, no dysuria, no fever, no perianal numbness and no weight loss         The following portions of the patient's history were reviewed and updated as appropriate: allergies, current medications, past family history, past medical history, past social history, past surgical history, and problem list.    Patient Active Problem List   Diagnosis    Degeneration of intervertebral disc of lumbosacral region with discogenic back pain and lower extremity pain    Disc narrowing    Colon cancer screening    Gastroesophageal reflux disease    Hypertension    Paroxysmal atrial fibrillation    Spinal stenosis of lumbar region with neurogenic claudication    Spondylolysis    Status post arthroscopy of knee    Sinus pause    Tachycardia-bradycardia    Thoracic or lumbosacral neuritis or radiculitis    Tremor    Presence of cardiac pacemaker    Allergic rhinitis    Hyperlipidemia    Insomnia    Umbilical hernia    Tinea    Impetigo    Labile blood pressure    Other specified persistent mood disorders    Non-seasonal allergic rhinitis due to pollen    Chronic bilateral low back pain with bilateral  sciatica    Bobby's esophagus with dysplasia    Former tobacco use    Cervical disc disease with myelopathy    Psoriatic arthritis    Chest pain    Hypertensive urgency    Headache    Abnormal nuclear stress test    Herniation of lumbar intervertebral disc with radiculopathy    History of lumbar spinal fusion    Other chronic pain    Sacroiliac joint dysfunction of right side    Spondylolisthesis at L2-L3 level    Spondylolisthesis at L3-L4 level    Other secondary kyphosis, thoracolumbar region    Pseudoarthrosis of lumbar spine    Over weight    Allergic conjunctivitis of both eyes    Lumbar radiculopathy       Current Outpatient Medications on File Prior to Visit   Medication Sig Dispense Refill    amitriptyline (ELAVIL) 50 MG tablet TAKE 2 TABLETS BY MOUTH AT  BEDTIME 180 tablet 3    ascorbic acid (VITAMIN C) 500 MG tablet Take 1 tablet by mouth every night at bedtime.      celecoxib (CeleBREX) 100 MG capsule Take 1 capsule by mouth Every 12 (Twelve) Hours. 180 capsule 3    cetirizine (zyrTEC) 10 MG tablet Take 1 tablet by mouth Daily.      cholecalciferol (VITAMIN D3) 25 MCG (1000 UT) tablet Take 3 tablets by mouth every night at bedtime.      digoxin (LANOXIN) 125 MCG tablet TAKE 1 TABLET DAILY 90 tablet 3    esomeprazole (nexIUM) 40 MG capsule Take 1 capsule by mouth Daily.      fluticasone (FLONASE) 50 MCG/ACT nasal spray USE 2 SPRAYS IN BOTH NOSTRILS  ONCE DAILY 48 g 3    gabapentin (NEURONTIN) 600 MG tablet Take 1 tablet by mouth 4 (Four) Times a Day. 360 tablet 3    hydroCHLOROthiazide 25 MG tablet TAKE 1 TABLET BY MOUTH DAILY 90 tablet 3    lisinopril (PRINIVIL,ZESTRIL) 20 MG tablet TAKE 1 TABLET BY MOUTH DAILY 90 tablet 3    montelukast (Singulair) 10 MG tablet Take 1 tablet by mouth Every Night. 90 tablet 3    pramipexole (MIRAPEX) 0.25 MG tablet       propranolol LA (INDERAL LA) 120 MG 24 hr capsule Take 1 capsule by mouth Daily.      rosuvastatin (CRESTOR) 10 MG tablet TAKE ONE-HALF TABLET BY MOUTH  " DAILY 45 tablet 3    silodosin (RAPAFLO) 8 MG capsule capsule Take 1 capsule by mouth Daily.      [DISCONTINUED] carisoprodol (SOMA) 350 MG tablet Take 1 tablet by mouth 3 (Three) Times a Day As Needed for Muscle Spasms. for muscle spams 90 tablet 3    [DISCONTINUED] HYDROcodone-acetaminophen (NORCO) 5-325 MG per tablet Take 1 tablet by mouth Every 8 (Eight) Hours As Needed for Severe Pain. 90 tablet 0    Eliquis 5 MG tablet tablet TAKE 1 TABLET TWICE A  tablet 3     No current facility-administered medications on file prior to visit.     Current outpatient and discharge medications have been reconciled for the patient.  Reviewed by: Junaid Marquez MD      Allergies   Allergen Reactions    Oxycodone-Acetaminophen GI Intolerance    Acetaminophen Nausea And Vomiting    Adhesive Tape Unknown (See Comments)     BLISTERS       Review of Systems   Constitutional:  Negative for activity change, appetite change, fever, unexpected weight gain and unexpected weight loss.   Respiratory:  Negative for cough.    Cardiovascular:  Negative for chest pain.   Gastrointestinal:  Negative for abdominal distention, abdominal pain, blood in stool, bowel incontinence, constipation, diarrhea and vomiting.   Genitourinary:  Positive for pelvic pain. Negative for dysuria and urinary incontinence.   Musculoskeletal:  Positive for arthralgias and back pain.   Skin:  Negative for color change.   Neurological:  Positive for tingling, weakness, numbness and paresthesias.   Psychiatric/Behavioral:  Negative for behavioral problems.      I have reviewed and confirmed the accuracy of the ROS as documented by the MA/LPN/RN Junaid Marquez MD    Objective   Visit Vitals  /70 (BP Location: Right arm, Patient Position: Sitting, Cuff Size: Adult)   Pulse 84   Resp 20   Ht 180.3 cm (71\")   Wt 101 kg (223 lb)   SpO2 94%   BMI 31.10 kg/m²      **  Physical Exam  Constitutional:       Appearance: He is well-developed.   HENT:    "   Head: Normocephalic and atraumatic.      Right Ear: External ear normal.      Left Ear: External ear normal.      Nose: Nose normal.   Eyes:      Pupils: Pupils are equal, round, and reactive to light.   Cardiovascular:      Rate and Rhythm: Normal rate and regular rhythm.      Heart sounds: Normal heart sounds.   Pulmonary:      Effort: Pulmonary effort is normal.      Breath sounds: Normal breath sounds.   Abdominal:      General: Bowel sounds are normal.      Palpations: Abdomen is soft.   Musculoskeletal:         General: Normal range of motion.      Cervical back: Normal range of motion and neck supple.   Skin:     General: Skin is warm and dry.   Neurological:      Mental Status: He is alert and oriented to person, place, and time.   Psychiatric:         Behavior: Behavior normal.         Thought Content: Thought content normal.         Judgment: Judgment normal.       Derm Physical Exam  Ortho Exam   Neurological Exam  Mental Status  Alert. Oriented to person, place, and time.    Cranial Nerves  CN III, IV, VI: Pupils equal round and reactive to light bilaterally.       Assessment & Plan  Spinal stenosis of lumbar region with neurogenic claudication    Orders:    HYDROcodone-acetaminophen (NORCO) 5-325 MG per tablet; Take 1 tablet by mouth Every 8 (Eight) Hours As Needed for Severe Pain.    meloxicam (MOBIC) 15 MG tablet; Take 1 tablet by mouth Daily.    Degeneration of lumbar or lumbosacral intervertebral disc    Orders:    carisoprodol (SOMA) 350 MG tablet; Take 1 tablet by mouth 3 (Three) Times a Day As Needed for Muscle Spasms. for muscle spams    meloxicam (MOBIC) 15 MG tablet; Take 1 tablet by mouth Daily.       Findings discussed. All questions answered.  Medication and medication adverse effects discussed.  Drug education given and explained to patient. Patient verbalized understanding. Use mobic with caution and sparingly . Watch for GI upset       Expected course, medications, and adverse effects  discussed as appropriate.  Call or return if worsening or persistent symptoms.     This document is intended for medical professional use only.   Electronically signed by Junaid Marquez MD on 05/29/2025. This content may not have been proofread.

## 2025-05-29 NOTE — ASSESSMENT & PLAN NOTE
Orders:    HYDROcodone-acetaminophen (NORCO) 5-325 MG per tablet; Take 1 tablet by mouth Every 8 (Eight) Hours As Needed for Severe Pain.    meloxicam (MOBIC) 15 MG tablet; Take 1 tablet by mouth Daily.

## 2025-06-02 ENCOUNTER — OFFICE VISIT (OUTPATIENT)
Dept: PAIN MEDICINE | Facility: CLINIC | Age: 65
End: 2025-06-02
Payer: MEDICARE

## 2025-06-02 VITALS
HEART RATE: 62 BPM | DIASTOLIC BLOOD PRESSURE: 93 MMHG | RESPIRATION RATE: 16 BRPM | OXYGEN SATURATION: 99 % | SYSTOLIC BLOOD PRESSURE: 133 MMHG

## 2025-06-02 DIAGNOSIS — M53.3 SACROILIAC JOINT DYSFUNCTION OF RIGHT SIDE: ICD-10-CM

## 2025-06-02 DIAGNOSIS — M51.372 DEGENERATION OF INTERVERTEBRAL DISC OF LUMBOSACRAL REGION WITH DISCOGENIC BACK PAIN AND LOWER EXTREMITY PAIN: ICD-10-CM

## 2025-06-02 DIAGNOSIS — M51.16 HERNIATION OF LUMBAR INTERVERTEBRAL DISC WITH RADICULOPATHY: ICD-10-CM

## 2025-06-02 DIAGNOSIS — M54.16 LUMBAR RADICULOPATHY: ICD-10-CM

## 2025-06-02 DIAGNOSIS — G89.29 CHRONIC BILATERAL LOW BACK PAIN WITH BILATERAL SCIATICA: Primary | ICD-10-CM

## 2025-06-02 DIAGNOSIS — M50.00 CERVICAL DISC DISEASE WITH MYELOPATHY: ICD-10-CM

## 2025-06-02 DIAGNOSIS — M48.062 SPINAL STENOSIS OF LUMBAR REGION WITH NEUROGENIC CLAUDICATION: ICD-10-CM

## 2025-06-02 DIAGNOSIS — M54.41 CHRONIC BILATERAL LOW BACK PAIN WITH BILATERAL SCIATICA: Primary | ICD-10-CM

## 2025-06-02 DIAGNOSIS — M54.42 CHRONIC BILATERAL LOW BACK PAIN WITH BILATERAL SCIATICA: Primary | ICD-10-CM

## 2025-06-02 PROCEDURE — 1159F MED LIST DOCD IN RCRD: CPT | Performed by: PHYSICAL MEDICINE & REHABILITATION

## 2025-06-02 PROCEDURE — 3075F SYST BP GE 130 - 139MM HG: CPT | Performed by: PHYSICAL MEDICINE & REHABILITATION

## 2025-06-02 PROCEDURE — G2211 COMPLEX E/M VISIT ADD ON: HCPCS | Performed by: PHYSICAL MEDICINE & REHABILITATION

## 2025-06-02 PROCEDURE — 3080F DIAST BP >= 90 MM HG: CPT | Performed by: PHYSICAL MEDICINE & REHABILITATION

## 2025-06-02 PROCEDURE — G0463 HOSPITAL OUTPT CLINIC VISIT: HCPCS | Performed by: PHYSICAL MEDICINE & REHABILITATION

## 2025-06-02 PROCEDURE — 99214 OFFICE O/P EST MOD 30 MIN: CPT | Performed by: PHYSICAL MEDICINE & REHABILITATION

## 2025-06-02 PROCEDURE — 1125F AMNT PAIN NOTED PAIN PRSNT: CPT | Performed by: PHYSICAL MEDICINE & REHABILITATION

## 2025-06-02 PROCEDURE — 1160F RVW MEDS BY RX/DR IN RCRD: CPT | Performed by: PHYSICAL MEDICINE & REHABILITATION

## 2025-06-02 NOTE — PROGRESS NOTES
Subjective   Steven Hammonds is a 64 y.o. male.     History of Present Illness  CC: low back pain    LBP to medial and lateral LLE to foot, 8/10 at worst, 5/10 at best, began > 10 years ago, treated by me since 7/14/24, worsening, s/p lumbar fusion, aching, throbbing, with numbness, worse with bending and lifting, interferes with ADLs, activity, sleep, failed surgery, PT, medications. CT L-spine with L2-S1, neuroforaminal narrowing worst at left L5/S1. Saw PCP, notes reviewed, referred for injections only, on Eliquis, has pacemaker. No FH of substance abuse. Had b/l L5 TFESI with much > 50% relief for > 3 months, repeated with similar relief.   Back Pain  Associated symptoms: chest pain and numbness    Associated symptoms: no abdominal pain, no bladder incontinence, no fever and no weakness    Pain  Symptoms include chest pain and numbness.    Pertinent negative symptoms include no abdominal pain, no chills, no fatigue, no fever, no myalgias, no nausea, no neck pain, no vomiting and no weakness.   Chest Pain   Associated symptoms include back pain and numbness. Pertinent negatives include no abdominal pain, dizziness, fever, nausea, shortness of breath, vomiting or weakness.        The following portions of the patient's history were reviewed and updated as appropriate: allergies, current medications, past family history, past medical history, past social history, past surgical history, and problem list.    Review of Systems   Constitutional:  Negative for chills, fatigue and fever.   HENT:  Negative for hearing loss and trouble swallowing.    Eyes:  Negative for visual disturbance.   Respiratory:  Negative for shortness of breath.    Cardiovascular:  Positive for chest pain.   Gastrointestinal:  Negative for abdominal pain, constipation, diarrhea, nausea and vomiting.   Genitourinary:  Negative for urinary incontinence.   Musculoskeletal:  Positive for back pain. Negative for arthralgias, joint swelling, myalgias  and neck pain.   Neurological:  Positive for numbness. Negative for dizziness, weakness and headache.       Objective   Physical Exam  Constitutional:       Appearance: Normal appearance. He is well-developed.   HENT:      Head: Normocephalic and atraumatic.   Eyes:      Pupils: Pupils are equal, round, and reactive to light.   Cardiovascular:      Rate and Rhythm: Normal rate and regular rhythm.      Heart sounds: Normal heart sounds.   Pulmonary:      Effort: Pulmonary effort is normal.      Breath sounds: Normal breath sounds.   Abdominal:      General: Bowel sounds are normal. There is no distension.      Palpations: Abdomen is soft.      Tenderness: There is no abdominal tenderness.   Musculoskeletal:      Cervical back: Normal range of motion.   Neurological:      Mental Status: He is alert and oriented to person, place, and time.      Sensory: Sensory deficit present.      Deep Tendon Reflexes: Reflexes are normal and symmetric. Reflexes normal.      Comments: Loss of sensation in LLE in L4 distribution   Psychiatric:         Mood and Affect: Mood normal.         Behavior: Behavior normal.         Thought Content: Thought content normal.         Judgment: Judgment normal.         Assessment & Plan   Problems Addressed this Visit          Other    Degeneration of intervertebral disc of lumbosacral region with discogenic back pain and lower extremity pain    Spinal stenosis of lumbar region with neurogenic claudication    Chronic bilateral low back pain with bilateral sciatica - Primary    Cervical disc disease with myelopathy    Herniation of lumbar intervertebral disc with radiculopathy    Sacroiliac joint dysfunction of right side    Lumbar radiculopathy     Diagnoses         Codes Comments      Chronic bilateral low back pain with bilateral sciatica    -  Primary ICD-10-CM: M54.42, M54.41, G89.29  ICD-9-CM: 724.2, 724.3, 338.29       Degeneration of intervertebral disc of lumbosacral region with discogenic  back pain and lower extremity pain     ICD-10-CM: M51.372  ICD-9-CM: 722.52       Herniation of lumbar intervertebral disc with radiculopathy     ICD-10-CM: M51.16  ICD-9-CM: 722.10, 724.4       Lumbar radiculopathy     ICD-10-CM: M54.16  ICD-9-CM: 724.4       Sacroiliac joint dysfunction of right side     ICD-10-CM: M53.3  ICD-9-CM: 724.6       Spinal stenosis of lumbar region with neurogenic claudication     ICD-10-CM: M48.062  ICD-9-CM: 724.03       Cervical disc disease with myelopathy     ICD-10-CM: M50.00  ICD-9-CM: 722.71             Performed left L4 and L5 TFESI, has clearance to hold Eliquis for 2 days, with > 50% relief, performed repeat with even greater relief, performed b/l L5 TFESI as RLE pain now significant as LLE improves with best relief yet, performed repeat b/l L5 TFESI with again > 50% relief, schedule repeat.  No change to meds today, referred for injections only, gets Ayr 5mg TID prn from PCP  RTC for b/l L5 TFESI then in 2 months for f/u. Hold Eliquis for 3 days.

## 2025-06-27 NOTE — PROGRESS NOTES
Subjective   Steven Hammonds is a 64 y.o. male.   Chief Complaint   Patient presents with    Back Pain     History of Present Illness  Pain med refill. Stable on current treatment. Controlled substance agreement signed and on file.    Back Pain  This is a chronic problem. The current episode started more than 1 year ago. The problem occurs constantly. The problem has been worse since onset. The pain is present in the lumbar spine. The quality of the pain is described as stabbing and shooting. The pain radiates to the left buttock and left thigh. The pain is severe. The pain is The same all the time. The symptoms are aggravated by position, standing, twisting, stress, sitting, lying down, bending and coughing. Stiffness is present All day. Associated symptoms include leg pain, numbness (stable), paresthesias, pelvic pain, tingling and weakness (stable). Pertinent negatives include no abdominal pain, bladder incontinence, dysuria or fever. He has tried NSAIDs and muscle relaxant for the symptoms. The treatment provided moderate relief.        The following portions of the patient's history were reviewed and updated as appropriate: allergies, current medications, past family history, past medical history, past social history, past surgical history, and problem list.    Patient Active Problem List   Diagnosis    Degeneration of intervertebral disc of lumbosacral region with discogenic back pain and lower extremity pain    Disc narrowing    Colon cancer screening    Gastroesophageal reflux disease    Hypertension    Paroxysmal atrial fibrillation    Spinal stenosis of lumbar region with neurogenic claudication    Spondylolysis    Status post arthroscopy of knee    Sinus pause    Tachycardia-bradycardia    Thoracic or lumbosacral neuritis or radiculitis    Tremor    Presence of cardiac pacemaker    Allergic rhinitis    Hyperlipidemia    Insomnia    Umbilical hernia    Tinea    Impetigo    Labile blood pressure    Other  specified persistent mood disorders    Non-seasonal allergic rhinitis due to pollen    Chronic bilateral low back pain with bilateral sciatica    Bobby's esophagus with dysplasia    Former tobacco use    Cervical disc disease with myelopathy    Psoriatic arthritis    Chest pain    Hypertensive urgency    Headache    Abnormal nuclear stress test    Herniation of lumbar intervertebral disc with radiculopathy    History of lumbar spinal fusion    Other chronic pain    Sacroiliac joint dysfunction of right side    Spondylolisthesis at L2-L3 level    Spondylolisthesis at L3-L4 level    Other secondary kyphosis, thoracolumbar region    Pseudoarthrosis of lumbar spine    Over weight    Allergic conjunctivitis of both eyes    Lumbar radiculopathy       Current Outpatient Medications on File Prior to Visit   Medication Sig Dispense Refill    amitriptyline (ELAVIL) 50 MG tablet TAKE 2 TABLETS BY MOUTH AT  BEDTIME 180 tablet 3    ascorbic acid (VITAMIN C) 500 MG tablet Take 1 tablet by mouth every night at bedtime.      carisoprodol (SOMA) 350 MG tablet Take 1 tablet by mouth 3 (Three) Times a Day As Needed for Muscle Spasms. for muscle spams 90 tablet 3    cetirizine (zyrTEC) 10 MG tablet Take 1 tablet by mouth Daily.      cholecalciferol (VITAMIN D3) 25 MCG (1000 UT) tablet Take 3 tablets by mouth every night at bedtime.      digoxin (LANOXIN) 125 MCG tablet TAKE 1 TABLET DAILY 90 tablet 3    esomeprazole (nexIUM) 40 MG capsule Take 1 capsule by mouth Daily.      fluticasone (FLONASE) 50 MCG/ACT nasal spray USE 2 SPRAYS IN BOTH NOSTRILS  ONCE DAILY 48 g 3    gabapentin (NEURONTIN) 600 MG tablet Take 1 tablet by mouth 4 (Four) Times a Day. 360 tablet 3    hydroCHLOROthiazide 25 MG tablet TAKE 1 TABLET BY MOUTH DAILY 90 tablet 3    lisinopril (PRINIVIL,ZESTRIL) 20 MG tablet TAKE 1 TABLET BY MOUTH DAILY 90 tablet 3    montelukast (Singulair) 10 MG tablet Take 1 tablet by mouth Every Night. 90 tablet 3    pramipexole (MIRAPEX)  "0.25 MG tablet       propranolol LA (INDERAL LA) 120 MG 24 hr capsule Take 1 capsule by mouth Daily.      rosuvastatin (CRESTOR) 10 MG tablet TAKE ONE-HALF TABLET BY MOUTH  DAILY 45 tablet 3    silodosin (RAPAFLO) 8 MG capsule capsule Take 1 capsule by mouth Daily.      [DISCONTINUED] Eliquis 5 MG tablet tablet TAKE 1 TABLET TWICE A  tablet 3    [DISCONTINUED] HYDROcodone-acetaminophen (NORCO) 5-325 MG per tablet Take 1 tablet by mouth Every 8 (Eight) Hours As Needed for Severe Pain. 90 tablet 0    [DISCONTINUED] meloxicam (MOBIC) 15 MG tablet Take 1 tablet by mouth Daily. 90 tablet 3     No current facility-administered medications on file prior to visit.     Current outpatient and discharge medications have been reconciled for the patient.  Reviewed by: Junaid Marquez MD      Allergies   Allergen Reactions    Oxycodone-Acetaminophen GI Intolerance    Acetaminophen Nausea And Vomiting    Adhesive Tape Unknown (See Comments)     BLISTERS       Review of Systems   Constitutional:  Negative for fever.   Gastrointestinal:  Negative for abdominal pain.   Genitourinary:  Positive for pelvic pain. Negative for dysuria and urinary incontinence.   Musculoskeletal:  Positive for back pain.   Neurological:  Positive for tingling, weakness (stable), numbness (stable) and paresthesias.     I have reviewed and confirmed the accuracy of the ROS as documented by the MA/LPN/RN Junaid Marquez MD    Objective   Visit Vitals  /84 (BP Location: Right arm, Patient Position: Sitting, Cuff Size: Large Adult)   Pulse 84   Resp 20   Ht 180.3 cm (71\")   Wt 102 kg (224 lb)   SpO2 97%   BMI 31.24 kg/m²      **  Physical Exam  Vitals reviewed.   Constitutional:       Appearance: He is well-developed.   HENT:      Head: Normocephalic.      Right Ear: External ear normal.      Left Ear: External ear normal.      Nose: Nose normal.   Eyes:      Conjunctiva/sclera: Conjunctivae normal.   Cardiovascular:      Rate and " Rhythm: Normal rate.   Pulmonary:      Effort: Pulmonary effort is normal. No respiratory distress.   Abdominal:      General: Abdomen is flat.   Musculoskeletal:         General: No signs of injury.      Cervical back: Normal range of motion.   Skin:     Findings: No rash.   Neurological:      Mental Status: He is alert and oriented to person, place, and time.   Psychiatric:         Behavior: Behavior normal.         Thought Content: Thought content normal.         Judgment: Judgment normal.       Derm Physical Exam  Ortho Exam   Neurological Exam  Mental Status  Alert. Oriented to person, place, and time.     Assessment & Plan  Spinal stenosis of lumbar region with neurogenic claudication    Orders:    meloxicam (MOBIC) 15 MG tablet; Take 1 tablet by mouth Daily.    HYDROcodone-acetaminophen (NORCO) 5-325 MG per tablet; Take 1 tablet by mouth Every 8 (Eight) Hours As Needed for Severe Pain.    Degeneration of lumbar or lumbosacral intervertebral disc    Orders:    meloxicam (MOBIC) 15 MG tablet; Take 1 tablet by mouth Daily.    Paroxysmal atrial fibrillation    Orders:    apixaban (Eliquis) 5 MG tablet tablet; Take 1 tablet by mouth 2 (Two) Times a Day.       Findings discussed. All questions answered.  Medication and medication adverse effects discussed.  Drug education given and explained to patient. Patient verbalized understanding.  Follow-up for routine health maintenance as indicated.         Expected course, medications, and adverse effects discussed as appropriate.  Call or return if worsening or persistent symptoms.     This document is intended for medical professional use only.   Electronically signed by Junaid Marquez MD on 06/30/2025. This content may not have been proofread.

## 2025-06-30 ENCOUNTER — OFFICE VISIT (OUTPATIENT)
Dept: FAMILY MEDICINE CLINIC | Facility: CLINIC | Age: 65
End: 2025-06-30
Payer: MEDICARE

## 2025-06-30 VITALS
RESPIRATION RATE: 20 BRPM | DIASTOLIC BLOOD PRESSURE: 84 MMHG | BODY MASS INDEX: 31.36 KG/M2 | WEIGHT: 224 LBS | HEIGHT: 71 IN | HEART RATE: 84 BPM | OXYGEN SATURATION: 97 % | SYSTOLIC BLOOD PRESSURE: 126 MMHG

## 2025-06-30 DIAGNOSIS — M51.379 DEGENERATION OF LUMBAR OR LUMBOSACRAL INTERVERTEBRAL DISC: ICD-10-CM

## 2025-06-30 DIAGNOSIS — M48.062 SPINAL STENOSIS OF LUMBAR REGION WITH NEUROGENIC CLAUDICATION: ICD-10-CM

## 2025-06-30 DIAGNOSIS — I48.0 PAROXYSMAL ATRIAL FIBRILLATION: Primary | ICD-10-CM

## 2025-06-30 PROCEDURE — 1111F DSCHRG MED/CURRENT MED MERGE: CPT | Performed by: FAMILY MEDICINE

## 2025-06-30 PROCEDURE — 3074F SYST BP LT 130 MM HG: CPT | Performed by: FAMILY MEDICINE

## 2025-06-30 PROCEDURE — 1125F AMNT PAIN NOTED PAIN PRSNT: CPT | Performed by: FAMILY MEDICINE

## 2025-06-30 PROCEDURE — 99214 OFFICE O/P EST MOD 30 MIN: CPT | Performed by: FAMILY MEDICINE

## 2025-06-30 PROCEDURE — 3079F DIAST BP 80-89 MM HG: CPT | Performed by: FAMILY MEDICINE

## 2025-06-30 PROCEDURE — G2211 COMPLEX E/M VISIT ADD ON: HCPCS | Performed by: FAMILY MEDICINE

## 2025-06-30 RX ORDER — HYDROCODONE BITARTRATE AND ACETAMINOPHEN 5; 325 MG/1; MG/1
1 TABLET ORAL EVERY 8 HOURS PRN
Qty: 90 TABLET | Refills: 0 | Status: SHIPPED | OUTPATIENT
Start: 2025-06-30

## 2025-06-30 RX ORDER — MELOXICAM 15 MG/1
15 TABLET ORAL DAILY
Qty: 90 TABLET | Refills: 3 | Status: SHIPPED | OUTPATIENT
Start: 2025-06-30

## 2025-06-30 NOTE — ASSESSMENT & PLAN NOTE
Orders:    meloxicam (MOBIC) 15 MG tablet; Take 1 tablet by mouth Daily.    HYDROcodone-acetaminophen (NORCO) 5-325 MG per tablet; Take 1 tablet by mouth Every 8 (Eight) Hours As Needed for Severe Pain.

## 2025-07-08 DIAGNOSIS — M51.379 DEGENERATION OF LUMBAR OR LUMBOSACRAL INTERVERTEBRAL DISC: ICD-10-CM

## 2025-07-09 RX ORDER — CELECOXIB 100 MG/1
100 CAPSULE ORAL EVERY 12 HOURS SCHEDULED
Qty: 180 CAPSULE | Refills: 3 | OUTPATIENT
Start: 2025-07-09

## 2025-07-28 NOTE — PROGRESS NOTES
Subjective   Steven Hammonds is a 65 y.o. male.   Chief Complaint   Patient presents with    Leg Pain    Back Pain     History of Present Illness  Pain med refill. Stable on current treatment. Controlled substance agreement signed and on file.    Patient has been having increased pain in right leg, goes from knee to upper thigh  Pt has tried and failed all previous modalities including surgical intervention and non-narcotic, non-benzo medications.  Pt is aware of the risks of combining these medications.     Back Pain  This is a chronic problem. The current episode started more than 1 year ago. The problem occurs constantly. The problem has been worse since onset. The pain is present in the lumbar spine. The quality of the pain is described as stabbing and shooting. The pain radiates to the left buttock, left thigh, right thigh, right anterolateral lower leg and right knee. The pain is at a severity of 8/10. The pain is severe. The pain is The same all the time. The symptoms are aggravated by position, standing, twisting, stress, sitting, lying down, bending and coughing. Stiffness is present All day. Associated symptoms include leg pain, numbness (stable), paresthesias, pelvic pain, tingling and weakness (stable). Pertinent negatives include no abdominal pain, bladder incontinence, bowel incontinence, chest pain, dysuria, fever, perianal numbness or weight loss. Risk factors include lack of exercise and obesity. He has tried NSAIDs, muscle relaxant, walking and physical therapy for the symptoms. The treatment provided moderate relief.   Additional comments: Continuous, getting worse down right leg when setting       The following portions of the patient's history were reviewed and updated as appropriate: allergies, current medications, past family history, past medical history, past social history, past surgical history, and problem list.    Patient Active Problem List   Diagnosis    Degeneration of intervertebral  disc of lumbosacral region with discogenic back pain and lower extremity pain    Disc narrowing    Colon cancer screening    Gastroesophageal reflux disease    Hypertension    Paroxysmal atrial fibrillation    Spinal stenosis of lumbar region with neurogenic claudication    Spondylolysis    Status post arthroscopy of knee    Sinus pause    Tachycardia-bradycardia    Thoracic or lumbosacral neuritis or radiculitis    Tremor    Presence of cardiac pacemaker    Allergic rhinitis    Hyperlipidemia    Insomnia    Umbilical hernia    Tinea    Impetigo    Labile blood pressure    Other specified persistent mood disorders    Non-seasonal allergic rhinitis due to pollen    Chronic bilateral low back pain with bilateral sciatica    Bobby's esophagus with dysplasia    Former tobacco use    Cervical disc disease with myelopathy    Psoriatic arthritis    Chest pain    Hypertensive urgency    Headache    Abnormal nuclear stress test    Herniation of lumbar intervertebral disc with radiculopathy    History of lumbar spinal fusion    Other chronic pain    Sacroiliac joint dysfunction of right side    Spondylolisthesis at L2-L3 level    Spondylolisthesis at L3-L4 level    Other secondary kyphosis, thoracolumbar region    Pseudoarthrosis of lumbar spine    Over weight    Allergic conjunctivitis of both eyes    Lumbar radiculopathy       Current Outpatient Medications on File Prior to Visit   Medication Sig Dispense Refill    amitriptyline (ELAVIL) 50 MG tablet TAKE 2 TABLETS BY MOUTH AT  BEDTIME 180 tablet 3    apixaban (Eliquis) 5 MG tablet tablet Take 1 tablet by mouth 2 (Two) Times a Day. 180 tablet 3    ascorbic acid (VITAMIN C) 500 MG tablet Take 1 tablet by mouth every night at bedtime.      carisoprodol (SOMA) 350 MG tablet Take 1 tablet by mouth 3 (Three) Times a Day As Needed for Muscle Spasms. for muscle spams 90 tablet 3    cetirizine (zyrTEC) 10 MG tablet Take 1 tablet by mouth Daily.      cholecalciferol (VITAMIN D3)  25 MCG (1000 UT) tablet Take 3 tablets by mouth every night at bedtime.      digoxin (LANOXIN) 125 MCG tablet TAKE 1 TABLET DAILY 90 tablet 3    esomeprazole (nexIUM) 40 MG capsule Take 1 capsule by mouth Daily.      fluticasone (FLONASE) 50 MCG/ACT nasal spray USE 2 SPRAYS IN BOTH NOSTRILS  ONCE DAILY 48 g 3    gabapentin (NEURONTIN) 600 MG tablet Take 1 tablet by mouth 4 (Four) Times a Day. 360 tablet 3    hydroCHLOROthiazide 25 MG tablet TAKE 1 TABLET BY MOUTH DAILY 90 tablet 3    lisinopril (PRINIVIL,ZESTRIL) 20 MG tablet TAKE 1 TABLET BY MOUTH DAILY 90 tablet 3    meloxicam (MOBIC) 15 MG tablet Take 1 tablet by mouth Daily. 90 tablet 3    montelukast (Singulair) 10 MG tablet Take 1 tablet by mouth Every Night. 90 tablet 3    pramipexole (MIRAPEX) 0.25 MG tablet       propranolol LA (INDERAL LA) 120 MG 24 hr capsule Take 1 capsule by mouth Daily.      rosuvastatin (CRESTOR) 10 MG tablet TAKE ONE-HALF TABLET BY MOUTH  DAILY 45 tablet 3    silodosin (RAPAFLO) 8 MG capsule capsule Take 1 capsule by mouth Daily.      [DISCONTINUED] HYDROcodone-acetaminophen (NORCO) 5-325 MG per tablet Take 1 tablet by mouth Every 8 (Eight) Hours As Needed for Severe Pain. 90 tablet 0     No current facility-administered medications on file prior to visit.     Current outpatient and discharge medications have been reconciled for the patient.  Reviewed by: Junaid Marquez MD      Allergies   Allergen Reactions    Oxycodone-Acetaminophen GI Intolerance    Acetaminophen Nausea And Vomiting    Adhesive Tape Unknown (See Comments)     BLISTERS       Review of Systems   Constitutional:  Negative for fever and unexpected weight loss.   Cardiovascular:  Negative for chest pain.   Gastrointestinal:  Negative for abdominal pain and bowel incontinence.   Genitourinary:  Positive for pelvic pain. Negative for dysuria and urinary incontinence.   Musculoskeletal:  Positive for back pain.   Neurological:  Positive for tingling, weakness  "(stable), numbness (stable) and paresthesias.     I have reviewed and confirmed the accuracy of the ROS as documented by the MA/LPN/RN Junaid Marquez MD    Objective   Visit Vitals  /80 (BP Location: Right arm, Patient Position: Sitting, Cuff Size: Large Adult)   Pulse 86   Resp 20   Ht 180.3 cm (71\")   Wt 99.8 kg (220 lb)   SpO2 98%   BMI 30.68 kg/m²      **  Physical Exam  Vitals reviewed.   Constitutional:       Appearance: He is well-developed.   HENT:      Head: Normocephalic.      Right Ear: External ear normal.      Left Ear: External ear normal.      Nose: Nose normal.   Eyes:      Conjunctiva/sclera: Conjunctivae normal.   Cardiovascular:      Rate and Rhythm: Normal rate.   Pulmonary:      Effort: Pulmonary effort is normal. No respiratory distress.   Abdominal:      General: Abdomen is flat.   Musculoskeletal:         General: No signs of injury.      Cervical back: Normal range of motion.   Skin:     Findings: No rash.   Neurological:      Mental Status: He is alert and oriented to person, place, and time.   Psychiatric:         Behavior: Behavior normal.         Thought Content: Thought content normal.         Judgment: Judgment normal.       Derm Physical Exam  Ortho Exam   Neurological Exam  Mental Status  Alert. Oriented to person, place, and time.    Assessment & Plan  Spinal stenosis of lumbar region with neurogenic claudication  Findings discussed. All questions answered.  Follow-up for routine health maintenance as indicated.   Orders:    HYDROcodone-acetaminophen (NORCO) 5-325 MG per tablet; Take 1 tablet by mouth Every 8 (Eight) Hours As Needed for Severe Pain.    Continue quality of life management. Pt's condition not expected to improve.   Benefits of his medications outweigh risks.    BMI is >= 30 and <35. (Class 1 Obesity). The following options were offered after discussion;: exercise counseling/recommendations       Expected course, medications, and adverse effects discussed " as appropriate.  Call or return if worsening or persistent symptoms.     This document is intended for medical professional use only.   Electronically signed by Junaid Marquez MD on 07/30/2025. This content may not have been proofread.

## 2025-07-30 ENCOUNTER — OFFICE VISIT (OUTPATIENT)
Dept: FAMILY MEDICINE CLINIC | Facility: CLINIC | Age: 65
End: 2025-07-30
Payer: MEDICARE

## 2025-07-30 VITALS
HEART RATE: 86 BPM | SYSTOLIC BLOOD PRESSURE: 132 MMHG | HEIGHT: 71 IN | DIASTOLIC BLOOD PRESSURE: 80 MMHG | OXYGEN SATURATION: 98 % | WEIGHT: 220 LBS | BODY MASS INDEX: 30.8 KG/M2 | RESPIRATION RATE: 20 BRPM

## 2025-07-30 DIAGNOSIS — M48.062 SPINAL STENOSIS OF LUMBAR REGION WITH NEUROGENIC CLAUDICATION: ICD-10-CM

## 2025-07-30 RX ORDER — HYDROCODONE BITARTRATE AND ACETAMINOPHEN 5; 325 MG/1; MG/1
1 TABLET ORAL EVERY 8 HOURS PRN
Qty: 90 TABLET | Refills: 0 | Status: SHIPPED | OUTPATIENT
Start: 2025-07-30

## 2025-07-30 NOTE — ASSESSMENT & PLAN NOTE
Findings discussed. All questions answered.  Follow-up for routine health maintenance as indicated.   Orders:    HYDROcodone-acetaminophen (NORCO) 5-325 MG per tablet; Take 1 tablet by mouth Every 8 (Eight) Hours As Needed for Severe Pain.

## 2025-08-13 ENCOUNTER — OFFICE VISIT (OUTPATIENT)
Dept: PAIN MEDICINE | Facility: CLINIC | Age: 65
End: 2025-08-13
Payer: MEDICARE

## 2025-08-13 VITALS
RESPIRATION RATE: 16 BRPM | DIASTOLIC BLOOD PRESSURE: 112 MMHG | OXYGEN SATURATION: 96 % | HEART RATE: 59 BPM | SYSTOLIC BLOOD PRESSURE: 163 MMHG

## 2025-08-13 DIAGNOSIS — M54.41 CHRONIC BILATERAL LOW BACK PAIN WITH BILATERAL SCIATICA: Primary | ICD-10-CM

## 2025-08-13 DIAGNOSIS — G89.29 OTHER CHRONIC PAIN: ICD-10-CM

## 2025-08-13 DIAGNOSIS — M54.16 LUMBAR RADICULOPATHY: ICD-10-CM

## 2025-08-13 DIAGNOSIS — M48.062 SPINAL STENOSIS OF LUMBAR REGION WITH NEUROGENIC CLAUDICATION: ICD-10-CM

## 2025-08-13 DIAGNOSIS — M54.42 CHRONIC BILATERAL LOW BACK PAIN WITH BILATERAL SCIATICA: Primary | ICD-10-CM

## 2025-08-13 DIAGNOSIS — M51.372 DEGENERATION OF INTERVERTEBRAL DISC OF LUMBOSACRAL REGION WITH DISCOGENIC BACK PAIN AND LOWER EXTREMITY PAIN: ICD-10-CM

## 2025-08-13 DIAGNOSIS — G89.29 CHRONIC BILATERAL LOW BACK PAIN WITH BILATERAL SCIATICA: Primary | ICD-10-CM

## 2025-08-13 DIAGNOSIS — M51.16 HERNIATION OF LUMBAR INTERVERTEBRAL DISC WITH RADICULOPATHY: ICD-10-CM

## 2025-08-13 DIAGNOSIS — M53.3 SACROILIAC JOINT DYSFUNCTION OF RIGHT SIDE: ICD-10-CM

## 2025-08-13 DIAGNOSIS — M50.00 CERVICAL DISC DISEASE WITH MYELOPATHY: ICD-10-CM

## 2025-08-13 PROCEDURE — G0463 HOSPITAL OUTPT CLINIC VISIT: HCPCS | Performed by: PHYSICAL MEDICINE & REHABILITATION

## 2025-08-18 ENCOUNTER — HOSPITAL ENCOUNTER (OUTPATIENT)
Dept: PAIN MEDICINE | Facility: HOSPITAL | Age: 65
Discharge: HOME | End: 2025-08-18
Payer: MEDICARE

## 2025-08-18 ENCOUNTER — HOSPITAL ENCOUNTER (OUTPATIENT)
Dept: GENERAL RADIOLOGY | Facility: HOSPITAL | Age: 65
Discharge: HOME | End: 2025-08-18
Payer: MEDICARE

## 2025-08-18 VITALS
RESPIRATION RATE: 16 BRPM | OXYGEN SATURATION: 96 % | TEMPERATURE: 98.1 F | HEART RATE: 75 BPM | DIASTOLIC BLOOD PRESSURE: 104 MMHG | SYSTOLIC BLOOD PRESSURE: 157 MMHG

## 2025-08-18 DIAGNOSIS — M54.41 CHRONIC BILATERAL LOW BACK PAIN WITH BILATERAL SCIATICA: Primary | ICD-10-CM

## 2025-08-18 DIAGNOSIS — M54.42 CHRONIC BILATERAL LOW BACK PAIN WITH BILATERAL SCIATICA: Primary | ICD-10-CM

## 2025-08-18 DIAGNOSIS — G89.29 CHRONIC BILATERAL LOW BACK PAIN WITH BILATERAL SCIATICA: Primary | ICD-10-CM

## 2025-08-18 DIAGNOSIS — M54.16 LUMBAR RADICULOPATHY: ICD-10-CM

## 2025-08-18 DIAGNOSIS — M54.50 PAIN OF LUMBAR SPINE: ICD-10-CM

## 2025-08-18 PROCEDURE — 64483 NJX AA&/STRD TFRM EPI L/S 1: CPT | Performed by: PHYSICAL MEDICINE & REHABILITATION

## 2025-08-18 PROCEDURE — 25510000001 IOPAMIDOL 41 % SOLUTION: Performed by: PHYSICAL MEDICINE & REHABILITATION

## 2025-08-18 PROCEDURE — 77003 FLUOROGUIDE FOR SPINE INJECT: CPT

## 2025-08-18 PROCEDURE — 25010000002 LIDOCAINE PF 1% 1 % SOLUTION: Performed by: PHYSICAL MEDICINE & REHABILITATION

## 2025-08-18 PROCEDURE — 25010000002 DEXAMETHASONE SODIUM PHOSPHATE 10 MG/ML SOLUTION: Performed by: PHYSICAL MEDICINE & REHABILITATION

## 2025-08-18 RX ORDER — DEXAMETHASONE SODIUM PHOSPHATE 10 MG/ML
10 INJECTION, SOLUTION INTRAMUSCULAR; INTRAVENOUS ONCE
Status: COMPLETED | OUTPATIENT
Start: 2025-08-18 | End: 2025-08-18

## 2025-08-18 RX ORDER — IOPAMIDOL 408 MG/ML
10 INJECTION, SOLUTION INTRATHECAL
Status: COMPLETED | OUTPATIENT
Start: 2025-08-18 | End: 2025-08-18

## 2025-08-18 RX ORDER — LIDOCAINE HYDROCHLORIDE 10 MG/ML
5 INJECTION, SOLUTION EPIDURAL; INFILTRATION; INTRACAUDAL; PERINEURAL ONCE
Status: COMPLETED | OUTPATIENT
Start: 2025-08-18 | End: 2025-08-18

## 2025-08-18 RX ADMIN — LIDOCAINE HYDROCHLORIDE 5 ML: 10 INJECTION, SOLUTION EPIDURAL; INFILTRATION; INTRACAUDAL; PERINEURAL at 09:55

## 2025-08-18 RX ADMIN — DEXAMETHASONE SODIUM PHOSPHATE 10 MG: 10 INJECTION, SOLUTION INTRAMUSCULAR; INTRAVENOUS at 09:55

## 2025-08-18 RX ADMIN — IOPAMIDOL 10 ML: 408 INJECTION, SOLUTION INTRATHECAL at 09:55

## 2025-08-29 ENCOUNTER — OFFICE VISIT (OUTPATIENT)
Dept: FAMILY MEDICINE CLINIC | Facility: CLINIC | Age: 65
End: 2025-08-29
Payer: MEDICARE

## 2025-08-29 VITALS
DIASTOLIC BLOOD PRESSURE: 70 MMHG | SYSTOLIC BLOOD PRESSURE: 98 MMHG | HEIGHT: 70 IN | TEMPERATURE: 97.8 F | OXYGEN SATURATION: 97 % | WEIGHT: 235 LBS | RESPIRATION RATE: 18 BRPM | HEART RATE: 79 BPM | BODY MASS INDEX: 33.64 KG/M2

## 2025-08-29 DIAGNOSIS — K22.719 BARRETT'S ESOPHAGUS WITH DYSPLASIA: ICD-10-CM

## 2025-08-29 DIAGNOSIS — M48.062 SPINAL STENOSIS OF LUMBAR REGION WITH NEUROGENIC CLAUDICATION: ICD-10-CM

## 2025-08-29 DIAGNOSIS — Z87.891 FORMER TOBACCO USE: ICD-10-CM

## 2025-08-29 DIAGNOSIS — R49.0 HOARSENESS: ICD-10-CM

## 2025-08-29 DIAGNOSIS — Z86.0100 HISTORY OF COLON POLYPS: ICD-10-CM

## 2025-08-29 RX ORDER — HYDROCODONE BITARTRATE AND ACETAMINOPHEN 5; 325 MG/1; MG/1
1 TABLET ORAL EVERY 8 HOURS PRN
Qty: 90 TABLET | Refills: 0 | Status: CANCELLED | OUTPATIENT
Start: 2025-08-29

## 2025-08-29 RX ORDER — HYDROCODONE BITARTRATE AND ACETAMINOPHEN 7.5; 325 MG/1; MG/1
1 TABLET ORAL EVERY 8 HOURS PRN
Qty: 90 TABLET | Refills: 0 | Status: SHIPPED | OUTPATIENT
Start: 2025-08-29

## (undated) DEVICE — RADIFOCUS OBTURATOR: Brand: RADIFOCUS

## (undated) DEVICE — GLV SURG TRIUMPH PF LTX 7.5 STRL

## (undated) DEVICE — EPIDURAL TRAY: Brand: MEDLINE INDUSTRIES, INC.

## (undated) DEVICE — SKIN PREP TRAY W/CHG: Brand: MEDLINE INDUSTRIES, INC.

## (undated) DEVICE — CATH DIAG IMPULSE FL4 5F 100CM

## (undated) DEVICE — GW DIAG EMERALD HEPCOAT MOVE JTIP STD .035 3MM 150CM

## (undated) DEVICE — NDL SPINE 22G 5IN BLK

## (undated) DEVICE — PK TRY HEART CATH 50

## (undated) DEVICE — CATH DIAG IMPULSE FR4 5F 100CM

## (undated) DEVICE — PINNACLE INTRODUCER SHEATH: Brand: PINNACLE

## (undated) DEVICE — CATH DIAG IMPULSE PIG 5F 100CM

## (undated) DEVICE — Device: Brand: PORTEX